# Patient Record
Sex: FEMALE | Race: BLACK OR AFRICAN AMERICAN | NOT HISPANIC OR LATINO | Employment: OTHER | URBAN - METROPOLITAN AREA
[De-identification: names, ages, dates, MRNs, and addresses within clinical notes are randomized per-mention and may not be internally consistent; named-entity substitution may affect disease eponyms.]

---

## 2017-01-05 ENCOUNTER — OFFICE VISIT (OUTPATIENT)
Dept: PODIATRY | Facility: CLINIC | Age: 82
End: 2017-01-05
Payer: MEDICARE

## 2017-01-05 VITALS
HEIGHT: 72 IN | SYSTOLIC BLOOD PRESSURE: 153 MMHG | HEART RATE: 73 BPM | WEIGHT: 137 LBS | DIASTOLIC BLOOD PRESSURE: 70 MMHG | BODY MASS INDEX: 18.56 KG/M2

## 2017-01-05 DIAGNOSIS — L97.509 FOOT ULCER DUE TO SECONDARY DM: Primary | Chronic | ICD-10-CM

## 2017-01-05 DIAGNOSIS — E13.621 FOOT ULCER DUE TO SECONDARY DM: Primary | Chronic | ICD-10-CM

## 2017-01-05 DIAGNOSIS — I73.9 PERIPHERAL VASCULAR DISEASE: ICD-10-CM

## 2017-01-05 PROCEDURE — 3078F DIAST BP <80 MM HG: CPT | Mod: S$GLB,,, | Performed by: PODIATRIST

## 2017-01-05 PROCEDURE — 1126F AMNT PAIN NOTED NONE PRSNT: CPT | Mod: S$GLB,,, | Performed by: PODIATRIST

## 2017-01-05 PROCEDURE — 1159F MED LIST DOCD IN RCRD: CPT | Mod: S$GLB,,, | Performed by: PODIATRIST

## 2017-01-05 PROCEDURE — 99213 OFFICE O/P EST LOW 20 MIN: CPT | Mod: 25,S$GLB,, | Performed by: PODIATRIST

## 2017-01-05 PROCEDURE — 11042 DBRDMT SUBQ TIS 1ST 20SQCM/<: CPT | Mod: S$GLB,,, | Performed by: PODIATRIST

## 2017-01-05 PROCEDURE — 1157F ADVNC CARE PLAN IN RCRD: CPT | Mod: S$GLB,,, | Performed by: PODIATRIST

## 2017-01-05 PROCEDURE — 1160F RVW MEDS BY RX/DR IN RCRD: CPT | Mod: S$GLB,,, | Performed by: PODIATRIST

## 2017-01-05 PROCEDURE — 3077F SYST BP >= 140 MM HG: CPT | Mod: S$GLB,,, | Performed by: PODIATRIST

## 2017-01-05 NOTE — MR AVS SNAPSHOT
[address]  [address]   [place] [address]  Phone: [phone]  Fax: [phone]                  Chey Trujillo   2017 9:30 AM   Office Visit    Description:  Female : 1935   Provider:  Vinay Zaragoza Jr., DPM   Department:  [facility]           Reason for Visit     Establish Care     Diabetic Foot Exam           Diagnoses this Visit        Comments    Foot ulcer due to secondary DM    -  Primary     Peripheral vascular disease                To Do List           Future Appointments        Provider Department Dept Phone    2017 3:40 PM John Quezada MD [facility] - Neurology [phone]    2017 11:05 AM Vinay Zaragoza Jr., DPM [facility] [phone]    3/2/2017 2:30 PM Shirley De La O APRN,ANP-C [facility] - Endocrinology [phone]    3/23/2017 11:20 AM TELEPHONE CHECK, PACEMAKER [facility] - Arrhythmia [phone]      Goals (5 Years of Data)     None      Follow-Up and Disposition     Return in about 2 weeks (around 2017) for woundcare.       These Medications        Disp Refills Start End    collagenase ointment 90 g 1 2017     Apply topically once daily. - Topical (Top)    Pharmacy: [facility]- [facility] - [place], [place] - [address] Ph #: [phone]         [facility]     [facility] On Call Nurse Care Line -  Assistance  Registered nurses in the [facility] Center provide clinical advisement, health education, appointment booking, and other advisory services.  Call for this free service at [phone].             Medications           Message regarding Medications     Verify the changes and/or additions to your medication regime listed below are the same as discussed with your clinician today.  If any of these changes or additions are incorrect, please notify your healthcare provider.        START taking these NEW medications        Refills     collagenase ointment 1    Sig: Apply topically once daily.    Class: Normal    Route: Topical (Top)           Verify that the below list of medications is an accurate representation of the medications you are currently taking.  If none reported, the list may be blank. If incorrect, please contact your healthcare provider. Carry this list with you in case of emergency.           Current Medications     amiodarone (PACERONE) 200 MG Tab Take 1 tablet (200 mg total) by mouth once daily.    amlodipine (NORVASC) 10 MG tablet Take 1 tablet (10 mg total) by mouth once daily.    aspirin 81 MG chewable tablet Take 1 tablet by mouth Every morning.    blood sugar diagnostic (ACCU-CHEK SMARTVIEW TEST STRIP) Strp 1 strip by Misc.(Non-Drug; Combo Route) route 4 (four) times daily.    blood-glucose meter Misc Dispense Accu-Chek Natalia meter    carvedilol (COREG) 25 MG tablet Take 1 tablet (25 mg total) by mouth 2 (two) times daily with meals.    cloNIDine (CATAPRES) 0.2 MG tablet Take 1 tablet (0.2 mg total) by mouth 2 (two) times daily.    cyanocobalamin, vitamin B-12, 1,000 mcg TbSR Take 1,000 mcg by mouth once daily.    dorzolamide (TRUSOPT) 2 % ophthalmic solution INSTILL ONE DROP INTO EACH EYE TWICE DAILY    doxazosin (CARDURA) 8 MG Tab TAKE ONE TABLET BY MOUTH ONCE DAILY IN THE EVENING    ferrous sulfate 324 mg (65 mg iron) TbEC TAKE ONE TABLET BY MOUTH TWICE DAILY ,  TAKE  WITH  A  SMALL  AMOUNT  OF  LEMON  WATER    hydrALAZINE (APRESOLINE) 100 MG tablet Take 1 tablet (100 mg total) by mouth every 8 (eight) hours.    insulin glargine (LANTUS) 100 unit/mL injection INJECT 10 units nightly.    insulin lispro (HUMALOG) 100 unit/mL injection Take 9 units with breakfast, 9 units with lunch, and 8 units with dinner. Plus sliding scale    insulin syringe-needle U-100 (EASY TOUCH INSULIN SYRINGE) 0.5 mL 31 gauge x 5/16 Syrg To use 4 times daily with insulin injections    isosorbide dinitrate (ISORDIL) 40 MG Tab Take 1 tablet (40 mg  "total) by mouth 3 (three) times daily.    lactulose (CEPHULAC) 20 gram Pack Take 1 packet (20 g total) by mouth once daily.    lancets Misc 1 lancet by Misc.(Non-Drug; Combo Route) route 4 (four) times daily.    latanoprost 0.005 % ophthalmic solution INSTILL ONE DROP INTO EACH EYE IN THE EVENING    lidocaine (LIDODERM) 5 % Place 1 patch onto the skin once daily. Remove & Discard patch within 12 hours or as directed by MD. Can apply to back area for pain    nitroGLYCERIN 0.4 MG/DOSE TL SPRY (NITROLINGUAL) 400 mcg/spray spray PLACE ONE SPRAY UNDER THE TONGUE EVERY 5 MINUTES AS NEEDED FOR CHEST PAIN.    NITROSTAT 0.3 mg SL tablet DISSOLVE ONE TABLET UNDER THE TONGUE EVERY 5 MINUTES AS NEEDED FOR CHEST PAIN.  DO NOT EXCEED A TOTAL OF 3 DOSES IN 15 MINUTES    paricalcitol (ZEMPLAR) 1 MCG capsule Take 1 capsule by mouth on Monday and Friday.    potassium chloride SA (K-DUR,KLOR-CON) 10 MEQ tablet Take 1 tablet (10 mEq total) by mouth 2 (two) times daily.    pravastatin (PRAVACHOL) 20 MG tablet TAKE 1 TABLET BY MOUTH once DAILY    sodium bicarbonate 650 MG tablet     torsemide (DEMADEX) 20 MG Tab Take 2 tablets (40 mg total) by mouth every morning.    warfarin (COUMADIN) 5 MG tablet     ZOSTAVAX, PF, 19,400 unit/0.65 mL injection     collagenase ointment Apply topically once daily.           Clinical Reference Information           Vital Signs - Last Recorded  Most recent update: 1/5/2017  9:29 AM by Terra Alvarado MA    BP Pulse Ht Wt BMI    (!) 153/70 (BP Location: Right arm, Patient Position: Sitting) 73 6' 2" (1.88 m) 62.1 kg (137 lb) 17.59 kg/m2      Blood Pressure          Most Recent Value    BP  (!)  153/70      Allergies as of 1/5/2017     Losartan    0.225 % Sodium Chloride    Amitriptyline    Azopt  [Brinzolamide]    Ciprofloxacin    Codeine    Cantil  [Mepenzolate Bromide]    Duragal-s    Erythromycin    Fentanyl    Hydrocodone-acetaminophen    Indomethacin    Meperidine    Minoxidil    Morphine    " Neuromuscular Blockers, Steroidal    Nifedipine    Oxycodone Hcl-oxycodone-asa    Penicillins    Propoxyphene    Sensipar [Cinacalcet]    Talwin Compound    Talwin  [Pentazocine Lactate]    Valsartan      Immunizations Administered on Date of Encounter - 1/5/2017     None      Orders Placed During Today's Visit     Future Labs/Procedures Expected by Expires    C-reactive protein  1/5/2017 3/6/2018    PREALBUMIN  1/5/2017 3/6/2018    Sedimentation rate, manual  1/5/2017 3/6/2018    X-Ray Calcaneus Bilateral  1/5/2017 1/5/2018    X-Ray Foot Complete Bilateral  1/5/2017 1/5/2018

## 2017-01-05 NOTE — PROGRESS NOTES
Subjective:    Patient ID: Chey Trujillo is a 81 y.o. female.    Chief Complaint: Establish Care (diabetic last appt 2 weeks ago, heel wounds) and Diabetic Foot Exam      HPI:   Chey is a 81 y.o. female who presents to the clinic for evaluation and treatment of high risk feet. 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 The patient's chief complaint is foot ulcer, R heel. This patient has documented high risk feet requiring routine maintenance secondary to diabetes mellitis and those secondary complications of diabetes, as mentioned..  Pt has established care with dr. Lola PRITCHETT at [facility].  Wants foot  closer to home. Has been moving all care to [place].   Gets home health from [facility].      PCP: Viviana Nguyen MD    Date Last Seen by PCP: in epic    Current shoe  gear:  Affected Foot: Flip-flops    History of Trauma: negative  Sign of Infection: none    Hemoglobin A1C   Date Value Ref Range Status   11/15/2016 7.8 (H) 4.5 - 6.2 % Final     Comment:     According to ADA guidelines, hemoglobin A1C <7.0% represents  optimal control in non-pregnant diabetic patients.  Different  metrics may apply to specific populations.   Standards of Medical Care in Diabetes - 2016.  For the purpose of screening for the presence of diabetes:  <5.7%     Consistent with the absence of diabetes  5.7-6.4%  Consistent with increasing risk for diabetes   (prediabetes)  >or=6.5%  Consistent with diabetes  Currently no consensus exists for use of hemoglobin A1C  for diagnosis of diabetes for children.     09/27/2016 7.5 (H) 4.5 - 6.2 % Final     Comment:     According to ADA guidelines, hemoglobin A1C <7.0% represents  optimal control in non-pregnant diabetic patients.  Different  metrics may apply to specific populations.   Standards of Medical Care in Diabetes - 2016.  For the purpose of screening for the presence of diabetes:  <5.7%     Consistent with the absence of diabetes  5.7-6.4%  Consistent with increasing risk for diabetes   (prediabetes)  >or=6.5%  Consistent with diabetes  Currently no consensus exists for use of hemoglobin A1C  for diagnosis of diabetes for children.     08/16/2016 7.2 (H) 4.5 - 6.2 % Final     Comment:     According to ADA guidelines, hemoglobin A1C <7.0% represents  optimal control in non-pregnant diabetic patients.  Different  metrics may apply to specific populations.   Standards of Medical Care in Diabetes - 2016.  For the purpose of screening for the presence of diabetes:  <5.7%     Consistent with the absence of diabetes  5.7-6.4%  Consistent with increasing risk for diabetes   (prediabetes)  >or=6.5%  Consistent with diabetes  Currently no consensus exists for use of hemoglobin A1C  for diagnosis of diabetes for children.       HPI    Last Podiatry Enc: Visit  date not found  Last Enc w/ Me: Visit date not found    Past Medical History   Diagnosis Date    Anticoagulation monitoring by pharmacist 6/29/2012    At risk for amiodarone toxicity with long term use 1/27/2014    Atrial fibrillation     Bilateral carotid artery disease 1/11/2016    Blood transfusion     CAD S/P percutaneous coronary angioplasty 9/27/2012    Chronic diastolic heart failure 9/27/2012     Echo 3-: 1 - Concentric hypertrophy.  2 - Normal left ventricular systolic function (EF 60-65%).  3 - Right ventricular enlargement with hypertrophy, with normal systolic function.  4 - Left ventricular diastolic dysfunction.  5 - Biatrial enlargement.  6 - Mild tricuspid regurgitation.  7 - Pulmonary hypertension. The estimated PA systolic pressure is 55 mmHg.  8 - Increased central venous pressure (IVC is greater than 3cm in diameter).     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  ROWAN placed just proximal to stent in  for UA.  Cath 2011: normal LMCA, 40% mid LAD, 50% distal LCx    Cardiac pacemaker placement  2012     Quinn Jerome DR, serial #[id number]    Eye surgery      Cataract extraction       Status post cataract extraction and insertion of intraocular lens of right eye     Social History     Social History    Marital status:      Spouse name: N/A    Number of children: N/A    Years of education: N/A     Occupational History    Not on file.     Social History Main Topics    Smoking status: Never Smoker    Smokeless tobacco: Never Used    Alcohol use No    Drug use: No    Sexual activity: No     Other Topics Concern    Not on file     Social History Narrative    Son lives with her. Spouse .          Current Outpatient Prescriptions:     amiodarone (PACERONE) 200 MG Tab, Take 1 tablet (200 mg total) by mouth once daily., Disp: 90 tablet, Rfl: 3    amlodipine (NORVASC) 10 MG tablet, Take 1 tablet (10 mg total) by mouth once daily., Disp: 90 tablet, Rfl: 3    aspirin 81 MG chewable tablet, Take 1 tablet by mouth Every morning., Disp: , Rfl:     blood sugar diagnostic (ACCU-CHEK SMARTVIEW TEST STRIP) Strp, 1 strip by Misc.(Non-Drug; Combo Route) route 4 (four) times daily., Disp: 150 strip, Rfl: 11    blood-glucose meter Misc, Dispense Accu-Chek Natalia meter, Disp: 1 each, Rfl: 0    carvedilol (COREG) 25 MG tablet, Take 1 tablet (25 mg total) by mouth 2 (two) times daily with meals., Disp: 270 tablet, Rfl: 4    cloNIDine (CATAPRES) 0.2 MG tablet, Take 1 tablet (0.2 mg total) by mouth 2 (two) times daily. (Patient taking differently: Take 0.3 mg by mouth 2 (two) times daily. ), Disp: 60 tablet, Rfl: 2    cyanocobalamin, vitamin B-12, 1,000 mcg TbSR, Take 1,000 mcg by mouth once daily., Disp: 30 each, Rfl: 0    dorzolamide (TRUSOPT) 2 % ophthalmic solution, INSTILL ONE DROP INTO  EACH EYE TWICE DAILY, Disp: 10 mL, Rfl: 11    doxazosin (CARDURA) 8 MG Tab, TAKE ONE TABLET BY MOUTH ONCE DAILY IN THE EVENING, Disp: 90 tablet, Rfl: 3    ferrous sulfate 324 mg (65 mg iron) TbEC, TAKE ONE TABLET BY MOUTH TWICE DAILY ,  TAKE  WITH  A  SMALL  AMOUNT  OF  LEMON  WATER, Disp: 60 tablet, Rfl: 0    hydrALAZINE (APRESOLINE) 100 MG tablet, Take 1 tablet (100 mg total) by mouth every 8 (eight) hours., Disp: 270 tablet, Rfl: 4    insulin glargine (LANTUS) 100 unit/mL injection, INJECT 10 units nightly., Disp: 30 mL, Rfl: 12    insulin lispro (HUMALOG) 100 unit/mL injection, Take 9 units with breakfast, 9 units with lunch, and 8 units with dinner. Plus sliding scale, Disp: 2 vial, Rfl: 8    insulin syringe-needle U-100 (EASY TOUCH INSULIN SYRINGE) 0.5 mL 31 gauge x 5/16 Syrg, To use 4 times daily with insulin injections, Disp: 150 each, Rfl: 2    isosorbide dinitrate (ISORDIL) 40 MG Tab, Take 1 tablet (40 mg total) by mouth 3 (three) times daily., Disp: 270 tablet, Rfl: 3    lactulose (CEPHULAC) 20 gram Pack, Take 1 packet (20 g total) by mouth once daily., Disp: 90 packet, Rfl: 6    lancets Misc, 1 lancet by Misc.(Non-Drug; Combo Route) route 4 (four) times daily., Disp: 150 each, Rfl: 11    latanoprost 0.005 % ophthalmic solution, INSTILL ONE DROP INTO EACH EYE IN THE EVENING, Disp: 3 Bottle, Rfl: 12    lidocaine (LIDODERM) 5 %, Place 1 patch onto the skin once daily. Remove & Discard patch within 12 hours or as directed by MD. Can apply to back area for pain, Disp: 30 patch, Rfl: 1    nitroGLYCERIN 0.4 MG/DOSE TL SPRY (NITROLINGUAL) 400 mcg/spray spray, PLACE ONE SPRAY UNDER THE TONGUE EVERY 5 MINUTES AS NEEDED FOR CHEST PAIN., Disp: 4.9 g, Rfl: 0    NITROSTAT 0.3 mg SL tablet, DISSOLVE ONE TABLET UNDER THE TONGUE EVERY 5 MINUTES AS NEEDED FOR CHEST PAIN.  DO NOT EXCEED A TOTAL OF 3 DOSES IN 15 MINUTES, Disp: 100 tablet, Rfl: 0    paricalcitol (ZEMPLAR) 1 MCG capsule, Take 1 capsule by mouth  on Monday and Friday., Disp: 8 capsule, Rfl: 3    potassium chloride SA (K-DUR,KLOR-CON) 10 MEQ tablet, Take 1 tablet (10 mEq total) by mouth 2 (two) times daily., Disp: 60 tablet, Rfl: 11    pravastatin (PRAVACHOL) 20 MG tablet, TAKE 1 TABLET BY MOUTH once DAILY, Disp: 90 tablet, Rfl: 0    sodium bicarbonate 650 MG tablet, , Disp: , Rfl:     torsemide (DEMADEX) 20 MG Tab, Take 2 tablets (40 mg total) by mouth every morning., Disp: 180 tablet, Rfl: 4    warfarin (COUMADIN) 5 MG tablet, , Disp: , Rfl:     ZOSTAVAX, PF, 19,400 unit/0.65 mL injection, , Disp: , Rfl:     collagenase ointment, Apply topically once daily., Disp: 90 g, Rfl: 1  Review of patient's allergies indicates:   Allergen Reactions    Losartan Other (See Comments)     Hyperkalemia    0.225 % sodium chloride      Other reaction(s): Eye irritation    Amitriptyline      Other reaction(s): Vomiting  Other reaction(s): Vomiting    Azopt  [brinzolamide]      Other reaction(s): Eye irritation    Ciprofloxacin Nausea And Vomiting     Other reaction(s): Stomach upset    Codeine      Other reaction(s): Unknown  Other reaction(s): Unknown    Cantil  [mepenzolate bromide]      Other reaction(s): Unknown  Other reaction(s): Unknown    Duragal-s      Other reaction(s): Vomiting    Erythromycin      Other reaction(s): Unknown  Other reaction(s): Unknown    Fentanyl      Other reaction(s): Vomiting    Hydrocodone-acetaminophen      Other reaction(s): Unknown  Other reaction(s): Unknown    Indomethacin      Other reaction(s): Unknown  Other reaction(s): Unknown    Meperidine      Other reaction(s): Unknown  Other reaction(s): Unknown    Minoxidil      Other reaction(s): druged  Other reaction(s): nausea and vomiting  Other reaction(s): nausea and vomiting  Other reaction(s): druged    Morphine      Other reaction(s): Unknown  Other reaction(s): Unknown    Neuromuscular blockers, steroidal      Other reaction(s): Unknown    Nifedipine       "Other reaction(s): Swelling  Other reaction(s): Swelling    Oxycodone hcl-oxycodone-asa      Other reaction(s): Unknown  Other reaction(s): Unknown    Penicillins Hives     Other reaction(s): Unknown    Propoxyphene      Other reaction(s): Unknown    Sensipar [cinacalcet] Nausea Only    Talwin compound      Other reaction(s): Unknown    Talwin  [pentazocine lactate]      Other reaction(s): Unknown    Valsartan Rash and Hives       Visit Vitals    BP (!) 153/70 (BP Location: Right arm, Patient Position: Sitting)    Pulse 73    Ht 6' 2" (1.88 m)    Wt 62.1 kg (137 lb)    BMI 17.59 kg/m2       ROS  ROS Constitutional:  General Appearance: malnourished  Vascular: positive for edema  Musculoskeletal: negative for joint paint and joint edema  Skin: negative for rashes and lesions  Neurological: negative for burning, tingling and numbness  Gastrointestinal: negative for stomach pain, nausea and vomiting        Objective:        Ortho/SPM Exam  Physical Exam  Physical Exam   Constitutional: She is oriented to person, place, and time. She appears well-developed and well-nourished.   Cardiovascular:   Dorsalis pedis and posterior tibial pulses are diminished Bilaterally. Toes are cool to touch. Feet are warm proximally.There is decreased digital hair. Skin is atrophic, slightly hyperpigmented, and mildly edematous       Musculoskeletal: Normal range of motion. She exhibits no edema or tenderness.   Adequate joint range of motion without pain, limitation, nor crepitation Bilateral feet and ankle joints. Muscle strength is 5/5 in all groups bilaterally.         Neurological: She is alert and oriented to person, place, and time.   Atwood-Mayela 5.07 monofilamant testing is diminished Haris feet. Sharp/dull sensation diminished Bilaterally. Light touch absent Bilaterally.       Skin: Skin is warm, dry. No bruising, no ecchymosis and no lesion noted. No erythema.   Nails x10 are elongated by  2-5mm's, thickened by 1-4 " "mm's, dystrophic, and are darkened in  coloration . Xerosis Bilaterally. No open lesions noted.    Hyperkeratotic tissue noted to posterior left heel    Psychiatric: She has a normal mood and affect. Her behavior is normal. Judgment and thought content normal.   Vitals reviewed.    Ulcer Location: R posterior heel  Measurements: .5x.5x.5  Periwound: Intact and hyperkeratotic  Drainage: none  Purulence: None.  Malodor: None.  Base:  100% fibrotic  Signs of infection: None.          Assessment:     Imaging: ordered on way out        1. Foot ulcer due to secondary DM    2. Peripheral vascular disease          Plan:       Orders Placed This Encounter    Debridement    X-Ray Foot Complete Bilateral    X-Ray Calcaneus Bilateral    Sedimentation rate, manual    C-reactive protein    PREALBUMIN    collagenase ointment     Wound Debridement  Date/Time: 1/5/2017 10:10 AM  Performed by: SHAWNEE SOLORZANO JR.  Authorized by: SHAWNEE SOLORZANO JR.     Time out: Immediately prior to procedure a "time out" was called to verify the correct patient, procedure, equipment, support staff and site/side marked as required.    Consent Done?:  Yes (Verbal)    Preparation: Patient was prepped and draped in usual sterile fashion    Local anesthesia used?: No      Wound Details:    Location:  Right foot    Location:  Right Heel    Type of Debridement:  Excisional       Length (cm):  0.5       Area (sq cm):  0.25       Width (cm):  0.5       Percent Debrided (%):  100       Depth (cm):  0.5       Total Area Debrided (sq cm):  0.25    Depth of debridement:  Subcutaneous tissue    Tissue debrided:  Epidermis, Hypergranulation and Subcutaneous    Devitalized tissue debrided:  Biofilm, Callus and Sough    Instruments:  Blade    Bleeding:  Minimal  Hemostasis Achieved: Yes    Method Used:  Pressure  Patient tolerance:  Patient tolerated the procedure well with no immediate complications        Chey was seen today for establish care and " diabetic foot exam.    Diagnoses and all orders for this visit:    Foot ulcer due to secondary DM  -     X-Ray Foot Complete Bilateral; Future  -     X-Ray Calcaneus Bilateral; Future  -     Sedimentation rate, manual; Future  -     C-reactive protein; Future  -     PREALBUMIN; Future  -     collagenase ointment; Apply topically once daily.  -     Debridement    Peripheral vascular disease  -     X-Ray Foot Complete Bilateral; Future  -     X-Ray Calcaneus Bilateral; Future        Chey ADRIANO Ronnie is a 81 y.o. female presenting w/   1. Foot ulcer due to secondary DM    2. Peripheral vascular disease        -pt seen, evaluated, and managed  -dx discussed in detail. All questions/concerns addressed  -all tx options discussed. All alternatives, risks, benefits of all txs discussed  -The patient was educated regarding the above diagnosis. We discussed conservative care options regarding shoe wear and/or padding.  -rxs dispensed: santyl. Pickup rx and bring to nxt visit  -dressing applied: iodosorb+mepilex+football. Keep on for 1 wk only and then remove at home. Keep covered with bandaid+triple abx ointment until nxt visit  -xr and labs ordered on way out--> will review at nxt visit  -full DM foot exam w/ routine care when ulceration is healed    Return in about 2 weeks (around 1/19/2017) for woundcare.

## 2017-01-10 ENCOUNTER — LAB VISIT (OUTPATIENT)
Dept: LAB | Facility: HOSPITAL | Age: 82
End: 2017-01-10
Attending: INTERNAL MEDICINE
Payer: MEDICARE

## 2017-01-10 ENCOUNTER — ANTI-COAG VISIT (OUTPATIENT)
Dept: CARDIOLOGY | Facility: CLINIC | Age: 82
End: 2017-01-10

## 2017-01-10 DIAGNOSIS — I50.32 CHRONIC DIASTOLIC HEART FAILURE: Primary | ICD-10-CM

## 2017-01-10 DIAGNOSIS — I48.0 PAROXYSMAL ATRIAL FIBRILLATION: ICD-10-CM

## 2017-01-10 DIAGNOSIS — Z79.01 ANTICOAGULATION MONITORING BY PHARMACIST: ICD-10-CM

## 2017-01-10 LAB
INR PPP: 1.9
PROTHROMBIN TIME: 18.8 SEC

## 2017-01-10 PROCEDURE — 36415 COLL VENOUS BLD VENIPUNCTURE: CPT

## 2017-01-10 PROCEDURE — 85610 PROTHROMBIN TIME: CPT

## 2017-01-13 ENCOUNTER — OFFICE VISIT (OUTPATIENT)
Dept: NEUROLOGY | Facility: CLINIC | Age: 82
End: 2017-01-13
Payer: MEDICARE

## 2017-01-13 ENCOUNTER — TELEPHONE (OUTPATIENT)
Dept: PODIATRY | Facility: CLINIC | Age: 82
End: 2017-01-13

## 2017-01-13 VITALS
WEIGHT: 163 LBS | DIASTOLIC BLOOD PRESSURE: 66 MMHG | HEIGHT: 72 IN | HEART RATE: 72 BPM | BODY MASS INDEX: 22.08 KG/M2 | SYSTOLIC BLOOD PRESSURE: 125 MMHG

## 2017-01-13 DIAGNOSIS — E11.22 DIABETES MELLITUS WITH STAGE 4 CHRONIC KIDNEY DISEASE GFR 15-29: Chronic | ICD-10-CM

## 2017-01-13 DIAGNOSIS — Z95.0 PACEMAKER: Chronic | ICD-10-CM

## 2017-01-13 DIAGNOSIS — G47.33 OBSTRUCTIVE SLEEP APNEA ON CPAP: Chronic | ICD-10-CM

## 2017-01-13 DIAGNOSIS — F01.518 VASCULAR DEMENTIA WITH BEHAVIOR DISTURBANCE: Primary | ICD-10-CM

## 2017-01-13 DIAGNOSIS — I48.0 PAROXYSMAL ATRIAL FIBRILLATION: Chronic | ICD-10-CM

## 2017-01-13 DIAGNOSIS — N18.4 DIABETES MELLITUS WITH STAGE 4 CHRONIC KIDNEY DISEASE GFR 15-29: Chronic | ICD-10-CM

## 2017-01-13 PROCEDURE — 99214 OFFICE O/P EST MOD 30 MIN: CPT | Mod: S$PBB,,, | Performed by: PSYCHIATRY & NEUROLOGY

## 2017-01-13 PROCEDURE — 99213 OFFICE O/P EST LOW 20 MIN: CPT | Mod: PBBFAC,PO | Performed by: PSYCHIATRY & NEUROLOGY

## 2017-01-13 PROCEDURE — 99999 PR PBB SHADOW E&M-EST. PATIENT-LVL III: CPT | Mod: PBBFAC,,, | Performed by: PSYCHIATRY & NEUROLOGY

## 2017-01-13 RX ORDER — MEMANTINE HYDROCHLORIDE 10 MG/1
10 TABLET ORAL DAILY
Qty: 30 TABLET | Refills: 5 | Status: SHIPPED | OUTPATIENT
Start: 2017-01-13 | End: 2017-04-21 | Stop reason: SDUPTHER

## 2017-01-13 NOTE — TELEPHONE ENCOUNTER
Spoke with patients son, Gilberto  and let him know that the silver nitrate will turn the skin black. When asked he stated that the wound does not have any drainage, a foul smell or redness. Patient does not have any fever, nausea or vomiting.

## 2017-01-13 NOTE — PROGRESS NOTES
Subjective:       Patient ID: Chey Trujillo is a 81 y.o. female.    Chief Complaint:  Memory Loss      History of Present Illness  HPI   This is an 81-year-old female who returns in follow-up of memory difficulties.  She was accompanied by her daughters who reported that she was fairly stable until September 2016 when she became quite distraught after the death of her niece suddenly.  It is reported that she used to look after her niece as if she were her daughter.  She became increasingly withdrawn and inattentive and at times is rambling incoherently.  She was admitted to the hospital when she became increasingly confused however it was noted that she was not taking her medications as prescribed and her diabetes was not well controlled and in addition it was suspected she may have had a reaction to the clonidine.  However a workup done during that hospitalization included a CT scan of the brain that was unremarkable and the patient is otherwise stable when discharged.      Since being at home she had continued to be somewhat withdrawn, inattentive and repeats herself but was otherwise quite appropriate when talked to.  The patient was able to give an adequate history during this interview.  She is limited to a walker or wheelchair because of chronic back problems.  She has had no recent falls.  Neuropsychological evaluation done recently was considered a combination of mild dementia most likely vascular in type and underlying depression also contributing factor.  Discussed results of the neuropsychological testing with family.  The patient has multiple cerebrovascular risk factors as documented and is presently on Coumadin and aspirin 81 mg daily.       Review of Systems  Review of Systems   Constitutional: Negative.    HENT: Negative.  Negative for hearing loss.    Eyes: Negative.  Negative for visual disturbance.   Respiratory: Negative.  Negative for shortness of breath.    Cardiovascular: Negative.   Negative for chest pain and palpitations.   Gastrointestinal: Negative.    Endocrine: Negative.    Genitourinary: Negative.    Musculoskeletal: Positive for arthralgias, back pain and gait problem. Negative for neck pain.   Skin: Negative.    Allergic/Immunologic: Negative.    Neurological: Negative for tremors, seizures, syncope, speech difficulty, weakness, numbness and headaches.   Hematological: Negative.    Psychiatric/Behavioral: Positive for decreased concentration and sleep disturbance. Negative for confusion. The patient is nervous/anxious.        Objective:      Neurologic Exam     Motor Exam     Strength   Strength 5/5 throughout.       Physical Exam   Constitutional: She appears well-developed and well-nourished.   HENT:   Head: Normocephalic.   Neck: Normal range of motion. Neck supple.   Neurological: She is alert. She has normal strength. She displays no atrophy, no tremor and normal reflexes (DTRs generally depressed to absent distally with plantars being flexor). A sensory deficit (Primary sensations intact though diminished distally) is present. No cranial nerve deficit (Cranial nerves are essentially intact and symmetrical.  Pupils equal and reactive with EOM full.  No facial asymmetry is noted.  Corneals and facial sensations intact.  Tongue and palate movements are normal.). She exhibits normal muscle tone. Gait (Walks stiffly with slight imbalance related to back and hip problems.) abnormal. Coordination normal.   Patient is awake and responsive and able to give an adequate recent history.  She does remember details of what happened to her niece and appears to be focused on that.  She is oriented to place person and time.  Immediate recall 2/3.  Spelling backwards 3/5.  She does take time to answer questions and there is a tendency to perseverate.  She is otherwise appropriate able to follow commands without any difficulty.  Speech was of normal flow and content though rambling at times.    Vitals reviewed.        Assessment:        1. Vascular dementia with behavior disturbance    2. 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 Obstructive sleep apnea on CPAP - setting = 5     4. Paroxysmal atrial fibrillation    5. Diabetes mellitus with stage 4 chronic kidney disease GFR 15-29            Plan:         Discussed with patient and family.  Results of the neuropsychological testing would discussed with the family.  Will initiate Namenda 10 mg in the morning daily.  They will contact me in about 2-4 weeks regarding response at which time may consider the addition of an SSRI.  Discussed family regarding regular use of CPAP as hypoxemia related to sleep apnea can also contribute to memory difficulties.  Control of vascular risk factors especially diabetes.  They will follow-up in 3 months.

## 2017-01-13 NOTE — MR AVS SNAPSHOT
[facility] Neurology  [address] [place] [address], [address]  [place] [address]  Phone: [phone]                  Chey Trujillo   2017 3:40 PM   Office Visit    Description:  Female : 1935   Provider:  John Quezada MD   Department[facility]  [facility] - Neurology           Reason for Visit     Memory Loss           Diagnoses this Visit        Comments    Vascular dementia with behavior disturbance                To Do List           Future Appointments        Provider Department Dept Phone    2017 11:05 AM Vinay Zaragoza Jr., DPM [facility] [phone]    3/2/2017 2:30 PM Shirleysury De La O, APRN,ANP-C [facility] - Endocrinology [phone]    3/23/2017 11:20 AM TELEPHONE CHECK, PACEMAKER [facility] - Arrhythmia [phone]    2017 3:20 PM John Quezada MD [facility] [phone]      Goals (5 Years of Data)     None       These Medications        Disp Refills Start End    memantine (NAMENDA) 10 MG Tab 30 tablet 5 2017    Take 1 tablet (10 mg total) by mouth once daily. - Oral    Pharmacy: "[facility] - [place], [place] - [address] Ph #: [phone]         [facility] On Call     [facility] On Call Nurse Care Line - 24/7 Assistance  Registered nurses in the [facility] On Call Center provide clinical advisement, health education, appointment booking, and other advisory services.  Call for this free service at [phone].             Medications           Message regarding Medications     Verify the changes and/or additions to your medication regime listed below are the same as discussed with your clinician today.  If any of these changes or additions are incorrect, please notify your healthcare provider.        START taking these NEW medications        Refills    memantine (NAMENDA) 10 MG Tab 5    Sig: Take 1 tablet (10 mg total) by mouth once daily.    Class: Normal    Route: Oral           Verify that the below list of  medications is an accurate representation of the medications you are currently taking.  If none reported, the list may be blank. If incorrect, please contact your healthcare provider. Carry this list with you in case of emergency.           Current Medications     amiodarone (PACERONE) 200 MG Tab Take 1 tablet (200 mg total) by mouth once daily.    amlodipine (NORVASC) 10 MG tablet Take 1 tablet (10 mg total) by mouth once daily.    aspirin 81 MG chewable tablet Take 1 tablet by mouth Every morning.    blood sugar diagnostic (ACCU-CHEK SMARTVIEW TEST STRIP) Strp 1 strip by Misc.(Non-Drug; Combo Route) route 4 (four) times daily.    blood-glucose meter Misc Dispense Accu-Chek Natalia meter    carvedilol (COREG) 25 MG tablet Take 1 tablet (25 mg total) by mouth 2 (two) times daily with meals.    cloNIDine (CATAPRES) 0.2 MG tablet Take 1 tablet (0.2 mg total) by mouth 2 (two) times daily.    collagenase ointment Apply topically once daily.    cyanocobalamin, vitamin B-12, 1,000 mcg TbSR Take 1,000 mcg by mouth once daily.    dorzolamide (TRUSOPT) 2 % ophthalmic solution INSTILL ONE DROP INTO EACH EYE TWICE DAILY    doxazosin (CARDURA) 8 MG Tab TAKE ONE TABLET BY MOUTH ONCE DAILY IN THE EVENING    ferrous sulfate 324 mg (65 mg iron) TbEC TAKE ONE TABLET BY MOUTH TWICE DAILY ,  TAKE  WITH  A  SMALL  AMOUNT  OF  LEMON  WATER    hydrALAZINE (APRESOLINE) 100 MG tablet Take 1 tablet (100 mg total) by mouth every 8 (eight) hours.    insulin glargine (LANTUS) 100 unit/mL injection INJECT 10 units nightly.    insulin lispro (HUMALOG) 100 unit/mL injection Take 9 units with breakfast, 9 units with lunch, and 8 units with dinner. Plus sliding scale    insulin syringe-needle U-100 (EASY TOUCH INSULIN SYRINGE) 0.5 mL 31 gauge x 5/16 Syrg To use 4 times daily with insulin injections    isosorbide dinitrate (ISORDIL) 40 MG Tab Take 1 tablet (40 mg total) by mouth 3 (three) times daily.    lactulose (CEPHULAC) 20 gram Pack Take 1 packet  "(20 g total) by mouth once daily.    lancets Misc 1 lancet by Misc.(Non-Drug; Combo Route) route 4 (four) times daily.    latanoprost 0.005 % ophthalmic solution INSTILL ONE DROP INTO EACH EYE IN THE EVENING    lidocaine (LIDODERM) 5 % Place 1 patch onto the skin once daily. Remove & Discard patch within 12 hours or as directed by MD. Can apply to back area for pain    memantine (NAMENDA) 10 MG Tab Take 1 tablet (10 mg total) by mouth once daily.    nitroGLYCERIN 0.4 MG/DOSE TL SPRY (NITROLINGUAL) 400 mcg/spray spray PLACE ONE SPRAY UNDER THE TONGUE EVERY 5 MINUTES AS NEEDED FOR CHEST PAIN.    NITROSTAT 0.3 mg SL tablet DISSOLVE ONE TABLET UNDER THE TONGUE EVERY 5 MINUTES AS NEEDED FOR CHEST PAIN.  DO NOT EXCEED A TOTAL OF 3 DOSES IN 15 MINUTES    paricalcitol (ZEMPLAR) 1 MCG capsule Take 1 capsule by mouth on Monday and Friday.    potassium chloride SA (K-DUR,KLOR-CON) 10 MEQ tablet Take 1 tablet (10 mEq total) by mouth 2 (two) times daily.    pravastatin (PRAVACHOL) 20 MG tablet TAKE 1 TABLET BY MOUTH once DAILY    sodium bicarbonate 650 MG tablet     torsemide (DEMADEX) 20 MG Tab Take 2 tablets (40 mg total) by mouth every morning.    warfarin (COUMADIN) 5 MG tablet     ZOSTAVAX, PF, 19,400 unit/0.65 mL injection            Clinical Reference Information           Vital Signs - Last Recorded  Most recent update: 1/13/2017  3:45 PM by Tali Bledsoe MA    BP Pulse Ht Wt BMI    125/66 (BP Location: Right arm, Patient Position: Sitting, BP Method: Automatic) 72 6' 2" (1.88 m) 73.9 kg (163 lb) 20.93 kg/m2      Blood Pressure          Most Recent Value    BP  125/66      Allergies as of 1/13/2017     Losartan    0.225 % Sodium Chloride    Amitriptyline    Azopt  [Brinzolamide]    Ciprofloxacin    Codeine    Cantil  [Mepenzolate Bromide]    Duragal-s    Erythromycin    Fentanyl    Hydrocodone-acetaminophen    Indomethacin    Meperidine    Minoxidil    Morphine    Neuromuscular Blockers, Steroidal    Nifedipine    " Oxycodone Hcl-oxycodone-asa    Penicillins    Propoxyphene    Sensipar [Cinacalcet]    Talwin Compound    Talwin  [Pentazocine Lactate]    Valsartan      Immunizations Administered on Date of Encounter - 1/13/2017     None

## 2017-01-13 NOTE — TELEPHONE ENCOUNTER
"----- Message from Jose F Wahl sent at 1/13/2017 12:35 PM CST -----  Contact: pk Macias   [phone]  or  [phone]  "My mother kept the bandage on for 1 week and now she has a blacken heel.  I do not like the way this look.  Please call me back soon."  "

## 2017-01-13 NOTE — PATIENT INSTRUCTIONS
Discussed with patient and family.  Results of the neuropsychological testing would discussed with the family.  Will initiate Namenda 10 mg in the morning daily.  They will contact me in about 2-4 weeks regarding response at which time may consider the addition of an SSRI.  Discussed family regarding regular use of CPAP as hypoxemia related to sleep apnea can also contribute to memory difficulties.  Control of vascular risk factors especially diabetes.

## 2017-01-17 ENCOUNTER — ANTI-COAG VISIT (OUTPATIENT)
Dept: CARDIOLOGY | Facility: CLINIC | Age: 82
End: 2017-01-17

## 2017-01-17 DIAGNOSIS — Z79.01 ANTICOAGULATION MONITORING BY PHARMACIST: ICD-10-CM

## 2017-01-17 DIAGNOSIS — I48.0 PAROXYSMAL ATRIAL FIBRILLATION: ICD-10-CM

## 2017-01-18 DIAGNOSIS — D63.8 ANEMIA OF CHRONIC DISEASE: ICD-10-CM

## 2017-01-19 RX ORDER — AMIODARONE HYDROCHLORIDE 200 MG/1
TABLET ORAL
Qty: 90 TABLET | OUTPATIENT
Start: 2017-01-19

## 2017-01-19 RX ORDER — FERROUS SULFATE 324(65)MG
TABLET, DELAYED RELEASE (ENTERIC COATED) ORAL
Qty: 60 TABLET | Refills: 0 | Status: SHIPPED | OUTPATIENT
Start: 2017-01-19 | End: 2017-01-20 | Stop reason: SDUPTHER

## 2017-01-20 DIAGNOSIS — D63.8 ANEMIA OF CHRONIC DISEASE: ICD-10-CM

## 2017-01-20 RX ORDER — FERROUS SULFATE 324(65)MG
325 TABLET, DELAYED RELEASE (ENTERIC COATED) ORAL 2 TIMES DAILY
Qty: 60 TABLET | Refills: 0 | Status: SHIPPED | OUTPATIENT
Start: 2017-01-20 | End: 2017-06-19 | Stop reason: SDUPTHER

## 2017-01-24 ENCOUNTER — OFFICE VISIT (OUTPATIENT)
Dept: PODIATRY | Facility: CLINIC | Age: 82
End: 2017-01-24
Payer: MEDICARE

## 2017-01-24 VITALS
RESPIRATION RATE: 20 BRPM | WEIGHT: 158.38 LBS | SYSTOLIC BLOOD PRESSURE: 115 MMHG | BODY MASS INDEX: 21.45 KG/M2 | HEART RATE: 69 BPM | DIASTOLIC BLOOD PRESSURE: 63 MMHG | HEIGHT: 72 IN

## 2017-01-24 DIAGNOSIS — L97.509 FOOT ULCER DUE TO SECONDARY DM: Primary | Chronic | ICD-10-CM

## 2017-01-24 DIAGNOSIS — N18.4 DIABETES MELLITUS WITH STAGE 4 CHRONIC KIDNEY DISEASE GFR 15-29: Chronic | ICD-10-CM

## 2017-01-24 DIAGNOSIS — I73.9 PERIPHERAL VASCULAR DISEASE: ICD-10-CM

## 2017-01-24 DIAGNOSIS — E13.621 FOOT ULCER DUE TO SECONDARY DM: Primary | Chronic | ICD-10-CM

## 2017-01-24 DIAGNOSIS — E11.22 DIABETES MELLITUS WITH STAGE 4 CHRONIC KIDNEY DISEASE GFR 15-29: Chronic | ICD-10-CM

## 2017-01-24 PROCEDURE — 3074F SYST BP LT 130 MM HG: CPT | Mod: S$GLB,,, | Performed by: PODIATRIST

## 2017-01-24 PROCEDURE — 99213 OFFICE O/P EST LOW 20 MIN: CPT | Mod: 25,S$GLB,, | Performed by: PODIATRIST

## 2017-01-24 PROCEDURE — 1159F MED LIST DOCD IN RCRD: CPT | Mod: S$GLB,,, | Performed by: PODIATRIST

## 2017-01-24 PROCEDURE — 11042 DBRDMT SUBQ TIS 1ST 20SQCM/<: CPT | Mod: S$GLB,,, | Performed by: PODIATRIST

## 2017-01-24 PROCEDURE — 1126F AMNT PAIN NOTED NONE PRSNT: CPT | Mod: S$GLB,,, | Performed by: PODIATRIST

## 2017-01-24 PROCEDURE — 1157F ADVNC CARE PLAN IN RCRD: CPT | Mod: S$GLB,,, | Performed by: PODIATRIST

## 2017-01-24 PROCEDURE — 3078F DIAST BP <80 MM HG: CPT | Mod: S$GLB,,, | Performed by: PODIATRIST

## 2017-01-24 PROCEDURE — 1160F RVW MEDS BY RX/DR IN RCRD: CPT | Mod: S$GLB,,, | Performed by: PODIATRIST

## 2017-01-24 NOTE — MR AVS SNAPSHOT
[address]  [address]   [place] [address]  Phone: [phone]  Fax: [phone]                  Chey Trujillo   2017 11:00 AM   Office Visit    Description:  Female : 1935   Provider:  Vinay Zaragoza Jr., DPM   Department:  [facility]           Reason for Visit     Follow-up                To Do List           Future Appointments        Provider Department Dept Phone    2017 8:15 AM Vinay Zaragoza Jr., DPM [facility] [phone]    3/2/2017 2:30 PM JAYLENE Way,ANP-C [facility] - Endocrinology [phone]    3/23/2017 11:20 AM TELEPHONE CHECK, PACEMAKER [facility] - Arrhythmia [phone]    2017 3:20 PM John Quezada MD [facility] Neurology [phone]      Goals (5 Years of Data)     None      Follow-Up and Disposition     Return in about 1 week (around 2017).      [facility] On Call     [facility] On Call Nurse [facility] Line -  Assistance  Registered nurses in the [facility] On Call Center provide clinical advisement, health education, appointment booking, and other advisory services.  Call for this free service at [phone].             Medications           Message regarding Medications     Verify the changes and/or additions to your medication regime listed below are the same as discussed with your clinician today.  If any of these changes or additions are incorrect, please notify your healthcare provider.             Verify that the below list of medications is an accurate representation of the medications you are currently taking.  If none reported, the list may be blank. If incorrect, please contact your healthcare provider. Carry this list with you in case of emergency.           Current Medications     amiodarone (PACERONE) 200 MG Tab Take 1 tablet (200 mg total) by mouth once daily.    amlodipine (NORVASC) 10 MG tablet Take 1 tablet (10 mg total) by mouth once daily.     aspirin 81 MG chewable tablet Take 1 tablet by mouth Every morning.    blood sugar diagnostic (ACCU-CHEK SMARTVIEW TEST STRIP) Strp 1 strip by Misc.(Non-Drug; Combo Route) route 4 (four) times daily.    blood-glucose meter Misc Dispense Accu-Chek Natalia meter    carvedilol (COREG) 25 MG tablet Take 1 tablet (25 mg total) by mouth 2 (two) times daily with meals.    cloNIDine (CATAPRES) 0.2 MG tablet Take 1 tablet (0.2 mg total) by mouth 2 (two) times daily.    collagenase ointment Apply topically once daily.    cyanocobalamin, vitamin B-12, 1,000 mcg TbSR Take 1,000 mcg by mouth once daily.    dorzolamide (TRUSOPT) 2 % ophthalmic solution INSTILL ONE DROP INTO EACH EYE TWICE DAILY    doxazosin (CARDURA) 8 MG Tab TAKE ONE TABLET BY MOUTH ONCE DAILY IN THE EVENING    ferrous sulfate 324 mg (65 mg iron) TbEC Take 1 tablet (325 mg total) by mouth 2 (two) times daily.    hydrALAZINE (APRESOLINE) 100 MG tablet Take 1 tablet (100 mg total) by mouth every 8 (eight) hours.    insulin glargine (LANTUS) 100 unit/mL injection INJECT 10 units nightly.    insulin lispro (HUMALOG) 100 unit/mL injection Take 9 units with breakfast, 9 units with lunch, and 8 units with dinner. Plus sliding scale    insulin syringe-needle U-100 (EASY TOUCH INSULIN SYRINGE) 0.5 mL 31 gauge x 5/16 Syrg To use 4 times daily with insulin injections    isosorbide dinitrate (ISORDIL) 40 MG Tab Take 1 tablet (40 mg total) by mouth 3 (three) times daily.    lactulose (CEPHULAC) 20 gram Pack Take 1 packet (20 g total) by mouth once daily.    lancets Misc 1 lancet by Misc.(Non-Drug; Combo Route) route 4 (four) times daily.    latanoprost 0.005 % ophthalmic solution INSTILL ONE DROP INTO EACH EYE IN THE EVENING    lidocaine (LIDODERM) 5 % Place 1 patch onto the skin once daily. Remove & Discard patch within 12 hours or as directed by MD. Can apply to back area for pain    memantine (NAMENDA) 10 MG Tab Take 1 tablet (10 mg total) by mouth once daily.     "nitroGLYCERIN 0.4 MG/DOSE TL SPRY (NITROLINGUAL) 400 mcg/spray spray PLACE ONE SPRAY UNDER THE TONGUE EVERY 5 MINUTES AS NEEDED FOR CHEST PAIN.    NITROSTAT 0.3 mg SL tablet DISSOLVE ONE TABLET UNDER THE TONGUE EVERY 5 MINUTES AS NEEDED FOR CHEST PAIN.  DO NOT EXCEED A TOTAL OF 3 DOSES IN 15 MINUTES    paricalcitol (ZEMPLAR) 1 MCG capsule Take 1 capsule by mouth on Monday and Friday.    potassium chloride SA (K-DUR,KLOR-CON) 10 MEQ tablet Take 1 tablet (10 mEq total) by mouth 2 (two) times daily.    pravastatin (PRAVACHOL) 20 MG tablet TAKE 1 TABLET BY MOUTH once DAILY    sodium bicarbonate 650 MG tablet     torsemide (DEMADEX) 20 MG Tab Take 2 tablets (40 mg total) by mouth every morning.    warfarin (COUMADIN) 5 MG tablet     ZOSTAVAX, PF, 19,400 unit/0.65 mL injection            Clinical Reference Information           Vital Signs - Last Recorded  Most recent update: 1/24/2017 11:06 AM by Lenka Peter LPN    BP Pulse Resp Ht Wt BMI    115/63 (BP Location: Left arm, Patient Position: Sitting, BP Method: Automatic) 69 20 6' 2" (1.88 m) 71.8 kg (158 lb 6.4 oz) 20.34 kg/m2      Blood Pressure          Most Recent Value    BP  115/63      Allergies as of 1/24/2017     Losartan    0.225 % Sodium Chloride    Amitriptyline    Azopt  [Brinzolamide]    Ciprofloxacin    Codeine    Cantil  [Mepenzolate Bromide]    Duragal-s    Erythromycin    Fentanyl    Hydrocodone-acetaminophen    Indomethacin    Meperidine    Minoxidil    Morphine    Neuromuscular Blockers, Steroidal    Nifedipine    Oxycodone Hcl-oxycodone-asa    Penicillins    Propoxyphene    Sensipar [Cinacalcet]    Talwin Compound    Talwin  [Pentazocine Lactate]    Valsartan      Immunizations Administered on Date of Encounter - 1/24/2017     None      Instructions      Prealbumin (Blood)  Does this test have other names?  PA, transthyretin test  What is this test?  The prealbumin screen is a blood test to see whether you are getting enough nutrition in your " diet. Specifically, the test finds out if you have been getting enough protein. It also finds out whether you are at risk for malnutrition or already suffering from it. Prealbumin is a protein that is made mainly by your liver. Your body uses prealbumin to make other proteins. Prealbumin also carries thyroid hormones in the blood.  Why do I need this test?  You might have this test if you appear to be malnourished or if healthcare providers want to follow your nutritional progress. Your provider may also order this test if you are an older adult and he or she suspects you suffer from poor nutrition. Healthcare providers may also screen children for prealbumin if they appear undernourished. In addition, if you are in the hospital, your provider might order this test soon after you arrive to see whether you need more nutritional support as part of your treatment. Your provider may also order this test if you have an eating disorder.  What other tests might I have along with this test?  To watch your nutritional needs, your healthcare provider might order a C-reactive protein screen. This looks for another protein in your blood. If you appear to be malnourished, your healthcare provider may order other tests. These may include hemoglobin, albumin, iron, transferrin, folate, and vitamin B-12, and other electrolytes, vitamins, and minerals.  What do my test results mean?  Many things may affect your lab test results. These include the method each lab uses to do the test. Even if your test results are different from the normal value, you may not have a problem. To learn what the results mean for you, talk with your healthcare provider.  Low prealbumin scores mean that you are likely at risk for malnutrition and need careful assessment. Low prealbumin scores may also be a sign of liver disease, inflammation, or tissue death (tissue necrosis). High prealbumin scores may be a sign of long-term (chronic) kidney disease,  steroid use, or alcoholism.  Normal results for a prealbumin blood test are:  · Adults: 15 to 36 milligrams per deciliter (mg/dL) or 150 to 360 milligrams per liter (mg/L)  · Children: 6 to 21 mg/dL for an infant under 5 days old, 14 to 30 mg/dL for children ages 1 to 5, 15 to 33 mg/dL for children ages 6 to 9, 22 to 36 mg/dL for those ages 10 to 13, 22 to 45 mg/dL for those ages 14 to 19  How is this test done?  The test requires a blood sample, which is drawn through a needle from a vein in your arm.  Does this test pose any risks?  Taking a blood sample with a needle carries a small risk of bleeding, infection, or bruising. You may also feel dizzy. When the needle pricks your arm, you may feel a slight sting. Afterward, the site might be sore.  What might affect my test results?  Infection, inflammation, or recent trauma may affect your test results. This could make them more difficult to figure out. Experts suggest that people in the hospital who are tested for prealbumin be tested twice. This should be done 3 to 5 days apart, for more accurate results.  How do I get ready for this test?  No preparation is necessary.      © 6888-5500 [employer]. [address] [place] [address], [place] [address]. All rights reserved. This information is not intended as a substitute for professional medical care. Always follow your healthcare professional's instructions.        Decubitus Ulcer    A decubitus ulcer starts as a red, tender area on skin (pressure sore). It is caused by lying on a bony area for long periods of time without turning. This reduces the amount of blood flow to that part of the skin. Decubitus ulcers usually occur on the lower back, buttocks, or heels. They affect people who spend most or all of their time in bed. These ulcers take a long time to heal, depending on how big they are and how deep they are.  If a decubitus ulcer becomes infected, it will cause redness in the skin around the ulcer. Pus  will drain from the wound. If not treated early, a decubitus infection can spread to the bloodstream or nearby bone.  Home care  Follow these guidelines to help you care for your wound at home:  · Look at your ulcer every day with good lighting to watch for signs of infection. At the same time, check other skin pressure points for early signs of a skin changes.  · Change positions every few hours. This will let blood flow to the pressure areas. This is essential for healing to occur. A foam, water, or air mattress or gel pad will help reduce the pressure on the skin.  · Your health care provider may give you a special skin covering that stays in place on the ulcer. If a simple bandage is used, change it daily. Clean the ulcer with sterile saline or another solution your doctor tells you to use. Pat dry. Apply any prescribed lotion or cream. Cover with a clean, dry gauze pad.  · Bed linen should be kept clean and dry.  · Avoid soiling the ulcer with feces or urine. If this isnt possible, keep the time of contact with the feces or urine to a minimum.  Follow-up care  Follow up with your doctor as advised by our staff.  When to seek medical advice  Call your health care provider right away if any of these occur:  · Redness around the wound that gets worse  · Pain or swelling near the wound that gets worse  · Pus drains from a wound thats not already treated  · Unexplained fever over 100.4°F (38.0°C) oral, or higher  © 2641-0493 [employer]. [address] [place] [address], [place] [address]. All rights reserved. This information is not intended as a substitute for professional medical care. Always follow your healthcare professional's instructions.

## 2017-01-24 NOTE — PATIENT INSTRUCTIONS
Prealbumin (Blood)  Does this test have other names?  PA, transthyretin test  What is this test?  The prealbumin screen is a blood test to see whether you are getting enough nutrition in your diet. Specifically, the test finds out if you have been getting enough protein. It also finds out whether you are at risk for malnutrition or already suffering from it. Prealbumin is a protein that is made mainly by your liver. Your body uses prealbumin to make other proteins. Prealbumin also carries thyroid hormones in the blood.  Why do I need this test?  You might have this test if you appear to be malnourished or if healthcare providers want to follow your nutritional progress. Your provider may also order this test if you are an older adult and he or she suspects you suffer from poor nutrition. Healthcare providers may also screen children for prealbumin if they appear undernourished. In addition, if you are in the hospital, your provider might order this test soon after you arrive to see whether you need more nutritional support as part of your treatment. Your provider may also order this test if you have an eating disorder.  What other tests might I have along with this test?  To watch your nutritional needs, your healthcare provider might order a C-reactive protein screen. This looks for another protein in your blood. If you appear to be malnourished, your healthcare provider may order other tests. These may include hemoglobin, albumin, iron, transferrin, folate, and vitamin B-12, and other electrolytes, vitamins, and minerals.  What do my test results mean?  Many things may affect your lab test results. These include the method each lab uses to do the test. Even if your test results are different from the normal value, you may not have a problem. To learn what the results mean for you, talk with your healthcare provider.  Low prealbumin scores mean that you are likely at risk for malnutrition and need careful  assessment. Low prealbumin scores may also be a sign of liver disease, inflammation, or tissue death (tissue necrosis). High prealbumin scores may be a sign of long-term (chronic) kidney disease, steroid use, or alcoholism.  Normal results for a prealbumin blood test are:  · Adults: 15 to 36 milligrams per deciliter (mg/dL) or 150 to 360 milligrams per liter (mg/L)  · Children: 6 to 21 mg/dL for an infant under 5 days old, 14 to 30 mg/dL for children ages 1 to 5, 15 to 33 mg/dL for children ages 6 to 9, 22 to 36 mg/dL for those ages 10 to 13, 22 to 45 mg/dL for those ages 14 to 19  How is this test done?  The test requires a blood sample, which is drawn through a needle from a vein in your arm.  Does this test pose any risks?  Taking a blood sample with a needle carries a small risk of bleeding, infection, or bruising. You may also feel dizzy. When the needle pricks your arm, you may feel a slight sting. Afterward, the site might be sore.  What might affect my test results?  Infection, inflammation, or recent trauma may affect your test results. This could make them more difficult to figure out. Experts suggest that people in the hospital who are tested for prealbumin be tested twice. This should be done 3 to 5 days apart, for more accurate results.  How do I get ready for this test?  No preparation is necessary.      © 0180-9319 [employer]. [address] [place] [address], [place] [address]. All rights reserved. This information is not intended as a substitute for professional medical care. Always follow your healthcare professional's instructions.        Decubitus Ulcer    A decubitus ulcer starts as a red, tender area on skin (pressure sore). It is caused by lying on a bony area for long periods of time without turning. This reduces the amount of blood flow to that part of the skin. Decubitus ulcers usually occur on the lower back, buttocks, or heels. They affect people who spend most or all of their time  in bed. These ulcers take a long time to heal, depending on how big they are and how deep they are.  If a decubitus ulcer becomes infected, it will cause redness in the skin around the ulcer. Pus will drain from the wound. If not treated early, a decubitus infection can spread to the bloodstream or nearby bone.  Home care  Follow these guidelines to help you care for your wound at home:  · Look at your ulcer every day with good lighting to watch for signs of infection. At the same time, check other skin pressure points for early signs of a skin changes.  · Change positions every few hours. This will let blood flow to the pressure areas. This is essential for healing to occur. A foam, water, or air mattress or gel pad will help reduce the pressure on the skin.  · Your health care provider may give you a special skin covering that stays in place on the ulcer. If a simple bandage is used, change it daily. Clean the ulcer with sterile saline or another solution your doctor tells you to use. Pat dry. Apply any prescribed lotion or cream. Cover with a clean, dry gauze pad.  · Bed linen should be kept clean and dry.  · Avoid soiling the ulcer with feces or urine. If this isnt possible, keep the time of contact with the feces or urine to a minimum.  Follow-up care  Follow up with your doctor as advised by our staff.  When to seek medical advice  Call your health care provider right away if any of these occur:  · Redness around the wound that gets worse  · Pain or swelling near the wound that gets worse  · Pus drains from a wound thats not already treated  · Unexplained fever over 100.4°F (38.0°C) oral, or higher  © 5118-6775 [employer]. [address] [place] [id number]. All rights reserved. This information is not intended as a substitute for professional medical care. Always follow your healthcare professional's instructions.

## 2017-01-24 NOTE — PROGRESS NOTES
Subjective:    Patient ID: Chey Trujillo is a 81 y.o. female.    Chief Complaint: Follow-up      HPI:   Chey is a 81 y.o. female who presents to the clinic for evaluation and treatment of high risk feet. 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 The patient's chief complaint is foot ulcer, R heel. This patient has documented high risk feet requiring routine maintenance secondary to diabetes mellitis and those secondary complications of diabetes, as mentioned..  Pt has established care with dr. Lola PRITCHETT at [facility].  Wants foot  closer to home. Has been moving all care to [place].   Gets home health from [facility].      PCP: Viviana Nguyen MD    Date Last Seen by PCP: in epic    Current shoe gear:  Affected Foot: Flip-flops    History of Trauma: negative  Sign of  Infection: none    Hemoglobin A1C   Date Value Ref Range Status   11/15/2016 7.8 (H) 4.5 - 6.2 % Final     Comment:     According to ADA guidelines, hemoglobin A1C <7.0% represents  optimal control in non-pregnant diabetic patients.  Different  metrics may apply to specific populations.   Standards of Medical Care in Diabetes - 2016.  For the purpose of screening for the presence of diabetes:  <5.7%     Consistent with the absence of diabetes  5.7-6.4%  Consistent with increasing risk for diabetes   (prediabetes)  >or=6.5%  Consistent with diabetes  Currently no consensus exists for use of hemoglobin A1C  for diagnosis of diabetes for children.     09/27/2016 7.5 (H) 4.5 - 6.2 % Final     Comment:     According to ADA guidelines, hemoglobin A1C <7.0% represents  optimal control in non-pregnant diabetic patients.  Different  metrics may apply to specific populations.   Standards of Medical Care in Diabetes - 2016.  For the purpose of screening for the presence of diabetes:  <5.7%     Consistent with the absence of diabetes  5.7-6.4%  Consistent with increasing risk for diabetes   (prediabetes)  >or=6.5%  Consistent with diabetes  Currently no consensus exists for use of hemoglobin A1C  for diagnosis of diabetes for children.     08/16/2016 7.2 (H) 4.5 - 6.2 % Final     Comment:     According to ADA guidelines, hemoglobin A1C <7.0% represents  optimal control in non-pregnant diabetic patients.  Different  metrics may apply to specific populations.   Standards of Medical Care in Diabetes - 2016.  For the purpose of screening for the presence of diabetes:  <5.7%     Consistent with the absence of diabetes  5.7-6.4%  Consistent with increasing risk for diabetes   (prediabetes)  >or=6.5%  Consistent with diabetes  Currently no consensus exists for use of hemoglobin A1C  for diagnosis of diabetes for children.       HPI    Last Podiatry Enc: Visit date not found  Last Enc w/ Me: Visit date not found    Past Medical History    Diagnosis Date    Anticoagulation monitoring by pharmacist 6/29/2012    At risk for amiodarone toxicity with long term use 1/27/2014    Atrial fibrillation     Bilateral carotid artery disease 1/11/2016    Blood transfusion     CAD S/P percutaneous coronary angioplasty 9/27/2012    Chronic diastolic heart failure 9/27/2012     Echo 3-: 1 - Concentric hypertrophy.  2 - Normal left ventricular systolic function (EF 60-65%).  3 - Right ventricular enlargement with hypertrophy, with normal systolic function.  4 - Left ventricular diastolic dysfunction.  5 - Biatrial enlargement.  6 - Mild tricuspid regurgitation.  7 - Pulmonary hypertension. The estimated PA systolic pressure is 55 mmHg.  8 - Increased central venous pressure (IVC is greater than 3cm in diameter).     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  ROWAN placed  just proximal to stent in  for UA.  Cath 2011: normal LMCA, 40% mid LAD, 50% distal LCx    Cardiac pacemaker placement  2012     Quinn Jerome DR, serial #[id number]    Eye surgery      Cataract extraction       Status post cataract extraction and insertion of intraocular lens of right eye     Social History     Social History    Marital status:      Spouse name: N/A    Number of children: N/A    Years of education: N/A     Occupational History    Not on file.     Social History Main Topics    Smoking status: Never Smoker    Smokeless tobacco: Never Used    Alcohol use No    Drug use: No    Sexual activity: No     Other Topics Concern    Not on file     Social History Narrative    Son lives with her. Spouse .          Current Outpatient Prescriptions:     amiodarone (PACERONE) 200 MG Tab, Take 1 tablet (200 mg total) by mouth once daily., Disp: 90 tablet, Rfl: 3    amlodipine (NORVASC) 10 MG tablet, Take 1 tablet (10 mg total) by mouth once daily., Disp: 90 tablet, Rfl: 3    aspirin 81 MG chewable tablet, Take 1 tablet by mouth Every morning., Disp: , Rfl:     blood sugar diagnostic (ACCU-CHEK SMARTVIEW TEST STRIP) Strp, 1 strip by Misc.(Non-Drug; Combo Route) route 4 (four) times daily., Disp: 150 strip, Rfl: 11    blood-glucose meter Misc, Dispense Accu-Chek Natalia meter, Disp: 1 each, Rfl: 0    carvedilol (COREG) 25 MG tablet, Take 1 tablet (25 mg total) by mouth 2 (two) times daily with meals., Disp: 270 tablet, Rfl: 4    cloNIDine (CATAPRES) 0.2 MG tablet, Take 1 tablet (0.2 mg total) by mouth 2 (two) times daily. (Patient taking differently: Take 0.3 mg by mouth 2 (two) times daily. ), Disp: 60 tablet, Rfl: 2    collagenase ointment, Apply topically once daily., Disp: 90 g, Rfl: 1    cyanocobalamin, vitamin B-12, 1,000 mcg TbSR, Take 1,000 mcg by mouth once daily., Disp: 30 each, Rfl: 0    dorzolamide (TRUSOPT) 2 % ophthalmic solution, INSTILL ONE DROP INTO EACH EYE  TWICE DAILY, Disp: 10 mL, Rfl: 11    doxazosin (CARDURA) 8 MG Tab, TAKE ONE TABLET BY MOUTH ONCE DAILY IN THE EVENING, Disp: 90 tablet, Rfl: 3    ferrous sulfate 324 mg (65 mg iron) TbEC, Take 1 tablet (325 mg total) by mouth 2 (two) times daily., Disp: 60 tablet, Rfl: 0    hydrALAZINE (APRESOLINE) 100 MG tablet, Take 1 tablet (100 mg total) by mouth every 8 (eight) hours., Disp: 270 tablet, Rfl: 4    insulin glargine (LANTUS) 100 unit/mL injection, INJECT 10 units nightly., Disp: 30 mL, Rfl: 12    insulin lispro (HUMALOG) 100 unit/mL injection, Take 9 units with breakfast, 9 units with lunch, and 8 units with dinner. Plus sliding scale, Disp: 2 vial, Rfl: 8    insulin syringe-needle U-100 (EASY TOUCH INSULIN SYRINGE) 0.5 mL 31 gauge x 5/16 Syrg, To use 4 times daily with insulin injections, Disp: 150 each, Rfl: 2    isosorbide dinitrate (ISORDIL) 40 MG Tab, Take 1 tablet (40 mg total) by mouth 3 (three) times daily., Disp: 270 tablet, Rfl: 3    lactulose (CEPHULAC) 20 gram Pack, Take 1 packet (20 g total) by mouth once daily., Disp: 90 packet, Rfl: 6    lancets Misc, 1 lancet by Misc.(Non-Drug; Combo Route) route 4 (four) times daily., Disp: 150 each, Rfl: 11    latanoprost 0.005 % ophthalmic solution, INSTILL ONE DROP INTO EACH EYE IN THE EVENING, Disp: 3 Bottle, Rfl: 12    lidocaine (LIDODERM) 5 %, Place 1 patch onto the skin once daily. Remove & Discard patch within 12 hours or as directed by MD. Can apply to back area for pain, Disp: 30 patch, Rfl: 1    memantine (NAMENDA) 10 MG Tab, Take 1 tablet (10 mg total) by mouth once daily., Disp: 30 tablet, Rfl: 5    nitroGLYCERIN 0.4 MG/DOSE TL SPRY (NITROLINGUAL) 400 mcg/spray spray, PLACE ONE SPRAY UNDER THE TONGUE EVERY 5 MINUTES AS NEEDED FOR CHEST PAIN., Disp: 4.9 g, Rfl: 0    NITROSTAT 0.3 mg SL tablet, DISSOLVE ONE TABLET UNDER THE TONGUE EVERY 5 MINUTES AS NEEDED FOR CHEST PAIN.  DO NOT EXCEED A TOTAL OF 3 DOSES IN 15 MINUTES, Disp: 100 tablet,  Rfl: 0    paricalcitol (ZEMPLAR) 1 MCG capsule, Take 1 capsule by mouth on Monday and Friday., Disp: 8 capsule, Rfl: 3    potassium chloride SA (K-DUR,KLOR-CON) 10 MEQ tablet, Take 1 tablet (10 mEq total) by mouth 2 (two) times daily., Disp: 60 tablet, Rfl: 11    pravastatin (PRAVACHOL) 20 MG tablet, TAKE 1 TABLET BY MOUTH once DAILY, Disp: 90 tablet, Rfl: 0    sodium bicarbonate 650 MG tablet, , Disp: , Rfl:     torsemide (DEMADEX) 20 MG Tab, Take 2 tablets (40 mg total) by mouth every morning., Disp: 180 tablet, Rfl: 4    warfarin (COUMADIN) 5 MG tablet, , Disp: , Rfl:     ZOSTAVAX, PF, 19,400 unit/0.65 mL injection, , Disp: , Rfl:   Review of patient's allergies indicates:   Allergen Reactions    Losartan Other (See Comments)     Hyperkalemia    0.225 % sodium chloride      Other reaction(s): Eye irritation    Amitriptyline      Other reaction(s): Vomiting  Other reaction(s): Vomiting    Azopt  [brinzolamide]      Other reaction(s): Eye irritation    Ciprofloxacin Nausea And Vomiting     Other reaction(s): Stomach upset    Codeine      Other reaction(s): Unknown  Other reaction(s): Unknown    Cantil  [mepenzolate bromide]      Other reaction(s): Unknown  Other reaction(s): Unknown    Duragal-s      Other reaction(s): Vomiting    Erythromycin      Other reaction(s): Unknown  Other reaction(s): Unknown    Fentanyl      Other reaction(s): Vomiting    Hydrocodone-acetaminophen      Other reaction(s): Unknown  Other reaction(s): Unknown    Indomethacin      Other reaction(s): Unknown  Other reaction(s): Unknown    Meperidine      Other reaction(s): Unknown  Other reaction(s): Unknown    Minoxidil      Other reaction(s): druged  Other reaction(s): nausea and vomiting  Other reaction(s): nausea and vomiting  Other reaction(s): druged    Morphine      Other reaction(s): Unknown  Other reaction(s): Unknown    Neuromuscular blockers, steroidal      Other reaction(s): Unknown    Nifedipine      Other  "reaction(s): Swelling  Other reaction(s): Swelling    Oxycodone hcl-oxycodone-asa      Other reaction(s): Unknown  Other reaction(s): Unknown    Penicillins Hives     Other reaction(s): Unknown    Propoxyphene      Other reaction(s): Unknown    Sensipar [cinacalcet] Nausea Only    Talwin compound      Other reaction(s): Unknown    Talwin  [pentazocine lactate]      Other reaction(s): Unknown    Valsartan Rash and Hives       Visit Vitals    /63 (BP Location: Left arm, Patient Position: Sitting, BP Method: Automatic)    Pulse 69    Resp 20    Ht 6' 2" (1.88 m)    Wt 71.8 kg (158 lb 6.4 oz)    BMI 20.34 kg/m2       ROS  ROS Constitutional:  General Appearance: malnourished  Vascular: positive for edema  Musculoskeletal: negative for joint paint and joint edema  Skin: negative for rashes and lesions  Neurological: negative for burning, tingling and numbness  Gastrointestinal: negative for stomach pain, nausea and vomiting        Objective:        Ortho/SPM Exam  Physical Exam  Physical Exam   Constitutional: She is oriented to person, place, and time. She appears well-developed and well-nourished.   Cardiovascular:   Dorsalis pedis and posterior tibial pulses are diminished Bilaterally. Toes are cool to touch. Feet are warm proximally.There is decreased digital hair. Skin is atrophic, slightly hyperpigmented, and mildly edematous       Musculoskeletal: Normal range of motion. She exhibits no edema or tenderness.   Adequate joint range of motion without pain, limitation, nor crepitation Bilateral feet and ankle joints. Muscle strength is 5/5 in all groups bilaterally.         Neurological: She is alert and oriented to person, place, and time.   [place] 5.07 monofilamant testing is diminished Haris feet. Sharp/dull sensation diminished Bilaterally. Light touch absent Bilaterally.       Skin: Skin is warm, dry. No bruising, no ecchymosis and no lesion noted. No erythema.   Nails x10 are elongated " by  2-5mm's, thickened by 1-4 mm's, dystrophic, and are darkened in  coloration . Xerosis Bilaterally. No open lesions noted.    Hyperkeratotic tissue noted to posterior left heel    Psychiatric: She has a normal mood and affect. Her behavior is normal. Judgment and thought content normal.   Vitals reviewed.    Ulcer Location: R posterior heel  Measurements: .5x.5x.3  Periwound: Intact and hyperkeratotic  Drainage: none  Purulence: None.  Malodor: None.  Base:  80% granular, 20% fibrotic  Signs of infection: None.          Assessment:     Imaging:   X-ray Foot Complete Bilateral    Result Date: 1/5/2017  AP, oblique, and weightbearing lateral radiographs of both feet were obtained. The bones are osteoporotic but intact. There is no evidence for acute fracture or bone destruction. There is mild hallux valgus deformity present bilaterally. There are mild degenerative changes within the small joints of both feet. There is no evidence for dislocation. No bony erosions are evident. Atherosclerotic calcification is identified within the arteries of both feet.    Osteoporosis. Extensive atherosclerosis. No evidence for acute fracture, bone destruction, or dislocation. Electronically signed by: LLOYD WALKER MD Date:     01/05/17 Time:    12:58     X-ray Calcaneus Bilateral    Result Date: 1/5/2017  2 views of the calcaneus were obtained bilaterally. There is no evidence for acute fracture or bone destruction. There is extensive atherosclerotic calcification identified within the arteries at the level of the ankle. There is a small amount of soft tissue calcification posterior to the left calcaneus. No radiopaque soft tissue foreign bodies are identified.    No evidence for acute fracture, bone destruction, or dislocation. Extensive atherosclerotic calcification within the arteries at the level of the ankles. Electronically signed by: LLOYD WALKER MD Date:     01/05/17 Time:    12:55       Recent Labs  Lab Result Units  "11/15/16  1013 01/05/17  1043   Sed Rate mm/Hr  --  40*   CRP mg/L  --  0.5   WBC K/uL 3.70*  3.70*  --    Prealbumin mg/dL  --  11*   ]          1. Foot ulcer due to secondary DM    2. Diabetes mellitus with stage 4 chronic kidney disease GFR 15-29    3. Peripheral vascular disease          Plan:       Orders Placed This Encounter    Foot Care    Debridement    Ambulatory consult to Nutrition Services     Wound Debridement  Date/Time: 1/24/2017 3:41 PM  Performed by: SHAWNEE SOLORZANO JR.  Authorized by: SHAWNEE SOLORZANO JR.     Time out: Immediately prior to procedure a "time out" was called to verify the correct patient, procedure, equipment, support staff and site/side marked as required.    Consent Done?:  Yes (Verbal)    Preparation: Patient was prepped and draped in usual sterile fashion    Local anesthesia used?: No      Wound Details:    Location:  Right foot    Location:  Right Heel    Type of Debridement:  Excisional       Length (cm):  0.5       Area (sq cm):  0.25       Width (cm):  0.5       Percent Debrided (%):  100       Depth (cm):  0.3       Total Area Debrided (sq cm):  0.25    Depth of debridement:  Subcutaneous tissue    Tissue debrided:  Subcutaneous, Hypergranulation and Epidermis    Devitalized tissue debrided:  Biofilm, Callus and Sough    Instruments:  Blade    Bleeding:  Minimal  Hemostasis Achieved: Yes    Method Used:  Pressure  Patient tolerance:  Patient tolerated the procedure well with no immediate complications        Chey was seen today for follow-up.    Diagnoses and all orders for this visit:    Foot ulcer due to secondary DM  -     Ambulatory consult to Nutrition Services  -     Debridement    Diabetes mellitus with stage 4 chronic kidney disease GFR 15-29  -     Ambulatory consult to Nutrition Services    Peripheral vascular disease  -     Ambulatory consult to Nutrition Services    Other orders  -     Foot Care        Chey Trujillo is a 81 y.o. female presenting w/ "   1. Foot ulcer due to secondary DM    2. Diabetes mellitus with stage 4 chronic kidney disease GFR 15-29    3. Peripheral vascular disease        -pt seen, evaluated, and managed  -dx discussed in detail. All questions/concerns addressed  -all tx options discussed. All alternatives, risks, benefits of all txs discussed  -The patient was educated regarding the above diagnosis. We discussed conservative care options regarding shoe wear and/or padding.  -rxs dispensed: none  -xr and labs reviewed  -pt would benefit from nutrition consult due to multiple medical problems, polypharmacy, allergies, low pre-albumin   Referral ordered in epic    -dressing applied: iodosorb+mepilex+football  -shoe: darco      -full DM foot exam w/ routine care when ulceration is healed    Return in about 1 week (around 1/31/2017).

## 2017-01-25 RX ORDER — POTASSIUM CHLORIDE 750 MG/1
TABLET, EXTENDED RELEASE ORAL
Qty: 60 TABLET | Refills: 0 | Status: SHIPPED | OUTPATIENT
Start: 2017-01-25 | End: 2017-01-26 | Stop reason: SDUPTHER

## 2017-01-25 RX ORDER — CLONIDINE HYDROCHLORIDE 0.2 MG/1
0.2 TABLET ORAL 2 TIMES DAILY
Qty: 60 TABLET | Refills: 2 | Status: SHIPPED | OUTPATIENT
Start: 2017-01-25 | End: 2017-05-12

## 2017-01-25 RX ORDER — AMIODARONE HYDROCHLORIDE 200 MG/1
TABLET ORAL
Qty: 90 TABLET | Refills: 3 | Status: SHIPPED | OUTPATIENT
Start: 2017-01-25 | End: 2017-01-26 | Stop reason: SDUPTHER

## 2017-01-26 RX ORDER — AMIODARONE HYDROCHLORIDE 200 MG/1
200 TABLET ORAL DAILY
Qty: 90 TABLET | Refills: 3 | Status: SHIPPED | OUTPATIENT
Start: 2017-01-26 | End: 2017-08-16 | Stop reason: SDUPTHER

## 2017-01-26 RX ORDER — POTASSIUM CHLORIDE 750 MG/1
10 TABLET, EXTENDED RELEASE ORAL 2 TIMES DAILY
Qty: 60 TABLET | Refills: 11 | Status: SHIPPED | OUTPATIENT
Start: 2017-01-26 | End: 2017-03-16 | Stop reason: SDUPTHER

## 2017-01-28 DIAGNOSIS — E11.9 TYPE 2 DIABETES MELLITUS NOT AT GOAL: Primary | ICD-10-CM

## 2017-01-30 RX ORDER — INSULIN LISPRO 100 [IU]/ML
INJECTION, SOLUTION INTRAVENOUS; SUBCUTANEOUS
Qty: 3 VIAL | Refills: 6 | Status: SHIPPED | OUTPATIENT
Start: 2017-01-30 | End: 2017-06-13 | Stop reason: SDUPTHER

## 2017-01-31 ENCOUNTER — OFFICE VISIT (OUTPATIENT)
Dept: PODIATRY | Facility: CLINIC | Age: 82
End: 2017-01-31
Payer: MEDICARE

## 2017-01-31 ENCOUNTER — ANTI-COAG VISIT (OUTPATIENT)
Dept: CARDIOLOGY | Facility: CLINIC | Age: 82
End: 2017-01-31

## 2017-01-31 VITALS
RESPIRATION RATE: 18 BRPM | HEIGHT: 72 IN | WEIGHT: 163 LBS | DIASTOLIC BLOOD PRESSURE: 68 MMHG | SYSTOLIC BLOOD PRESSURE: 148 MMHG | HEART RATE: 84 BPM | BODY MASS INDEX: 22.08 KG/M2

## 2017-01-31 DIAGNOSIS — L97.509 FOOT ULCER DUE TO SECONDARY DM: Primary | Chronic | ICD-10-CM

## 2017-01-31 DIAGNOSIS — Z79.01 ANTICOAGULATION MONITORING BY PHARMACIST: Primary | ICD-10-CM

## 2017-01-31 DIAGNOSIS — I48.0 PAROXYSMAL ATRIAL FIBRILLATION: ICD-10-CM

## 2017-01-31 DIAGNOSIS — E13.621 FOOT ULCER DUE TO SECONDARY DM: Primary | Chronic | ICD-10-CM

## 2017-01-31 LAB — INR PPP: 2

## 2017-01-31 PROCEDURE — 1157F ADVNC CARE PLAN IN RCRD: CPT | Mod: S$GLB,,, | Performed by: PODIATRIST

## 2017-01-31 PROCEDURE — 3078F DIAST BP <80 MM HG: CPT | Mod: S$GLB,,, | Performed by: PODIATRIST

## 2017-01-31 PROCEDURE — 1126F AMNT PAIN NOTED NONE PRSNT: CPT | Mod: S$GLB,,, | Performed by: PODIATRIST

## 2017-01-31 PROCEDURE — 3077F SYST BP >= 140 MM HG: CPT | Mod: S$GLB,,, | Performed by: PODIATRIST

## 2017-01-31 PROCEDURE — 1159F MED LIST DOCD IN RCRD: CPT | Mod: S$GLB,,, | Performed by: PODIATRIST

## 2017-01-31 PROCEDURE — 99213 OFFICE O/P EST LOW 20 MIN: CPT | Mod: 25,S$GLB,, | Performed by: PODIATRIST

## 2017-01-31 PROCEDURE — 1160F RVW MEDS BY RX/DR IN RCRD: CPT | Mod: S$GLB,,, | Performed by: PODIATRIST

## 2017-01-31 PROCEDURE — 11042 DBRDMT SUBQ TIS 1ST 20SQCM/<: CPT | Mod: S$GLB,,, | Performed by: PODIATRIST

## 2017-01-31 NOTE — PROGRESS NOTES
Message left by Melva/ Dr Jan Nina Dentist [phone] requesting call for dental treatment for patient.

## 2017-01-31 NOTE — PROGRESS NOTES
Subjective:    Patient ID: Chey Trujillo is a 81 y.o. female.    Chief Complaint: Wound Care      HPI:   Chey is a 81 y.o. female who presents to the clinic for evaluation and treatment of high risk feet. 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 The patient's chief complaint is foot ulcer, R heel. This patient has documented high risk feet requiring routine maintenance secondary to diabetes mellitis and those secondary complications of diabetes, as mentioned..  Pt has established care with dr. Lola PRITCHETT at [facility].  Wants foot  closer to home. Has been moving all care to [place].   Gets home health from [facility].      PCP: Viviana Nguyen MD    Date Last Seen by PCP: in epic    Current shoe gear:  Affected Foot: Flip-flops    History of Trauma: negative  Sign of  Infection: none    Hemoglobin A1C   Date Value Ref Range Status   11/15/2016 7.8 (H) 4.5 - 6.2 % Final     Comment:     According to ADA guidelines, hemoglobin A1C <7.0% represents  optimal control in non-pregnant diabetic patients.  Different  metrics may apply to specific populations.   Standards of Medical Care in Diabetes - 2016.  For the purpose of screening for the presence of diabetes:  <5.7%     Consistent with the absence of diabetes  5.7-6.4%  Consistent with increasing risk for diabetes   (prediabetes)  >or=6.5%  Consistent with diabetes  Currently no consensus exists for use of hemoglobin A1C  for diagnosis of diabetes for children.     09/27/2016 7.5 (H) 4.5 - 6.2 % Final     Comment:     According to ADA guidelines, hemoglobin A1C <7.0% represents  optimal control in non-pregnant diabetic patients.  Different  metrics may apply to specific populations.   Standards of Medical Care in Diabetes - 2016.  For the purpose of screening for the presence of diabetes:  <5.7%     Consistent with the absence of diabetes  5.7-6.4%  Consistent with increasing risk for diabetes   (prediabetes)  >or=6.5%  Consistent with diabetes  Currently no consensus exists for use of hemoglobin A1C  for diagnosis of diabetes for children.     08/16/2016 7.2 (H) 4.5 - 6.2 % Final     Comment:     According to ADA guidelines, hemoglobin A1C <7.0% represents  optimal control in non-pregnant diabetic patients.  Different  metrics may apply to specific populations.   Standards of Medical Care in Diabetes - 2016.  For the purpose of screening for the presence of diabetes:  <5.7%     Consistent with the absence of diabetes  5.7-6.4%  Consistent with increasing risk for diabetes   (prediabetes)  >or=6.5%  Consistent with diabetes  Currently no consensus exists for use of hemoglobin A1C  for diagnosis of diabetes for children.       HPI    Last Podiatry Enc: Visit date not found  Last Enc w/ Me: Visit date not found    Past Medical History    Diagnosis Date    Anticoagulation monitoring by pharmacist 6/29/2012    At risk for amiodarone toxicity with long term use 1/27/2014    Atrial fibrillation     Bilateral carotid artery disease 1/11/2016    Blood transfusion     CAD S/P percutaneous coronary angioplasty 9/27/2012    Chronic diastolic heart failure 9/27/2012     Echo 3-: 1 - Concentric hypertrophy.  2 - Normal left ventricular systolic function (EF 60-65%).  3 - Right ventricular enlargement with hypertrophy, with normal systolic function.  4 - Left ventricular diastolic dysfunction.  5 - Biatrial enlargement.  6 - Mild tricuspid regurgitation.  7 - Pulmonary hypertension. The estimated PA systolic pressure is 55 mmHg.  8 - Increased central venous pressure (IVC is greater than 3cm in diameter).     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  ROWAN placed  just proximal to stent in  for UA.  Cath 2011: normal LMCA, 40% mid LAD, 50% distal LCx    Cardiac pacemaker placement  2012     Quinn Waldropy , serial #[id number]    Eye surgery      Cataract extraction       Status post cataract extraction and insertion of intraocular lens of right eye     Social History     Social History    Marital status:      Spouse name: N/A    Number of children: N/A    Years of education: N/A     Occupational History    Not on file.     Social History Main Topics    Smoking status: Never Smoker    Smokeless tobacco: Never Used    Alcohol use No    Drug use: No    Sexual activity: No     Other Topics Concern    Not on file     Social History Narrative    Son lives with her. Spouse .          Current Outpatient Prescriptions:     amiodarone (PACERONE) 200 MG Tab, Take 1 tablet (200 mg total) by mouth once daily., Disp: 90 tablet, Rfl: 3    amlodipine (NORVASC) 10 MG tablet, Take 1 tablet (10 mg total) by mouth once daily., Disp: 90 tablet, Rfl: 3    aspirin 81 MG chewable tablet, Take 1 tablet by mouth Every morning., Disp: , Rfl:     blood sugar diagnostic (ACCU-CHEK SMARTVIEW TEST STRIP) Strp, 1 strip by Misc.(Non-Drug; Combo Route) route 4 (four) times daily., Disp: 150 strip, Rfl: 11    blood-glucose meter Misc, Dispense Accu-Chek Natalia meter, Disp: 1 each, Rfl: 0    carvedilol (COREG) 25 MG tablet, Take 1 tablet (25 mg total) by mouth 2 (two) times daily with meals., Disp: 270 tablet, Rfl: 4    cloNIDine (CATAPRES) 0.2 MG tablet, Take 1 tablet (0.2 mg total) by mouth 2 (two) times daily., Disp: 60 tablet, Rfl: 2    collagenase ointment, Apply topically once daily., Disp: 90 g, Rfl: 1    cyanocobalamin, vitamin B-12, 1,000 mcg TbSR, Take 1,000 mcg by mouth once daily., Disp: 30 each, Rfl: 0    dorzolamide (TRUSOPT) 2 % ophthalmic solution, INSTILL ONE DROP INTO EACH EYE TWICE DAILY, Disp: 10 mL, Rfl: 11    doxazosin (CARDURA) 8 MG Tab, TAKE  ONE TABLET BY MOUTH ONCE DAILY IN THE EVENING, Disp: 90 tablet, Rfl: 3    ferrous sulfate 324 mg (65 mg iron) TbEC, Take 1 tablet (325 mg total) by mouth 2 (two) times daily., Disp: 60 tablet, Rfl: 0    hydrALAZINE (APRESOLINE) 100 MG tablet, Take 1 tablet (100 mg total) by mouth every 8 (eight) hours., Disp: 270 tablet, Rfl: 4    insulin glargine (LANTUS) 100 unit/mL injection, INJECT 10 units nightly., Disp: 30 mL, Rfl: 12    insulin lispro (HUMALOG) 100 unit/mL injection, Take 9 units with breakfast, 9 units with lunch, and 8 units with dinner. Plus sliding scale, Disp: 2 vial, Rfl: 8    insulin lispro (HUMALOG) 100 unit/mL injection, 15 units with meals plus correction scale, max daily dose is 75 units., Disp: 3 vial, Rfl: 6    insulin syringe-needle U-100 (EASY TOUCH INSULIN SYRINGE) 0.5 mL 31 gauge x 5/16 Syrg, To use 4 times daily with insulin injections, Disp: 150 each, Rfl: 2    isosorbide dinitrate (ISORDIL) 40 MG Tab, Take 1 tablet (40 mg total) by mouth 3 (three) times daily., Disp: 270 tablet, Rfl: 3    lactulose (CEPHULAC) 20 gram Pack, Take 1 packet (20 g total) by mouth once daily., Disp: 90 packet, Rfl: 6    lancets Misc, 1 lancet by Misc.(Non-Drug; Combo Route) route 4 (four) times daily., Disp: 150 each, Rfl: 11    latanoprost 0.005 % ophthalmic solution, INSTILL ONE DROP INTO EACH EYE IN THE EVENING, Disp: 3 Bottle, Rfl: 12    lidocaine (LIDODERM) 5 %, Place 1 patch onto the skin once daily. Remove & Discard patch within 12 hours or as directed by MD. Can apply to back area for pain, Disp: 30 patch, Rfl: 1    memantine (NAMENDA) 10 MG Tab, Take 1 tablet (10 mg total) by mouth once daily., Disp: 30 tablet, Rfl: 5    nitroGLYCERIN 0.4 MG/DOSE TL SPRY (NITROLINGUAL) 400 mcg/spray spray, PLACE ONE SPRAY UNDER THE TONGUE EVERY 5 MINUTES AS NEEDED FOR CHEST PAIN., Disp: 4.9 g, Rfl: 0    NITROSTAT 0.3 mg SL tablet, DISSOLVE ONE TABLET UNDER THE TONGUE EVERY 5 MINUTES AS NEEDED FOR CHEST  PAIN.  DO NOT EXCEED A TOTAL OF 3 DOSES IN 15 MINUTES, Disp: 100 tablet, Rfl: 0    potassium chloride SA (KLOR-CON M10) 10 MEQ tablet, Take 1 tablet (10 mEq total) by mouth 2 (two) times daily., Disp: 60 tablet, Rfl: 11    pravastatin (PRAVACHOL) 20 MG tablet, TAKE 1 TABLET BY MOUTH once DAILY, Disp: 90 tablet, Rfl: 0    sodium bicarbonate 650 MG tablet, , Disp: , Rfl:     torsemide (DEMADEX) 20 MG Tab, Take 2 tablets (40 mg total) by mouth every morning., Disp: 180 tablet, Rfl: 4    warfarin (COUMADIN) 5 MG tablet, , Disp: , Rfl:     paricalcitol (ZEMPLAR) 1 MCG capsule, Take 1 capsule by mouth on Monday and Friday., Disp: 8 capsule, Rfl: 3    ZOSTAVAX, PF, 19,400 unit/0.65 mL injection, , Disp: , Rfl:   Review of patient's allergies indicates:   Allergen Reactions    Losartan Other (See Comments)     Hyperkalemia    0.225 % sodium chloride      Other reaction(s): Eye irritation    Amitriptyline      Other reaction(s): Vomiting  Other reaction(s): Vomiting    Azopt  [brinzolamide]      Other reaction(s): Eye irritation    Ciprofloxacin Nausea And Vomiting     Other reaction(s): Stomach upset    Codeine      Other reaction(s): Unknown  Other reaction(s): Unknown    Cantil  [mepenzolate bromide]      Other reaction(s): Unknown  Other reaction(s): Unknown    Duragal-s      Other reaction(s): Vomiting    Erythromycin      Other reaction(s): Unknown  Other reaction(s): Unknown    Fentanyl      Other reaction(s): Vomiting    Hydrocodone-acetaminophen      Other reaction(s): Unknown  Other reaction(s): Unknown    Indomethacin      Other reaction(s): Unknown  Other reaction(s): Unknown    Meperidine      Other reaction(s): Unknown  Other reaction(s): Unknown    Minoxidil      Other reaction(s): druged  Other reaction(s): nausea and vomiting  Other reaction(s): nausea and vomiting  Other reaction(s): druged    Morphine      Other reaction(s): Unknown  Other reaction(s): Unknown    Neuromuscular  "blockers, steroidal      Other reaction(s): Unknown    Nifedipine      Other reaction(s): Swelling  Other reaction(s): Swelling    Oxycodone hcl-oxycodone-asa      Other reaction(s): Unknown  Other reaction(s): Unknown    Penicillins Hives     Other reaction(s): Unknown    Propoxyphene      Other reaction(s): Unknown    Sensipar [cinacalcet] Nausea Only    Talwin compound      Other reaction(s): Unknown    Talwin  [pentazocine lactate]      Other reaction(s): Unknown    Valsartan Rash and Hives       Visit Vitals    BP (!) 148/68 (BP Location: Right arm, Patient Position: Sitting, BP Method: Automatic)    Pulse 84    Resp 18    Ht 6' 2" (1.88 m)    Wt 73.9 kg (163 lb)    BMI 20.93 kg/m2       ROS  ROS Constitutional:  General Appearance: malnourished  Vascular: positive for edema  Musculoskeletal: negative for joint paint and joint edema  Skin: negative for rashes and lesions  Neurological: negative for burning, tingling and numbness  Gastrointestinal: negative for stomach pain, nausea and vomiting        Objective:        Ortho/SPM Exam  Physical Exam  Physical Exam   Constitutional: She is oriented to person, place, and time. She appears well-developed and well-nourished.   Cardiovascular:   Dorsalis pedis and posterior tibial pulses are diminished Bilaterally. Toes are cool to touch. Feet are warm proximally.There is decreased digital hair. Skin is atrophic, slightly hyperpigmented, and mildly edematous       Musculoskeletal: Normal range of motion. She exhibits no edema or tenderness.   Adequate joint range of motion without pain, limitation, nor crepitation Bilateral feet and ankle joints. Muscle strength is 5/5 in all groups bilaterally.         Neurological: She is alert and oriented to person, place, and time.   Willow-Mayela 5.07 monofilamant testing is diminished Haris feet. Sharp/dull sensation diminished Bilaterally. Light touch absent Bilaterally.       Skin: Skin is warm, dry. No " bruising, no ecchymosis and no lesion noted. No erythema.   Nails x10 are elongated by  2-5mm's, thickened by 1-4 mm's, dystrophic, and are darkened in  coloration . Xerosis Bilaterally. No open lesions noted.    Hyperkeratotic tissue noted to posterior left heel    Psychiatric: She has a normal mood and affect. Her behavior is normal. Judgment and thought content normal.   Vitals reviewed.    Ulcer Location: R posterior heel  Measurements: .5x.5x.3  Periwound: Intact and hyperkeratotic  Drainage: none  Purulence: None.  Malodor: None.  Base:  80% granular, 20% fibrotic  Signs of infection: None.          Assessment:     Imaging:   X-ray Foot Complete Bilateral    Result Date: 1/5/2017  AP, oblique, and weightbearing lateral radiographs of both feet were obtained. The bones are osteoporotic but intact. There is no evidence for acute fracture or bone destruction. There is mild hallux valgus deformity present bilaterally. There are mild degenerative changes within the small joints of both feet. There is no evidence for dislocation. No bony erosions are evident. Atherosclerotic calcification is identified within the arteries of both feet.    Osteoporosis. Extensive atherosclerosis. No evidence for acute fracture, bone destruction, or dislocation. Electronically signed by: LLOYD WALKER MD Date:     01/05/17 Time:    12:58     X-ray Calcaneus Bilateral    Result Date: 1/5/2017  2 views of the calcaneus were obtained bilaterally. There is no evidence for acute fracture or bone destruction. There is extensive atherosclerotic calcification identified within the arteries at the level of the ankle. There is a small amount of soft tissue calcification posterior to the left calcaneus. No radiopaque soft tissue foreign bodies are identified.    No evidence for acute fracture, bone destruction, or dislocation. Extensive atherosclerotic calcification within the arteries at the level of the ankles. Electronically signed by: LLODY  "JENN WALKER MD Date:     01/05/17 Time:    12:55       Recent Labs  Lab Result Units 11/15/16  1013 01/05/17  1043   Sed Rate mm/Hr  --  40*   CRP mg/L  --  0.5   WBC K/uL 3.70*  3.70*  --    Prealbumin mg/dL  --  11*         1. Foot ulcer due to secondary DM          Plan:       Orders Placed This Encounter    Debridement     Wound Debridement  Date/Time: 1/31/2017 10:19 AM  Performed by: SHAWNEE SOLORZANO JR.  Authorized by: SHAWNEE SOLORZANO JR.     Time out: Immediately prior to procedure a "time out" was called to verify the correct patient, procedure, equipment, support staff and site/side marked as required.    Consent Done?:  Yes (Verbal)    Preparation: Patient was prepped and draped in usual sterile fashion    Local anesthesia used?: No      Wound Details:    Location:  Right foot    Location:  Right Heel    Type of Debridement:  Excisional       Length (cm):  0.5       Area (sq cm):  0.25       Width (cm):  0.5       Percent Debrided (%):  100       Depth (cm):  0.5       Total Area Debrided (sq cm):  0.25    Depth of debridement:  Subcutaneous tissue    Tissue debrided:  Subcutaneous and Epidermis    Devitalized tissue debrided:  Biofilm and Callus    Instruments:  Blade    Bleeding:  Minimal  Hemostasis Achieved: Yes    Method Used:  Pressure  Patient tolerance:  Patient tolerated the procedure well with no immediate complications        Chey was seen today for wound care.    Diagnoses and all orders for this visit:    Foot ulcer due to secondary DM  -     Debridement        Chey Trujillo is a 81 y.o. female presenting w/   1. Foot ulcer due to secondary DM        -pt seen, evaluated, and managed  -dx discussed in detail. All questions/concerns addressed  -all tx options discussed. All alternatives, risks, benefits of all txs discussed  -The patient was educated regarding the above diagnosis. We discussed conservative care options regarding shoe wear and/or padding.  -rxs dispensed: none  -xr and " labs reviewed  -pt would benefit from nutrition consult due to multiple medical problems, polypharmacy, allergies, low pre-albumin   Referral ordered in epic - still pending    -dressing applied: iodosorb+mepilex+football  -shoe: darco    -full DM foot exam w/ routine care when ulceration is healed    Return in about 1 week (around 2/7/2017).

## 2017-01-31 NOTE — PROGRESS NOTES
Dentist (Dr Nina) called today to report that they are wanting to schedule patient for dental work (described in previous progress note) and want clearance. When she had a similar procedure in October, her coumadin was held 3 days and resumed the evening of the procedure and she ended up in the ER with bleeding, so Dr Nina would be reluctant to restart coumadin right away from his standpoint. As stated in previous notes, patient has CHADS2 score of 4(CHF, HTN, Age, DM) but also bleeding risk and CKD. Per previous progress note the plan was to hold coumadin as minimal as possible, resuming as soon as safely possible.  I will contact patient's PCP (Dr Nguyen) to see if she thinks it would be ok for patient to hold coumadin 3 days prior, the day of, and the day after, resuming 2 days post procedure. If not, we may have to determine restart date on day by day basis depending on the presence of post procedure bleeding. Awaiting response. Dr Nguyen ok with this plan. I let desntist office know and per  she will run this by Dr Nina and get back to us once date is set

## 2017-01-31 NOTE — MR AVS SNAPSHOT
[address]  [address]   [place] [address]  Phone: [phone]  Fax: [phone]                  Chey Trujillo   2017 3:00 PM   Office Visit    Description:  Female : 1935   Provider:  Vinay Zaragoza Jr., DPM   Department:  [facility]           Reason for Visit     Wound Care           Diagnoses this Visit        Comments    Foot ulcer due to secondary DM    -  Primary            To Do List           Future Appointments        Provider Department Dept Phone    2017 1:45 PM Vinay Zaragoza Jr., DPM [facility] [phone]    2017 1:45 PM Vinay Zaragoza Jr., DPM [facility] [phone]    3/2/2017 2:30 PM JAYLENE Way,ANP-C [facility] - Endocrinology [phone]    3/23/2017 11:20 AM TELEPHONE CHECK, PACEMAKER [facility] - Arrhythmia [phone]    2017 3:20 PM John Quezada MD [facility] - Neurology [phone]      Goals (5 Years of Data)     None      Follow-Up and Disposition     Return in about 1 week (around 2017).      [facility] On Call     [facility] On Call Nurse Care Line -  Assistance  Registered nurses in the [facility] On Call Center provide clinical advisement, health education, appointment booking, and other advisory services.  Call for this free service at [phone].             Medications           Message regarding Medications     Verify the changes and/or additions to your medication regime listed below are the same as discussed with your clinician today.  If any of these changes or additions are incorrect, please notify your healthcare provider.             Verify that the below list of medications is an accurate representation of the medications you are currently taking.  If none reported, the list may be blank. If incorrect, please contact your healthcare provider. Carry this list with you in case of emergency.           Current Medications      amiodarone (PACERONE) 200 MG Tab Take 1 tablet (200 mg total) by mouth once daily.    amlodipine (NORVASC) 10 MG tablet Take 1 tablet (10 mg total) by mouth once daily.    aspirin 81 MG chewable tablet Take 1 tablet by mouth Every morning.    blood sugar diagnostic (ACCU-CHEK SMARTVIEW TEST STRIP) Strp 1 strip by Misc.(Non-Drug; Combo Route) route 4 (four) times daily.    blood-glucose meter Misc Dispense Accu-Chek Natalia meter    carvedilol (COREG) 25 MG tablet Take 1 tablet (25 mg total) by mouth 2 (two) times daily with meals.    cloNIDine (CATAPRES) 0.2 MG tablet Take 1 tablet (0.2 mg total) by mouth 2 (two) times daily.    collagenase ointment Apply topically once daily.    cyanocobalamin, vitamin B-12, 1,000 mcg TbSR Take 1,000 mcg by mouth once daily.    dorzolamide (TRUSOPT) 2 % ophthalmic solution INSTILL ONE DROP INTO EACH EYE TWICE DAILY    doxazosin (CARDURA) 8 MG Tab TAKE ONE TABLET BY MOUTH ONCE DAILY IN THE EVENING    ferrous sulfate 324 mg (65 mg iron) TbEC Take 1 tablet (325 mg total) by mouth 2 (two) times daily.    hydrALAZINE (APRESOLINE) 100 MG tablet Take 1 tablet (100 mg total) by mouth every 8 (eight) hours.    insulin glargine (LANTUS) 100 unit/mL injection INJECT 10 units nightly.    insulin lispro (HUMALOG) 100 unit/mL injection Take 9 units with breakfast, 9 units with lunch, and 8 units with dinner. Plus sliding scale    insulin lispro (HUMALOG) 100 unit/mL injection 15 units with meals plus correction scale, max daily dose is 75 units.    insulin syringe-needle U-100 (EASY TOUCH INSULIN SYRINGE) 0.5 mL 31 gauge x 5/16 Syrg To use 4 times daily with insulin injections    isosorbide dinitrate (ISORDIL) 40 MG Tab Take 1 tablet (40 mg total) by mouth 3 (three) times daily.    lactulose (CEPHULAC) 20 gram Pack Take 1 packet (20 g total) by mouth once daily.    lancets Misc 1 lancet by Misc.(Non-Drug; Combo Route) route 4 (four) times daily.    latanoprost 0.005 % ophthalmic solution INSTILL  "ONE DROP INTO EACH EYE IN THE EVENING    lidocaine (LIDODERM) 5 % Place 1 patch onto the skin once daily. Remove & Discard patch within 12 hours or as directed by MD. Can apply to back area for pain    memantine (NAMENDA) 10 MG Tab Take 1 tablet (10 mg total) by mouth once daily.    nitroGLYCERIN 0.4 MG/DOSE TL SPRY (NITROLINGUAL) 400 mcg/spray spray PLACE ONE SPRAY UNDER THE TONGUE EVERY 5 MINUTES AS NEEDED FOR CHEST PAIN.    NITROSTAT 0.3 mg SL tablet DISSOLVE ONE TABLET UNDER THE TONGUE EVERY 5 MINUTES AS NEEDED FOR CHEST PAIN.  DO NOT EXCEED A TOTAL OF 3 DOSES IN 15 MINUTES    paricalcitol (ZEMPLAR) 1 MCG capsule Take 1 capsule by mouth on Monday and Friday.    potassium chloride SA (KLOR-CON M10) 10 MEQ tablet Take 1 tablet (10 mEq total) by mouth 2 (two) times daily.    pravastatin (PRAVACHOL) 20 MG tablet TAKE 1 TABLET BY MOUTH once DAILY    sodium bicarbonate 650 MG tablet     torsemide (DEMADEX) 20 MG Tab Take 2 tablets (40 mg total) by mouth every morning.    warfarin (COUMADIN) 5 MG tablet     ZOSTAVAX, PF, 19,400 unit/0.65 mL injection            Clinical Reference Information           Vital Signs - Last Recorded  Most recent update: 1/31/2017  3:29 PM by Rajat Linton MA    BP Pulse Resp Ht Wt BMI    (!) 148/68 (BP Location: Right arm, Patient Position: Sitting, BP Method: Automatic) 84 18 6' 2" (1.88 m) 73.9 kg (163 lb) 20.93 kg/m2      Blood Pressure          Most Recent Value    BP  (!)  148/68      Allergies as of 1/31/2017     Losartan    0.225 % Sodium Chloride    Amitriptyline    Azopt  [Brinzolamide]    Ciprofloxacin    Codeine    Cantil  [Mepenzolate Bromide]    Duragal-s    Erythromycin    Fentanyl    Hydrocodone-acetaminophen    Indomethacin    Meperidine    Minoxidil    Morphine    Neuromuscular Blockers, Steroidal    Nifedipine    Oxycodone Hcl-oxycodone-asa    Penicillins    Propoxyphene    Sensipar [Cinacalcet]    Talwin Compound    Talwin  [Pentazocine Lactate]    Valsartan    "   Immunizations Administered on Date of Encounter - 1/31/2017     None      Instructions      Prealbumin (Blood)  Does this test have other names?  PA, transthyretin test  What is this test?  The prealbumin screen is a blood test to see whether you are getting enough nutrition in your diet. Specifically, the test finds out if you have been getting enough protein. It also finds out whether you are at risk for malnutrition or already suffering from it. Prealbumin is a protein that is made mainly by your liver. Your body uses prealbumin to make other proteins. Prealbumin also carries thyroid hormones in the blood.  Why do I need this test?  You might have this test if you appear to be malnourished or if healthcare providers want to follow your nutritional progress. Your provider may also order this test if you are an older adult and he or she suspects you suffer from poor nutrition. Healthcare providers may also screen children for prealbumin if they appear undernourished. In addition, if you are in the hospital, your provider might order this test soon after you arrive to see whether you need more nutritional support as part of your treatment. Your provider may also order this test if you have an eating disorder.  What other tests might I have along with this test?  To watch your nutritional needs, your healthcare provider might order a C-reactive protein screen. This looks for another protein in your blood. If you appear to be malnourished, your healthcare provider may order other tests. These may include hemoglobin, albumin, iron, transferrin, folate, and vitamin B-12, and other electrolytes, vitamins, and minerals.  What do my test results mean?  Many things may affect your lab test results. These include the method each lab uses to do the test. Even if your test results are different from the normal value, you may not have a problem. To learn what the results mean for you, talk with your healthcare provider.  Low  prealbumin scores mean that you are likely at risk for malnutrition and need careful assessment. Low prealbumin scores may also be a sign of liver disease, inflammation, or tissue death (tissue necrosis). High prealbumin scores may be a sign of long-term (chronic) kidney disease, steroid use, or alcoholism.  Normal results for a prealbumin blood test are:  · Adults: 15 to 36 milligrams per deciliter (mg/dL) or 150 to 360 milligrams per liter (mg/L)  · Children: 6 to 21 mg/dL for an infant under 5 days old, 14 to 30 mg/dL for children ages 1 to 5, 15 to 33 mg/dL for children ages 6 to 9, 22 to 36 mg/dL for those ages 10 to 13, 22 to 45 mg/dL for those ages 14 to 19  How is this test done?  The test requires a blood sample, which is drawn through a needle from a vein in your arm.  Does this test pose any risks?  Taking a blood sample with a needle carries a small risk of bleeding, infection, or bruising. You may also feel dizzy. When the needle pricks your arm, you may feel a slight sting. Afterward, the site might be sore.  What might affect my test results?  Infection, inflammation, or recent trauma may affect your test results. This could make them more difficult to figure out. Experts suggest that people in the hospital who are tested for prealbumin be tested twice. This should be done 3 to 5 days apart, for more accurate results.  How do I get ready for this test?  No preparation is necessary.      © 6847-3616 [employer]. [address] [place] [address], [place] [address]. All rights reserved. This information is not intended as a substitute for professional medical care. Always follow your healthcare professional's instructions.

## 2017-01-31 NOTE — PATIENT INSTRUCTIONS
Prealbumin (Blood)  Does this test have other names?  PA, transthyretin test  What is this test?  The prealbumin screen is a blood test to see whether you are getting enough nutrition in your diet. Specifically, the test finds out if you have been getting enough protein. It also finds out whether you are at risk for malnutrition or already suffering from it. Prealbumin is a protein that is made mainly by your liver. Your body uses prealbumin to make other proteins. Prealbumin also carries thyroid hormones in the blood.  Why do I need this test?  You might have this test if you appear to be malnourished or if healthcare providers want to follow your nutritional progress. Your provider may also order this test if you are an older adult and he or she suspects you suffer from poor nutrition. Healthcare providers may also screen children for prealbumin if they appear undernourished. In addition, if you are in the hospital, your provider might order this test soon after you arrive to see whether you need more nutritional support as part of your treatment. Your provider may also order this test if you have an eating disorder.  What other tests might I have along with this test?  To watch your nutritional needs, your healthcare provider might order a C-reactive protein screen. This looks for another protein in your blood. If you appear to be malnourished, your healthcare provider may order other tests. These may include hemoglobin, albumin, iron, transferrin, folate, and vitamin B-12, and other electrolytes, vitamins, and minerals.  What do my test results mean?  Many things may affect your lab test results. These include the method each lab uses to do the test. Even if your test results are different from the normal value, you may not have a problem. To learn what the results mean for you, talk with your healthcare provider.  Low prealbumin scores mean that you are likely at risk for malnutrition and need careful  assessment. Low prealbumin scores may also be a sign of liver disease, inflammation, or tissue death (tissue necrosis). High prealbumin scores may be a sign of long-term (chronic) kidney disease, steroid use, or alcoholism.  Normal results for a prealbumin blood test are:  · Adults: 15 to 36 milligrams per deciliter (mg/dL) or 150 to 360 milligrams per liter (mg/L)  · Children: 6 to 21 mg/dL for an infant under 5 days old, 14 to 30 mg/dL for children ages 1 to 5, 15 to 33 mg/dL for children ages 6 to 9, 22 to 36 mg/dL for those ages 10 to 13, 22 to 45 mg/dL for those ages 14 to 19  How is this test done?  The test requires a blood sample, which is drawn through a needle from a vein in your arm.  Does this test pose any risks?  Taking a blood sample with a needle carries a small risk of bleeding, infection, or bruising. You may also feel dizzy. When the needle pricks your arm, you may feel a slight sting. Afterward, the site might be sore.  What might affect my test results?  Infection, inflammation, or recent trauma may affect your test results. This could make them more difficult to figure out. Experts suggest that people in the hospital who are tested for prealbumin be tested twice. This should be done 3 to 5 days apart, for more accurate results.  How do I get ready for this test?  No preparation is necessary.      © 0133-7693 [employer]. [address] [place] [address], [place] [address]. All rights reserved. This information is not intended as a substitute for professional medical care. Always follow your healthcare professional's instructions.

## 2017-02-01 ENCOUNTER — TELEPHONE (OUTPATIENT)
Dept: INTERNAL MEDICINE | Facility: CLINIC | Age: 82
End: 2017-02-01

## 2017-02-01 NOTE — TELEPHONE ENCOUNTER
I agree, she is not a great candidate for other options    Holding Coumadin for 3 days is acceptable    The real question is, how urgent is this dental surgery.  Debora are usually lefty alone

## 2017-02-01 NOTE — TELEPHONE ENCOUNTER
----- Message from Fabian Ledezma PharmD sent at 2/1/2017  8:56 AM CST -----  Good morning,   We would like Dr Nguyen's input on this matter.  Coumadin clinic was contacted by patient's dentist, Dr Alvarez Nina with concerns. She is having upcoming dental surgery (to remove linn) and they are waiting to schedule once we can give them anticoagulation instructions. Of concern,  10/2016 she had dental work. We held 3 days before and resumed the evening of procedure (per protocol) and she ended up in the ER with bleeding.  Patient has CHADS2 score of 4(CHF, HTN, Age, DM) so is clot risk but also bleeding risk and CKD so poor lovenox candidate.  Our plan is to hold coumadin as minimal as possible, resuming as soon as safely possible.  Do you think it would be ok for us to hold 3 days prior but this time resume on post procedure day 2? Or do you have a suggestion for an alternate plan? Thanks in advance!

## 2017-02-06 ENCOUNTER — PATIENT MESSAGE (OUTPATIENT)
Dept: NEPHROLOGY | Facility: CLINIC | Age: 82
End: 2017-02-06

## 2017-02-06 ENCOUNTER — PATIENT MESSAGE (OUTPATIENT)
Dept: INTERNAL MEDICINE | Facility: CLINIC | Age: 82
End: 2017-02-06

## 2017-02-07 ENCOUNTER — PATIENT MESSAGE (OUTPATIENT)
Dept: NEUROLOGY | Facility: CLINIC | Age: 82
End: 2017-02-07

## 2017-02-07 ENCOUNTER — PATIENT MESSAGE (OUTPATIENT)
Dept: OPTOMETRY | Facility: CLINIC | Age: 82
End: 2017-02-07

## 2017-02-07 ENCOUNTER — OFFICE VISIT (OUTPATIENT)
Dept: PODIATRY | Facility: CLINIC | Age: 82
End: 2017-02-07
Payer: COMMERCIAL

## 2017-02-07 VITALS
DIASTOLIC BLOOD PRESSURE: 71 MMHG | SYSTOLIC BLOOD PRESSURE: 154 MMHG | BODY MASS INDEX: 22.08 KG/M2 | HEART RATE: 82 BPM | WEIGHT: 163 LBS | HEIGHT: 72 IN | RESPIRATION RATE: 18 BRPM

## 2017-02-07 DIAGNOSIS — L97.509 FOOT ULCER DUE TO SECONDARY DM: Primary | Chronic | ICD-10-CM

## 2017-02-07 DIAGNOSIS — E13.621 FOOT ULCER DUE TO SECONDARY DM: Primary | Chronic | ICD-10-CM

## 2017-02-07 DIAGNOSIS — N18.4 CHRONIC KIDNEY DISEASE (CKD), STAGE IV (SEVERE): Primary | ICD-10-CM

## 2017-02-07 PROCEDURE — 3077F SYST BP >= 140 MM HG: CPT | Mod: S$GLB,,, | Performed by: PODIATRIST

## 2017-02-07 PROCEDURE — 1125F AMNT PAIN NOTED PAIN PRSNT: CPT | Mod: S$GLB,,, | Performed by: PODIATRIST

## 2017-02-07 PROCEDURE — 1160F RVW MEDS BY RX/DR IN RCRD: CPT | Mod: S$GLB,,, | Performed by: PODIATRIST

## 2017-02-07 PROCEDURE — 99213 OFFICE O/P EST LOW 20 MIN: CPT | Mod: S$GLB,,, | Performed by: PODIATRIST

## 2017-02-07 PROCEDURE — 1157F ADVNC CARE PLAN IN RCRD: CPT | Mod: S$GLB,,, | Performed by: PODIATRIST

## 2017-02-07 PROCEDURE — 1159F MED LIST DOCD IN RCRD: CPT | Mod: S$GLB,,, | Performed by: PODIATRIST

## 2017-02-07 PROCEDURE — 3078F DIAST BP <80 MM HG: CPT | Mod: S$GLB,,, | Performed by: PODIATRIST

## 2017-02-07 RX ORDER — WARFARIN SODIUM 5 MG/1
TABLET ORAL
OUTPATIENT
Start: 2017-02-07

## 2017-02-07 NOTE — PROGRESS NOTES
Subjective:    Patient ID: Chey Trujillo is a 81 y.o. female.    Chief Complaint: Wound Care (foot ulcer)      HPI:   DENITA Guerrier is a 81 y.o. female who presents to the clinic for evaluation and treatment of high risk feet. 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 The patient's chief complaint is foot ulcer, R heel. This patient has documented high risk feet requiring routine maintenance secondary to diabetes mellitis and those secondary complications of diabetes, as mentioned..  Pt has established care with dr. Lola PRITCHETT at [facility].  [facility] closer to home. Has been moving all care to [place].   Gets home health from [facility].      PCP: Viviana Nguyen MD    Date Last Seen by PCP: in epic    Current shoe gear:  Affected Foot: Flip-flops    History of Trauma:  negative  Sign of Infection: none    Last Podiatry Enc: Visit date not found  Last Enc w/ Me: Visit date not found    Hemoglobin A1C   Date Value Ref Range Status   11/15/2016 7.8 (H) 4.5 - 6.2 % Final     Comment:     According to ADA guidelines, hemoglobin A1C <7.0% represents  optimal control in non-pregnant diabetic patients.  Different  metrics may apply to specific populations.   Standards of Medical Care in Diabetes - 2016.  For the purpose of screening for the presence of diabetes:  <5.7%     Consistent with the absence of diabetes  5.7-6.4%  Consistent with increasing risk for diabetes   (prediabetes)  >or=6.5%  Consistent with diabetes  Currently no consensus exists for use of hemoglobin A1C  for diagnosis of diabetes for children.     09/27/2016 7.5 (H) 4.5 - 6.2 % Final     Comment:     According to ADA guidelines, hemoglobin A1C <7.0% represents  optimal control in non-pregnant diabetic patients.  Different  metrics may apply to specific populations.   Standards of Medical Care in Diabetes - 2016.  For the purpose of screening for the presence of diabetes:  <5.7%     Consistent with the absence of diabetes  5.7-6.4%  Consistent with increasing risk for diabetes   (prediabetes)  >or=6.5%  Consistent with diabetes  Currently no consensus exists for use of hemoglobin A1C  for diagnosis of diabetes for children.     08/16/2016 7.2 (H) 4.5 - 6.2 % Final     Comment:     According to ADA guidelines, hemoglobin A1C <7.0% represents  optimal control in non-pregnant diabetic patients.  Different  metrics may apply to specific populations.   Standards of Medical Care in Diabetes - 2016.  For the purpose of screening for the presence of diabetes:  <5.7%     Consistent with the absence of diabetes  5.7-6.4%  Consistent with increasing risk for diabetes   (prediabetes)  >or=6.5%  Consistent with diabetes  Currently no consensus exists for use of hemoglobin A1C  for diagnosis of diabetes for children.         Past  Medical History   Diagnosis Date    Anticoagulation monitoring by pharmacist 6/29/2012    At risk for amiodarone toxicity with long term use 1/27/2014    Atrial fibrillation     Bilateral carotid artery disease 1/11/2016    Blood transfusion     CAD S/P percutaneous coronary angioplasty 9/27/2012    Chronic diastolic heart failure 9/27/2012     Echo 3-: 1 - Concentric hypertrophy.  2 - Normal left ventricular systolic function (EF 60-65%).  3 - Right ventricular enlargement with hypertrophy, with normal systolic function.  4 - Left ventricular diastolic dysfunction.  5 - Biatrial enlargement.  6 - Mild tricuspid regurgitation.  7 - Pulmonary hypertension. The estimated PA systolic pressure is 55 mmHg.  8 - Increased central venous pressure (IVC is greater than 3cm in diameter).     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  ROWAN placed just proximal to stent in  for UA.  Cath 2011: normal LMCA, 40% mid LAD, 50% distal LCx    Cardiac pacemaker placement  2012     Quinn Jerome DR, serial #[id number]    Eye surgery      Cataract extraction       Status post cataract extraction and insertion of intraocular lens of right eye     Social History     Social History    Marital status:      Spouse name: N/A    Number of children: N/A    Years of education: N/A     Occupational History    Not on file.     Social History Main Topics    Smoking status: Never Smoker    Smokeless tobacco: Never Used    Alcohol use No    Drug use: No    Sexual activity: No     Other Topics Concern    Not on file     Social History Narrative    Son lives with her. Spouse .          Current Outpatient Prescriptions:     amiodarone (PACERONE) 200 MG Tab, Take 1 tablet (200 mg total) by mouth once daily., Disp: 90 tablet, Rfl: 3    amlodipine (NORVASC) 10 MG tablet, Take 1 tablet (10 mg total) by mouth once daily., Disp: 90 tablet, Rfl: 3    aspirin 81 MG chewable tablet, Take 1 tablet by mouth Every morning., Disp: , Rfl:     blood-glucose meter Misc, Dispense Accu-Chek Natalia meter, Disp: 1 each, Rfl: 0    carvedilol (COREG) 25 MG tablet, Take 1 tablet (25 mg total) by mouth 2 (two) times daily with meals., Disp: 270 tablet, Rfl: 4    cloNIDine (CATAPRES) 0.2 MG tablet, Take 1 tablet (0.2 mg total) by mouth 2 (two) times daily., Disp: 60 tablet, Rfl: 2    collagenase ointment, Apply topically once daily., Disp: 90 g, Rfl: 1    cyanocobalamin, vitamin B-12, 1,000 mcg TbSR, Take 1,000 mcg by mouth once daily., Disp: 30 each, Rfl: 0    dorzolamide (TRUSOPT) 2 % ophthalmic solution, INSTILL ONE DROP INTO EACH EYE TWICE DAILY, Disp: 10 mL, Rfl: 11    doxazosin (CARDURA) 8 MG Tab, TAKE ONE TABLET BY MOUTH ONCE DAILY IN THE EVENING, Disp: 90 tablet, Rfl: 3    ferrous sulfate 324 mg (65 mg iron) TbEC, Take 1 tablet (325 mg  total) by mouth 2 (two) times daily., Disp: 60 tablet, Rfl: 0    hydrALAZINE (APRESOLINE) 100 MG tablet, Take 1 tablet (100 mg total) by mouth every 8 (eight) hours., Disp: 270 tablet, Rfl: 4    insulin glargine (LANTUS) 100 unit/mL injection, INJECT 10 units nightly., Disp: 30 mL, Rfl: 12    insulin lispro (HUMALOG) 100 unit/mL injection, Take 9 units with breakfast, 9 units with lunch, and 8 units with dinner. Plus sliding scale, Disp: 2 vial, Rfl: 8    insulin lispro (HUMALOG) 100 unit/mL injection, 15 units with meals plus correction scale, max daily dose is 75 units., Disp: 3 vial, Rfl: 6    insulin syringe-needle U-100 (EASY TOUCH INSULIN SYRINGE) 0.5 mL 31 gauge x 5/16 Syrg, To use 4 times daily with insulin injections, Disp: 150 each, Rfl: 2    isosorbide dinitrate (ISORDIL) 40 MG Tab, Take 1 tablet (40 mg total) by mouth 3 (three) times daily., Disp: 270 tablet, Rfl: 3    lactulose (CEPHULAC) 20 gram Pack, Take 1 packet (20 g total) by mouth once daily., Disp: 90 packet, Rfl: 6    lancets Misc, 1 lancet by Misc.(Non-Drug; Combo Route) route 4 (four) times daily., Disp: 150 each, Rfl: 11    latanoprost 0.005 % ophthalmic solution, INSTILL ONE DROP INTO EACH EYE IN THE EVENING, Disp: 3 Bottle, Rfl: 12    lidocaine (LIDODERM) 5 %, Place 1 patch onto the skin once daily. Remove & Discard patch within 12 hours or as directed by MD. Can apply to back area for pain, Disp: 30 patch, Rfl: 1    memantine (NAMENDA) 10 MG Tab, Take 1 tablet (10 mg total) by mouth once daily., Disp: 30 tablet, Rfl: 5    nitroGLYCERIN 0.4 MG/DOSE TL SPRY (NITROLINGUAL) 400 mcg/spray spray, PLACE ONE SPRAY UNDER THE TONGUE EVERY 5 MINUTES AS NEEDED FOR CHEST PAIN., Disp: 4.9 g, Rfl: 0    NITROSTAT 0.3 mg SL tablet, DISSOLVE ONE TABLET UNDER THE TONGUE EVERY 5 MINUTES AS NEEDED FOR CHEST PAIN.  DO NOT EXCEED A TOTAL OF 3 DOSES IN 15 MINUTES, Disp: 100 tablet, Rfl: 0    paricalcitol (ZEMPLAR) 1 MCG capsule, Take 1 capsule by  mouth on Monday and Friday., Disp: 8 capsule, Rfl: 3    potassium chloride SA (KLOR-CON M10) 10 MEQ tablet, Take 1 tablet (10 mEq total) by mouth 2 (two) times daily., Disp: 60 tablet, Rfl: 11    pravastatin (PRAVACHOL) 20 MG tablet, TAKE 1 TABLET BY MOUTH once DAILY, Disp: 90 tablet, Rfl: 0    sodium bicarbonate 650 MG tablet, , Disp: , Rfl:     torsemide (DEMADEX) 20 MG Tab, Take 2 tablets (40 mg total) by mouth every morning., Disp: 180 tablet, Rfl: 4    ZOSTAVAX, PF, 19,400 unit/0.65 mL injection, , Disp: , Rfl:     ACCU-CHEK SMARTVIEW TEST STRIP Strp, test FOUR TIMES A DAY, Disp: 150 each, Rfl: 11    warfarin (COUMADIN) 5 MG tablet, Take 0.5-1 tablets (2.5-5 mg total) by mouth Daily. As directed by [facility], Disp: 30 tablet, Rfl: 3  Review of patient's allergies indicates:   Allergen Reactions    Losartan Other (See Comments)     Hyperkalemia    0.225 % sodium chloride      Other reaction(s): Eye irritation    Amitriptyline      Other reaction(s): Vomiting  Other reaction(s): Vomiting    Azopt  [brinzolamide]      Other reaction(s): Eye irritation    Ciprofloxacin Nausea And Vomiting     Other reaction(s): Stomach upset    Codeine      Other reaction(s): Unknown  Other reaction(s): Unknown    Cantil  [mepenzolate bromide]      Other reaction(s): Unknown  Other reaction(s): Unknown    Duragal-s      Other reaction(s): Vomiting    Erythromycin      Other reaction(s): Unknown  Other reaction(s): Unknown    Fentanyl      Other reaction(s): Vomiting    Hydrocodone-acetaminophen      Other reaction(s): Unknown  Other reaction(s): Unknown    Indomethacin      Other reaction(s): Unknown  Other reaction(s): Unknown    Meperidine      Other reaction(s): Unknown  Other reaction(s): Unknown    Minoxidil      Other reaction(s): druged  Other reaction(s): nausea and vomiting  Other reaction(s): nausea and vomiting  Other reaction(s): druged    Morphine      Other reaction(s): Unknown  Other  "reaction(s): Unknown    Neuromuscular blockers, steroidal      Other reaction(s): Unknown    Nifedipine      Other reaction(s): Swelling  Other reaction(s): Swelling    Oxycodone hcl-oxycodone-asa      Other reaction(s): Unknown  Other reaction(s): Unknown    Penicillins Hives     Other reaction(s): Unknown    Propoxyphene      Other reaction(s): Unknown    Sensipar [cinacalcet] Nausea Only    Talwin compound      Other reaction(s): Unknown    Talwin  [pentazocine lactate]      Other reaction(s): Unknown    Valsartan Rash and Hives       Visit Vitals    BP (!) 154/71 (BP Location: Right arm, Patient Position: Sitting, BP Method: Automatic)    Pulse 82    Resp 18    Ht 6' 2" (1.88 m)    Wt 73.9 kg (163 lb)    BMI 20.93 kg/m2       ROS  ROS Constitutional:  General Appearance: malnourished  Vascular: positive for edema  Musculoskeletal: negative for joint paint and joint edema  Skin: negative for rashes and lesions  Neurological: negative for burning, tingling and numbness  Gastrointestinal: negative for stomach pain, nausea and vomiting        Objective:        Ortho/SPM Exam  Physical Exam  Physical Exam   Constitutional: She is oriented to person, place, and time. She appears well-developed and well-nourished.   Cardiovascular:   Dorsalis pedis and posterior tibial pulses are diminished Bilaterally. Toes are cool to touch. Feet are warm proximally.There is decreased digital hair. Skin is atrophic, slightly hyperpigmented, and mildly edematous       Musculoskeletal: Normal range of motion. She exhibits no edema or tenderness.   Adequate joint range of motion without pain, limitation, nor crepitation Bilateral feet and ankle joints. Muscle strength is 5/5 in all groups bilaterally.         Neurological: She is alert and oriented to person, place, and time.   Forest-Mayela 5.07 monofilamant testing is diminished Haris feet. Sharp/dull sensation diminished Bilaterally. Light touch absent " Bilaterally.       Skin: Skin is warm, dry. No bruising, no ecchymosis and no lesion noted. No erythema.   Nails x10 are elongated by  2-5mm's, thickened by 1-4 mm's, dystrophic, and are darkened in  coloration . Xerosis Bilaterally. No open lesions noted.    Hyperkeratotic tissue noted to posterior left heel    Psychiatric: She has a normal mood and affect. Her behavior is normal. Judgment and thought content normal.   Vitals reviewed.    Ulcer Location: R posterior heel  Measurements: ulcer epithelialized 2/7/17          Assessment:     Imaging:   X-ray Foot Complete Bilateral    Result Date: 1/5/2017  AP, oblique, and weightbearing lateral radiographs of both feet were obtained. The bones are osteoporotic but intact. There is no evidence for acute fracture or bone destruction. There is mild hallux valgus deformity present bilaterally. There are mild degenerative changes within the small joints of both feet. There is no evidence for dislocation. No bony erosions are evident. Atherosclerotic calcification is identified within the arteries of both feet.    Osteoporosis. Extensive atherosclerosis. No evidence for acute fracture, bone destruction, or dislocation. Electronically signed by: LLOYD WALKER MD Date:     01/05/17 Time:    12:58     X-ray Calcaneus Bilateral    Result Date: 1/5/2017  2 views of the calcaneus were obtained bilaterally. There is no evidence for acute fracture or bone destruction. There is extensive atherosclerotic calcification identified within the arteries at the level of the ankle. There is a small amount of soft tissue calcification posterior to the left calcaneus. No radiopaque soft tissue foreign bodies are identified.    No evidence for acute fracture, bone destruction, or dislocation. Extensive atherosclerotic calcification within the arteries at the level of the ankles. Electronically signed by: LLOYD WALKER MD Date:     01/05/17 Time:    12:55       Recent Labs  Lab Result Units  11/15/16  1013 01/05/17  1043   Sed Rate mm/Hr  --  40*   CRP mg/L  --  0.5   WBC K/uL 3.70*  3.70*  --    Prealbumin mg/dL  --  11*         1. Foot ulcer due to secondary DM          Plan:          Procedures: rudy Guerrier was seen today for wound care.    Diagnoses and all orders for this visit:    Foot ulcer due to secondary DM        Chey Trujillo is a 81 y.o. female presenting w/   1. Foot ulcer due to secondary DM        -pt seen, evaluated, and managed  -dx discussed in detail. All questions/concerns addressed  -all tx options discussed. All alternatives, risks, benefits of all txs discussed  -The patient was educated regarding the above diagnosis. We discussed conservative care options regarding shoe wear and/or padding.  -rxs dispensed: none  -xr and labs reviewed  -ulcer healed 2/7/17  -pt would benefit from nutrition consult due to multiple medical problems, polypharmacy, allergies, low pre-albumin   Referral ordered in epic - still pending     -dressing applied: offloading mepilex pad    -full DM foot exam w/ routine care when ulceration is healed    rtc 2 wks    Return in about 2 weeks (around 2/21/2017).

## 2017-02-07 NOTE — MR AVS SNAPSHOT
[address]  [address]   [place] [address]  Phone: [phone]  Fax: [phone]                  Chey Trujillo   2017 1:45 PM   Office Visit    Description:  Female : 1935   Provider:  Vinay Zaragoza Jr., DPM   Department:  [facility]           Reason for Visit     Wound Care           Diagnoses this Visit        Comments    Foot ulcer due to secondary DM    -  Primary            To Do List           Future Appointments        Provider Department Dept Phone    2017 10:45 AM Vinay Zaragoza Jr., DPM [facility] [phone]    3/2/2017 1:00 PM PERIMETRY, HUMP[facility] - Ophthalmology [phone]    3/2/2017 1:30 PM KIRTI Palacio - Optometry [phone]    3/2/2017 2:30 PM Shirley Perez APRN,ANP-C [facility] - Endocrinology [phone]    3/23/2017 11:20 AM TELEPHONE CHECK, PACEMAKER [facility] - Arrhythmia [phone]      Goals (5 Years of Data)     None      [facility] On Call     [facility] On Call Nurse Care Line -  Assistance  Registered nurses in the [facility] Center provide clinical advisement, health education, appointment booking, and other advisory services.  Call for this free service at [phone].             Medications           Message regarding Medications     Verify the changes and/or additions to your medication regime listed below are the same as discussed with your clinician today.  If any of these changes or additions are incorrect, please notify your healthcare provider.             Verify that the below list of medications is an accurate representation of the medications you are currently taking.  If none reported, the list may be blank. If incorrect, please contact your healthcare provider. Carry this list with you in case of emergency.           Current Medications     amiodarone (PACERONE) 200 MG Tab Take 1 tablet (200 mg total) by mouth once daily.     amlodipine (NORVASC) 10 MG tablet Take 1 tablet (10 mg total) by mouth once daily.    aspirin 81 MG chewable tablet Take 1 tablet by mouth Every morning.    blood sugar diagnostic (ACCU-CHEK SMARTVIEW TEST STRIP) Strp 1 strip by Misc.(Non-Drug; Combo Route) route 4 (four) times daily.    blood-glucose meter Misc Dispense Accu-Chek Natalia meter    carvedilol (COREG) 25 MG tablet Take 1 tablet (25 mg total) by mouth 2 (two) times daily with meals.    cloNIDine (CATAPRES) 0.2 MG tablet Take 1 tablet (0.2 mg total) by mouth 2 (two) times daily.    collagenase ointment Apply topically once daily.    cyanocobalamin, vitamin B-12, 1,000 mcg TbSR Take 1,000 mcg by mouth once daily.    dorzolamide (TRUSOPT) 2 % ophthalmic solution INSTILL ONE DROP INTO EACH EYE TWICE DAILY    doxazosin (CARDURA) 8 MG Tab TAKE ONE TABLET BY MOUTH ONCE DAILY IN THE EVENING    ferrous sulfate 324 mg (65 mg iron) TbEC Take 1 tablet (325 mg total) by mouth 2 (two) times daily.    hydrALAZINE (APRESOLINE) 100 MG tablet Take 1 tablet (100 mg total) by mouth every 8 (eight) hours.    insulin glargine (LANTUS) 100 unit/mL injection INJECT 10 units nightly.    insulin lispro (HUMALOG) 100 unit/mL injection Take 9 units with breakfast, 9 units with lunch, and 8 units with dinner. Plus sliding scale    insulin lispro (HUMALOG) 100 unit/mL injection 15 units with meals plus correction scale, max daily dose is 75 units.    insulin syringe-needle U-100 (EASY TOUCH INSULIN SYRINGE) 0.5 mL 31 gauge x 5/16 Syrg To use 4 times daily with insulin injections    isosorbide dinitrate (ISORDIL) 40 MG Tab Take 1 tablet (40 mg total) by mouth 3 (three) times daily.    lactulose (CEPHULAC) 20 gram Pack Take 1 packet (20 g total) by mouth once daily.    lancets Misc 1 lancet by Misc.(Non-Drug; Combo Route) route 4 (four) times daily.    latanoprost 0.005 % ophthalmic solution INSTILL ONE DROP INTO EACH EYE IN THE EVENING    lidocaine (LIDODERM) 5 % Place 1 patch onto the  "skin once daily. Remove & Discard patch within 12 hours or as directed by MD. Can apply to back area for pain    memantine (NAMENDA) 10 MG Tab Take 1 tablet (10 mg total) by mouth once daily.    nitroGLYCERIN 0.4 MG/DOSE TL SPRY (NITROLINGUAL) 400 mcg/spray spray PLACE ONE SPRAY UNDER THE TONGUE EVERY 5 MINUTES AS NEEDED FOR CHEST PAIN.    NITROSTAT 0.3 mg SL tablet DISSOLVE ONE TABLET UNDER THE TONGUE EVERY 5 MINUTES AS NEEDED FOR CHEST PAIN.  DO NOT EXCEED A TOTAL OF 3 DOSES IN 15 MINUTES    paricalcitol (ZEMPLAR) 1 MCG capsule Take 1 capsule by mouth on Monday and Friday.    potassium chloride SA (KLOR-CON M10) 10 MEQ tablet Take 1 tablet (10 mEq total) by mouth 2 (two) times daily.    pravastatin (PRAVACHOL) 20 MG tablet TAKE 1 TABLET BY MOUTH once DAILY    sodium bicarbonate 650 MG tablet     torsemide (DEMADEX) 20 MG Tab Take 2 tablets (40 mg total) by mouth every morning.    warfarin (COUMADIN) 5 MG tablet     ZOSTAVAX, PF, 19,400 unit/0.65 mL injection            Clinical Reference Information           Your Vitals Were     BP Pulse Resp Height Weight BMI    154/71 (BP Location: Right arm, Patient Position: Sitting, BP Method: Automatic) 82 18 6' 2" (1.88 m) 73.9 kg (163 lb) 20.93 kg/m2      Blood Pressure          Most Recent Value    BP  (!)  154/71      Allergies as of 2/7/2017     Losartan    0.225 % Sodium Chloride    Amitriptyline    Azopt  [Brinzolamide]    Ciprofloxacin    Codeine    Cantil  [Mepenzolate Bromide]    Duragal-s    Erythromycin    Fentanyl    Hydrocodone-acetaminophen    Indomethacin    Meperidine    Minoxidil    Morphine    Neuromuscular Blockers, Steroidal    Nifedipine    Oxycodone Hcl-oxycodone-asa    Penicillins    Propoxyphene    Sensipar [Cinacalcet]    Talwin Compound    Talwin  [Pentazocine Lactate]    Valsartan      Immunizations Administered on Date of Encounter - 2/7/2017     None      Language Assistance Services     ATTENTION: Language assistance services are available, " free of charge. Please call [phone].      ATENCIÓN: Si habla español, tiene a moulton disposición servicios gratuitos de asistencia lingüística. Llame al [phone].     CHÚ Ý: N?u b?n nói Ti?ng Vi?t, có các d?ch v? h? tr? ngôn ng? mi?n phí dành cho b?n. G?i s? [phone].         [facility] complies with applicable Federal civil rights laws and does not discriminate on the basis of race, color, national origin, age, disability, or sex.

## 2017-02-08 DIAGNOSIS — E11.22 TYPE 2 DIABETES MELLITUS WITH CHRONIC KIDNEY DISEASE: ICD-10-CM

## 2017-02-08 RX ORDER — WARFARIN SODIUM 5 MG/1
2.5-5 TABLET ORAL DAILY
Qty: 30 TABLET | Refills: 3 | Status: SHIPPED | OUTPATIENT
Start: 2017-02-08 | End: 2017-09-21 | Stop reason: SDUPTHER

## 2017-02-08 RX ORDER — BLOOD SUGAR DIAGNOSTIC
STRIP MISCELLANEOUS
Qty: 150 EACH | Refills: 11 | Status: SHIPPED | OUTPATIENT
Start: 2017-02-08 | End: 2018-03-07 | Stop reason: SDUPTHER

## 2017-02-10 ENCOUNTER — PATIENT MESSAGE (OUTPATIENT)
Dept: NEUROLOGY | Facility: CLINIC | Age: 82
End: 2017-02-10

## 2017-02-14 RX ORDER — POTASSIUM CHLORIDE 750 MG/1
TABLET, EXTENDED RELEASE ORAL
Qty: 60 TABLET | Refills: 0 | Status: SHIPPED | OUTPATIENT
Start: 2017-02-14 | End: 2017-02-20 | Stop reason: SDUPTHER

## 2017-02-15 NOTE — PROGRESS NOTES
As of 2/15/17 Nevin/Nurse/[facility] ORQUIDEA [place] called again today to inform us that Pt and son was not home. Pt was not home on 2/14/17 either, i spoke to son Gilberto he told me to tell nurse to call in am so they would not leave. Nevin/nurse called in the am, there was no answer. I spoke to son just 3 mins ago he is at a Dr. appt him self in [facility]. I ask him could he take his mother to ([facility]). He said he will try to get there for 4pm today.

## 2017-02-17 ENCOUNTER — ANTI-COAG VISIT (OUTPATIENT)
Dept: CARDIOLOGY | Facility: CLINIC | Age: 82
End: 2017-02-17

## 2017-02-17 DIAGNOSIS — Z79.01 ANTICOAGULATION MONITORING BY PHARMACIST: ICD-10-CM

## 2017-02-17 DIAGNOSIS — I48.0 PAROXYSMAL ATRIAL FIBRILLATION: ICD-10-CM

## 2017-02-19 ENCOUNTER — PATIENT MESSAGE (OUTPATIENT)
Dept: TRANSPLANT | Facility: CLINIC | Age: 82
End: 2017-02-19

## 2017-02-19 ENCOUNTER — PATIENT MESSAGE (OUTPATIENT)
Dept: NEUROLOGY | Facility: CLINIC | Age: 82
End: 2017-02-19

## 2017-02-20 RX ORDER — POTASSIUM CHLORIDE 750 MG/1
10 TABLET, EXTENDED RELEASE ORAL 2 TIMES DAILY
Qty: 60 TABLET | Refills: 2 | Status: SHIPPED | OUTPATIENT
Start: 2017-02-20 | End: 2017-05-12

## 2017-02-20 NOTE — TELEPHONE ENCOUNTER
gives this patient Namenda #30 tablets, and this is the only medication he prescribes to this patient. No one in the home seems to know what this message is about.

## 2017-02-21 ENCOUNTER — OFFICE VISIT (OUTPATIENT)
Dept: PODIATRY | Facility: CLINIC | Age: 82
End: 2017-02-21
Payer: COMMERCIAL

## 2017-02-21 VITALS
BODY MASS INDEX: 22.08 KG/M2 | HEIGHT: 72 IN | SYSTOLIC BLOOD PRESSURE: 126 MMHG | WEIGHT: 163 LBS | DIASTOLIC BLOOD PRESSURE: 56 MMHG | HEART RATE: 68 BPM | RESPIRATION RATE: 18 BRPM

## 2017-02-21 DIAGNOSIS — E11.49 TYPE II DIABETES MELLITUS WITH NEUROLOGICAL MANIFESTATIONS: ICD-10-CM

## 2017-02-21 DIAGNOSIS — L97.409: Primary | ICD-10-CM

## 2017-02-21 DIAGNOSIS — E11.621: Primary | ICD-10-CM

## 2017-02-21 DIAGNOSIS — I73.9 PERIPHERAL VASCULAR DISEASE: ICD-10-CM

## 2017-02-21 PROCEDURE — 11042 DBRDMT SUBQ TIS 1ST 20SQCM/<: CPT | Mod: S$GLB,,, | Performed by: PODIATRIST

## 2017-02-21 PROCEDURE — 3074F SYST BP LT 130 MM HG: CPT | Mod: S$GLB,,, | Performed by: PODIATRIST

## 2017-02-21 PROCEDURE — 1157F ADVNC CARE PLAN IN RCRD: CPT | Mod: S$GLB,,, | Performed by: PODIATRIST

## 2017-02-21 PROCEDURE — 3078F DIAST BP <80 MM HG: CPT | Mod: S$GLB,,, | Performed by: PODIATRIST

## 2017-02-21 PROCEDURE — 1126F AMNT PAIN NOTED NONE PRSNT: CPT | Mod: S$GLB,,, | Performed by: PODIATRIST

## 2017-02-21 PROCEDURE — 99213 OFFICE O/P EST LOW 20 MIN: CPT | Mod: 25,S$GLB,, | Performed by: PODIATRIST

## 2017-02-21 PROCEDURE — 1159F MED LIST DOCD IN RCRD: CPT | Mod: S$GLB,,, | Performed by: PODIATRIST

## 2017-02-21 NOTE — MR AVS SNAPSHOT
[address]  [address]   [place] [address]  Phone: [phone]  Fax: [phone]                  Chey Trujillo   2017 10:45 AM   Office Visit    Description:  Female : 1935   Provider:  Vinay Zaragoza Jr., DPM   Department:  [facility]           Reason for Visit     Wound Care           Diagnoses this Visit        Comments    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    -  Primary     Peripheral vascular disease         Type II diabetes mellitus with neurological manifestations                To Do List           Future Appointments        Provider Department Dept Phone    2017 10:45 AM Vinay Zaragoza Jr., DPM [facility] [phone]    3/2/2017 9:15 AM Vinay Zaragoza Jr., DPM [facility] [phone]    3/2/2017 1:00 PM PERIMETRY, HUMJACE Wesley - Ophthalmology [phone]    3/2/2017 1:30 PM KIRTI Palacio - Optometry [phone]    3/2/2017 2:30 PM JAYLENE Hanna,ANP-C Aj radha - Endocrinology [phone]      Goals (5 Years of Data)     None      Follow-Up and Disposition     Return in about 1 week (around 2017).    Follow-up and Disposition History      [facility]     [facility] On [facility] Care [facility] - / Assistance  Registered nurses in the [facility] On Call Center provide clinical advisement, health education, appointment booking, and other advisory services.  Call for this free service at [phone].             Medications           Message regarding Medications     Verify the changes and/or additions to your medication regime listed below are the same as discussed with your clinician today.  If any of these changes or additions are incorrect, please notify your healthcare provider.             Verify that the below list of medications is an accurate representation of the medications you are currently taking.  If none reported, the list may be blank. If incorrect,  please contact your healthcare provider. Carry this list with you in case of emergency.           Current Medications     ACCU-CHEK SMARTVIEW TEST STRIP Strp test FOUR TIMES A DAY    amiodarone (PACERONE) 200 MG Tab Take 1 tablet (200 mg total) by mouth once daily.    amlodipine (NORVASC) 10 MG tablet Take 1 tablet (10 mg total) by mouth once daily.    aspirin 81 MG chewable tablet Take 1 tablet by mouth Every morning.    blood-glucose meter Misc Dispense Accu-Chek Natalia meter    carvedilol (COREG) 25 MG tablet Take 1 tablet (25 mg total) by mouth 2 (two) times daily with meals.    cloNIDine (CATAPRES) 0.2 MG tablet Take 1 tablet (0.2 mg total) by mouth 2 (two) times daily.    collagenase ointment Apply topically once daily.    cyanocobalamin, vitamin B-12, 1,000 mcg TbSR Take 1,000 mcg by mouth once daily.    dorzolamide (TRUSOPT) 2 % ophthalmic solution INSTILL ONE DROP INTO EACH EYE TWICE DAILY    doxazosin (CARDURA) 8 MG Tab TAKE ONE TABLET BY MOUTH ONCE DAILY IN THE EVENING    ferrous sulfate 324 mg (65 mg iron) TbEC Take 1 tablet (325 mg total) by mouth 2 (two) times daily.    hydrALAZINE (APRESOLINE) 100 MG tablet Take 1 tablet (100 mg total) by mouth every 8 (eight) hours.    insulin glargine (LANTUS) 100 unit/mL injection INJECT 10 units nightly.    insulin lispro (HUMALOG) 100 unit/mL injection Take 9 units with breakfast, 9 units with lunch, and 8 units with dinner. Plus sliding scale    insulin lispro (HUMALOG) 100 unit/mL injection 15 units with meals plus correction scale, max daily dose is 75 units.    insulin syringe-needle U-100 (EASY TOUCH INSULIN SYRINGE) 0.5 mL 31 gauge x 5/16 Syrg To use 4 times daily with insulin injections    isosorbide dinitrate (ISOCHRON) 40 mg TbSR     isosorbide dinitrate (ISORDIL) 40 MG Tab Take 1 tablet (40 mg total) by mouth 3 (three) times daily.    lactulose (CEPHULAC) 20 gram Pack Take 1 packet (20 g total) by mouth once daily.    lancets Misc 1 lancet by  "Misc.(Non-Drug; Combo Route) route 4 (four) times daily.    latanoprost 0.005 % ophthalmic solution INSTILL ONE DROP INTO EACH EYE IN THE EVENING    lidocaine (LIDODERM) 5 % Place 1 patch onto the skin once daily. Remove & Discard patch within 12 hours or as directed by MD. Can apply to back area for pain    memantine (NAMENDA) 10 MG Tab Take 1 tablet (10 mg total) by mouth once daily.    nitroGLYCERIN 0.4 MG/DOSE TL SPRY (NITROLINGUAL) 400 mcg/spray spray PLACE ONE SPRAY UNDER THE TONGUE EVERY 5 MINUTES AS NEEDED FOR CHEST PAIN.    NITROSTAT 0.3 mg SL tablet DISSOLVE ONE TABLET UNDER THE TONGUE EVERY 5 MINUTES AS NEEDED FOR CHEST PAIN.  DO NOT EXCEED A TOTAL OF 3 DOSES IN 15 MINUTES    paricalcitol (ZEMPLAR) 1 MCG capsule Take 1 capsule by mouth on Monday and Friday.    potassium chloride SA (KLOR-CON M10) 10 MEQ tablet Take 1 tablet (10 mEq total) by mouth 2 (two) times daily.    potassium chloride SA (KLOR-CON M10) 10 MEQ tablet Take 1 tablet (10 mEq total) by mouth 2 (two) times daily.    pravastatin (PRAVACHOL) 20 MG tablet TAKE 1 TABLET BY MOUTH once DAILY    sodium bicarbonate 650 MG tablet     torsemide (DEMADEX) 20 MG Tab Take 2 tablets (40 mg total) by mouth every morning.    warfarin (COUMADIN) 5 MG tablet Take 0.5-1 tablets (2.5-5 mg total) by mouth Daily. As directed by [facility]    ZOSTAVAX, PF, 19,400 unit/0.65 mL injection            Clinical Reference Information           Your Vitals Were     BP Pulse Resp Height Weight BMI    126/56 (BP Location: Right arm, Patient Position: Sitting, BP Method: Automatic) 68 18 6' 2" (1.88 m) 73.9 kg (163 lb) 20.93 kg/m2      Blood Pressure          Most Recent Value    BP  (!)  126/56      Allergies as of 2/21/2017     Losartan    0.225 % Sodium Chloride    Amitriptyline    Azopt  [Brinzolamide]    Ciprofloxacin    Codeine    Cantil  [Mepenzolate Bromide]    Duragal-s    Erythromycin    Fentanyl    Hydrocodone-acetaminophen    Indomethacin    Meperidine    " Minoxidil    Morphine    Neuromuscular Blockers, Steroidal    Nifedipine    Oxycodone Hcl-oxycodone-asa    Penicillins    Propoxyphene    Sensipar [Cinacalcet]    Talwin Compound    Talwin  [Pentazocine Lactate]    Valsartan      Immunizations Administered on Date of Encounter - 2/21/2017     None      Orders Placed During Today's Visit      Normal Orders This Visit    Debridement       Instructions      Diabetes: Inspecting Your Feet    Diabetes increases your chances of developing foot problems. So inspect your feet every day. This helps you find small skin irritations before they become serious ulcers or infections. If you have trouble seeing the bottoms of your feet, use a mirror or ask a family member or friend to help.  How to check your feet  Below are tips to help you look for foot problems. Try to check your feet at the same time each day, such as when you get out of bed in the morning:  · Check the top of each foot. The tops of toes, back of the heel, and outer edge of the foot can get a lot of rubbing from poor-fitting shoes.  · Check the bottom of each foot. Daily wear and tear often leads to problems at pressure spots.  · Check the toes and nails. Fungal infections often occur between toes. Toenail problems can also be a sign of fungal infections or lead to breaks in the skin.  · Check your shoes, too. Loose objects inside a shoe can injure the foot. Use your hand to feel inside your shoes for things like nu, loose stitching, or rough areas that could irritate your skin.  Warning signs  Look for any color changes in the foot. Redness with streaks can signal a severe infection, which needs immediate medical attention. Tell your healthcare provider right away if you have any of these problems:  · Swelling, sometimes with color changes, may be a sign of poor blood flow or infection. Symptoms include tenderness and an increase in the size of your foot.  · Warm or hot areas on your feet may be signs of  infection. A foot that is cold may not be getting enough blood.  · Sensations such as burning, tingling, or pins and needles can be signs of a problem. Also check for areas that may be numb.  · Hot spots are caused by friction or pressure. Look for hot spots in areas that get a lot of rubbing. Hot spots can turn into blisters, calluses, or sores.  · Cracks and sores are caused by dry or irritated skin. They are a sign that the skin is breaking down, which can lead to infection.  · Toenail problems to watch for include nails growing into the skin (ingrown toenail) and causing redness or pain. Thick, yellow, or discolored nails can signal a fungal infection.  · Drainage and odor can develop from untreated sores and ulcers. Call your healthcare provider right away if you notice white or yellow drainage, bleeding, or unpleasant odor.   Date Last Reviewed: 6/1/2016  © 8045-9413 [employer]. [address] [place] [address]. All rights reserved. This information is not intended as a substitute for professional medical care. Always follow your healthcare professional's instructions.             Language Assistance Services     ATTENTION: Language assistance services are available, free of charge. Please call [phone].      ATENCIÓN: Si dianela maryann, tiene a moulton disposición servicios gratuitos de asistencia lingüística. Llame al [phone].     [facility] Ý: N?u b?n nói Ti?ng Vi?t, có các d?ch v? h? tr? ngôn ng? mi?n phí dành cho b?n. G?i s? [phone].         [facility] complies with applicable Federal civil rights laws and does not discriminate on the basis of race, color, national origin, age, disability, or sex.

## 2017-02-21 NOTE — PATIENT INSTRUCTIONS
Diabetes: Inspecting Your Feet    Diabetes increases your chances of developing foot problems. So inspect your feet every day. This helps you find small skin irritations before they become serious ulcers or infections. If you have trouble seeing the bottoms of your feet, use a mirror or ask a family member or friend to help.  How to check your feet  Below are tips to help you look for foot problems. Try to check your feet at the same time each day, such as when you get out of bed in the morning:  · Check the top of each foot. The tops of toes, back of the heel, and outer edge of the foot can get a lot of rubbing from poor-fitting shoes.  · Check the bottom of each foot. Daily wear and tear often leads to problems at pressure spots.  · Check the toes and nails. Fungal infections often occur between toes. Toenail problems can also be a sign of fungal infections or lead to breaks in the skin.  · Check your shoes, too. Loose objects inside a shoe can injure the foot. Use your hand to feel inside your shoes for things like nu, loose stitching, or rough areas that could irritate your skin.  Warning signs  Look for any color changes in the foot. Redness with streaks can signal a severe infection, which needs immediate medical attention. Tell your healthcare provider right away if you have any of these problems:  · Swelling, sometimes with color changes, may be a sign of poor blood flow or infection. Symptoms include tenderness and an increase in the size of your foot.  · Warm or hot areas on your feet may be signs of infection. A foot that is cold may not be getting enough blood.  · Sensations such as burning, tingling, or pins and needles can be signs of a problem. Also check for areas that may be numb.  · Hot spots are caused by friction or pressure. Look for hot spots in areas that get a lot of rubbing. Hot spots can turn into blisters, calluses, or sores.  · Cracks and sores are caused by dry or irritated  skin. They are a sign that the skin is breaking down, which can lead to infection.  · Toenail problems to watch for include nails growing into the skin (ingrown toenail) and causing redness or pain. Thick, yellow, or discolored nails can signal a fungal infection.  · Drainage and odor can develop from untreated sores and ulcers. Call your healthcare provider right away if you notice white or yellow drainage, bleeding, or unpleasant odor.   Date Last Reviewed: 6/1/2016 © 2000-2016 [employer]. [address] [place] [address]. All rights reserved. This information is not intended as a substitute for professional medical care. Always follow your healthcare professional's instructions.

## 2017-02-21 NOTE — PROGRESS NOTES
Subjective:    Patient ID: Chey Trujillo is a 81 y.o. female.    Chief Complaint: Wound Care      HPI:   DENITA Guerrier is a 81 y.o. female who presents to the clinic for evaluation and treatment of high risk feet. 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 The patient's chief complaint is foot ulcer, R heel. This patient has documented high risk feet requiring routine maintenance secondary to diabetes mellitis and those secondary complications of diabetes, as mentioned.    Wants foot dr closer to home. Has been moving all care to [place].   Gets home health from [facility].  Had a R heel ulcer that healed 2/7/17.      PCP: Viviana Nguyen MD    Date Last Seen by PCP: in epic    Current shoe gear:  Affected Foot: Flip-flops    History of Trauma: negative  Sign of  Infection: none    Last Podiatry Enc: Visit date not found  Last Enc w/ Me: Visit date not found    Hemoglobin A1C   Date Value Ref Range Status   11/15/2016 7.8 (H) 4.5 - 6.2 % Final     Comment:     According to ADA guidelines, hemoglobin A1C <7.0% represents  optimal control in non-pregnant diabetic patients.  Different  metrics may apply to specific populations.   Standards of Medical Care in Diabetes - 2016.  For the purpose of screening for the presence of diabetes:  <5.7%     Consistent with the absence of diabetes  5.7-6.4%  Consistent with increasing risk for diabetes   (prediabetes)  >or=6.5%  Consistent with diabetes  Currently no consensus exists for use of hemoglobin A1C  for diagnosis of diabetes for children.     09/27/2016 7.5 (H) 4.5 - 6.2 % Final     Comment:     According to ADA guidelines, hemoglobin A1C <7.0% represents  optimal control in non-pregnant diabetic patients.  Different  metrics may apply to specific populations.   Standards of Medical Care in Diabetes - 2016.  For the purpose of screening for the presence of diabetes:  <5.7%     Consistent with the absence of diabetes  5.7-6.4%  Consistent with increasing risk for diabetes   (prediabetes)  >or=6.5%  Consistent with diabetes  Currently no consensus exists for use of hemoglobin A1C  for diagnosis of diabetes for children.     08/16/2016 7.2 (H) 4.5 - 6.2 % Final     Comment:     According to ADA guidelines, hemoglobin A1C <7.0% represents  optimal control in non-pregnant diabetic patients.  Different  metrics may apply to specific populations.   Standards of Medical Care in Diabetes - 2016.  For the purpose of screening for the presence of diabetes:  <5.7%     Consistent with the absence of diabetes  5.7-6.4%  Consistent with increasing risk for diabetes   (prediabetes)  >or=6.5%  Consistent with diabetes  Currently no consensus exists for use of hemoglobin A1C  for diagnosis of diabetes for children.         Past Medical History    Diagnosis Date    Anticoagulation monitoring by pharmacist 6/29/2012    At risk for amiodarone toxicity with long term use 1/27/2014    Atrial fibrillation     Bilateral carotid artery disease 1/11/2016    Blood transfusion     CAD S/P percutaneous coronary angioplasty 9/27/2012    Chronic diastolic heart failure 9/27/2012     Echo 3-: 1 - Concentric hypertrophy.  2 - Normal left ventricular systolic function (EF 60-65%).  3 - Right ventricular enlargement with hypertrophy, with normal systolic function.  4 - Left ventricular diastolic dysfunction.  5 - Biatrial enlargement.  6 - Mild tricuspid regurgitation.  7 - Pulmonary hypertension. The estimated PA systolic pressure is 55 mmHg.  8 - Increased central venous pressure (IVC is greater than 3cm in diameter).     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  ROWAN placed  just proximal to stent in  for UA.  Cath 2011: normal LMCA, 40% mid LAD, 50% distal LCx    Cardiac pacemaker placement  2012     Quinn Reply , serial #[id number]    Eye surgery      Cataract extraction       Status post cataract extraction and insertion of intraocular lens of right eye     Social History     Social History    Marital status:      Spouse name: N/A    Number of children: N/A    Years of education: N/A     Occupational History    Not on file.     Social History Main Topics    Smoking status: Never Smoker    Smokeless tobacco: Never Used    Alcohol use No    Drug use: No    Sexual activity: No     Other Topics Concern    Not on file     Social History Narrative    Son lives with her. Spouse .          Current Outpatient Prescriptions:     ACCU-CHEK SMARTVIEW TEST STRIP Strp, test FOUR TIMES A DAY, Disp: 150 each, Rfl: 11    amiodarone (PACERONE) 200 MG Tab, Take 1 tablet (200 mg total) by mouth once daily., Disp: 90 tablet, Rfl: 3    amlodipine (NORVASC) 10 MG tablet, Take 1 tablet (10 mg total) by mouth once daily., Disp: 90 tablet, Rfl: 3    aspirin 81 MG chewable tablet, Take 1 tablet by mouth Every morning., Disp: , Rfl:     blood-glucose meter Misc, Dispense Accu-Chek Natalia meter, Disp: 1 each, Rfl: 0    carvedilol (COREG) 25 MG tablet, Take 1 tablet (25 mg total) by mouth 2 (two) times daily with meals., Disp: 270 tablet, Rfl: 4    cloNIDine (CATAPRES) 0.2 MG tablet, Take 1 tablet (0.2 mg total) by mouth 2 (two) times daily., Disp: 60 tablet, Rfl: 2    collagenase ointment, Apply topically once daily., Disp: 90 g, Rfl: 1    cyanocobalamin, vitamin B-12, 1,000 mcg TbSR, Take 1,000 mcg by mouth once daily., Disp: 30 each, Rfl: 0    dorzolamide (TRUSOPT) 2 % ophthalmic solution, INSTILL ONE DROP INTO EACH EYE TWICE DAILY, Disp: 10 mL, Rfl: 11    doxazosin (CARDURA) 8 MG Tab, TAKE ONE TABLET BY MOUTH ONCE DAILY IN THE EVENING, Disp: 90 tablet, Rfl: 3     ferrous sulfate 324 mg (65 mg iron) TbEC, Take 1 tablet (325 mg total) by mouth 2 (two) times daily., Disp: 60 tablet, Rfl: 0    hydrALAZINE (APRESOLINE) 100 MG tablet, Take 1 tablet (100 mg total) by mouth every 8 (eight) hours., Disp: 270 tablet, Rfl: 4    insulin glargine (LANTUS) 100 unit/mL injection, INJECT 10 units nightly., Disp: 30 mL, Rfl: 12    insulin lispro (HUMALOG) 100 unit/mL injection, Take 9 units with breakfast, 9 units with lunch, and 8 units with dinner. Plus sliding scale, Disp: 2 vial, Rfl: 8    insulin lispro (HUMALOG) 100 unit/mL injection, 15 units with meals plus correction scale, max daily dose is 75 units., Disp: 3 vial, Rfl: 6    insulin syringe-needle U-100 (EASY TOUCH INSULIN SYRINGE) 0.5 mL 31 gauge x 5/16 Syrg, To use 4 times daily with insulin injections, Disp: 150 each, Rfl: 2    isosorbide dinitrate (ISOCHRON) 40 mg TbSR, , Disp: , Rfl:     isosorbide dinitrate (ISORDIL) 40 MG Tab, Take 1 tablet (40 mg total) by mouth 3 (three) times daily., Disp: 270 tablet, Rfl: 3    lactulose (CEPHULAC) 20 gram Pack, Take 1 packet (20 g total) by mouth once daily., Disp: 90 packet, Rfl: 6    lancets Misc, 1 lancet by Misc.(Non-Drug; Combo Route) route 4 (four) times daily., Disp: 150 each, Rfl: 11    latanoprost 0.005 % ophthalmic solution, INSTILL ONE DROP INTO EACH EYE IN THE EVENING, Disp: 3 Bottle, Rfl: 12    lidocaine (LIDODERM) 5 %, Place 1 patch onto the skin once daily. Remove & Discard patch within 12 hours or as directed by MD. Can apply to back area for pain, Disp: 30 patch, Rfl: 1    memantine (NAMENDA) 10 MG Tab, Take 1 tablet (10 mg total) by mouth once daily., Disp: 30 tablet, Rfl: 5    nitroGLYCERIN 0.4 MG/DOSE TL SPRY (NITROLINGUAL) 400 mcg/spray spray, PLACE ONE SPRAY UNDER THE TONGUE EVERY 5 MINUTES AS NEEDED FOR CHEST PAIN., Disp: 4.9 g, Rfl: 0    NITROSTAT 0.3 mg SL tablet, DISSOLVE ONE TABLET UNDER THE TONGUE EVERY 5 MINUTES AS NEEDED FOR CHEST PAIN.  DO  NOT EXCEED A TOTAL OF 3 DOSES IN 15 MINUTES, Disp: 100 tablet, Rfl: 0    paricalcitol (ZEMPLAR) 1 MCG capsule, Take 1 capsule by mouth on Monday and Friday., Disp: 8 capsule, Rfl: 3    potassium chloride SA (KLOR-CON M10) 10 MEQ tablet, Take 1 tablet (10 mEq total) by mouth 2 (two) times daily., Disp: 60 tablet, Rfl: 11    potassium chloride SA (KLOR-CON M10) 10 MEQ tablet, Take 1 tablet (10 mEq total) by mouth 2 (two) times daily., Disp: 60 tablet, Rfl: 2    pravastatin (PRAVACHOL) 20 MG tablet, TAKE 1 TABLET BY MOUTH once DAILY, Disp: 90 tablet, Rfl: 0    sodium bicarbonate 650 MG tablet, , Disp: , Rfl:     torsemide (DEMADEX) 20 MG Tab, Take 2 tablets (40 mg total) by mouth every morning., Disp: 180 tablet, Rfl: 4    warfarin (COUMADIN) 5 MG tablet, Take 0.5-1 tablets (2.5-5 mg total) by mouth Daily. As directed by [facility], Disp: 30 tablet, Rfl: 3    ZOSTAVAX, PF, 19,400 unit/0.65 mL injection, , Disp: , Rfl:   Review of patient's allergies indicates:   Allergen Reactions    Losartan Other (See Comments)     Hyperkalemia    0.225 % sodium chloride      Other reaction(s): Eye irritation    Amitriptyline      Other reaction(s): Vomiting  Other reaction(s): Vomiting    Azopt  [brinzolamide]      Other reaction(s): Eye irritation    Ciprofloxacin Nausea And Vomiting     Other reaction(s): Stomach upset    Codeine      Other reaction(s): Unknown  Other reaction(s): Unknown    Cantil  [mepenzolate bromide]      Other reaction(s): Unknown  Other reaction(s): Unknown    Duragal-s      Other reaction(s): Vomiting    Erythromycin      Other reaction(s): Unknown  Other reaction(s): Unknown    Fentanyl      Other reaction(s): Vomiting    Hydrocodone-acetaminophen      Other reaction(s): Unknown  Other reaction(s): Unknown    Indomethacin      Other reaction(s): Unknown  Other reaction(s): Unknown    Meperidine      Other reaction(s): Unknown  Other reaction(s): Unknown    Minoxidil      Other  "reaction(s): druged  Other reaction(s): nausea and vomiting  Other reaction(s): nausea and vomiting  Other reaction(s): druged    Morphine      Other reaction(s): Unknown  Other reaction(s): Unknown    Neuromuscular blockers, steroidal      Other reaction(s): Unknown    Nifedipine      Other reaction(s): Swelling  Other reaction(s): Swelling    Oxycodone hcl-oxycodone-asa      Other reaction(s): Unknown  Other reaction(s): Unknown    Penicillins Hives     Other reaction(s): Unknown    Propoxyphene      Other reaction(s): Unknown    Sensipar [cinacalcet] Nausea Only    Talwin compound      Other reaction(s): Unknown    Talwin  [pentazocine lactate]      Other reaction(s): Unknown    Valsartan Rash and Hives       Visit Vitals    BP (!) 126/56 (BP Location: Right arm, Patient Position: Sitting, BP Method: Automatic)    Pulse 68    Resp 18    Ht 6' 2" (1.88 m)    Wt 73.9 kg (163 lb)    BMI 20.93 kg/m2       ROS  ROS Constitutional:  General Appearance: malnourished  Vascular: positive for edema  Musculoskeletal: negative for joint paint and joint edema  Skin: negative for rashes and lesions  Neurological: negative for burning, tingling and numbness  Gastrointestinal: negative for stomach pain, nausea and vomiting        Objective:        Ortho/SPM Exam  Physical Exam  Physical Exam   Constitutional: She is oriented to person, place, and time. She appears well-developed and well-nourished.   Cardiovascular:   Dorsalis pedis and posterior tibial pulses are diminished Bilaterally. Toes are cool to touch. Feet are warm proximally.There is decreased digital hair. Skin is atrophic, slightly hyperpigmented, and mildly edematous       Musculoskeletal: Normal range of motion. She exhibits no edema or tenderness.   Adequate joint range of motion without pain, limitation, nor crepitation Bilateral feet and ankle joints. Muscle strength is 5/5 in all groups bilaterally.         Neurological: She is alert and " oriented to person, place, and time.   [place] 5.07 monofilamant testing is diminished Haris feet. Sharp/dull sensation diminished Bilaterally. Light touch absent Bilaterally.       Skin: Skin is warm, dry. No bruising, no ecchymosis and no lesion noted. No erythema.   Nails x10 are elongated by  2-5mm's, thickened by 1-4 mm's, dystrophic, and are darkened in  coloration . Xerosis Bilaterally. No open lesions noted.    Hyperkeratotic tissue noted to posterior b/l  Psychiatric: She has a normal mood and affect. Her behavior is normal. Judgment and thought content normal.   Vitals reviewed.    Ulcer Location: R posterior heel  Measurements: .5x.5x.5  Periwound: Intact and hyperkeratotic  Drainage: none  Purulence: None.  Malodor: None.  Base:  80% granular, 20% fibrotic  Signs of infection: None.        Assessment:     Imaging:   X-ray Foot Complete Bilateral    Result Date: 1/5/2017  AP, oblique, and weightbearing lateral radiographs of both feet were obtained. The bones are osteoporotic but intact. There is no evidence for acute fracture or bone destruction. There is mild hallux valgus deformity present bilaterally. There are mild degenerative changes within the small joints of both feet. There is no evidence for dislocation. No bony erosions are evident. Atherosclerotic calcification is identified within the arteries of both feet.    Osteoporosis. Extensive atherosclerosis. No evidence for acute fracture, bone destruction, or dislocation. Electronically signed by: LLOYD WALKER MD Date:     01/05/17 Time:    12:58     X-ray Calcaneus Bilateral    Result Date: 1/5/2017  2 views of the calcaneus were obtained bilaterally. There is no evidence for acute fracture or bone destruction. There is extensive atherosclerotic calcification identified within the arteries at the level of the ankle. There is a small amount of soft tissue calcification posterior to the left calcaneus. No radiopaque soft tissue foreign  "bodies are identified.    No evidence for acute fracture, bone destruction, or dislocation. Extensive atherosclerotic calcification within the arteries at the level of the ankles. Electronically signed by: LLOYD WALKER MD Date:     01/05/17 Time:    12:55       Recent Labs  Lab Result Units 01/05/17  1043   Sed Rate mm/Hr 40*   CRP mg/L 0.5   Prealbumin mg/dL 11*         1. 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 - Right Foot    2. Peripheral vascular disease    3. Type II diabetes mellitus with neurological manifestations          Plan:       Orders Placed This Encounter    Debridement     Wound Debridement  Date/Time: 2/21/2017 10:25 AM  Performed by: SHAWNEE SOLORZANO JR.  Authorized by: SHAWNEE SOLORZANO JR.     Time out: Immediately prior to procedure a "time out" was called to verify the correct patient, procedure, equipment, support staff and site/side marked as required.    Consent Done?:  Yes (Verbal)    Preparation: Patient was prepped and draped in usual sterile fashion    Local anesthesia used?: No      Wound Details:    Location:  Right foot    Location:  Right Heel    Type of Debridement:  Excisional       Length (cm):  0.5       Area (sq cm):  0.25       Width (cm):  0.5       Percent Debrided (%):  100       Depth (cm):  0.5       Total Area Debrided (sq cm):  0.25    Depth of debridement:  Subcutaneous tissue    Tissue debrided:  Subcutaneous and Hypergranulation    Devitalized tissue debrided:  Biofilm, Callus and Fibrin    Instruments:  Blade    Bleeding:  Minimal  Hemostasis Achieved: Yes    Method Used:  Pressure and Silver Nitrate  Patient tolerance:  Patient tolerated the procedure well with no immediate complications    :     Chey was seen today for wound care.    Diagnoses and all orders for this visit:    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 - Right Foot  -     Debridement    Peripheral vascular disease    Type II diabetes mellitus with neurological manifestations        Chey Trujillo is a 81 y.o. female presenting " w/   1. 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 - Right Foot    2. Peripheral vascular disease    3. Type II diabetes mellitus with neurological manifestations        -pt seen, evaluated, and managed  -dx discussed in detail. All questions/concerns addressed  -all tx options discussed. All alternatives, risks, benefits of all txs discussed  -The patient was educated regarding the above diagnosis. We discussed conservative care options regarding shoe wear and/or padding.  -rxs dispensed: none  -xr and labs reviewed  -ulcer reopened today  -procedure as above  -dressings: chelle+football    -pt would benefit from nutrition consult due to multiple medical problems, polypharmacy, allergies, low pre-albumin   Referral ordered in epic - still pending  -educated on DM footcare    rtc 1 wks    Return in about 1 week (around 2/28/2017).

## 2017-03-02 ENCOUNTER — OFFICE VISIT (OUTPATIENT)
Dept: ENDOCRINOLOGY | Facility: CLINIC | Age: 82
End: 2017-03-02
Payer: COMMERCIAL

## 2017-03-02 ENCOUNTER — OFFICE VISIT (OUTPATIENT)
Dept: PODIATRY | Facility: CLINIC | Age: 82
End: 2017-03-02
Payer: COMMERCIAL

## 2017-03-02 ENCOUNTER — CLINICAL SUPPORT (OUTPATIENT)
Dept: OPHTHALMOLOGY | Facility: CLINIC | Age: 82
End: 2017-03-02
Payer: COMMERCIAL

## 2017-03-02 ENCOUNTER — OFFICE VISIT (OUTPATIENT)
Dept: OPTOMETRY | Facility: CLINIC | Age: 82
End: 2017-03-02
Payer: COMMERCIAL

## 2017-03-02 VITALS
BODY MASS INDEX: 23.7 KG/M2 | HEIGHT: 72 IN | WEIGHT: 175 LBS | SYSTOLIC BLOOD PRESSURE: 132 MMHG | HEART RATE: 72 BPM | DIASTOLIC BLOOD PRESSURE: 62 MMHG

## 2017-03-02 VITALS
HEART RATE: 84 BPM | SYSTOLIC BLOOD PRESSURE: 134 MMHG | HEIGHT: 72 IN | DIASTOLIC BLOOD PRESSURE: 72 MMHG | RESPIRATION RATE: 18 BRPM

## 2017-03-02 DIAGNOSIS — H40.1133 PRIMARY OPEN-ANGLE GLAUCOMA, BILATERAL, SEVERE STAGE: ICD-10-CM

## 2017-03-02 DIAGNOSIS — H52.4 MYOPIA WITH PRESBYOPIA OF BOTH EYES: ICD-10-CM

## 2017-03-02 DIAGNOSIS — G47.33 OBSTRUCTIVE SLEEP APNEA ON CPAP: Chronic | ICD-10-CM

## 2017-03-02 DIAGNOSIS — H25.12 NUCLEAR SCLEROSIS, LEFT: ICD-10-CM

## 2017-03-02 DIAGNOSIS — L97.409: Primary | ICD-10-CM

## 2017-03-02 DIAGNOSIS — E11.9 DIABETES MELLITUS TYPE 2 WITHOUT RETINOPATHY: Primary | ICD-10-CM

## 2017-03-02 DIAGNOSIS — Z98.61 CAD S/P PERCUTANEOUS CORONARY ANGIOPLASTY: Chronic | ICD-10-CM

## 2017-03-02 DIAGNOSIS — E55.9 VITAMIN D DEFICIENCY: ICD-10-CM

## 2017-03-02 DIAGNOSIS — E13.9 LADA (LATENT AUTOIMMUNE DIABETES IN ADULTS), MANAGED AS TYPE 1: Primary | ICD-10-CM

## 2017-03-02 DIAGNOSIS — N18.4 CHRONIC KIDNEY DISEASE, STAGE IV (SEVERE): Chronic | ICD-10-CM

## 2017-03-02 DIAGNOSIS — E78.2 MIXED HYPERLIPIDEMIA: ICD-10-CM

## 2017-03-02 DIAGNOSIS — E11.621: ICD-10-CM

## 2017-03-02 DIAGNOSIS — H52.13 MYOPIA WITH PRESBYOPIA OF BOTH EYES: ICD-10-CM

## 2017-03-02 DIAGNOSIS — F01.518 VASCULAR DEMENTIA WITH BEHAVIOR DISTURBANCE: ICD-10-CM

## 2017-03-02 DIAGNOSIS — E11.22 DIABETES MELLITUS WITH STAGE 4 CHRONIC KIDNEY DISEASE GFR 15-29: Chronic | ICD-10-CM

## 2017-03-02 DIAGNOSIS — E11.621: Primary | ICD-10-CM

## 2017-03-02 DIAGNOSIS — I50.32 CHRONIC DIASTOLIC HEART FAILURE: Chronic | ICD-10-CM

## 2017-03-02 DIAGNOSIS — I25.10 CAD S/P PERCUTANEOUS CORONARY ANGIOPLASTY: Chronic | ICD-10-CM

## 2017-03-02 DIAGNOSIS — L97.409: ICD-10-CM

## 2017-03-02 DIAGNOSIS — I48.0 PAROXYSMAL ATRIAL FIBRILLATION: Chronic | ICD-10-CM

## 2017-03-02 DIAGNOSIS — N18.4 DIABETES MELLITUS WITH STAGE 4 CHRONIC KIDNEY DISEASE GFR 15-29: Chronic | ICD-10-CM

## 2017-03-02 PROCEDURE — 99999 PR PBB SHADOW E&M-EST. PATIENT-LVL II: CPT | Mod: PBBFAC,,, | Performed by: OPTOMETRIST

## 2017-03-02 PROCEDURE — 99999 PR PBB SHADOW E&M-EST. PATIENT-LVL V: CPT | Mod: PBBFAC,,, | Performed by: NURSE PRACTITIONER

## 2017-03-02 PROCEDURE — 1160F RVW MEDS BY RX/DR IN RCRD: CPT | Mod: S$GLB,,, | Performed by: PODIATRIST

## 2017-03-02 PROCEDURE — 1160F RVW MEDS BY RX/DR IN RCRD: CPT | Mod: S$GLB,,, | Performed by: NURSE PRACTITIONER

## 2017-03-02 PROCEDURE — 99214 OFFICE O/P EST MOD 30 MIN: CPT | Mod: S$GLB,,, | Performed by: NURSE PRACTITIONER

## 2017-03-02 PROCEDURE — 92014 COMPRE OPH EXAM EST PT 1/>: CPT | Mod: S$GLB,,, | Performed by: OPTOMETRIST

## 2017-03-02 PROCEDURE — 1157F ADVNC CARE PLAN IN RCRD: CPT | Mod: S$GLB,,, | Performed by: PODIATRIST

## 2017-03-02 PROCEDURE — 3078F DIAST BP <80 MM HG: CPT | Mod: S$GLB,,, | Performed by: NURSE PRACTITIONER

## 2017-03-02 PROCEDURE — 3074F SYST BP LT 130 MM HG: CPT | Mod: S$GLB,,, | Performed by: OPTOMETRIST

## 2017-03-02 PROCEDURE — 1126F AMNT PAIN NOTED NONE PRSNT: CPT | Mod: S$GLB,,, | Performed by: NURSE PRACTITIONER

## 2017-03-02 PROCEDURE — 1126F AMNT PAIN NOTED NONE PRSNT: CPT | Mod: S$GLB,,, | Performed by: PODIATRIST

## 2017-03-02 PROCEDURE — 99213 OFFICE O/P EST LOW 20 MIN: CPT | Mod: 25,S$GLB,, | Performed by: PODIATRIST

## 2017-03-02 PROCEDURE — 3078F DIAST BP <80 MM HG: CPT | Mod: S$GLB,,, | Performed by: PODIATRIST

## 2017-03-02 PROCEDURE — 92083 EXTENDED VISUAL FIELD XM: CPT | Mod: S$GLB,,, | Performed by: OPTOMETRIST

## 2017-03-02 PROCEDURE — 1159F MED LIST DOCD IN RCRD: CPT | Mod: S$GLB,,, | Performed by: PODIATRIST

## 2017-03-02 PROCEDURE — 1157F ADVNC CARE PLAN IN RCRD: CPT | Mod: S$GLB,,, | Performed by: NURSE PRACTITIONER

## 2017-03-02 PROCEDURE — 1159F MED LIST DOCD IN RCRD: CPT | Mod: S$GLB,,, | Performed by: NURSE PRACTITIONER

## 2017-03-02 PROCEDURE — 3075F SYST BP GE 130 - 139MM HG: CPT | Mod: S$GLB,,, | Performed by: PODIATRIST

## 2017-03-02 PROCEDURE — 3075F SYST BP GE 130 - 139MM HG: CPT | Mod: S$GLB,,, | Performed by: NURSE PRACTITIONER

## 2017-03-02 PROCEDURE — 3078F DIAST BP <80 MM HG: CPT | Mod: S$GLB,,, | Performed by: OPTOMETRIST

## 2017-03-02 NOTE — PROGRESS NOTES
CC: Ms. Chey Trujillo arrives today for management of MAYELA treated as Type 1  DM and review of chronic medical conditions, as listed in the visit diagnosis section of this encounter.  She presents in a wheelchair and is accompanied by her son.     HPI: Ms. Chey Trujillo is  female who was diagnosed with Type 2 in 1960s via glucose tolerance test. She was diet controlled for a while, but then progressed to orals, then insulin. Denies ever being hospitalized for DM.   She is a former patient of Dr. Wang. C-peptide down to 0.6 in 10/2015. She was diagnosed with MAYELA and has been treated as type 1 diabetes. She is currently seeing podiatry regularly for R heel ulcer.    Last seen in endocrine by Dr. Marroquin 11/2016. She is new to me today. No current A1c in EPIC.    CURRENT DIABETIC MEDS: intensive insulin injection program Lantus 10 units PM, Humalog 8 units AC + correction scale  Vial or pen: vials; prefers vials; rotates injects in abd and hips    Missing Insulin/PO medication doses: Yes   Timing prandial insulin 5-15 minutes before meals: yes    BG readings are checked 3-4 x/day, no meter or log to clinic today but reports readings range from :  FBS 89, usually in 200s  Bedtime 200s    Hypoglycemia: none recently; previously during the night, BG in 70s  Hypoglycemic Symptoms: sweating, weakness, HA  Hypoglycemia Treatment: glucerna    Exercise: No    Dietary Habits: The patient eats a regular, healthy diet. snacks at night to prevent night hypoglycemia, drinks glucerna, water, coffee; eats out and at home    Last Eye Exam: has appt today Dr. Mederos  Last Podiatry Exam: 2/2017    REVIEW OF SYSTEMS  Constitutional: no c/o fatigue, weakness, + weight gain over past year  Eyes: denies visual disturbances.  Cardiac: no palpitations or chest pain.  Respiratory: no SOB, SCHWAB, or cough  GI: no N/V/D, abdominal pain   Skin: + R foot heel ulcer - seeing podiatry weekly  Neuro: no numbness, tingling, or  "parasthesias.  Endocrine: denies polyphagia, polydipsia, polyuria    Personally reviewed Past Medical, Surgical, Social History.    Vital Signs  /62  Pulse 72  Ht 6' 2" (1.88 m)  Wt 79.4 kg (175 lb)  BMI 22.47 kg/m2    Personally reviewed the below labs:    Hemoglobin A1C   Date Value Ref Range Status   11/15/2016 7.8 (H) 4.5 - 6.2 % Final     Comment:     According to ADA guidelines, hemoglobin A1C <7.0% represents  optimal control in non-pregnant diabetic patients.  Different  metrics may apply to specific populations.   Standards of Medical Care in Diabetes - 2016.  For the purpose of screening for the presence of diabetes:  <5.7%     Consistent with the absence of diabetes  5.7-6.4%  Consistent with increasing risk for diabetes   (prediabetes)  >or=6.5%  Consistent with diabetes  Currently no consensus exists for use of hemoglobin A1C  for diagnosis of diabetes for children.     09/27/2016 7.5 (H) 4.5 - 6.2 % Final     Comment:     According to ADA guidelines, hemoglobin A1C <7.0% represents  optimal control in non-pregnant diabetic patients.  Different  metrics may apply to specific populations.   Standards of Medical Care in Diabetes - 2016.  For the purpose of screening for the presence of diabetes:  <5.7%     Consistent with the absence of diabetes  5.7-6.4%  Consistent with increasing risk for diabetes   (prediabetes)  >or=6.5%  Consistent with diabetes  Currently no consensus exists for use of hemoglobin A1C  for diagnosis of diabetes for children.     08/16/2016 7.2 (H) 4.5 - 6.2 % Final     Comment:     According to ADA guidelines, hemoglobin A1C <7.0% represents  optimal control in non-pregnant diabetic patients.  Different  metrics may apply to specific populations.   Standards of Medical Care in Diabetes - 2016.  For the purpose of screening for the presence of diabetes:  <5.7%     Consistent with the absence of diabetes  5.7-6.4%  Consistent with increasing risk for diabetes "   (prediabetes)  >or=6.5%  Consistent with diabetes  Currently no consensus exists for use of hemoglobin A1C  for diagnosis of diabetes for children.         Chemistry        Component Value Date/Time     11/15/2016 1013    K 4.2 11/15/2016 1013     (H) 11/15/2016 1013    CO2 22 (L) 11/15/2016 1013    BUN 56 (H) 11/15/2016 1013    CREATININE 2.3 (H) 11/15/2016 1013     (H) 11/15/2016 1013        Component Value Date/Time    CALCIUM 8.9 11/15/2016 1013    ALKPHOS 96 11/15/2016 1013    AST 44 (H) 11/15/2016 1013    ALT 29 11/15/2016 1013    BILITOT 0.4 11/15/2016 1013        Lab Results   Component Value Date    CHOL 99 (L) 11/15/2016    CHOL 108 (L) 09/28/2016    CHOL 114 (L) 01/25/2016     Lab Results   Component Value Date    HDL 29 (L) 11/15/2016    HDL 24 (L) 09/28/2016    HDL 33 (L) 01/25/2016     Lab Results   Component Value Date    LDLCALC 47.0 (L) 11/15/2016    LDLCALC 57.8 (L) 09/28/2016    LDLCALC 63.4 01/25/2016     Lab Results   Component Value Date    TRIG 115 11/15/2016    TRIG 131 09/28/2016    TRIG 88 01/25/2016       Lab Results   Component Value Date    MICALBCREAT 12.9 09/05/2007     Lab Results   Component Value Date    TSH 1.563 11/15/2016     Vit D, 25-Hydroxy   Date Value Ref Range Status   06/16/2016 18 (L) 30 - 96 ng/mL Final     Comment:     Vitamin D deficiency.........<10 ng/mL                              Vitamin D insufficiency......10-29 ng/mL       Vitamin D sufficiency........> or equal to 30 ng/mL  Vitamin D toxicity............>100 ng/mL       PHYSICAL EXAMINATION  Constitutional: Appears well, no distress  Neck: Supple, trachea midline  Respiratory:  even and unlabored.  Cardiovascular:  no edema.    Abdomen: soft, non tender, non distended  Skin: warm and dry  Foot: full exam deferred, seeing podiatry weekly     Assessment/Plan  1. 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 Reviewed A1c and BG goals.   Change Lantus 10 units in AM administration - to lessen risk of hypoglycemia. AM administration r/t renal dysfunction. Instructed not to snack at night to prevent hypoglycemia.   Continue Humalog 8 units AC, + correction scale. Demonstrated proper use of correction scale. Does NOT count carbs at this time.   - provided SMBG flowsheet, demonostrated use, she will mail to this office for review in 2-3 weeks  Discussed insulin's onset, peak, duration, storage, dosing, administration, site rotation.   Reviewed hypoglycemia, s/s and appropriate tx options.   Monitor BG ac/hs  - takes ASA, statin  - allergy to losartan, valsartan   2. Chronic kidney disease, stage IV (severe)  Avoiding metformin and SGLT2i r/t GFR  - off ARB per Dr. Gupta 8/2016     3. Diabetes mellitus with stage 4 chronic kidney disease GFR 15-29  Avoiding metformin and SGLT2i  -avoid hypoglycemia  - caution with insulin stacking   4. CAD S/P percutaneous coronary angioplasty  avoid hypoglycemia; followed by cardiology   5. Chronic diastolic heart failure  avoid hypoglycemia   6. Mixed hyperlipidemia   LDL goal < 100  - controlled, LDL at goal, on moderate intensity statin, LFTs noted   7. Paroxysmal atrial fibrillation  If uncontrolled, may contribute to insulin resistance  - seen by Dr. Kay annually - due 9/2017   8. Vitamin D deficiency  Level low, managed per renal - started ergo 8/2016   9.  Memory loss Evaluated by neurology; started Namenda 1/2017.    10. PVD, with R heel foot ulcer Optimize DM control to promote wound healing; f/u with podiatry as recommended   11. VEE on CPAP If uncontrolled, may contribute to insulin resistance     Orders Placed This Encounter   Procedures    Hemoglobin A1c       FOLLOW UP  Return in about 3 months (around 6/2/2017).

## 2017-03-02 NOTE — PROGRESS NOTES
HPI     DLS: 06/25/2015 Dr. Mederos     LBS: 89 this morning  Hemoglobin A1C       Date                     Value               Ref Range             Status                11/15/2016               7.8 (H)             4.5 - 6.2 %           Final                 09/27/2016               7.5 (H)             4.5 - 6.2 %           Final                 08/16/2016               7.2 (H)             4.5 - 6.2 %           Final            ----------  Patient states vision her vision has been stable since her last visit. She   says she has been having problems seeing out of her right eye.      Latanoprost QHS, Trusopt BID (only taking at night time) (no drops today)   care giver reports good complinace       Last edited by Vinay Mederos, OD on 3/2/2017  2:30 PM.         Assessment /Plan     For exam results, see Encounter Report.    Diabetes mellitus type 2 without retinopathy  -No retinopathy noted today.  Continued control with primary care physician and annual comprehensive eye exam.    Primary open-angle glaucoma, bilateral, severe stage  -HVF very poor stats complete defect OD, not useful OS  -Reviewed compliance and instructed Trusopt BID and Latanoprost QHS  -6 mo follow up Dr. Cuellar.  Repeat fields OCT    Nuclear sclerosis, left  -Mature    Myopia with presbyopia of both eyes  -Hold sRx secondary to cataracts       RTC 6 mo Polo

## 2017-03-02 NOTE — MR AVS SNAPSHOT
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 [place] [phone]    3/23/2017 8:45 AM Vinay Zaragoza Jr. [facility] [phone]    3/23/2017 11:20 AM TELEPHONE CHECK, PACEMAKER [facility] Arr[facility]ia [phone]    2017 3:20 PM John Quezada MD [facility] Neurology [phone]      Goals (5 Years of Data)     None      Follow-Up and Disposition     Return in about 3 months (around 2017).      [facility] On [facility]     [facility] -  Assistance  Registered nurses in the [facility] On Call Center provide clinical advisement, health education, appointment booking, and other advisory services.  Call for this free service at [phone].             Medications           Message regarding Medications     Verify the changes and/or additions to your medication regime listed below are the same as discussed with your clinician today.  If any of these changes or additions are incorrect, please notify your healthcare  provider.             Verify that the below list of medications is an accurate representation of the medications you are currently taking.  If none reported, the list may be blank. If incorrect, please contact your healthcare provider. Carry this list with you in case of emergency.           Current Medications     ACCU-CHEK SMARTVIEW TEST STRIP Strp test FOUR TIMES A DAY    amiodarone (PACERONE) 200 MG Tab Take 1 tablet (200 mg total) by mouth once daily.    amlodipine (NORVASC) 10 MG tablet Take 1 tablet (10 mg total) by mouth once daily.    aspirin 81 MG chewable tablet Take 1 tablet by mouth Every morning.    blood-glucose meter Misc Dispense Accu-Chek Natalia meter    carvedilol (COREG) 25 MG tablet Take 1 tablet (25 mg total) by mouth 2 (two) times daily with meals.    cloNIDine (CATAPRES) 0.2 MG tablet Take 1 tablet (0.2 mg total) by mouth 2 (two) times daily.    collagenase ointment Apply topically once daily.    cyanocobalamin, vitamin B-12, 1,000 mcg TbSR Take 1,000 mcg by mouth once daily.    dorzolamide (TRUSOPT) 2 % ophthalmic solution INSTILL ONE DROP INTO EACH EYE TWICE DAILY    doxazosin (CARDURA) 8 MG Tab TAKE ONE TABLET BY MOUTH ONCE DAILY IN THE EVENING    ferrous sulfate 324 mg (65 mg iron) TbEC Take 1 tablet (325 mg total) by mouth 2 (two) times daily.    hydrALAZINE (APRESOLINE) 100 MG tablet Take 1 tablet (100 mg total) by mouth every 8 (eight) hours.    insulin glargine (LANTUS) 100 unit/mL injection INJECT 10 units nightly.    insulin lispro (HUMALOG) 100 unit/mL injection Take 9 units with breakfast, 9 units with lunch, and 8 units with dinner. Plus sliding scale    insulin lispro (HUMALOG) 100 unit/mL injection 15 units with meals plus correction scale, max daily dose is 75 units.    insulin syringe-needle U-100 (EASY TOUCH INSULIN SYRINGE) 0.5 mL 31 gauge x 5/16 Syrg To use 4 times daily with insulin injections    isosorbide dinitrate (ISOCHRON) 40 mg TbSR     isosorbide dinitrate (ISORDIL)  40 MG Tab Take 1 tablet (40 mg total) by mouth 3 (three) times daily.    lactulose (CEPHULAC) 20 gram Pack Take 1 packet (20 g total) by mouth once daily.    lancets Misc 1 lancet by Misc.(Non-Drug; Combo Route) route 4 (four) times daily.    latanoprost 0.005 % ophthalmic solution INSTILL ONE DROP INTO EACH EYE IN THE EVENING    lidocaine (LIDODERM) 5 % Place 1 patch onto the skin once daily. Remove & Discard patch within 12 hours or as directed by MD. Can apply to back area for pain    memantine (NAMENDA) 10 MG Tab Take 1 tablet (10 mg total) by mouth once daily.    nitroGLYCERIN 0.4 MG/DOSE TL SPRY (NITROLINGUAL) 400 mcg/spray spray PLACE ONE SPRAY UNDER THE TONGUE EVERY 5 MINUTES AS NEEDED FOR CHEST PAIN.    NITROSTAT 0.3 mg SL tablet DISSOLVE ONE TABLET UNDER THE TONGUE EVERY 5 MINUTES AS NEEDED FOR CHEST PAIN.  DO NOT EXCEED A TOTAL OF 3 DOSES IN 15 MINUTES    paricalcitol (ZEMPLAR) 1 MCG capsule Take 1 capsule by mouth on Monday and Friday.    potassium chloride SA (KLOR-CON M10) 10 MEQ tablet Take 1 tablet (10 mEq total) by mouth 2 (two) times daily.    potassium chloride SA (KLOR-CON M10) 10 MEQ tablet Take 1 tablet (10 mEq total) by mouth 2 (two) times daily.    pravastatin (PRAVACHOL) 20 MG tablet TAKE 1 TABLET BY MOUTH once DAILY    sodium bicarbonate 650 MG tablet     torsemide (DEMADEX) 20 MG Tab Take 2 tablets (40 mg total) by mouth every morning.    warfarin (COUMADIN) 5 MG tablet Take 0.5-1 tablets (2.5-5 mg total) by mouth Daily. As directed by [facility]    ZOSTAVAX, PF, 19,400 unit/0.65 mL injection            Clinical Reference Information           Your Vitals Were     BP                   132/62           Blood Pressure          Most Recent Value    BP  132/62      Allergies as of 3/2/2017     Losartan    0.225 % Sodium Chloride    Amitriptyline    Azopt  [Brinzolamide]    Ciprofloxacin    Codeine    Cantil  [Mepenzolate Bromide]    Duragal-s    Erythromycin    Fentanyl     Hydrocodone-acetaminophen    Indomethacin    Meperidine    Minoxidil    Morphine    Neuromuscular Blockers, Steroidal    Nifedipine    Oxycodone Hcl-oxycodone-asa    Penicillins    Propoxyphene    Sensipar [Cinacalcet]    Talwin Compound    Talwin  [Pentazocine Lactate]    Valsartan      Immunizations Administered on Date of Encounter - 3/2/2017     None      Orders Placed During Today's Visit     Future Labs/Procedures Expected by Expires    Hemoglobin A1c  3/2/2017 3/2/2018      Language Assistance Services     ATTENTION: Language assistance services are available, free of charge. Please call [phone].      ATENCIÓN: Si habla español, tiene a moulton disposición servicios gratuitos de asistencia lingüística. Llame al [phone].     CHÚ Ý: N?u b?n nói Ti?ng Vi?t, có các d?ch v? h? tr? ngôn ng? mi?n phí dành cho b?n. G?i s? [phone].         [facility] complies with applicable Federal civil rights laws and does not discriminate on the basis of race, color, national origin, age, disability, or sex.

## 2017-03-02 NOTE — MR AVS SNAPSHOT
[address]  [address]   [place] [address]  Phone: [phone]  Fax: [phone]                  Chey Trujillo   3/2/2017 9:15 AM   Office Visit    Description:  Female : 1935   Provider:  Vinay Zaragoza Jr., DPM   Department:  [facility]           Reason for Visit     Wound Check           Diagnoses this Visit        Comments    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    -  Primary            To Do List           Future Appointments        Provider Department Dept Phone    3/2/2017 1:00 PM PERIMETRY, REMI [facility] - Ophthalmology [phone]    3/2/2017 1:30 PM KIRTI Palacio radha - Optometry [phone]    3/2/2017 2:30 PM JAYLENE Hanna,ANP-C Aj [facility] - Endocrinology [phone]    3/9/2017 8:30 AM Vinay Zaragoza Jr., DPM [facility] [phone]    3/16/2017 8:15 AM Vinay Zaragoza Jr., DPM [place] [phone]      Goals (5 Years of Data)     None      Follow-Up and Disposition     Return in about 1 week (around 3/9/2017).      [facility]     [facility] On Call Nurse [facility] Line - 24/7 Assistance  Registered nurses in the [facility] On Call Center provide clinical advisement, health education, appointment booking, and other advisory services.  Call for this free service at [phone].             Medications           Message regarding Medications     Verify the changes and/or additions to your medication regime listed below are the same as discussed with your clinician today.  If any of these changes or additions are incorrect, please notify your healthcare provider.             Verify that the below list of medications is an accurate representation of the medications you are currently taking.  If none reported, the list may be blank. If incorrect, please contact your healthcare provider. Carry this list with you in case of emergency.           Current Medications     ACCU-CHEK SMARTVIEW TEST  STRIP Strp test FOUR TIMES A DAY    amiodarone (PACERONE) 200 MG Tab Take 1 tablet (200 mg total) by mouth once daily.    amlodipine (NORVASC) 10 MG tablet Take 1 tablet (10 mg total) by mouth once daily.    aspirin 81 MG chewable tablet Take 1 tablet by mouth Every morning.    blood-glucose meter Misc Dispense Accu-Chek Natalia meter    carvedilol (COREG) 25 MG tablet Take 1 tablet (25 mg total) by mouth 2 (two) times daily with meals.    cloNIDine (CATAPRES) 0.2 MG tablet Take 1 tablet (0.2 mg total) by mouth 2 (two) times daily.    collagenase ointment Apply topically once daily.    cyanocobalamin, vitamin B-12, 1,000 mcg TbSR Take 1,000 mcg by mouth once daily.    dorzolamide (TRUSOPT) 2 % ophthalmic solution INSTILL ONE DROP INTO EACH EYE TWICE DAILY    doxazosin (CARDURA) 8 MG Tab TAKE ONE TABLET BY MOUTH ONCE DAILY IN THE EVENING    ferrous sulfate 324 mg (65 mg iron) TbEC Take 1 tablet (325 mg total) by mouth 2 (two) times daily.    hydrALAZINE (APRESOLINE) 100 MG tablet Take 1 tablet (100 mg total) by mouth every 8 (eight) hours.    insulin glargine (LANTUS) 100 unit/mL injection INJECT 10 units nightly.    insulin lispro (HUMALOG) 100 unit/mL injection Take 9 units with breakfast, 9 units with lunch, and 8 units with dinner. Plus sliding scale    insulin lispro (HUMALOG) 100 unit/mL injection 15 units with meals plus correction scale, max daily dose is 75 units.    insulin syringe-needle U-100 (EASY TOUCH INSULIN SYRINGE) 0.5 mL 31 gauge x 5/16 Syrg To use 4 times daily with insulin injections    isosorbide dinitrate (ISOCHRON) 40 mg TbSR     isosorbide dinitrate (ISORDIL) 40 MG Tab Take 1 tablet (40 mg total) by mouth 3 (three) times daily.    lactulose (CEPHULAC) 20 gram Pack Take 1 packet (20 g total) by mouth once daily.    lancets Misc 1 lancet by Misc.(Non-Drug; Combo Route) route 4 (four) times daily.    latanoprost 0.005 % ophthalmic solution INSTILL ONE DROP INTO EACH EYE IN THE EVENING    lidocaine  (LIDODERM) 5 % Place 1 patch onto the skin once daily. Remove & Discard patch within 12 hours or as directed by MD. Can apply to back area for pain    memantine (NAMENDA) 10 MG Tab Take 1 tablet (10 mg total) by mouth once daily.    nitroGLYCERIN 0.4 MG/DOSE TL SPRY (NITROLINGUAL) 400 mcg/spray spray PLACE ONE SPRAY UNDER THE TONGUE EVERY 5 MINUTES AS NEEDED FOR CHEST PAIN.    NITROSTAT 0.3 mg SL tablet DISSOLVE ONE TABLET UNDER THE TONGUE EVERY 5 MINUTES AS NEEDED FOR CHEST PAIN.  DO NOT EXCEED A TOTAL OF 3 DOSES IN 15 MINUTES    paricalcitol (ZEMPLAR) 1 MCG capsule Take 1 capsule by mouth on Monday and Friday.    potassium chloride SA (KLOR-CON M10) 10 MEQ tablet Take 1 tablet (10 mEq total) by mouth 2 (two) times daily.    potassium chloride SA (KLOR-CON M10) 10 MEQ tablet Take 1 tablet (10 mEq total) by mouth 2 (two) times daily.    pravastatin (PRAVACHOL) 20 MG tablet TAKE 1 TABLET BY MOUTH once DAILY    sodium bicarbonate 650 MG tablet     torsemide (DEMADEX) 20 MG Tab Take 2 tablets (40 mg total) by mouth every morning.    warfarin (COUMADIN) 5 MG tablet Take 0.5-1 tablets (2.5-5 mg total) by mouth Daily. As directed by [facility]    ZOSTAVAX, PF, 19,400 unit/0.65 mL injection            Clinical Reference Information           Your Vitals Were     BP                   134/72 (BP Location: Right arm, Patient Position: Sitting, BP Method: Automatic)           Blood Pressure          Most Recent Value    BP  134/72      Allergies as of 3/2/2017     Losartan    0.225 % Sodium Chloride    Amitriptyline    Azopt  [Brinzolamide]    Ciprofloxacin    Codeine    Cantil  [Mepenzolate Bromide]    Duragal-s    Erythromycin    Fentanyl    Hydrocodone-acetaminophen    Indomethacin    Meperidine    Minoxidil    Morphine    Neuromuscular Blockers, Steroidal    Nifedipine    Oxycodone Hcl-oxycodone-asa    Penicillins    Propoxyphene    Sensipar [Cinacalcet]    Talwin Compound    Talwin  [Pentazocine Lactate]    Valsartan       Immunizations Administered on Date of Encounter - 3/2/2017     None      Instructions      Prealbumin (Blood)  Does this test have other names?  PA, transthyretin test  What is this test?  The prealbumin screen is a blood test to see whether you are getting enough nutrition in your diet. Specifically, the test finds out if you have been getting enough protein. It also finds out whether you are at risk for malnutrition or already suffering from it. Prealbumin is a protein that is made mainly by your liver. Your body uses prealbumin to make other proteins. Prealbumin also carries thyroid hormones in the blood.  Why do I need this test?  You might have this test if you appear to be malnourished or if healthcare providers want to follow your nutritional progress. Your provider may also order this test if you are an older adult and he or she suspects you suffer from poor nutrition. Healthcare providers may also screen children for prealbumin if they appear undernourished. In addition, if you are in the hospital, your provider might order this test soon after you arrive to see whether you need more nutritional support as part of your treatment. Your provider may also order this test if you have an eating disorder.  What other tests might I have along with this test?  To watch your nutritional needs, your healthcare provider might order a C-reactive protein screen. This looks for another protein in your blood. If you appear to be malnourished, your healthcare provider may order other tests. These may include hemoglobin, albumin, iron, transferrin, folate, and vitamin B-12, and other electrolytes, vitamins, and minerals.  What do my test results mean?  Many things may affect your lab test results. These include the method each lab uses to do the test. Even if your test results are different from the normal value, you may not have a problem. To learn what the results mean for you, talk with your healthcare  provider.  Low prealbumin scores mean that you are likely at risk for malnutrition and need careful assessment. Low prealbumin scores may also be a sign of liver disease, inflammation, or tissue death (tissue necrosis). High prealbumin scores may be a sign of long-term (chronic) kidney disease, steroid use, or alcoholism.  Normal results for a prealbumin blood test are:  · Adults: 15 to 36 milligrams per deciliter (mg/dL) or 150 to 360 milligrams per liter (mg/L)  · Children: 6 to 21 mg/dL for an infant under 5 days old, 14 to 30 mg/dL for children ages 1 to 5, 15 to 33 mg/dL for children ages 6 to 9, 22 to 36 mg/dL for those ages 10 to 13, 22 to 45 mg/dL for those ages 14 to 19  How is this test done?  The test requires a blood sample, which is drawn through a needle from a vein in your arm.  Does this test pose any risks?  Taking a blood sample with a needle carries a small risk of bleeding, infection, or bruising. You may also feel dizzy. When the needle pricks your arm, you may feel a slight sting. Afterward, the site might be sore.  What might affect my test results?  Infection, inflammation, or recent trauma may affect your test results. This could make them more difficult to figure out. Experts suggest that people in the hospital who are tested for prealbumin be tested twice. This should be done 3 to 5 days apart, for more accurate results.  How do I get ready for this test?  No preparation is necessary.      Date Last Reviewed: 8/31/2015 © 2000-2016 [employer]. [address] [place] [address], [place] [address]. All rights reserved. This information is not intended as a substitute for professional medical care. Always follow your healthcare professional's instructions.        Diabetes: Inspecting Your Feet    Diabetes increases your chances of developing foot problems. So inspect your feet every day. This helps you find small skin irritations before they become serious ulcers or infections. If you  have trouble seeing the bottoms of your feet, use a mirror or ask a family member or friend to help.  How to check your feet  Below are tips to help you look for foot problems. Try to check your feet at the same time each day, such as when you get out of bed in the morning:  · Check the top of each foot. The tops of toes, back of the heel, and outer edge of the foot can get a lot of rubbing from poor-fitting shoes.  · Check the bottom of each foot. Daily wear and tear often leads to problems at pressure spots.  · Check the toes and nails. Fungal infections often occur between toes. Toenail problems can also be a sign of fungal infections or lead to breaks in the skin.  · Check your shoes, too. Loose objects inside a shoe can injure the foot. Use your hand to feel inside your shoes for things like nu, loose stitching, or rough areas that could irritate your skin.  Warning signs  Look for any color changes in the foot. Redness with streaks can signal a severe infection, which needs immediate medical attention. Tell your healthcare provider right away if you have any of these problems:  · Swelling, sometimes with color changes, may be a sign of poor blood flow or infection. Symptoms include tenderness and an increase in the size of your foot.  · Warm or hot areas on your feet may be signs of infection. A foot that is cold may not be getting enough blood.  · Sensations such as burning, tingling, or pins and needles can be signs of a problem. Also check for areas that may be numb.  · Hot spots are caused by friction or pressure. Look for hot spots in areas that get a lot of rubbing. Hot spots can turn into blisters, calluses, or sores.  · Cracks and sores are caused by dry or irritated skin. They are a sign that the skin is breaking down, which can lead to infection.  · Toenail problems to watch for include nails growing into the skin (ingrown toenail) and causing redness or pain. Thick, yellow, or discolored  nails can signal a fungal infection.  · Drainage and odor can develop from untreated sores and ulcers. Call your healthcare provider right away if you notice white or yellow drainage, bleeding, or unpleasant odor.   Date Last Reviewed: 6/1/2016 © 2000-2016 [employer]. [address] [place] [address]. All rights reserved. This information is not intended as a substitute for professional medical care. Always follow your healthcare professional's instructions.             Language Assistance Services     ATTENTION: Language assistance services are available, free of charge. Please call [phone].      ATENCIÓN: Si habla español, tiene a moulton disposición servicios gratuitos de asistencia lingüística. Llame al [phone].     CHÚ Ý: N?u b?n nói Ti?ng Vi?t, có các d?ch v? h? tr? ngôn ng? mi?n phí dành cho b?n. G?i s? [phone].         [facility] complies with applicable Federal civil rights laws and does not discriminate on the basis of race, color, national origin, age, disability, or sex.

## 2017-03-02 NOTE — LETTER
March 2, 2017      Viviana Nguyen MD  [address]  [facility] [address]           [address]  [address] [address]  Phone: [phone]  Fax: [phone]          Patient: Chey Trujillo   MR Number: [id number]   YOB: 1935   Date of Visit: 3/2/2017       Dear Dr. Viviana Nguyen:    Thank you for referring Chey Trujillo to me for evaluation. Attached you will find relevant portions of my assessment and plan of care.    If you have questions, please do not hesitate to call me. I look forward to following Chey Trujillo along with you.    Sincerely,    Vinay Zaragoza Jr., DPM    Enclosure  CC:  No Recipients    If you would like to receive this communication electronically, please contact [email or web address] or [phone] to request more information on [employer] access.    For providers and/or their staff who would like to refer a patient to [facility], please contact us through our one-stop-shop provider referral line, [facility], at [phone].    If you feel you have received this communication in error or would no longer like to receive these types of communications, please e-mail [email or web address]

## 2017-03-02 NOTE — PROGRESS NOTES
Subjective:    Patient ID: Chey Trujillo is a 81 y.o. female.    Chief Complaint: Wound Check (right foot heel)      HPI:   Wound Check       Chey is a 81 y.o. female who presents to the clinic for evaluation and treatment of high risk feet. 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 The patient's chief complaint is foot ulcer, R heel. This patient has documented high risk feet requiring routine maintenance secondary to diabetes mellitis and those secondary complications of diabetes, as mentioned.    Wants foot dr closer to home. Has been moving all care to [place].   Gets home health from [facility].  Had a R heel ulcer that healed 2/7/17 but has subsequently recurred.      PCP: Viviana Nguyen MD    Date Last Seen by PCP: in epic    Current shoe gear:  Affected  Foot: Flip-flops    History of Trauma: negative  Sign of Infection: none    Last Podiatry Enc: Visit date not found  Last Enc w/ Me: Visit date not found    Hemoglobin A1C   Date Value Ref Range Status   11/15/2016 7.8 (H) 4.5 - 6.2 % Final     Comment:     According to ADA guidelines, hemoglobin A1C <7.0% represents  optimal control in non-pregnant diabetic patients.  Different  metrics may apply to specific populations.   Standards of Medical Care in Diabetes - 2016.  For the purpose of screening for the presence of diabetes:  <5.7%     Consistent with the absence of diabetes  5.7-6.4%  Consistent with increasing risk for diabetes   (prediabetes)  >or=6.5%  Consistent with diabetes  Currently no consensus exists for use of hemoglobin A1C  for diagnosis of diabetes for children.     09/27/2016 7.5 (H) 4.5 - 6.2 % Final     Comment:     According to ADA guidelines, hemoglobin A1C <7.0% represents  optimal control in non-pregnant diabetic patients.  Different  metrics may apply to specific populations.   Standards of Medical Care in Diabetes - 2016.  For the purpose of screening for the presence of diabetes:  <5.7%     Consistent with the absence of diabetes  5.7-6.4%  Consistent with increasing risk for diabetes   (prediabetes)  >or=6.5%  Consistent with diabetes  Currently no consensus exists for use of hemoglobin A1C  for diagnosis of diabetes for children.     08/16/2016 7.2 (H) 4.5 - 6.2 % Final     Comment:     According to ADA guidelines, hemoglobin A1C <7.0% represents  optimal control in non-pregnant diabetic patients.  Different  metrics may apply to specific populations.   Standards of Medical Care in Diabetes - 2016.  For the purpose of screening for the presence of diabetes:  <5.7%     Consistent with the absence of diabetes  5.7-6.4%  Consistent with increasing risk for diabetes   (prediabetes)  >or=6.5%  Consistent with diabetes  Currently no consensus exists for use of hemoglobin A1C  for diagnosis of  diabetes for children.         Past Medical History:   Diagnosis Date    Anticoagulation monitoring by pharmacist 6/29/2012    At risk for amiodarone toxicity with long term use 1/27/2014    Atrial fibrillation     Bilateral carotid artery disease 1/11/2016    Blood transfusion     CAD S/P percutaneous coronary angioplasty 9/27/2012    Chronic diastolic heart failure 9/27/2012    Echo 3-: 1 - Concentric hypertrophy.  2 - Normal left ventricular systolic function (EF 60-65%).  3 - Right ventricular enlargement with hypertrophy, with normal systolic function.  4 - Left ventricular diastolic dysfunction.  5 - Biatrial enlargement.  6 - Mild tricuspid regurgitation.  7 - Pulmonary hypertension. The estimated PA systolic pressure is 55 mmHg.  8 - Increased central venous pressure (IVC is greater than 3cm in diameter).     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  ROWAN placed just proximal to stent in  for UA.  Cath 2011: normal LMCA, 40% mid LAD, 50% distal LCx    EYE SURGERY      LAMINECTOMY      TOTAL HIP ARTHROPLASTY Right     x's r hip     Social History     Social History    Marital status:      Spouse name: N/A    Number of children: N/A    Years of education: N/A     Occupational History    Not on file.     Social History Main Topics    Smoking status: Never Smoker    Smokeless tobacco: Never Used    Alcohol use No    Drug use: No    Sexual activity: No     Other Topics Concern    Not on file     Social History Narrative    Son lives with her. Spouse .          Current Outpatient Prescriptions:     ACCU-CHEK SMARTVIEW TEST STRIP Strp, test FOUR TIMES A DAY, Disp: 150 each, Rfl: 11    amiodarone (PACERONE) 200 MG Tab, Take 1 tablet (200 mg total) by mouth once daily., Disp: 90 tablet, Rfl: 3    amlodipine (NORVASC) 10 MG tablet, Take 1 tablet (10 mg total) by mouth once daily., Disp: 90 tablet, Rfl: 3    aspirin 81 MG chewable tablet, Take 1 tablet by mouth Every morning., Disp: , Rfl:     blood-glucose meter Misc, Dispense Accu-Chek Natalia meter, Disp: 1 each, Rfl: 0    carvedilol (COREG) 25 MG tablet, Take 1 tablet (25 mg total) by mouth 2 (two) times daily with meals., Disp: 270 tablet, Rfl: 4    cloNIDine (CATAPRES) 0.2 MG tablet, Take 1 tablet (0.2 mg total) by mouth 2 (two) times daily., Disp: 60 tablet, Rfl: 2    collagenase ointment, Apply topically once daily., Disp: 90 g, Rfl: 1    cyanocobalamin, vitamin B-12, 1,000 mcg TbSR, Take 1,000 mcg by mouth once daily., Disp: 30 each, Rfl: 0    dorzolamide (TRUSOPT) 2 % ophthalmic solution, INSTILL ONE DROP INTO EACH EYE TWICE DAILY, Disp: 10 mL, Rfl: 11    doxazosin (CARDURA) 8 MG Tab, TAKE ONE TABLET BY  MOUTH ONCE DAILY IN THE EVENING, Disp: 90 tablet, Rfl: 3    ferrous sulfate 324 mg (65 mg iron) TbEC, Take 1 tablet (325 mg total) by mouth 2 (two) times daily., Disp: 60 tablet, Rfl: 0    hydrALAZINE (APRESOLINE) 100 MG tablet, Take 1 tablet (100 mg total) by mouth every 8 (eight) hours., Disp: 270 tablet, Rfl: 4    insulin glargine (LANTUS) 100 unit/mL injection, INJECT 10 units nightly., Disp: 30 mL, Rfl: 12    insulin lispro (HUMALOG) 100 unit/mL injection, Take 9 units with breakfast, 9 units with lunch, and 8 units with dinner. Plus sliding scale, Disp: 2 vial, Rfl: 8    insulin lispro (HUMALOG) 100 unit/mL injection, 15 units with meals plus correction scale, max daily dose is 75 units., Disp: 3 vial, Rfl: 6    insulin syringe-needle U-100 (EASY TOUCH INSULIN SYRINGE) 0.5 mL 31 gauge x 5/16 Syrg, To use 4 times daily with insulin injections, Disp: 150 each, Rfl: 2    isosorbide dinitrate (ISOCHRON) 40 mg TbSR, , Disp: , Rfl:     isosorbide dinitrate (ISORDIL) 40 MG Tab, Take 1 tablet (40 mg total) by mouth 3 (three) times daily., Disp: 270 tablet, Rfl: 3    lactulose (CEPHULAC) 20 gram Pack, Take 1 packet (20 g total) by mouth once daily., Disp: 90 packet, Rfl: 6    lancets Misc, 1 lancet by Misc.(Non-Drug; Combo Route) route 4 (four) times daily., Disp: 150 each, Rfl: 11    latanoprost 0.005 % ophthalmic solution, INSTILL ONE DROP INTO EACH EYE IN THE EVENING, Disp: 3 Bottle, Rfl: 12    lidocaine (LIDODERM) 5 %, Place 1 patch onto the skin once daily. Remove & Discard patch within 12 hours or as directed by MD. Can apply to back area for pain, Disp: 30 patch, Rfl: 1    memantine (NAMENDA) 10 MG Tab, Take 1 tablet (10 mg total) by mouth once daily., Disp: 30 tablet, Rfl: 5    nitroGLYCERIN 0.4 MG/DOSE TL SPRY (NITROLINGUAL) 400 mcg/spray spray, PLACE ONE SPRAY UNDER THE TONGUE EVERY 5 MINUTES AS NEEDED FOR CHEST PAIN., Disp: 4.9 g, Rfl: 0    NITROSTAT 0.3 mg SL tablet, DISSOLVE ONE TABLET UNDER  THE TONGUE EVERY 5 MINUTES AS NEEDED FOR CHEST PAIN.  DO NOT EXCEED A TOTAL OF 3 DOSES IN 15 MINUTES, Disp: 100 tablet, Rfl: 0    paricalcitol (ZEMPLAR) 1 MCG capsule, Take 1 capsule by mouth on Monday and Friday., Disp: 8 capsule, Rfl: 3    potassium chloride SA (KLOR-CON M10) 10 MEQ tablet, Take 1 tablet (10 mEq total) by mouth 2 (two) times daily., Disp: 60 tablet, Rfl: 11    potassium chloride SA (KLOR-CON M10) 10 MEQ tablet, Take 1 tablet (10 mEq total) by mouth 2 (two) times daily., Disp: 60 tablet, Rfl: 2    pravastatin (PRAVACHOL) 20 MG tablet, TAKE 1 TABLET BY MOUTH once DAILY, Disp: 90 tablet, Rfl: 0    sodium bicarbonate 650 MG tablet, , Disp: , Rfl:     torsemide (DEMADEX) 20 MG Tab, Take 2 tablets (40 mg total) by mouth every morning., Disp: 180 tablet, Rfl: 4    warfarin (COUMADIN) 5 MG tablet, Take 0.5-1 tablets (2.5-5 mg total) by mouth Daily. As directed by [facility], Disp: 30 tablet, Rfl: 3    ZOSTAVAX, PF, 19,400 unit/0.65 mL injection, , Disp: , Rfl:   Review of patient's allergies indicates:   Allergen Reactions    Losartan Other (See Comments)     Hyperkalemia    0.225 % sodium chloride      Other reaction(s): Eye irritation    Amitriptyline      Other reaction(s): Vomiting  Other reaction(s): Vomiting    Azopt  [brinzolamide]      Other reaction(s): Eye irritation    Ciprofloxacin Nausea And Vomiting     Other reaction(s): Stomach upset    Codeine      Other reaction(s): Unknown  Other reaction(s): Unknown    Cantil  [mepenzolate bromide]      Other reaction(s): Unknown  Other reaction(s): Unknown    Duragal-s      Other reaction(s): Vomiting    Erythromycin      Other reaction(s): Unknown  Other reaction(s): Unknown    Fentanyl      Other reaction(s): Vomiting    Hydrocodone-acetaminophen      Other reaction(s): Unknown  Other reaction(s): Unknown    Indomethacin      Other reaction(s): Unknown  Other reaction(s): Unknown    Meperidine      Other reaction(s):  "Unknown  Other reaction(s): Unknown    Minoxidil      Other reaction(s): druged  Other reaction(s): nausea and vomiting  Other reaction(s): nausea and vomiting  Other reaction(s): druged    Morphine      Other reaction(s): Unknown  Other reaction(s): Unknown    Neuromuscular blockers, steroidal      Other reaction(s): Unknown    Nifedipine      Other reaction(s): Swelling  Other reaction(s): Swelling    Oxycodone hcl-oxycodone-asa      Other reaction(s): Unknown  Other reaction(s): Unknown    Penicillins Hives     Other reaction(s): Unknown    Propoxyphene      Other reaction(s): Unknown    Sensipar [cinacalcet] Nausea Only    Talwin compound      Other reaction(s): Unknown    Talwin  [pentazocine lactate]      Other reaction(s): Unknown    Valsartan Rash and Hives       /72 (BP Location: Right arm, Patient Position: Sitting, BP Method: Automatic)  Pulse 84  Resp 18  Ht 6' 2" (1.88 m)    ROS  ROS Constitutional:  General Appearance: malnourished  Vascular: positive for edema  Musculoskeletal: negative for joint paint and joint edema  Skin: negative for rashes and lesions  Neurological: negative for burning, tingling and numbness  Gastrointestinal: negative for stomach pain, nausea and vomiting        Objective:        Ortho/SPM Exam  Physical Exam  Physical Exam   Constitutional: She is oriented to person, place, and time. She appears well-developed and well-nourished.   Cardiovascular:   Dorsalis pedis and posterior tibial pulses are diminished Bilaterally. Toes are cool to touch. Feet are warm proximally.There is decreased digital hair. Skin is atrophic, slightly hyperpigmented, and mildly edematous       Musculoskeletal: Normal range of motion. She exhibits no edema or tenderness.   Adequate joint range of motion without pain, limitation, nor crepitation Bilateral feet and ankle joints. Muscle strength is 5/5 in all groups bilaterally.         Neurological: She is alert and oriented to person, " place, and time.   [place] 5.07 monofilamant testing is diminished Haris feet. Sharp/dull sensation diminished Bilaterally. Light touch absent Bilaterally.       Skin: Skin is warm, dry. No bruising, no ecchymosis and no lesion noted. No erythema.   Nails x10 are elongated by  2-5mm's, thickened by 1-4 mm's, dystrophic, and are darkened in  coloration . Xerosis Bilaterally. No open lesions noted.    Hyperkeratotic tissue noted to posterior b/l  Psychiatric: She has a normal mood and affect. Her behavior is normal. Judgment and thought content normal.   Vitals reviewed.    Ulcer Location: R posterior heel  Measurements: .5x.5x.5  Periwound: Intact and hyperkeratotic  Drainage: none  Purulence: None.  Malodor: None.  Base:  80% granular, 20% fibrotic  Signs of infection: None.        Assessment:     Imaging:   X-ray Foot Complete Bilateral    Result Date: 1/5/2017  AP, oblique, and weightbearing lateral radiographs of both feet were obtained. The bones are osteoporotic but intact. There is no evidence for acute fracture or bone destruction. There is mild hallux valgus deformity present bilaterally. There are mild degenerative changes within the small joints of both feet. There is no evidence for dislocation. No bony erosions are evident. Atherosclerotic calcification is identified within the arteries of both feet.    Osteoporosis. Extensive atherosclerosis. No evidence for acute fracture, bone destruction, or dislocation. Electronically signed by: LLOYD WALKER MD Date:     01/05/17 Time:    12:58     X-ray Calcaneus Bilateral    Result Date: 1/5/2017  2 views of the calcaneus were obtained bilaterally. There is no evidence for acute fracture or bone destruction. There is extensive atherosclerotic calcification identified within the arteries at the level of the ankle. There is a small amount of soft tissue calcification posterior to the left calcaneus. No radiopaque soft tissue foreign bodies are  "identified.    No evidence for acute fracture, bone destruction, or dislocation. Extensive atherosclerotic calcification within the arteries at the level of the ankles. Electronically signed by: LLOYD WALKER MD Date:     01/05/17 Time:    12:55       Recent Labs  Lab Result Units 01/05/17  1043   Sed Rate mm/Hr 40*   CRP mg/L 0.5   Prealbumin mg/dL 11*         1. 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          Plan:          Wound Debridement  Date/Time: 3/6/2017 7:19 PM  Performed by: SHAWNEE SOLORZANO JR.  Authorized by: SHAWNEE SOLORZANO JR.     Time out: Immediately prior to procedure a "time out" was called to verify the correct patient, procedure, equipment, support staff and site/side marked as required.    Consent Done?:  Yes (Verbal)    Preparation: Patient was prepped and draped in usual sterile fashion    Local anesthesia used?: No      Wound Details:    Location:  Right foot    Location:  Right Heel       Length (cm):  0.5       Area (sq cm):  0.25       Width (cm):  0.5       Percent Debrided (%):  100       Depth (cm):  0.5       Total Area Debrided (sq cm):  0.25    Depth of debridement:  Subcutaneous tissue    Tissue debrided:  Epidermis and Hypergranulation    Devitalized tissue debrided:  Callus and Sough    Instruments:  Blade    Bleeding:  Minimal  Hemostasis Achieved: Yes    Method Used:  Pressure and Silver Nitrate  Patient tolerance:  Patient tolerated the procedure well with no immediate complications    :     Chey was seen today for wound check.    Diagnoses and all orders for this visit:    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      Chey Trujillo is a 81 y.o. female presenting w/   1. 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        -pt seen, evaluated, and managed  -dx discussed in detail. All questions/concerns addressed  -all tx options discussed. All alternatives, risks, benefits of all txs discussed  -The patient was educated regarding the above diagnosis. We discussed conservative care options regarding shoe wear and/or " padding.  -rxs dispensed: none  -xr and labs reviewed  -procedure as above  -dressings: chelle+football  -shea at nxt visit    -pt would benefit from nutrition consult due to multiple medical problems, polypharmacy, allergies, low pre-albumin   Referral ordered in epic - still pending    -educated on DM footcare    rtc 1 wks    Return in about 1 week (around 3/9/2017).

## 2017-03-02 NOTE — MR AVS SNAPSHOT
[facility]  [address]  [facility] [address]  Phone: [phone]  Fax: [phone]                  Chey Trujillo   3/2/2017 1:30 PM   Office Visit    Description:  Female : 1935   Provider:  Vinay Mederos OD   Department:  [facility]           Reason for Visit     Diabetic Eye Exam           Diagnoses this Visit        Comments    Diabetes mellitus type 2 without retinopathy    -  Primary     Primary open-angle glaucoma, bilateral, severe stage         Nuclear sclerosis, left         Myopia with presbyopia of both eyes                To Do List           Future Appointments        Provider Department Dept Phone    3/9/2017 8:30 AM Vinay Zaragoza Jr., [place] [phone]    3/16/2017 8:15 AM Vinay Zaragoza Jr., [place] [phone]    3/23/2017 8:45 AM Vinay Zaragoza Jr., [place] [phone]    3/23/2017 11:20 AM TELEPHONE CHECK, PACEM[facility]ER [facility] [phone]    2017 3:20 PM John Quezada MD [facility] Neurology [phone]      Goals (5 Years of Data)     None      Follow-Up and Disposition     Return in about 6 months (around 2017).      [facility] On [facility]     [facility] On Call Nurse Care Line - 24/7 Assistance  Registered nurses in the [facility] On Call Center provide clinical advisement, health education, appointment booking, and other advisory services.  Call for this free service at [phone].             Medications           Message regarding Medications     Verify the changes and/or additions to your medication regime listed below are the same as discussed with your clinician today.  If any of these changes or additions are incorrect, please notify your healthcare provider.             Verify that the below list of medications is an accurate representation of the medications you are currently taking.  If none reported, the list may be blank. If  incorrect, please contact your healthcare provider. Carry this list with you in case of emergency.           Current Medications     ACCU-CHEK SMARTVIEW TEST STRIP Strp test FOUR TIMES A DAY    amiodarone (PACERONE) 200 MG Tab Take 1 tablet (200 mg total) by mouth once daily.    amlodipine (NORVASC) 10 MG tablet Take 1 tablet (10 mg total) by mouth once daily.    aspirin 81 MG chewable tablet Take 1 tablet by mouth Every morning.    blood-glucose meter Misc Dispense Accu-Chek Natalia meter    carvedilol (COREG) 25 MG tablet Take 1 tablet (25 mg total) by mouth 2 (two) times daily with meals.    cloNIDine (CATAPRES) 0.2 MG tablet Take 1 tablet (0.2 mg total) by mouth 2 (two) times daily.    collagenase ointment Apply topically once daily.    cyanocobalamin, vitamin B-12, 1,000 mcg TbSR Take 1,000 mcg by mouth once daily.    dorzolamide (TRUSOPT) 2 % ophthalmic solution INSTILL ONE DROP INTO EACH EYE TWICE DAILY    doxazosin (CARDURA) 8 MG Tab TAKE ONE TABLET BY MOUTH ONCE DAILY IN THE EVENING    ferrous sulfate 324 mg (65 mg iron) TbEC Take 1 tablet (325 mg total) by mouth 2 (two) times daily.    hydrALAZINE (APRESOLINE) 100 MG tablet Take 1 tablet (100 mg total) by mouth every 8 (eight) hours.    insulin glargine (LANTUS) 100 unit/mL injection INJECT 10 units nightly.    insulin lispro (HUMALOG) 100 unit/mL injection Take 9 units with breakfast, 9 units with lunch, and 8 units with dinner. Plus sliding scale    insulin lispro (HUMALOG) 100 unit/mL injection 15 units with meals plus correction scale, max daily dose is 75 units.    insulin syringe-needle U-100 (EASY TOUCH INSULIN SYRINGE) 0.5 mL 31 gauge x 5/16 Syrg To use 4 times daily with insulin injections    isosorbide dinitrate (ISOCHRON) 40 mg TbSR     isosorbide dinitrate (ISORDIL) 40 MG Tab Take 1 tablet (40 mg total) by mouth 3 (three) times daily.    lactulose (CEPHULAC) 20 gram Pack Take 1 packet (20 g total) by mouth once daily.    lancets Misc 1 lancet by  Misc.(Non-Drug; Combo Route) route 4 (four) times daily.    latanoprost 0.005 % ophthalmic solution INSTILL ONE DROP INTO EACH EYE IN THE EVENING    lidocaine (LIDODERM) 5 % Place 1 patch onto the skin once daily. Remove & Discard patch within 12 hours or as directed by MD. Can apply to back area for pain    memantine (NAMENDA) 10 MG Tab Take 1 tablet (10 mg total) by mouth once daily.    nitroGLYCERIN 0.4 MG/DOSE TL SPRY (NITROLINGUAL) 400 mcg/spray spray PLACE ONE SPRAY UNDER THE TONGUE EVERY 5 MINUTES AS NEEDED FOR CHEST PAIN.    NITROSTAT 0.3 mg SL tablet DISSOLVE ONE TABLET UNDER THE TONGUE EVERY 5 MINUTES AS NEEDED FOR CHEST PAIN.  DO NOT EXCEED A TOTAL OF 3 DOSES IN 15 MINUTES    paricalcitol (ZEMPLAR) 1 MCG capsule Take 1 capsule by mouth on Monday and Friday.    potassium chloride SA (KLOR-CON M10) 10 MEQ tablet Take 1 tablet (10 mEq total) by mouth 2 (two) times daily.    potassium chloride SA (KLOR-CON M10) 10 MEQ tablet Take 1 tablet (10 mEq total) by mouth 2 (two) times daily.    pravastatin (PRAVACHOL) 20 MG tablet TAKE 1 TABLET BY MOUTH once DAILY    sodium bicarbonate 650 MG tablet     torsemide (DEMADEX) 20 MG Tab Take 2 tablets (40 mg total) by mouth every morning.    warfarin (COUMADIN) 5 MG tablet Take 0.5-1 tablets (2.5-5 mg total) by mouth Daily. As directed by [facility]    ZOSTAVAX, PF, 19,400 unit/0.65 mL injection            Clinical Reference Information           Allergies as of 3/2/2017     Losartan    0.225 % Sodium Chloride    Amitriptyline    Azopt  [Brinzolamide]    Ciprofloxacin    Codeine    Cantil  [Mepenzolate Bromide]    Duragal-s    Erythromycin    Fentanyl    Hydrocodone-acetaminophen    Indomethacin    Meperidine    Minoxidil    Morphine    Neuromuscular Blockers, Steroidal    Nifedipine    Oxycodone Hcl-oxycodone-asa    Penicillins    Propoxyphene    Sensipar [Cinacalcet]    Talwin Compound    Talwin  [Pentazocine Lactate]    Valsartan      Immunizations Administered on  Date of Encounter - 3/2/2017     None      Instructions    Trusopt 2x/day both eyes    Latanoprost at bedtime both eyes       Language Assistance Services     ATTENTION: Language assistance services are available, free of charge. Please call [phone].      ATENCIÓN: Si shreya wolf, tiene a moulton disposición servicios gratuitos de asistencia lingüística. Llame al [phone].     CHÚ Ý: N?u b?n nói Ti?ng Vi?t, có các d?ch v? h? tr? ngôn ng? mi?n phí dành cho b?n. G?i s? [phone].         [facility] complies with applicable Federal civil rights laws and does not discriminate on the basis of race, color, national origin, age, disability, or sex.

## 2017-03-02 NOTE — PATIENT INSTRUCTIONS
Prealbumin (Blood)  Does this test have other names?  PA, transthyretin test  What is this test?  The prealbumin screen is a blood test to see whether you are getting enough nutrition in your diet. Specifically, the test finds out if you have been getting enough protein. It also finds out whether you are at risk for malnutrition or already suffering from it. Prealbumin is a protein that is made mainly by your liver. Your body uses prealbumin to make other proteins. Prealbumin also carries thyroid hormones in the blood.  Why do I need this test?  You might have this test if you appear to be malnourished or if healthcare providers want to follow your nutritional progress. Your provider may also order this test if you are an older adult and he or she suspects you suffer from poor nutrition. Healthcare providers may also screen children for prealbumin if they appear undernourished. In addition, if you are in the hospital, your provider might order this test soon after you arrive to see whether you need more nutritional support as part of your treatment. Your provider may also order this test if you have an eating disorder.  What other tests might I have along with this test?  To watch your nutritional needs, your healthcare provider might order a C-reactive protein screen. This looks for another protein in your blood. If you appear to be malnourished, your healthcare provider may order other tests. These may include hemoglobin, albumin, iron, transferrin, folate, and vitamin B-12, and other electrolytes, vitamins, and minerals.  What do my test results mean?  Many things may affect your lab test results. These include the method each lab uses to do the test. Even if your test results are different from the normal value, you may not have a problem. To learn what the results mean for you, talk with your healthcare provider.  Low prealbumin scores mean that you are likely at risk for malnutrition and need careful  assessment. Low prealbumin scores may also be a sign of liver disease, inflammation, or tissue death (tissue necrosis). High prealbumin scores may be a sign of long-term (chronic) kidney disease, steroid use, or alcoholism.  Normal results for a prealbumin blood test are:  · Adults: 15 to 36 milligrams per deciliter (mg/dL) or 150 to 360 milligrams per liter (mg/L)  · Children: 6 to 21 mg/dL for an infant under 5 days old, 14 to 30 mg/dL for children ages 1 to 5, 15 to 33 mg/dL for children ages 6 to 9, 22 to 36 mg/dL for those ages 10 to 13, 22 to 45 mg/dL for those ages 14 to 19  How is this test done?  The test requires a blood sample, which is drawn through a needle from a vein in your arm.  Does this test pose any risks?  Taking a blood sample with a needle carries a small risk of bleeding, infection, or bruising. You may also feel dizzy. When the needle pricks your arm, you may feel a slight sting. Afterward, the site might be sore.  What might affect my test results?  Infection, inflammation, or recent trauma may affect your test results. This could make them more difficult to figure out. Experts suggest that people in the hospital who are tested for prealbumin be tested twice. This should be done 3 to 5 days apart, for more accurate results.  How do I get ready for this test?  No preparation is necessary.      Date Last Reviewed: 8/31/2015  © 2890-5455 [employer]. [address] [place] [address], [place] [address]. All rights reserved. This information is not intended as a substitute for professional medical care. Always follow your healthcare professional's instructions.        Diabetes: Inspecting Your Feet    Diabetes increases your chances of developing foot problems. So inspect your feet every day. This helps you find small skin irritations before they become serious ulcers or infections. If you have trouble seeing the bottoms of your feet, use a mirror or ask a family member or friend to  help.  How to check your feet  Below are tips to help you look for foot problems. Try to check your feet at the same time each day, such as when you get out of bed in the morning:  · Check the top of each foot. The tops of toes, back of the heel, and outer edge of the foot can get a lot of rubbing from poor-fitting shoes.  · Check the bottom of each foot. Daily wear and tear often leads to problems at pressure spots.  · Check the toes and nails. Fungal infections often occur between toes. Toenail problems can also be a sign of fungal infections or lead to breaks in the skin.  · Check your shoes, too. Loose objects inside a shoe can injure the foot. Use your hand to feel inside your shoes for things like nu, loose stitching, or rough areas that could irritate your skin.  Warning signs  Look for any color changes in the foot. Redness with streaks can signal a severe infection, which needs immediate medical attention. Tell your healthcare provider right away if you have any of these problems:  · Swelling, sometimes with color changes, may be a sign of poor blood flow or infection. Symptoms include tenderness and an increase in the size of your foot.  · Warm or hot areas on your feet may be signs of infection. A foot that is cold may not be getting enough blood.  · Sensations such as burning, tingling, or pins and needles can be signs of a problem. Also check for areas that may be numb.  · Hot spots are caused by friction or pressure. Look for hot spots in areas that get a lot of rubbing. Hot spots can turn into blisters, calluses, or sores.  · Cracks and sores are caused by dry or irritated skin. They are a sign that the skin is breaking down, which can lead to infection.  · Toenail problems to watch for include nails growing into the skin (ingrown toenail) and causing redness or pain. Thick, yellow, or discolored nails can signal a fungal infection.  · Drainage and odor can develop from untreated sores and  ulcers. Call your healthcare provider right away if you notice white or yellow drainage, bleeding, or unpleasant odor.   Date Last Reviewed: 6/1/2016  © 3766-0657 [employer]. [address] [place] [address], [place] [address]. All rights reserved. This information is not intended as a substitute for professional medical care. Always follow your healthcare professional's instructions.

## 2017-03-03 ENCOUNTER — NURSE TRIAGE (OUTPATIENT)
Dept: ADMINISTRATIVE | Facility: CLINIC | Age: 82
End: 2017-03-03

## 2017-03-03 NOTE — PROGRESS NOTES
hvf done    Assessment /Plan     For exam results, see Encounter Report.    There are no diagnoses linked to this encounter.

## 2017-03-03 NOTE — TELEPHONE ENCOUNTER
Reason for Disposition   Caller has medication question only, adult not sick, and triager answers question    Protocols used: ST MEDICATION QUESTION CALL-A-    Son calling with questions regarding Lantus and Humalog insulin.  Questions answered.

## 2017-03-06 PROCEDURE — 11042 DBRDMT SUBQ TIS 1ST 20SQCM/<: CPT | Mod: S$GLB,,, | Performed by: PODIATRIST

## 2017-03-08 ENCOUNTER — TELEPHONE (OUTPATIENT)
Dept: INTERNAL MEDICINE | Facility: CLINIC | Age: 82
End: 2017-03-08

## 2017-03-08 ENCOUNTER — ANTI-COAG VISIT (OUTPATIENT)
Dept: CARDIOLOGY | Facility: CLINIC | Age: 82
End: 2017-03-08

## 2017-03-08 DIAGNOSIS — I48.0 PAROXYSMAL ATRIAL FIBRILLATION: ICD-10-CM

## 2017-03-08 DIAGNOSIS — Z79.01 ANTICOAGULATION MONITORING BY PHARMACIST: ICD-10-CM

## 2017-03-08 DIAGNOSIS — I50.32 CHRONIC DIASTOLIC HEART FAILURE: Primary | Chronic | ICD-10-CM

## 2017-03-08 LAB — INR PPP: 2.1

## 2017-03-08 RX ORDER — POTASSIUM CHLORIDE 750 MG/1
10 TABLET, EXTENDED RELEASE ORAL 2 TIMES DAILY
Qty: 60 TABLET | Refills: 11 | Status: SHIPPED | OUTPATIENT
Start: 2017-03-08 | End: 2017-05-12 | Stop reason: SDUPTHER

## 2017-03-08 NOTE — TELEPHONE ENCOUNTER
She was supposed to see a general cardiologist last fall and this never happened.    She does have heart failure.  The best thing would be for her to come in to urgent care as soon as possible.  The other option would be to try to get her into general cardiology ASAP.  Referral is in.  Thank you

## 2017-03-08 NOTE — TELEPHONE ENCOUNTER
----- Message from Sophie Alvarez sent at 3/8/2017 12:48 PM CST -----  Contact: [facility] [phone]  The pt have congestive heart failure and the pt gained 4 pounds over night,please advise

## 2017-03-08 NOTE — PROGRESS NOTES
Morena goodson/OHYOLA Kramer called to R/s 3/09 lab draw to 3/08, states Son called to report wt gain so nurse will be seeing Pt today, lab draw date was R/s'ed, order was faxed to [facility]

## 2017-03-08 NOTE — TELEPHONE ENCOUNTER
Spoke to patients son he states that he is taking her to [facility]  We will call and schedule cardiology upon the patients ER discharge

## 2017-03-09 ENCOUNTER — OFFICE VISIT (OUTPATIENT)
Dept: PODIATRY | Facility: CLINIC | Age: 82
End: 2017-03-09
Payer: COMMERCIAL

## 2017-03-09 VITALS
HEART RATE: 77 BPM | DIASTOLIC BLOOD PRESSURE: 61 MMHG | BODY MASS INDEX: 24.38 KG/M2 | HEIGHT: 72 IN | RESPIRATION RATE: 18 BRPM | SYSTOLIC BLOOD PRESSURE: 115 MMHG | WEIGHT: 180 LBS

## 2017-03-09 DIAGNOSIS — L97.409: Primary | ICD-10-CM

## 2017-03-09 DIAGNOSIS — I73.9 PVD (PERIPHERAL VASCULAR DISEASE): ICD-10-CM

## 2017-03-09 DIAGNOSIS — I13.0: ICD-10-CM

## 2017-03-09 DIAGNOSIS — I50.32 CHRONIC DIASTOLIC HEART FAILURE: Chronic | ICD-10-CM

## 2017-03-09 DIAGNOSIS — E11.621: Primary | ICD-10-CM

## 2017-03-09 PROCEDURE — 99213 OFFICE O/P EST LOW 20 MIN: CPT | Mod: 25,S$GLB,, | Performed by: PODIATRIST

## 2017-03-09 PROCEDURE — 3078F DIAST BP <80 MM HG: CPT | Mod: S$GLB,,, | Performed by: PODIATRIST

## 2017-03-09 PROCEDURE — 11042 DBRDMT SUBQ TIS 1ST 20SQCM/<: CPT | Mod: S$GLB,,, | Performed by: PODIATRIST

## 2017-03-09 PROCEDURE — 1160F RVW MEDS BY RX/DR IN RCRD: CPT | Mod: S$GLB,,, | Performed by: PODIATRIST

## 2017-03-09 PROCEDURE — 1159F MED LIST DOCD IN RCRD: CPT | Mod: S$GLB,,, | Performed by: PODIATRIST

## 2017-03-09 PROCEDURE — 3074F SYST BP LT 130 MM HG: CPT | Mod: S$GLB,,, | Performed by: PODIATRIST

## 2017-03-09 PROCEDURE — 1157F ADVNC CARE PLAN IN RCRD: CPT | Mod: S$GLB,,, | Performed by: PODIATRIST

## 2017-03-09 PROCEDURE — 1126F AMNT PAIN NOTED NONE PRSNT: CPT | Mod: S$GLB,,, | Performed by: PODIATRIST

## 2017-03-09 RX ORDER — TORSEMIDE 20 MG/1
40 TABLET ORAL EVERY MORNING
Qty: 180 TABLET | Refills: 4 | Status: SHIPPED | OUTPATIENT
Start: 2017-03-09 | End: 2017-05-12 | Stop reason: SDUPTHER

## 2017-03-09 NOTE — MR AVS SNAPSHOT
[address]  [address]   [place] [address]  Phone: [phone]  Fax: [phone]                  Chey Trujillo   3/9/2017 8:30 AM   Office Visit    Description:  Female : 1935   Provider:  Vinay Zaragoza Jr., DPM   Department:  [facility]           Reason for Visit     Wound Care           Diagnoses this Visit        Comments    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    -  Primary     PVD (peripheral vascular disease)                To Do List           Future Appointments        Provider Department Dept Phone    3/16/2017 8:15 AM Vinay Zaragoza Jr., DPM [place] [phone]    3/23/2017 8:45 AM Vinay Zaragoza Jr., DPM [place] [phone]    3/23/2017 11:20 AM TELEPHONE CHECK, PACEMAKER [address] - Arrhythmia [phone]    3/30/2017 1:15 PM Vinay Zaragoza Jr., DPM [place] [phone]    2017 10:30 AM Vinay Zaragoza Jr., DPM [place] [phone]      Goals (5 Years of Data)     None      Follow-Up and Disposition     Return in about 1 week (around 3/16/2017).    Follow-up and Disposition History      [facility] On Call     [facility] On Call Nurse [facility] Line - 24/7 Assistance  Registered nurses in the [facility] On Call Center provide clinical advisement, health education, appointment booking, and other advisory services.  Call for this free service at [phone].             Medications           Message regarding Medications     Verify the changes and/or additions to your medication regime listed below are the same as discussed with your clinician today.  If any of these changes or additions are incorrect, please notify your healthcare provider.             Verify that the below list of medications is an accurate representation of the medications you are currently taking.  If none reported, the list may be blank. If incorrect, please contact your healthcare  provider. Carry this list with you in case of emergency.           Current Medications     ACCU-CHEK SMARTVIEW TEST STRIP Strp test FOUR TIMES A DAY    amiodarone (PACERONE) 200 MG Tab Take 1 tablet (200 mg total) by mouth once daily.    amlodipine (NORVASC) 10 MG tablet Take 1 tablet (10 mg total) by mouth once daily.    aspirin 81 MG chewable tablet Take 1 tablet by mouth Every morning.    blood-glucose meter Misc Dispense Accu-Chek Natalia meter    carvedilol (COREG) 25 MG tablet Take 1 tablet (25 mg total) by mouth 2 (two) times daily with meals.    cloNIDine (CATAPRES) 0.2 MG tablet Take 1 tablet (0.2 mg total) by mouth 2 (two) times daily.    collagenase ointment Apply topically once daily.    cyanocobalamin, vitamin B-12, 1,000 mcg TbSR Take 1,000 mcg by mouth once daily.    dorzolamide (TRUSOPT) 2 % ophthalmic solution INSTILL ONE DROP INTO EACH EYE TWICE DAILY    doxazosin (CARDURA) 8 MG Tab TAKE ONE TABLET BY MOUTH ONCE DAILY IN THE EVENING    ferrous sulfate 324 mg (65 mg iron) TbEC Take 1 tablet (325 mg total) by mouth 2 (two) times daily.    hydrALAZINE (APRESOLINE) 100 MG tablet Take 1 tablet (100 mg total) by mouth every 8 (eight) hours.    insulin glargine (LANTUS) 100 unit/mL injection INJECT 10 units nightly.    insulin lispro (HUMALOG) 100 unit/mL injection Take 9 units with breakfast, 9 units with lunch, and 8 units with dinner. Plus sliding scale    insulin lispro (HUMALOG) 100 unit/mL injection 15 units with meals plus correction scale, max daily dose is 75 units.    insulin syringe-needle U-100 (EASY TOUCH INSULIN SYRINGE) 0.5 mL 31 gauge x 5/16 Syrg To use 4 times daily with insulin injections    isosorbide dinitrate (ISOCHRON) 40 mg TbSR     isosorbide dinitrate (ISORDIL) 40 MG Tab Take 1 tablet (40 mg total) by mouth 3 (three) times daily.    lactulose (CEPHULAC) 20 gram Pack Take 1 packet (20 g total) by mouth once daily.    lancets Misc 1 lancet by Misc.(Non-Drug; Combo Route) route 4 (four)  "times daily.    latanoprost 0.005 % ophthalmic solution INSTILL ONE DROP INTO EACH EYE IN THE EVENING    lidocaine (LIDODERM) 5 % Place 1 patch onto the skin once daily. Remove & Discard patch within 12 hours or as directed by MD. Can apply to back area for pain    memantine (NAMENDA) 10 MG Tab Take 1 tablet (10 mg total) by mouth once daily.    nitroGLYCERIN 0.4 MG/DOSE TL SPRY (NITROLINGUAL) 400 mcg/spray spray PLACE ONE SPRAY UNDER THE TONGUE EVERY 5 MINUTES AS NEEDED FOR CHEST PAIN.    NITROSTAT 0.3 mg SL tablet DISSOLVE ONE TABLET UNDER THE TONGUE EVERY 5 MINUTES AS NEEDED FOR CHEST PAIN.  DO NOT EXCEED A TOTAL OF 3 DOSES IN 15 MINUTES    paricalcitol (ZEMPLAR) 1 MCG capsule Take 1 capsule by mouth on Monday and Friday.    potassium chloride SA (KLOR-CON M10) 10 MEQ tablet Take 1 tablet (10 mEq total) by mouth 2 (two) times daily.    potassium chloride SA (KLOR-CON M10) 10 MEQ tablet Take 1 tablet (10 mEq total) by mouth 2 (two) times daily.    pravastatin (PRAVACHOL) 20 MG tablet TAKE 1 TABLET BY MOUTH once DAILY    sodium bicarbonate 650 MG tablet     torsemide (DEMADEX) 20 MG Tab Take 2 tablets (40 mg total) by mouth every morning.    warfarin (COUMADIN) 5 MG tablet Take 0.5-1 tablets (2.5-5 mg total) by mouth Daily. As directed by [facility] Clinic    ZOSTAVAX, PF, 19,400 unit/0.65 mL injection            Clinical Reference Information           Your Vitals Were     BP Pulse Resp Height Weight BMI    115/61 (BP Location: Right arm, Patient Position: Sitting, BP Method: Automatic) 77 18 6' 2" (1.88 m) 81.6 kg (180 lb) 23.11 kg/m2      Blood Pressure          Most Recent Value    BP  115/61      Allergies as of 3/9/2017     Losartan    0.225 % Sodium Chloride    Amitriptyline    Azopt  [Brinzolamide]    Ciprofloxacin    Codeine    Cantil  [Mepenzolate Bromide]    Duragal-s    Erythromycin    Fentanyl    Hydrocodone-acetaminophen    Indomethacin    Meperidine    Minoxidil    Morphine    Neuromuscular Blockers, " Steroidal    Nifedipine    Oxycodone Hcl-oxycodone-asa    Penicillins    Propoxyphene    Sensipar [Cinacalcet]    Talwin Compound    Talwin  [Pentazocine Lactate]    Valsartan      Immunizations Administered on Date of Encounter - 3/9/2017     None      Orders Placed During Today's Visit      Normal Orders This Visit    Debridement     Future Labs/Procedures Expected by Expires    US Lower Extremity Arteries Bilateral  3/9/2017 3/9/2018      Language Assistance Services     ATTENTION: Language assistance services are available, free of charge. Please call [phone].      ATENCIÓN: Si habla español, tiene a moulton disposición servicios gratuitos de asistencia lingüística. Llame al [phone].     CHÚ Ý: N?u b?n nói Ti?ng Vi?t, có các d?ch v? h? tr? ngôn ng? mi?n phí dành cho b?n. G?i s? [phone].         [facility] complies with applicable Federal civil rights laws and does not discriminate on the basis of race, color, national origin, age, disability, or sex.

## 2017-03-09 NOTE — PROGRESS NOTES
Subjective:    Patient ID: Chey Trujillo is a 81 y.o. female.    Chief Complaint: Wound Care      HPI:   DENITA Guerrier is a 81 y.o. female who presents to the clinic for evaluation and treatment of high risk feet. 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 The patient's chief complaint is foot ulcer, R heel. This patient has documented high risk feet requiring routine maintenance secondary to diabetes mellitis and those secondary complications of diabetes, as mentioned.    Wants foot dr closer to home. Has been moving all care to [place].   Gets home health from [facility].  Had a R heel ulcer that healed 2/7/17 but has subsequently recurred.      PCP: Viviana Nguyen MD    Date Last Seen by PCP: in epic    Current shoe gear:  Affected Foot: Flip-flops    History of  Trauma: negative  Sign of Infection: none    Last Podiatry Enc: Visit date not found  Last Enc w/ Me: Visit date not found    Hemoglobin A1C   Date Value Ref Range Status   11/15/2016 7.8 (H) 4.5 - 6.2 % Final     Comment:     According to ADA guidelines, hemoglobin A1C <7.0% represents  optimal control in non-pregnant diabetic patients.  Different  metrics may apply to specific populations.   Standards of Medical Care in Diabetes - 2016.  For the purpose of screening for the presence of diabetes:  <5.7%     Consistent with the absence of diabetes  5.7-6.4%  Consistent with increasing risk for diabetes   (prediabetes)  >or=6.5%  Consistent with diabetes  Currently no consensus exists for use of hemoglobin A1C  for diagnosis of diabetes for children.     09/27/2016 7.5 (H) 4.5 - 6.2 % Final     Comment:     According to ADA guidelines, hemoglobin A1C <7.0% represents  optimal control in non-pregnant diabetic patients.  Different  metrics may apply to specific populations.   Standards of Medical Care in Diabetes - 2016.  For the purpose of screening for the presence of diabetes:  <5.7%     Consistent with the absence of diabetes  5.7-6.4%  Consistent with increasing risk for diabetes   (prediabetes)  >or=6.5%  Consistent with diabetes  Currently no consensus exists for use of hemoglobin A1C  for diagnosis of diabetes for children.     08/16/2016 7.2 (H) 4.5 - 6.2 % Final     Comment:     According to ADA guidelines, hemoglobin A1C <7.0% represents  optimal control in non-pregnant diabetic patients.  Different  metrics may apply to specific populations.   Standards of Medical Care in Diabetes - 2016.  For the purpose of screening for the presence of diabetes:  <5.7%     Consistent with the absence of diabetes  5.7-6.4%  Consistent with increasing risk for diabetes   (prediabetes)  >or=6.5%  Consistent with diabetes  Currently no consensus exists for use of hemoglobin A1C  for diagnosis of diabetes for children.          Past Medical History:   Diagnosis Date    Anticoagulation monitoring by pharmacist 6/29/2012    At risk for amiodarone toxicity with long term use 1/27/2014    Atrial fibrillation     Bilateral carotid artery disease 1/11/2016    Blood transfusion     CAD S/P percutaneous coronary angioplasty 9/27/2012    Chronic diastolic heart failure 9/27/2012    Echo 3-: 1 - Concentric hypertrophy.  2 - Normal left ventricular systolic function (EF 60-65%).  3 - Right ventricular enlargement with hypertrophy, with normal systolic function.  4 - Left ventricular diastolic dysfunction.  5 - Biatrial enlargement.  6 - Mild tricuspid regurgitation.  7 - Pulmonary hypertension. The estimated PA systolic pressure is 55 mmHg.  8 - Increased central venous pressure (IVC is greater than 3cm in diameter).     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  ROWAN placed just proximal to stent in  for UA.  Cath 2011: normal LMCA, 40% mid LAD, 50% distal LCx    EYE SURGERY      LAMINECTOMY      TOTAL HIP ARTHROPLASTY Right     x's r hip     Social History     Social History    Marital status:      Spouse name: N/A    Number of children: N/A    Years of education: N/A     Occupational History    Not on file.     Social History Main Topics    Smoking status: Never Smoker    Smokeless tobacco: Never Used    Alcohol use No    Drug use: No    Sexual activity: No     Other Topics Concern    Not on file     Social History Narrative    Son lives with her. Spouse .          Current Outpatient Prescriptions:     ACCU-CHEK SMARTVIEW TEST STRIP Strp, test FOUR TIMES A DAY, Disp: 150 each, Rfl: 11    amiodarone (PACERONE) 200 MG Tab, Take 1 tablet (200 mg total) by mouth once daily., Disp: 90 tablet, Rfl: 3    amlodipine (NORVASC) 10 MG tablet, Take 1 tablet (10 mg total) by mouth once daily., Disp: 90 tablet, Rfl: 3    aspirin 81 MG chewable tablet, Take 1 tablet by mouth Every morning., Disp: , Rfl:     blood-glucose meter Misc, Dispense Accu-Chek Natalia meter, Disp: 1 each, Rfl: 0    carvedilol (COREG) 25 MG tablet, Take 1 tablet (25 mg total) by mouth 2 (two) times daily with meals., Disp: 270 tablet, Rfl: 4    cloNIDine (CATAPRES) 0.2 MG tablet, Take 1 tablet (0.2 mg total) by mouth 2 (two) times daily., Disp: 60 tablet, Rfl: 2    collagenase ointment, Apply topically once daily., Disp: 90 g, Rfl: 1    cyanocobalamin, vitamin B-12, 1,000 mcg TbSR, Take 1,000 mcg by mouth once daily., Disp: 30 each, Rfl: 0    dorzolamide (TRUSOPT) 2 % ophthalmic solution, INSTILL ONE DROP INTO EACH EYE TWICE DAILY, Disp: 10 mL, Rfl: 11    doxazosin (CARDURA) 8 MG Tab, TAKE ONE TABLET BY MOUTH ONCE DAILY IN THE  EVENING, Disp: 90 tablet, Rfl: 3    ferrous sulfate 324 mg (65 mg iron) TbEC, Take 1 tablet (325 mg total) by mouth 2 (two) times daily., Disp: 60 tablet, Rfl: 0    hydrALAZINE (APRESOLINE) 100 MG tablet, Take 1 tablet (100 mg total) by mouth every 8 (eight) hours., Disp: 270 tablet, Rfl: 4    insulin glargine (LANTUS) 100 unit/mL injection, INJECT 10 units nightly., Disp: 30 mL, Rfl: 12    insulin lispro (HUMALOG) 100 unit/mL injection, Take 9 units with breakfast, 9 units with lunch, and 8 units with dinner. Plus sliding scale, Disp: 2 vial, Rfl: 8    insulin lispro (HUMALOG) 100 unit/mL injection, 15 units with meals plus correction scale, max daily dose is 75 units., Disp: 3 vial, Rfl: 6    insulin syringe-needle U-100 (EASY TOUCH INSULIN SYRINGE) 0.5 mL 31 gauge x 5/16 Syrg, To use 4 times daily with insulin injections, Disp: 150 each, Rfl: 2    isosorbide dinitrate (ISOCHRON) 40 mg TbSR, , Disp: , Rfl:     isosorbide dinitrate (ISORDIL) 40 MG Tab, Take 1 tablet (40 mg total) by mouth 3 (three) times daily., Disp: 270 tablet, Rfl: 3    lactulose (CEPHULAC) 20 gram Pack, Take 1 packet (20 g total) by mouth once daily., Disp: 90 packet, Rfl: 6    lancets Misc, 1 lancet by Misc.(Non-Drug; Combo Route) route 4 (four) times daily., Disp: 150 each, Rfl: 11    latanoprost 0.005 % ophthalmic solution, INSTILL ONE DROP INTO EACH EYE IN THE EVENING, Disp: 3 Bottle, Rfl: 12    lidocaine (LIDODERM) 5 %, Place 1 patch onto the skin once daily. Remove & Discard patch within 12 hours or as directed by MD. Can apply to back area for pain, Disp: 30 patch, Rfl: 1    memantine (NAMENDA) 10 MG Tab, Take 1 tablet (10 mg total) by mouth once daily., Disp: 30 tablet, Rfl: 5    nitroGLYCERIN 0.4 MG/DOSE TL SPRY (NITROLINGUAL) 400 mcg/spray spray, PLACE ONE SPRAY UNDER THE TONGUE EVERY 5 MINUTES AS NEEDED FOR CHEST PAIN., Disp: 4.9 g, Rfl: 0    NITROSTAT 0.3 mg SL tablet, DISSOLVE ONE TABLET UNDER THE TONGUE EVERY 5  MINUTES AS NEEDED FOR CHEST PAIN.  DO NOT EXCEED A TOTAL OF 3 DOSES IN 15 MINUTES, Disp: 100 tablet, Rfl: 0    paricalcitol (ZEMPLAR) 1 MCG capsule, Take 1 capsule by mouth on Monday and Friday., Disp: 8 capsule, Rfl: 3    potassium chloride SA (KLOR-CON M10) 10 MEQ tablet, Take 1 tablet (10 mEq total) by mouth 2 (two) times daily., Disp: 60 tablet, Rfl: 2    potassium chloride SA (KLOR-CON M10) 10 MEQ tablet, Take 1 tablet (10 mEq total) by mouth 2 (two) times daily., Disp: 60 tablet, Rfl: 11    pravastatin (PRAVACHOL) 20 MG tablet, TAKE 1 TABLET BY MOUTH once DAILY, Disp: 90 tablet, Rfl: 0    sodium bicarbonate 650 MG tablet, , Disp: , Rfl:     torsemide (DEMADEX) 20 MG Tab, Take 2 tablets (40 mg total) by mouth every morning., Disp: 180 tablet, Rfl: 4    warfarin (COUMADIN) 5 MG tablet, Take 0.5-1 tablets (2.5-5 mg total) by mouth Daily. As directed by [facility], Disp: 30 tablet, Rfl: 3    ZOSTAVAX, PF, 19,400 unit/0.65 mL injection, , Disp: , Rfl:   No current facility-administered medications for this visit.   Review of patient's allergies indicates:   Allergen Reactions    Losartan Other (See Comments)     Hyperkalemia    0.225 % sodium chloride      Other reaction(s): Eye irritation    Amitriptyline      Other reaction(s): Vomiting  Other reaction(s): Vomiting    Azopt  [brinzolamide]      Other reaction(s): Eye irritation    Ciprofloxacin Nausea And Vomiting     Other reaction(s): Stomach upset    Codeine      Other reaction(s): Unknown  Other reaction(s): Unknown    Cantil  [mepenzolate bromide]      Other reaction(s): Unknown  Other reaction(s): Unknown    Duragal-s      Other reaction(s): Vomiting    Erythromycin      Other reaction(s): Unknown  Other reaction(s): Unknown    Fentanyl      Other reaction(s): Vomiting    Hydrocodone-acetaminophen      Other reaction(s): Unknown  Other reaction(s): Unknown    Indomethacin      Other reaction(s): Unknown  Other reaction(s): Unknown  "   Meperidine      Other reaction(s): Unknown  Other reaction(s): Unknown    Minoxidil      Other reaction(s): druged  Other reaction(s): nausea and vomiting  Other reaction(s): nausea and vomiting  Other reaction(s): druged    Morphine      Other reaction(s): Unknown  Other reaction(s): Unknown    Neuromuscular blockers, steroidal      Other reaction(s): Unknown    Nifedipine      Other reaction(s): Swelling  Other reaction(s): Swelling    Oxycodone hcl-oxycodone-asa      Other reaction(s): Unknown  Other reaction(s): Unknown    Penicillins Hives     Other reaction(s): Unknown    Propoxyphene      Other reaction(s): Unknown    Sensipar [cinacalcet] Nausea Only    Talwin compound      Other reaction(s): Unknown    Talwin  [pentazocine lactate]      Other reaction(s): Unknown    Valsartan Rash and Hives       /61 (BP Location: Right arm, Patient Position: Sitting, BP Method: Automatic)  Pulse 77  Resp 18  Ht 6' 2" (1.88 m)  Wt 81.6 kg (180 lb)  BMI 23.11 kg/m2    ROS  ROS Constitutional:  General Appearance: malnourished  Vascular: positive for edema  Musculoskeletal: negative for joint paint and joint edema  Skin: negative for rashes and lesions  Neurological: negative for burning, tingling and numbness  Gastrointestinal: negative for stomach pain, nausea and vomiting        Objective:        Ortho/SPM Exam  Physical Exam  Physical Exam   Constitutional: She is oriented to person, place, and time. She appears well-developed and well-nourished.   Cardiovascular:   Dorsalis pedis and posterior tibial pulses are diminished Bilaterally. Toes are cool to touch. Feet are warm proximally.There is decreased digital hair. Skin is atrophic, slightly hyperpigmented, and mildly edematous       Musculoskeletal: Normal range of motion. She exhibits no edema or tenderness.   Adequate joint range of motion without pain, limitation, nor crepitation Bilateral feet and ankle joints. Muscle strength is 5/5 in all " groups bilaterally.         Neurological: She is alert and oriented to person, place, and time.   [place]-Mayela 5.07 monofilamant testing is diminished Haris feet. Sharp/dull sensation diminished Bilaterally. Light touch absent Bilaterally.       Skin: Skin is warm, dry. No bruising, no ecchymosis and no lesion noted. No erythema.   Nails x10 are elongated by  2-5mm's, thickened by 1-4 mm's, dystrophic, and are darkened in  coloration . Xerosis Bilaterally. No open lesions noted.    Hyperkeratotic tissue noted to posterior b/l  Psychiatric: She has a normal mood and affect. Her behavior is normal. Judgment and thought content normal.   Vitals reviewed.    Ulcer Location: R posterior heel  Measurements: .5x.5x.5  Periwound: Intact and hyperkeratotic  Drainage: none  Purulence: None.  Malodor: None.  Base:  80% granular, 20% fibrotic  Signs of infection: None.        Assessment:     Imaging:   X-ray Foot Complete Bilateral    Result Date: 1/5/2017  AP, oblique, and weightbearing lateral radiographs of both feet were obtained. The bones are osteoporotic but intact. There is no evidence for acute fracture or bone destruction. There is mild hallux valgus deformity present bilaterally. There are mild degenerative changes within the small joints of both feet. There is no evidence for dislocation. No bony erosions are evident. Atherosclerotic calcification is identified within the arteries of both feet.    Osteoporosis. Extensive atherosclerosis. No evidence for acute fracture, bone destruction, or dislocation. Electronically signed by: LLOYD WALKER MD Date:     01/05/17 Time:    12:58     X-ray Calcaneus Bilateral    Result Date: 1/5/2017  2 views of the calcaneus were obtained bilaterally. There is no evidence for acute fracture or bone destruction. There is extensive atherosclerotic calcification identified within the arteries at the level of the ankle. There is a small amount of soft tissue calcification posterior to  "the left calcaneus. No radiopaque soft tissue foreign bodies are identified.    No evidence for acute fracture, bone destruction, or dislocation. Extensive atherosclerotic calcification within the arteries at the level of the ankles. Electronically signed by: LLOYD WALKER MD Date:     01/05/17 Time:    12:55       Recent Labs  Lab Result Units 01/05/17  1043 03/08/17  1546   Sed Rate mm/Hr 40*  --    CRP mg/L 0.5  --    WBC K/uL  --  3.13*   Prealbumin mg/dL 11*  --          1. 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    2. PVD (peripheral vascular disease)          Plan:       Orders Placed This Encounter    Debridement    US Lower Extremity Arteries Bilateral     Wound Debridement  Date/Time: 3/9/2017 8:05 AM  Performed by: SHAWNEE SOLORZANO JR.  Authorized by: SHAWNEE SOLORZANO JR.     Time out: Immediately prior to procedure a "time out" was called to verify the correct patient, procedure, equipment, support staff and site/side marked as required.    Consent Done?:  Yes (Verbal)    Preparation: Patient was prepped and draped in usual sterile fashion    Local anesthesia used?: No      Wound Details:    Location:  Right foot    Location:  Right Heel    Type of Debridement:  Excisional       Length (cm):  0.5       Area (sq cm):  0.25       Width (cm):  0.5       Percent Debrided (%):  100       Depth (cm):  0.5       Total Area Debrided (sq cm):  0.25    Depth of debridement:  Subcutaneous tissue    Tissue debrided:  Subcutaneous, Hypergranulation and Epidermis    Devitalized tissue debrided:  Biofilm and Callus    Instruments:  Blade    Bleeding:  Minimal  Hemostasis Achieved: Yes    Method Used:  Pressure and Silver Nitrate  Patient tolerance:  Patient tolerated the procedure well with no immediate complications    :     Chey was seen today for wound care.    Diagnoses and all orders for this visit:    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  -     Debridement  -     US Lower Extremity Arteries Bilateral; Future    PVD (peripheral vascular " disease)  -     US Lower Extremity Arteries Bilateral; Future        Cheysa ADRIANO Trujillo is a 81 y.o. female presenting w/   1. 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    2. PVD (peripheral vascular disease)        -pt seen, evaluated, and managed  -dx discussed in detail. All questions/concerns addressed  -all tx options discussed. All alternatives, risks, benefits of all txs discussed  -The patient was educated regarding the above diagnosis. We discussed conservative care options regarding shoe wear and/or padding.  -rxs dispensed: none  -xr and labs reviewed  -procedure as above  -dressings: chelle+football  -arterial US ordered on way out--> will review at nxt viist    -pt would benefit from nutrition consult due to multiple medical problems, polypharmacy, allergies, low pre-albumin   Referral ordered in epic - pt not eligible due to insurance    -educated on DM footcare    rtc 1 wks    Return in about 1 week (around 3/16/2017).

## 2017-03-16 ENCOUNTER — OFFICE VISIT (OUTPATIENT)
Dept: PODIATRY | Facility: CLINIC | Age: 82
End: 2017-03-16
Payer: COMMERCIAL

## 2017-03-16 VITALS
RESPIRATION RATE: 18 BRPM | HEART RATE: 67 BPM | BODY MASS INDEX: 24.38 KG/M2 | SYSTOLIC BLOOD PRESSURE: 132 MMHG | HEIGHT: 72 IN | WEIGHT: 180 LBS | DIASTOLIC BLOOD PRESSURE: 68 MMHG

## 2017-03-16 DIAGNOSIS — E11.621: Primary | ICD-10-CM

## 2017-03-16 DIAGNOSIS — L97.409: Primary | ICD-10-CM

## 2017-03-16 PROCEDURE — 1126F AMNT PAIN NOTED NONE PRSNT: CPT | Mod: S$GLB,,, | Performed by: PODIATRIST

## 2017-03-16 PROCEDURE — 11042 DBRDMT SUBQ TIS 1ST 20SQCM/<: CPT | Mod: S$GLB,,, | Performed by: PODIATRIST

## 2017-03-16 PROCEDURE — 1160F RVW MEDS BY RX/DR IN RCRD: CPT | Mod: S$GLB,,, | Performed by: PODIATRIST

## 2017-03-16 PROCEDURE — 1157F ADVNC CARE PLAN IN RCRD: CPT | Mod: S$GLB,,, | Performed by: PODIATRIST

## 2017-03-16 PROCEDURE — 3078F DIAST BP <80 MM HG: CPT | Mod: S$GLB,,, | Performed by: PODIATRIST

## 2017-03-16 PROCEDURE — 1159F MED LIST DOCD IN RCRD: CPT | Mod: S$GLB,,, | Performed by: PODIATRIST

## 2017-03-16 PROCEDURE — 3075F SYST BP GE 130 - 139MM HG: CPT | Mod: S$GLB,,, | Performed by: PODIATRIST

## 2017-03-16 PROCEDURE — 99213 OFFICE O/P EST LOW 20 MIN: CPT | Mod: 25,S$GLB,, | Performed by: PODIATRIST

## 2017-03-16 NOTE — MR AVS SNAPSHOT
[address]  [address]   [place] [address]  Phone: [phone]  Fax: [phone]                  Chey Trujillo   3/16/2017 8:15 AM   Office Visit    Description:  Female : 1935   Provider:  Vinay Zaragoza Jr., DPM   Department:  [facility]           Reason for Visit     Wound Check           Diagnoses this Visit        Comments    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    -  Primary            To Do List           Future Appointments        Provider Department Dept Phone    3/21/2017 1:45 PM Vinay Zaragoza Jr., DPM [place] [phone]    3/23/2017 11:20 AM TELEPHONE CHECK, PACEM[facility] [address] - Arrhythmia [phone]    3/30/2017 1:15 PM Vinay Zaragoza Jr., DPM [place] [phone]    2017 10:30 AM Vinay Zaragoza Jr., DPM [place] [phone]    2017 3:20 PM John Quezada MD [facility] [phone]      Goals (5 Years of Data)     None      Follow-Up and Disposition     Return in about 1 week (around 3/23/2017).    Follow-up and Disposition History      [facility] On [facility]     [facility] On [facility] Care Line -  Assistance  Registered nurses in the [facility] On Call Center provide clinical advisement, health education, appointment booking, and other advisory services.  Call for this free service at [phone].             Medications           Message regarding Medications     Verify the changes and/or additions to your medication regime listed below are the same as discussed with your clinician today.  If any of these changes or additions are incorrect, please notify your healthcare provider.             Verify that the below list of medications is an accurate representation of the medications you are currently taking.  If none reported, the list may be blank. If incorrect, please contact your healthcare provider. Carry this list with you in case of emergency.            Current Medications     ACCU-CHEK SMARTVIEW TEST STRIP Strp test FOUR TIMES A DAY    amiodarone (PACERONE) 200 MG Tab Take 1 tablet (200 mg total) by mouth once daily.    amlodipine (NORVASC) 10 MG tablet Take 1 tablet (10 mg total) by mouth once daily.    aspirin 81 MG chewable tablet Take 1 tablet by mouth Every morning.    blood-glucose meter Misc Dispense Accu-Chek Natalia meter    carvedilol (COREG) 25 MG tablet Take 1 tablet (25 mg total) by mouth 2 (two) times daily with meals.    cloNIDine (CATAPRES) 0.2 MG tablet Take 1 tablet (0.2 mg total) by mouth 2 (two) times daily.    collagenase ointment Apply topically once daily.    cyanocobalamin, vitamin B-12, 1,000 mcg TbSR Take 1,000 mcg by mouth once daily.    dorzolamide (TRUSOPT) 2 % ophthalmic solution INSTILL ONE DROP INTO EACH EYE TWICE DAILY    doxazosin (CARDURA) 8 MG Tab TAKE ONE TABLET BY MOUTH ONCE DAILY IN THE EVENING    ferrous sulfate 324 mg (65 mg iron) TbEC Take 1 tablet (325 mg total) by mouth 2 (two) times daily.    hydrALAZINE (APRESOLINE) 100 MG tablet Take 1 tablet (100 mg total) by mouth every 8 (eight) hours.    insulin glargine (LANTUS) 100 unit/mL injection INJECT 10 units nightly.    insulin lispro (HUMALOG) 100 unit/mL injection Take 9 units with breakfast, 9 units with lunch, and 8 units with dinner. Plus sliding scale    insulin lispro (HUMALOG) 100 unit/mL injection 15 units with meals plus correction scale, max daily dose is 75 units.    insulin syringe-needle U-100 (EASY TOUCH INSULIN SYRINGE) 0.5 mL 31 gauge x 5/16 Syrg To use 4 times daily with insulin injections    isosorbide dinitrate (ISOCHRON) 40 mg TbSR     isosorbide dinitrate (ISORDIL) 40 MG Tab Take 1 tablet (40 mg total) by mouth 3 (three) times daily.    lactulose (CEPHULAC) 20 gram Pack Take 1 packet (20 g total) by mouth once daily.    lancets Misc 1 lancet by Misc.(Non-Drug; Combo Route) route 4 (four) times daily.    latanoprost 0.005 % ophthalmic solution  "INSTILL ONE DROP INTO EACH EYE IN THE EVENING    lidocaine (LIDODERM) 5 % Place 1 patch onto the skin once daily. Remove & Discard patch within 12 hours or as directed by MD. Can apply to back area for pain    memantine (NAMENDA) 10 MG Tab Take 1 tablet (10 mg total) by mouth once daily.    nitroGLYCERIN 0.4 MG/DOSE TL SPRY (NITROLINGUAL) 400 mcg/spray spray PLACE ONE SPRAY UNDER THE TONGUE EVERY 5 MINUTES AS NEEDED FOR CHEST PAIN.    NITROSTAT 0.3 mg SL tablet DISSOLVE ONE TABLET UNDER THE TONGUE EVERY 5 MINUTES AS NEEDED FOR CHEST PAIN.  DO NOT EXCEED A TOTAL OF 3 DOSES IN 15 MINUTES    paricalcitol (ZEMPLAR) 1 MCG capsule Take 1 capsule by mouth on Monday and Friday.    potassium chloride SA (KLOR-CON M10) 10 MEQ tablet Take 1 tablet (10 mEq total) by mouth 2 (two) times daily.    potassium chloride SA (KLOR-CON M10) 10 MEQ tablet Take 1 tablet (10 mEq total) by mouth 2 (two) times daily.    pravastatin (PRAVACHOL) 20 MG tablet TAKE 1 TABLET BY MOUTH once DAILY    sodium bicarbonate 650 MG tablet     torsemide (DEMADEX) 20 MG Tab Take 2 tablets (40 mg total) by mouth every morning.    warfarin (COUMADIN) 5 MG tablet Take 0.5-1 tablets (2.5-5 mg total) by mouth Daily. As directed by [facility]    ZOSTAVAX, PF, 19,400 unit/0.65 mL injection            Clinical Reference Information           Your Vitals Were     BP Pulse Resp Height Weight BMI    132/68 (BP Location: Left arm, Patient Position: Sitting, BP Method: Automatic) 67 18 6' 2" (1.88 m) 81.6 kg (180 lb) 23.11 kg/m2      Blood Pressure          Most Recent Value    BP  132/68      Allergies as of 3/16/2017     Losartan    0.225 % Sodium Chloride    Amitriptyline    Azopt  [Brinzolamide]    Ciprofloxacin    Codeine    Cantil  [Mepenzolate Bromide]    Duragal-s    Erythromycin    Fentanyl    Hydrocodone-acetaminophen    Indomethacin    Meperidine    Minoxidil    Morphine    Neuromuscular Blockers, Steroidal    Nifedipine    Oxycodone Hcl-oxycodone-asa    " Penicillins    Propoxyphene    Sensipar [Cinacalcet]    Talwin Compound    Talwin  [Pentazocine Lactate]    Valsartan      Immunizations Administered on Date of Encounter - 3/16/2017     None      Orders Placed During Today's Visit      Normal Orders This Visit    Debridement       Language Assistance Services     ATTENTION: Language assistance services are available, free of charge. Please call [phone].      ATENCIÓN: Si habla español, tiene a moulton disposición servicios gratuitos de asistencia lingüística. Llame al [phone].     GIOVANNY Ý: N?u b?n nói Ti?ng Vi?t, có các d?ch v? h? tr? ngôn ng? mi?n phí dành cho b?n. G?i s? [phone].         [facility] complies with applicable Federal civil rights laws and does not discriminate on the basis of race, color, national origin, age, disability, or sex.

## 2017-03-16 NOTE — LETTER
March 16, 2017      Viviana Nguyen MD  [address]  [facility] [address]           [address]  [address] [place] [address]  Phone: [phone]  Fax: [phone]          Patient: Chey Trujillo   MR Number: [id number]   YOB: 1935   Date of Visit: 3/16/2017       Dear Dr. Viviana Nguyen:    Thank you for referring Chey Trujillo to me for evaluation. Attached you will find relevant portions of my assessment and plan of care.    If you have questions, please do not hesitate to call me. I look forward to following Chey Trujillo along with you.    Sincerely,    Vinay Zaragoza Jr., DPM    Enclosure  CC:  No Recipients    If you would like to receive this communication electronically, please contact [email or web address] or [phone] to request more information on [employer] Link access.    For providers and/or their staff who would like to refer a patient to [facility], please contact us through our one-stop-shop provider referral line, [facility], at [phone].    If you feel you have received this communication in error or would no longer like to receive these types of communications, please e-mail [email or web address]

## 2017-03-16 NOTE — PROGRESS NOTES
Subjective:    Patient ID: Chey Trujillo is a 81 y.o. female.    Chief Complaint: Wound Check (heel ulcer)      HPI:   DENITA Guerrier is a 81 y.o. female who presents to the clinic for evaluation and treatment of high risk feet. 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 The patient's chief complaint is foot ulcer, R heel. This patient has documented high risk feet requiring routine maintenance secondary to diabetes mellitis and those secondary complications of diabetes, as mentioned.    Wants foot dr closer to home. Has been moving all care to [place].   Gets home health from [facility].  Had a R heel ulcer that healed 2/7/17 but has subsequently recurred.      PCP: Viviana Nguyen MD    Date Last Seen by PCP: in epic    Current shoe gear:  Affected Foot:  Flip-flops    History of Trauma: negative  Sign of Infection: none    Last Podiatry Enc: Visit date not found  Last Enc w/ Me: Visit date not found    Hemoglobin A1C   Date Value Ref Range Status   11/15/2016 7.8 (H) 4.5 - 6.2 % Final     Comment:     According to ADA guidelines, hemoglobin A1C <7.0% represents  optimal control in non-pregnant diabetic patients.  Different  metrics may apply to specific populations.   Standards of Medical Care in Diabetes - 2016.  For the purpose of screening for the presence of diabetes:  <5.7%     Consistent with the absence of diabetes  5.7-6.4%  Consistent with increasing risk for diabetes   (prediabetes)  >or=6.5%  Consistent with diabetes  Currently no consensus exists for use of hemoglobin A1C  for diagnosis of diabetes for children.     09/27/2016 7.5 (H) 4.5 - 6.2 % Final     Comment:     According to ADA guidelines, hemoglobin A1C <7.0% represents  optimal control in non-pregnant diabetic patients.  Different  metrics may apply to specific populations.   Standards of Medical Care in Diabetes - 2016.  For the purpose of screening for the presence of diabetes:  <5.7%     Consistent with the absence of diabetes  5.7-6.4%  Consistent with increasing risk for diabetes   (prediabetes)  >or=6.5%  Consistent with diabetes  Currently no consensus exists for use of hemoglobin A1C  for diagnosis of diabetes for children.     08/16/2016 7.2 (H) 4.5 - 6.2 % Final     Comment:     According to ADA guidelines, hemoglobin A1C <7.0% represents  optimal control in non-pregnant diabetic patients.  Different  metrics may apply to specific populations.   Standards of Medical Care in Diabetes - 2016.  For the purpose of screening for the presence of diabetes:  <5.7%     Consistent with the absence of diabetes  5.7-6.4%  Consistent with increasing risk for diabetes   (prediabetes)  >or=6.5%  Consistent with diabetes  Currently no consensus exists for use of hemoglobin A1C  for diagnosis of  diabetes for children.         Past Medical History:   Diagnosis Date    Anticoagulation monitoring by pharmacist 6/29/2012    At risk for amiodarone toxicity with long term use 1/27/2014    Atrial fibrillation     Bilateral carotid artery disease 1/11/2016    Blood transfusion     CAD S/P percutaneous coronary angioplasty 9/27/2012    Chronic diastolic heart failure 9/27/2012    Echo 3-: 1 - Concentric hypertrophy.  2 - Normal left ventricular systolic function (EF 60-65%).  3 - Right ventricular enlargement with hypertrophy, with normal systolic function.  4 - Left ventricular diastolic dysfunction.  5 - Biatrial enlargement.  6 - Mild tricuspid regurgitation.  7 - Pulmonary hypertension. The estimated PA systolic pressure is 55 mmHg.  8 - Increased central venous pressure (IVC is greater than 3cm in diameter).     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  ROWAN placed just proximal to stent in  for UA.  Cath 2011: normal LMCA, 40% mid LAD, 50% distal LCx    EYE SURGERY      LAMINECTOMY      TOTAL HIP ARTHROPLASTY Right     x's r hip     Social History     Social History    Marital status:      Spouse name: N/A    Number of children: N/A    Years of education: N/A     Occupational History    Not on file.     Social History Main Topics    Smoking status: Never Smoker    Smokeless tobacco: Never Used    Alcohol use No    Drug use: No    Sexual activity: No     Other Topics Concern    Not on file     Social History Narrative    Son lives with her. Spouse .          Current Outpatient Prescriptions:     ACCU-CHEK SMARTVIEW TEST STRIP Strp, test FOUR TIMES A DAY, Disp: 150 each, Rfl: 11    amiodarone (PACERONE) 200 MG Tab, Take 1 tablet (200 mg total) by mouth once daily., Disp: 90 tablet, Rfl: 3    amlodipine (NORVASC) 10 MG tablet, Take 1 tablet (10 mg total) by mouth once daily., Disp: 90 tablet, Rfl: 3    aspirin 81 MG chewable tablet, Take 1 tablet by mouth Every morning., Disp: , Rfl:     blood-glucose meter Misc, Dispense Accu-Chek Natalia meter, Disp: 1 each, Rfl: 0    carvedilol (COREG) 25 MG tablet, Take 1 tablet (25 mg total) by mouth 2 (two) times daily with meals., Disp: 270 tablet, Rfl: 4    cloNIDine (CATAPRES) 0.2 MG tablet, Take 1 tablet (0.2 mg total) by mouth 2 (two) times daily., Disp: 60 tablet, Rfl: 2    collagenase ointment, Apply topically once daily., Disp: 90 g, Rfl: 1    cyanocobalamin, vitamin B-12, 1,000 mcg TbSR, Take 1,000 mcg by mouth once daily., Disp: 30 each, Rfl: 0    dorzolamide (TRUSOPT) 2 % ophthalmic solution, INSTILL ONE DROP INTO EACH EYE TWICE DAILY, Disp: 10 mL, Rfl: 11    doxazosin (CARDURA) 8 MG Tab, TAKE ONE TABLET BY  MOUTH ONCE DAILY IN THE EVENING, Disp: 90 tablet, Rfl: 3    ferrous sulfate 324 mg (65 mg iron) TbEC, Take 1 tablet (325 mg total) by mouth 2 (two) times daily., Disp: 60 tablet, Rfl: 0    hydrALAZINE (APRESOLINE) 100 MG tablet, Take 1 tablet (100 mg total) by mouth every 8 (eight) hours., Disp: 270 tablet, Rfl: 4    insulin glargine (LANTUS) 100 unit/mL injection, INJECT 10 units nightly., Disp: 30 mL, Rfl: 12    insulin lispro (HUMALOG) 100 unit/mL injection, Take 9 units with breakfast, 9 units with lunch, and 8 units with dinner. Plus sliding scale, Disp: 2 vial, Rfl: 8    insulin lispro (HUMALOG) 100 unit/mL injection, 15 units with meals plus correction scale, max daily dose is 75 units., Disp: 3 vial, Rfl: 6    insulin syringe-needle U-100 (EASY TOUCH INSULIN SYRINGE) 0.5 mL 31 gauge x 5/16 Syrg, To use 4 times daily with insulin injections, Disp: 150 each, Rfl: 2    isosorbide dinitrate (ISOCHRON) 40 mg TbSR, , Disp: , Rfl:     isosorbide dinitrate (ISORDIL) 40 MG Tab, Take 1 tablet (40 mg total) by mouth 3 (three) times daily., Disp: 270 tablet, Rfl: 3    lactulose (CEPHULAC) 20 gram Pack, Take 1 packet (20 g total) by mouth once daily., Disp: 90 packet, Rfl: 6    lancets Misc, 1 lancet by Misc.(Non-Drug; Combo Route) route 4 (four) times daily., Disp: 150 each, Rfl: 11    latanoprost 0.005 % ophthalmic solution, INSTILL ONE DROP INTO EACH EYE IN THE EVENING, Disp: 3 Bottle, Rfl: 12    lidocaine (LIDODERM) 5 %, Place 1 patch onto the skin once daily. Remove & Discard patch within 12 hours or as directed by MD. Can apply to back area for pain, Disp: 30 patch, Rfl: 1    memantine (NAMENDA) 10 MG Tab, Take 1 tablet (10 mg total) by mouth once daily., Disp: 30 tablet, Rfl: 5    nitroGLYCERIN 0.4 MG/DOSE TL SPRY (NITROLINGUAL) 400 mcg/spray spray, PLACE ONE SPRAY UNDER THE TONGUE EVERY 5 MINUTES AS NEEDED FOR CHEST PAIN., Disp: 4.9 g, Rfl: 0    NITROSTAT 0.3 mg SL tablet, DISSOLVE ONE TABLET UNDER  THE TONGUE EVERY 5 MINUTES AS NEEDED FOR CHEST PAIN.  DO NOT EXCEED A TOTAL OF 3 DOSES IN 15 MINUTES, Disp: 100 tablet, Rfl: 0    paricalcitol (ZEMPLAR) 1 MCG capsule, Take 1 capsule by mouth on Monday and Friday., Disp: 8 capsule, Rfl: 3    potassium chloride SA (KLOR-CON M10) 10 MEQ tablet, Take 1 tablet (10 mEq total) by mouth 2 (two) times daily., Disp: 60 tablet, Rfl: 2    potassium chloride SA (KLOR-CON M10) 10 MEQ tablet, Take 1 tablet (10 mEq total) by mouth 2 (two) times daily., Disp: 60 tablet, Rfl: 11    pravastatin (PRAVACHOL) 20 MG tablet, TAKE 1 TABLET BY MOUTH once DAILY, Disp: 90 tablet, Rfl: 0    sodium bicarbonate 650 MG tablet, , Disp: , Rfl:     torsemide (DEMADEX) 20 MG Tab, Take 2 tablets (40 mg total) by mouth every morning., Disp: 180 tablet, Rfl: 4    warfarin (COUMADIN) 5 MG tablet, Take 0.5-1 tablets (2.5-5 mg total) by mouth Daily. As directed by [facility], Disp: 30 tablet, Rfl: 3    ZOSTAVAX, PF, 19,400 unit/0.65 mL injection, , Disp: , Rfl:   Review of patient's allergies indicates:   Allergen Reactions    Losartan Other (See Comments)     Hyperkalemia    0.225 % sodium chloride      Other reaction(s): Eye irritation    Amitriptyline      Other reaction(s): Vomiting  Other reaction(s): Vomiting    Azopt  [brinzolamide]      Other reaction(s): Eye irritation    Ciprofloxacin Nausea And Vomiting     Other reaction(s): Stomach upset    Codeine      Other reaction(s): Unknown  Other reaction(s): Unknown    Cantil  [mepenzolate bromide]      Other reaction(s): Unknown  Other reaction(s): Unknown    Duragal-s      Other reaction(s): Vomiting    Erythromycin      Other reaction(s): Unknown  Other reaction(s): Unknown    Fentanyl      Other reaction(s): Vomiting    Hydrocodone-acetaminophen      Other reaction(s): Unknown  Other reaction(s): Unknown    Indomethacin      Other reaction(s): Unknown  Other reaction(s): Unknown    Meperidine      Other reaction(s):  "Unknown  Other reaction(s): Unknown    Minoxidil      Other reaction(s): druged  Other reaction(s): nausea and vomiting  Other reaction(s): nausea and vomiting  Other reaction(s): druged    Morphine      Other reaction(s): Unknown  Other reaction(s): Unknown    Neuromuscular blockers, steroidal      Other reaction(s): Unknown    Nifedipine      Other reaction(s): Swelling  Other reaction(s): Swelling    Oxycodone hcl-oxycodone-asa      Other reaction(s): Unknown  Other reaction(s): Unknown    Penicillins Hives     Other reaction(s): Unknown    Propoxyphene      Other reaction(s): Unknown    Sensipar [cinacalcet] Nausea Only    Talwin compound      Other reaction(s): Unknown    Talwin  [pentazocine lactate]      Other reaction(s): Unknown    Valsartan Rash and Hives       /68 (BP Location: Left arm, Patient Position: Sitting, BP Method: Automatic)  Pulse 67  Resp 18  Ht 6' 2" (1.88 m)  Wt 81.6 kg (180 lb)  BMI 23.11 kg/m2    ROS  ROS Constitutional:  General Appearance: malnourished  Vascular: positive for edema  Musculoskeletal: negative for joint paint and joint edema  Skin: negative for rashes and lesions  Neurological: negative for burning, tingling and numbness  Gastrointestinal: negative for stomach pain, nausea and vomiting        Objective:        Ortho/SPM Exam  Physical Exam  Physical Exam   Constitutional: She is oriented to person, place, and time. She appears well-developed and well-nourished.   Cardiovascular:   Dorsalis pedis and posterior tibial pulses are diminished Bilaterally. Toes are cool to touch. Feet are warm proximally.There is decreased digital hair. Skin is atrophic, slightly hyperpigmented, and mildly edematous       Musculoskeletal: Normal range of motion. She exhibits no edema or tenderness.   Adequate joint range of motion without pain, limitation, nor crepitation Bilateral feet and ankle joints. Muscle strength is 5/5 in all groups bilaterally.       "   Neurological: She is alert and oriented to person, place, and time.   Walnut-Mayela 5.07 monofilamant testing is diminished Haris feet. Sharp/dull sensation diminished Bilaterally. Light touch absent Bilaterally.       Skin: Skin is warm, dry. No bruising, no ecchymosis and no lesion noted. No erythema.   Nails x10 are elongated by  2-5mm's, thickened by 1-4 mm's, dystrophic, and are darkened in  coloration . Xerosis Bilaterally. No open lesions noted.    Hyperkeratotic tissue noted to posterior b/l  Psychiatric: She has a normal mood and affect. Her behavior is normal. Judgment and thought content normal.   Vitals reviewed.    Ulcer Location: R posterior heel  Measurements: .5x.5x.5  Periwound: Intact and hyperkeratotic  Drainage: none  Purulence: None.  Malodor: None.  Base:  80% granular, 20% fibrotic  Signs of infection: None.        Assessment:     Imaging:   X-ray Foot Complete Bilateral    Result Date: 1/5/2017  AP, oblique, and weightbearing lateral radiographs of both feet were obtained. The bones are osteoporotic but intact. There is no evidence for acute fracture or bone destruction. There is mild hallux valgus deformity present bilaterally. There are mild degenerative changes within the small joints of both feet. There is no evidence for dislocation. No bony erosions are evident. Atherosclerotic calcification is identified within the arteries of both feet.    Osteoporosis. Extensive atherosclerosis. No evidence for acute fracture, bone destruction, or dislocation. Electronically signed by: LLOYD WALKER MD Date:     01/05/17 Time:    12:58     X-ray Calcaneus Bilateral    Result Date: 1/5/2017  2 views of the calcaneus were obtained bilaterally. There is no evidence for acute fracture or bone destruction. There is extensive atherosclerotic calcification identified within the arteries at the level of the ankle. There is a small amount of soft tissue calcification posterior to the left calcaneus. No  "radiopaque soft tissue foreign bodies are identified.    No evidence for acute fracture, bone destruction, or dislocation. Extensive atherosclerotic calcification within the arteries at the level of the ankles. Electronically signed by: LLOYD WALKER MD Date:     01/05/17 Time:    12:55     Narrative   Ultrasound lower extremity arteries bilateral.    Findings: Duplex and color flow Doppler evaluation demonstrates patent common femoral, superficial femoral, popliteal, posterior tibial, anterior tibial, and dorsal pedis arteries bilaterally. There is no doubling of velocity suggest any hemodynamically significant stenosis. There is moderate scattered plaque.   Impression    No evidence of any hemodynamically significant stenosis.      Electronically signed by: LLOYD JONES MD  Date: 03/13/17  Time: 14:57          Recent Labs  Lab Result Units 01/05/17  1043 03/08/17  1546   Sed Rate mm/Hr 40*  --    CRP mg/L 0.5  --    WBC K/uL  --  3.13*   Prealbumin mg/dL 11*  --          1. 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          Plan:       Orders Placed This Encounter    Debridement     Wound Debridement  Date/Time: 3/16/2017 8:07 AM  Performed by: SHAWNEE SOLORZANO JR.  Authorized by: SHAWNEE SOLORZANO JR.     Time out: Immediately prior to procedure a "time out" was called to verify the correct patient, procedure, equipment, support staff and site/side marked as required.    Consent Done?:  Yes (Verbal)    Preparation: Patient was prepped and draped in usual sterile fashion    Local anesthesia used?: No      Wound Details:    Location:  Right foot    Location:  Right Heel    Type of Debridement:  Excisional       Length (cm):  0.5       Area (sq cm):  0.25       Width (cm):  0.5       Percent Debrided (%):  100       Depth (cm):  0.5       Total Area Debrided (sq cm):  0.25    Depth of debridement:  Subcutaneous tissue    Tissue debrided:  Epidermis and Hypergranulation    Devitalized tissue debrided:  Biofilm, Callus and Sough    " Instruments:  Blade    Bleeding:  Minimal  Hemostasis Achieved: Yes    Method Used:  Pressure  Patient tolerance:  Patient tolerated the procedure well with no immediate complications      :     Chey was seen today for wound check.    Diagnoses and all orders for this visit:    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  -     Debridement        Chey Trujillo is a 81 y.o. female presenting w/ 1. 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        -pt seen, evaluated, and managed  -dx discussed in detail. All questions/concerns addressed  -all tx options discussed. All alternatives, risks, benefits of all txs discussed  -The patient was educated regarding the above diagnosis. We discussed conservative care options regarding shoe wear and/or padding.  -pt would benefit from nutrition consult due to multiple medical problems, polypharmacy, allergies, low pre-albumin   Referral ordered in epic - pt not eligible due to insurance  -rxs dispensed: none  -xr and labs reviewed  -procedure as above  -dressings: chelle+football    -educated on DM footcare    rtc 1 wks    Return in about 1 week (around 3/23/2017).

## 2017-03-21 ENCOUNTER — OFFICE VISIT (OUTPATIENT)
Dept: PODIATRY | Facility: CLINIC | Age: 82
End: 2017-03-21
Payer: COMMERCIAL

## 2017-03-21 VITALS
RESPIRATION RATE: 16 BRPM | HEART RATE: 76 BPM | BODY MASS INDEX: 24.38 KG/M2 | DIASTOLIC BLOOD PRESSURE: 60 MMHG | HEIGHT: 72 IN | SYSTOLIC BLOOD PRESSURE: 104 MMHG | WEIGHT: 180 LBS

## 2017-03-21 DIAGNOSIS — L97.409: Primary | ICD-10-CM

## 2017-03-21 DIAGNOSIS — E11.621: Primary | ICD-10-CM

## 2017-03-21 PROCEDURE — 1160F RVW MEDS BY RX/DR IN RCRD: CPT | Mod: S$GLB,,, | Performed by: PODIATRIST

## 2017-03-21 PROCEDURE — 1157F ADVNC CARE PLAN IN RCRD: CPT | Mod: S$GLB,,, | Performed by: PODIATRIST

## 2017-03-21 PROCEDURE — 3074F SYST BP LT 130 MM HG: CPT | Mod: S$GLB,,, | Performed by: PODIATRIST

## 2017-03-21 PROCEDURE — 1159F MED LIST DOCD IN RCRD: CPT | Mod: S$GLB,,, | Performed by: PODIATRIST

## 2017-03-21 PROCEDURE — 1126F AMNT PAIN NOTED NONE PRSNT: CPT | Mod: S$GLB,,, | Performed by: PODIATRIST

## 2017-03-21 PROCEDURE — 3078F DIAST BP <80 MM HG: CPT | Mod: S$GLB,,, | Performed by: PODIATRIST

## 2017-03-21 PROCEDURE — 99213 OFFICE O/P EST LOW 20 MIN: CPT | Mod: S$GLB,,, | Performed by: PODIATRIST

## 2017-03-21 NOTE — MR AVS SNAPSHOT
[address]  [address]   [place] [address]  Phone: [phone]  Fax: [phone]                  Chey Trujillo   3/21/2017 1:45 PM   Office Visit    Description:  Female : 1935   Provider:  Vinay Zaragoza Jr., DPM   Department:  [facility]           Reason for Visit     Wound Care           Diagnoses this Visit        Comments    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    -  Primary            To Do List           Future Appointments        Provider Department Dept Phone    3/23/2017 11:20 AM TELEPHONE CHECK, PACEMAKER Aj radha - Arrhythmia [phone]    3/30/2017 1:15 PM Vinay Zaragoza Jr., DPM [facility] [phone]    2017 10:30 AM Vinay Zaragoza Jr., DPM [place] [phone]    2017 3:20 PM John Quezada MD [facility] Neurology [phone]    2017 2:00 PM JAYLENE Newby, FNP [facility] - Endocrinology [phone]      Goals (5 Years of Data)     None      Follow-Up and Disposition     Return in about 1 week (around 3/28/2017).    Follow-up and Disposition History      [facility] On Call     [facility] -  Assistance  Registered nurses in the [facility] On Call Center provide clinical advisement, health education, appointment booking, and other advisory services.  Call for this free service at [phone].             Medications           Message regarding Medications     Verify the changes and/or additions to your medication regime listed below are the same as discussed with your clinician today.  If any of these changes or additions are incorrect, please notify your healthcare provider.             Verify that the below list of medications is an accurate representation of the medications you are currently taking.  If none reported, the list may be blank. If incorrect, please contact your healthcare provider. Carry this list with you in case of emergency.            Current Medications     ACCU-CHEK SMARTVIEW TEST STRIP Strp test FOUR TIMES A DAY    amiodarone (PACERONE) 200 MG Tab Take 1 tablet (200 mg total) by mouth once daily.    amlodipine (NORVASC) 10 MG tablet Take 1 tablet (10 mg total) by mouth once daily.    aspirin 81 MG chewable tablet Take 1 tablet by mouth Every morning.    blood-glucose meter Misc Dispense Accu-Chek Natalia meter    carvedilol (COREG) 25 MG tablet Take 1 tablet (25 mg total) by mouth 2 (two) times daily with meals.    cloNIDine (CATAPRES) 0.2 MG tablet Take 1 tablet (0.2 mg total) by mouth 2 (two) times daily.    collagenase ointment Apply topically once daily.    cyanocobalamin, vitamin B-12, 1,000 mcg TbSR Take 1,000 mcg by mouth once daily.    dorzolamide (TRUSOPT) 2 % ophthalmic solution INSTILL ONE DROP INTO EACH EYE TWICE DAILY    doxazosin (CARDURA) 8 MG Tab TAKE ONE TABLET BY MOUTH ONCE DAILY IN THE EVENING    ferrous sulfate 324 mg (65 mg iron) TbEC Take 1 tablet (325 mg total) by mouth 2 (two) times daily.    hydrALAZINE (APRESOLINE) 100 MG tablet Take 1 tablet (100 mg total) by mouth every 8 (eight) hours.    insulin glargine (LANTUS) 100 unit/mL injection INJECT 10 units nightly.    insulin lispro (HUMALOG) 100 unit/mL injection Take 9 units with breakfast, 9 units with lunch, and 8 units with dinner. Plus sliding scale    insulin lispro (HUMALOG) 100 unit/mL injection 15 units with meals plus correction scale, max daily dose is 75 units.    insulin syringe-needle U-100 (EASY TOUCH INSULIN SYRINGE) 0.5 mL 31 gauge x 5/16 Syrg To use 4 times daily with insulin injections    isosorbide dinitrate (ISOCHRON) 40 mg TbSR     isosorbide dinitrate (ISORDIL) 40 MG Tab Take 1 tablet (40 mg total) by mouth 3 (three) times daily.    lactulose (CEPHULAC) 20 gram Pack Take 1 packet (20 g total) by mouth once daily.    lancets Misc 1 lancet by Misc.(Non-Drug; Combo Route) route 4 (four) times daily.    latanoprost 0.005 % ophthalmic solution  "INSTILL ONE DROP INTO EACH EYE IN THE EVENING    lidocaine (LIDODERM) 5 % Place 1 patch onto the skin once daily. Remove & Discard patch within 12 hours or as directed by MD. Can apply to back area for pain    memantine (NAMENDA) 10 MG Tab Take 1 tablet (10 mg total) by mouth once daily.    nitroGLYCERIN 0.4 MG/DOSE TL SPRY (NITROLINGUAL) 400 mcg/spray spray PLACE ONE SPRAY UNDER THE TONGUE EVERY 5 MINUTES AS NEEDED FOR CHEST PAIN.    NITROSTAT 0.3 mg SL tablet DISSOLVE ONE TABLET UNDER THE TONGUE EVERY 5 MINUTES AS NEEDED FOR CHEST PAIN.  DO NOT EXCEED A TOTAL OF 3 DOSES IN 15 MINUTES    paricalcitol (ZEMPLAR) 1 MCG capsule Take 1 capsule by mouth on Monday and Friday.    potassium chloride SA (KLOR-CON M10) 10 MEQ tablet Take 1 tablet (10 mEq total) by mouth 2 (two) times daily.    potassium chloride SA (KLOR-CON M10) 10 MEQ tablet Take 1 tablet (10 mEq total) by mouth 2 (two) times daily.    pravastatin (PRAVACHOL) 20 MG tablet TAKE 1 TABLET BY MOUTH once DAILY    sodium bicarbonate 650 MG tablet     torsemide (DEMADEX) 20 MG Tab Take 2 tablets (40 mg total) by mouth every morning.    warfarin (COUMADIN) 5 MG tablet Take 0.5-1 tablets (2.5-5 mg total) by mouth Daily. As directed by [facility]    ZOSTAVAX, PF, 19,400 unit/0.65 mL injection            Clinical Reference Information           Your Vitals Were     BP Pulse Resp Height Weight BMI    104/60 (BP Location: Left arm, Patient Position: Sitting, BP Method: Automatic) 76 16 6' 2" (1.88 m) 81.6 kg (180 lb) 23.11 kg/m2      Blood Pressure          Most Recent Value    BP  104/60      Allergies as of 3/21/2017     Losartan    0.225 % Sodium Chloride    Amitriptyline    Azopt  [Brinzolamide]    Ciprofloxacin    Codeine    Cantil  [Mepenzolate Bromide]    Duragal-s    Erythromycin    Fentanyl    Hydrocodone-acetaminophen    Indomethacin    Meperidine    Minoxidil    Morphine    Neuromuscular Blockers, Steroidal    Nifedipine    Oxycodone Hcl-oxycodone-asa    " Penicillins    Propoxyphene    Sensipar [Cinacalcet]    Talwin Compound    Talwin  [Pentazocine Lactate]    Valsartan      Immunizations Administered on Date of Encounter - 3/21/2017     None      Orders Placed During Today's Visit      Normal Orders This Visit    Ambulatory consult to Physical Therapy     Ambulatory Referral to Physical/Occupational Therapy       Language Assistance Services     ATTENTION: Language assistance services are available, free of charge. Please call [phone].      ATENCIÓN: Si habla español, tiene a moulton disposición servicios gratuitos de asistencia lingüística. Llame al [phone].     [facility] Ý: N?u b?n nói Ti?ng Vi?t, có các d?ch v? h? tr? ngôn ng? mi?n phí dành cho b?n. G?i s? [phone].         [facility] complies with applicable Federal civil rights laws and does not discriminate on the basis of race, color, national origin, age, disability, or sex.

## 2017-03-21 NOTE — LETTER
March 21, 2017      Viviana Nguyen MD  [address]  [facility] [address]           [address]  [address] [place] [address]  Phone: [phone]  Fax: [phone]          Patient: Chey Trujillo   MR Number: [id number]   YOB: 1935   Date of Visit: 3/21/2017       Dear Dr. Viviana Nguyen:    Thank you for referring Chey Trujillo to me for evaluation. Attached you will find relevant portions of my assessment and plan of care.    If you have questions, please do not hesitate to call me. I look forward to following Chey Trujillo along with you.    Sincerely,    Vinay Zaragoza Jr., DPM    Enclosure  CC:  No Recipients    If you would like to receive this communication electronically, please contact [email or web address] or [phone] to request more information on [facility] access.    For providers and/or their staff who would like to refer a patient to [facility], please contact us through our one-stop-shop provider referral line, [facility], at [phone].    If you feel you have received this communication in error or would no longer like to receive these types of communications, please e-mail [email or web address]

## 2017-03-21 NOTE — PROGRESS NOTES
Subjective:    Patient ID: Chey Trujillo is a 82 y.o. female.    Chief Complaint: Wound Care      HPI:   DENITA Guerrier is a 81 y.o. female who presents to the clinic for evaluation and treatment of high risk feet. 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     The patient's chief complaint is foot ulcer, R heel. This patient has documented high risk feet requiring routine maintenance secondary to diabetes mellitis and those secondary complications of diabetes, as mentioned.  Wants foot dr closer to home. Has been moving all care to [place].   Gets home health from [facility], but not for heel.  Had a R heel ulcer that [facility] healed on 2/7/17 but has subsequently recurred by 2/21/17. Offloading has been the main issue.      PCP: Viviana Nguyen MD    Date Last Seen by PCP:  in epic    Current shoe gear:  Affected Foot: Flip-flops    History of Trauma: negative  Sign of Infection: none    Last Podiatry Enc: Visit date not found  Last Enc w/ Me: Visit date not found    Hemoglobin A1C   Date Value Ref Range Status   11/15/2016 7.8 (H) 4.5 - 6.2 % Final     Comment:     According to ADA guidelines, hemoglobin A1C <7.0% represents  optimal control in non-pregnant diabetic patients.  Different  metrics may apply to specific populations.   Standards of Medical Care in Diabetes - 2016.  For the purpose of screening for the presence of diabetes:  <5.7%     Consistent with the absence of diabetes  5.7-6.4%  Consistent with increasing risk for diabetes   (prediabetes)  >or=6.5%  Consistent with diabetes  Currently no consensus exists for use of hemoglobin A1C  for diagnosis of diabetes for children.     09/27/2016 7.5 (H) 4.5 - 6.2 % Final     Comment:     According to ADA guidelines, hemoglobin A1C <7.0% represents  optimal control in non-pregnant diabetic patients.  Different  metrics may apply to specific populations.   Standards of Medical Care in Diabetes - 2016.  For the purpose of screening for the presence of diabetes:  <5.7%     Consistent with the absence of diabetes  5.7-6.4%  Consistent with increasing risk for diabetes   (prediabetes)  >or=6.5%  Consistent with diabetes  Currently no consensus exists for use of hemoglobin A1C  for diagnosis of diabetes for children.     08/16/2016 7.2 (H) 4.5 - 6.2 % Final     Comment:     According to ADA guidelines, hemoglobin A1C <7.0% represents  optimal control in non-pregnant diabetic patients.  Different  metrics may apply to specific populations.   Standards of Medical Care in Diabetes - 2016.  For the purpose of screening for the presence of diabetes:  <5.7%     Consistent with the absence of diabetes  5.7-6.4%  Consistent with increasing risk for diabetes   (prediabetes)  >or=6.5%  Consistent with diabetes  Currently no consensus exists for  use of hemoglobin A1C  for diagnosis of diabetes for children.         Past Medical History:   Diagnosis Date    Anticoagulation monitoring by pharmacist 6/29/2012    At risk for amiodarone toxicity with long term use 1/27/2014    Atrial fibrillation     Bilateral carotid artery disease 1/11/2016    Blood transfusion     CAD S/P percutaneous coronary angioplasty 9/27/2012    Chronic diastolic heart failure 9/27/2012    Echo 3-: 1 - Concentric hypertrophy.  2 - Normal left ventricular systolic function (EF 60-65%).  3 - Right ventricular enlargement with hypertrophy, with normal systolic function.  4 - Left ventricular diastolic dysfunction.  5 - Biatrial enlargement.  6 - Mild tricuspid regurgitation.  7 - Pulmonary hypertension. The estimated PA systolic pressure is 55 mmHg.  8 - Increased central venous pressure (IVC is greater than 3cm in diameter).     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  ROWAN placed just proximal to stent in  for UA.  Cath 2011: normal LMCA, 40% mid LAD, 50% distal LCx    EYE SURGERY      LAMINECTOMY      TOTAL HIP ARTHROPLASTY Right     x's r hip     Social History     Social History    Marital status:      Spouse name: N/A    Number of children: N/A    Years of education: N/A     Occupational History    Not on file.     Social History Main Topics    Smoking status: Never Smoker    Smokeless tobacco: Never Used    Alcohol use No    Drug use: No    Sexual activity: No     Other Topics Concern    Not on file     Social History Narrative    Son lives with her. Spouse .          Current Outpatient Prescriptions:     ACCU-CHEK SMARTVIEW TEST STRIP Strp, test FOUR TIMES A DAY, Disp: 150 each, Rfl: 11    amiodarone (PACERONE) 200 MG Tab, Take 1 tablet (200 mg total) by mouth once daily., Disp: 90 tablet, Rfl: 3    amlodipine (NORVASC) 10 MG tablet, Take 1 tablet (10 mg total) by mouth once daily., Disp: 90 tablet, Rfl: 3    aspirin 81 MG chewable tablet, Take 1 tablet by mouth Every morning., Disp: , Rfl:     blood-glucose meter Misc, Dispense Accu-Chek Natalia meter, Disp: 1 each, Rfl: 0    carvedilol (COREG) 25 MG tablet, Take 1 tablet (25 mg total) by mouth 2 (two) times daily with meals., Disp: 270 tablet, Rfl: 4    cloNIDine (CATAPRES) 0.2 MG tablet, Take 1 tablet (0.2 mg total) by mouth 2 (two) times daily., Disp: 60 tablet, Rfl: 2    collagenase ointment, Apply topically once daily., Disp: 90 g, Rfl: 1    cyanocobalamin, vitamin B-12, 1,000 mcg TbSR, Take 1,000 mcg by mouth once daily., Disp: 30 each, Rfl: 0    dorzolamide (TRUSOPT) 2 % ophthalmic solution, INSTILL ONE DROP INTO EACH EYE TWICE DAILY, Disp: 10 mL, Rfl: 11    doxazosin  (CARDURA) 8 MG Tab, TAKE ONE TABLET BY MOUTH ONCE DAILY IN THE EVENING, Disp: 90 tablet, Rfl: 3    ferrous sulfate 324 mg (65 mg iron) TbEC, Take 1 tablet (325 mg total) by mouth 2 (two) times daily., Disp: 60 tablet, Rfl: 0    hydrALAZINE (APRESOLINE) 100 MG tablet, Take 1 tablet (100 mg total) by mouth every 8 (eight) hours., Disp: 270 tablet, Rfl: 4    insulin glargine (LANTUS) 100 unit/mL injection, INJECT 10 units nightly., Disp: 30 mL, Rfl: 12    insulin lispro (HUMALOG) 100 unit/mL injection, Take 9 units with breakfast, 9 units with lunch, and 8 units with dinner. Plus sliding scale, Disp: 2 vial, Rfl: 8    insulin lispro (HUMALOG) 100 unit/mL injection, 15 units with meals plus correction scale, max daily dose is 75 units., Disp: 3 vial, Rfl: 6    insulin syringe-needle U-100 (EASY TOUCH INSULIN SYRINGE) 0.5 mL 31 gauge x 5/16 Syrg, To use 4 times daily with insulin injections, Disp: 150 each, Rfl: 2    isosorbide dinitrate (ISOCHRON) 40 mg TbSR, , Disp: , Rfl:     isosorbide dinitrate (ISORDIL) 40 MG Tab, Take 1 tablet (40 mg total) by mouth 3 (three) times daily., Disp: 270 tablet, Rfl: 3    lactulose (CEPHULAC) 20 gram Pack, Take 1 packet (20 g total) by mouth once daily., Disp: 90 packet, Rfl: 6    lancets Misc, 1 lancet by Misc.(Non-Drug; Combo Route) route 4 (four) times daily., Disp: 150 each, Rfl: 11    latanoprost 0.005 % ophthalmic solution, INSTILL ONE DROP INTO EACH EYE IN THE EVENING, Disp: 3 Bottle, Rfl: 12    lidocaine (LIDODERM) 5 %, Place 1 patch onto the skin once daily. Remove & Discard patch within 12 hours or as directed by MD. Can apply to back area for pain, Disp: 30 patch, Rfl: 1    memantine (NAMENDA) 10 MG Tab, Take 1 tablet (10 mg total) by mouth once daily., Disp: 30 tablet, Rfl: 5    nitroGLYCERIN 0.4 MG/DOSE TL SPRY (NITROLINGUAL) 400 mcg/spray spray, PLACE ONE SPRAY UNDER THE TONGUE EVERY 5 MINUTES AS NEEDED FOR CHEST PAIN., Disp: 4.9 g, Rfl: 0    NITROSTAT 0.3  mg SL tablet, DISSOLVE ONE TABLET UNDER THE TONGUE EVERY 5 MINUTES AS NEEDED FOR CHEST PAIN.  DO NOT EXCEED A TOTAL OF 3 DOSES IN 15 MINUTES, Disp: 100 tablet, Rfl: 0    paricalcitol (ZEMPLAR) 1 MCG capsule, Take 1 capsule by mouth on Monday and Friday., Disp: 8 capsule, Rfl: 3    potassium chloride SA (KLOR-CON M10) 10 MEQ tablet, Take 1 tablet (10 mEq total) by mouth 2 (two) times daily., Disp: 60 tablet, Rfl: 2    potassium chloride SA (KLOR-CON M10) 10 MEQ tablet, Take 1 tablet (10 mEq total) by mouth 2 (two) times daily., Disp: 60 tablet, Rfl: 11    pravastatin (PRAVACHOL) 20 MG tablet, TAKE 1 TABLET BY MOUTH once DAILY, Disp: 90 tablet, Rfl: 0    sodium bicarbonate 650 MG tablet, , Disp: , Rfl:     torsemide (DEMADEX) 20 MG Tab, Take 2 tablets (40 mg total) by mouth every morning., Disp: 180 tablet, Rfl: 4    warfarin (COUMADIN) 5 MG tablet, Take 0.5-1 tablets (2.5-5 mg total) by mouth Daily. As directed by [facility], Disp: 30 tablet, Rfl: 3    ZOSTAVAX, PF, 19,400 unit/0.65 mL injection, , Disp: , Rfl:   Review of patient's allergies indicates:   Allergen Reactions    Losartan Other (See Comments)     Hyperkalemia    0.225 % sodium chloride      Other reaction(s): Eye irritation    Amitriptyline      Other reaction(s): Vomiting  Other reaction(s): Vomiting    Azopt  [brinzolamide]      Other reaction(s): Eye irritation    Ciprofloxacin Nausea And Vomiting     Other reaction(s): Stomach upset    Codeine      Other reaction(s): Unknown  Other reaction(s): Unknown    Cantil  [mepenzolate bromide]      Other reaction(s): Unknown  Other reaction(s): Unknown    Duragal-s      Other reaction(s): Vomiting    Erythromycin      Other reaction(s): Unknown  Other reaction(s): Unknown    Fentanyl      Other reaction(s): Vomiting    Hydrocodone-acetaminophen      Other reaction(s): Unknown  Other reaction(s): Unknown    Indomethacin      Other reaction(s): Unknown  Other reaction(s): Unknown     "Meperidine      Other reaction(s): Unknown  Other reaction(s): Unknown    Minoxidil      Other reaction(s): druged  Other reaction(s): nausea and vomiting  Other reaction(s): nausea and vomiting  Other reaction(s): druged    Morphine      Other reaction(s): Unknown  Other reaction(s): Unknown    Neuromuscular blockers, steroidal      Other reaction(s): Unknown    Nifedipine      Other reaction(s): Swelling  Other reaction(s): Swelling    Oxycodone hcl-oxycodone-asa      Other reaction(s): Unknown  Other reaction(s): Unknown    Penicillins Hives     Other reaction(s): Unknown    Propoxyphene      Other reaction(s): Unknown    Sensipar [cinacalcet] Nausea Only    Talwin compound      Other reaction(s): Unknown    Talwin  [pentazocine lactate]      Other reaction(s): Unknown    Valsartan Rash and Hives       /60 (BP Location: Left arm, Patient Position: Sitting, BP Method: Automatic)  Pulse 76  Resp 16  Ht 6' 2" (1.88 m)  Wt 81.6 kg (180 lb)  BMI 23.11 kg/m2    ROS  ROS Constitutional:  General Appearance: malnourished  Vascular: positive for edema  Musculoskeletal: negative for joint paint and joint edema  Skin: negative for rashes and lesions  Neurological: negative for burning, tingling and numbness  Gastrointestinal: negative for stomach pain, nausea and vomiting        Objective:        Ortho/SPM Exam  Physical Exam  Physical Exam   Constitutional: She is oriented to person, place, and time. She appears well-developed and well-nourished.   Cardiovascular:   Dorsalis pedis and posterior tibial pulses are diminished Bilaterally. Toes are cool to touch. Feet are warm proximally.There is decreased digital hair. Skin is atrophic, slightly hyperpigmented, and mildly edematous       Musculoskeletal: Normal range of motion. She exhibits no edema or tenderness.   Adequate joint range of motion without pain, limitation, nor crepitation Bilateral feet and ankle joints. Muscle strength is 5/5 in all " groups bilaterally.         Neurological: She is alert and oriented to person, place, and time.   Mellen-Mayela 5.07 monofilamant testing is diminished Haris feet. Sharp/dull sensation diminished Bilaterally. Light touch absent Bilaterally.       Skin: Skin is warm, dry. No bruising, no ecchymosis and no lesion noted. No erythema.   Nails x10 are elongated by  2-5mm's, thickened by 1-4 mm's, dystrophic, and are darkened in  coloration . Xerosis Bilaterally. No open lesions noted.    Hyperkeratotic tissue noted to posterior b/l  Psychiatric: She has a normal mood and affect. Her behavior is normal. Judgment and thought content normal.   Vitals reviewed.    Ulcer Location: R posterior heel  Measurements: .5x.5x.5  Periwound: Intact and hyperkeratotic  Drainage: none  Purulence: None.  Malodor: None.  Base:  80% granular, 20% fibrotic  Signs of infection: None.        Assessment:     Imaging:   X-ray Foot Complete Bilateral    Result Date: 1/5/2017  AP, oblique, and weightbearing lateral radiographs of both feet were obtained. The bones are osteoporotic but intact. There is no evidence for acute fracture or bone destruction. There is mild hallux valgus deformity present bilaterally. There are mild degenerative changes within the small joints of both feet. There is no evidence for dislocation. No bony erosions are evident. Atherosclerotic calcification is identified within the arteries of both feet.    Osteoporosis. Extensive atherosclerosis. No evidence for acute fracture, bone destruction, or dislocation. Electronically signed by: LLOYD WALKER MD Date:     01/05/17 Time:    12:58     X-ray Calcaneus Bilateral    Result Date: 1/5/2017  2 views of the calcaneus were obtained bilaterally. There is no evidence for acute fracture or bone destruction. There is extensive atherosclerotic calcification identified within the arteries at the level of the ankle. There is a small amount of soft tissue calcification posterior to  the left calcaneus. No radiopaque soft tissue foreign bodies are identified.    No evidence for acute fracture, bone destruction, or dislocation. Extensive atherosclerotic calcification within the arteries at the level of the ankles. Electronically signed by: LLOYD WALKER MD Date:     01/05/17 Time:    12:55     Narrative   Ultrasound lower extremity arteries bilateral.    Findings: Duplex and color flow Doppler evaluation demonstrates patent common femoral, superficial femoral, popliteal, posterior tibial, anterior tibial, and dorsal pedis arteries bilaterally. There is no doubling of velocity suggest any hemodynamically significant stenosis. There is moderate scattered plaque.   Impression    No evidence of any hemodynamically significant stenosis.      Electronically signed by: LLYOD JONES MD  Date: 03/13/17  Time: 14:57        Recent Labs  Lab Result Units 01/05/17  1043 03/08/17  1546   Sed Rate mm/Hr 40*  --    CRP mg/L 0.5  --    WBC K/uL  --  3.13*   Prealbumin mg/dL 11*  --            .    1. 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          Plan:       Orders Placed This Encounter    Ambulatory consult to Physical Therapy    Ambulatory Referral to Physical/Occupational Therapy     Procedures:     Chey was seen today for wound care.    Diagnoses and all orders for this visit:    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  -     Ambulatory consult to Physical Therapy  -     Ambulatory Referral to Physical/Occupational Therapy        Chey Trujillo is a 82 y.o. female presenting w/ 1. 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        -pt seen, evaluated, and managed  -dx discussed in detail. All questions/concerns addressed  -all tx options discussed. All alternatives, risks, benefits of all txs discussed  -The patient was educated regarding the above diagnosis. We discussed conservative care options regarding shoe wear and/or padding.  -pt would benefit from nutrition consult due to multiple medical problems, polypharmacy, allergies, low pre-albumin   Referral  ordered in epic - pt not eligible due to insurance  -rxs dispensed: none  -xr and labs reviewed  -procedure as above  -wound is stable w/o signs of infection  -pt would benefit from PT woundcare to manage wound - referral/order placed    dressings: chelle+football    -educated on DM footcare    rtc 1 wk    Return in about 1 week (around 3/28/2017).

## 2017-03-23 ENCOUNTER — CLINICAL SUPPORT (OUTPATIENT)
Dept: ELECTROPHYSIOLOGY | Facility: CLINIC | Age: 82
End: 2017-03-23
Payer: COMMERCIAL

## 2017-03-23 DIAGNOSIS — Z95.0 CARDIAC PACEMAKER IN SITU: ICD-10-CM

## 2017-03-23 DIAGNOSIS — I48.0 PAROXYSMAL ATRIAL FIBRILLATION: ICD-10-CM

## 2017-03-23 PROCEDURE — 93293 PM PHONE R-STRIP DEVICE EVAL: CPT | Mod: S$GLB,,, | Performed by: INTERNAL MEDICINE

## 2017-04-17 DIAGNOSIS — I13.0: ICD-10-CM

## 2017-04-17 DIAGNOSIS — I50.32 CHRONIC DIASTOLIC HEART FAILURE: Chronic | ICD-10-CM

## 2017-04-17 PROBLEM — L97.419 DIABETIC ULCER OF RIGHT HEEL: Status: ACTIVE | Noted: 2017-04-17

## 2017-04-17 PROBLEM — E11.621 DIABETIC ULCER OF RIGHT HEEL: Status: ACTIVE | Noted: 2017-04-17

## 2017-04-17 RX ORDER — CARVEDILOL 25 MG/1
25 TABLET ORAL 2 TIMES DAILY WITH MEALS
Qty: 270 TABLET | Refills: 4 | OUTPATIENT
Start: 2017-04-17

## 2017-04-19 DIAGNOSIS — B18.2 CHRONIC HEPATITIS C WITHOUT HEPATIC COMA: ICD-10-CM

## 2017-04-21 ENCOUNTER — OFFICE VISIT (OUTPATIENT)
Dept: NEUROLOGY | Facility: CLINIC | Age: 82
End: 2017-04-21
Payer: COMMERCIAL

## 2017-04-21 VITALS
BODY MASS INDEX: 23.35 KG/M2 | SYSTOLIC BLOOD PRESSURE: 136 MMHG | DIASTOLIC BLOOD PRESSURE: 64 MMHG | HEIGHT: 72 IN | HEART RATE: 68 BPM | WEIGHT: 172.38 LBS

## 2017-04-21 DIAGNOSIS — R41.3 MEMORY LOSS: Primary | ICD-10-CM

## 2017-04-21 DIAGNOSIS — F01.518 VASCULAR DEMENTIA WITH BEHAVIOR DISTURBANCE: ICD-10-CM

## 2017-04-21 PROCEDURE — 99213 OFFICE O/P EST LOW 20 MIN: CPT | Mod: PBBFAC,PO | Performed by: PSYCHIATRY & NEUROLOGY

## 2017-04-21 PROCEDURE — 99999 PR PBB SHADOW E&M-EST. PATIENT-LVL III: CPT | Mod: PBBFAC,,, | Performed by: PSYCHIATRY & NEUROLOGY

## 2017-04-21 PROCEDURE — 99214 OFFICE O/P EST MOD 30 MIN: CPT | Mod: S$GLB,,, | Performed by: PSYCHIATRY & NEUROLOGY

## 2017-04-21 RX ORDER — ESCITALOPRAM OXALATE 5 MG/1
5 TABLET ORAL DAILY
Qty: 30 TABLET | Refills: 5 | Status: SHIPPED | OUTPATIENT
Start: 2017-04-21 | End: 2017-08-16 | Stop reason: SDUPTHER

## 2017-04-21 RX ORDER — MEMANTINE HYDROCHLORIDE 10 MG/1
10 TABLET ORAL 2 TIMES DAILY
Qty: 60 TABLET | Refills: 5 | Status: SHIPPED | OUTPATIENT
Start: 2017-04-21 | End: 2017-08-16 | Stop reason: SDUPTHER

## 2017-04-21 NOTE — PROGRESS NOTES
Subjective:       Patient ID: Chey Trujillo is a 82 y.o. female.    Chief Complaint:  Memory Loss      History of Present Illness  HPI  This is an 82-year-old female who returns in follow-up of memory difficulties.  She was accompanied by her daughter who reported that she was fairly stable until September 2016 when she became quite distraught after the death of her niece suddenly.  It is reported that she used to look after her niece as if she were her daughter.  She became increasingly withdrawn and inattentive and at times is rambling incoherently.  She was admitted to the hospital when she became increasingly confused however it was noted that she was not taking her medications as prescribed and her diabetes was not well controlled and in addition it was suspected she may have had a reaction to the clonidine.  However a workup done during that hospitalization included a CT scan of the brain that was unremarkable and the patient is otherwise stable when discharged.      Since being at home she had continued to be somewhat withdrawn, inattentive and repeats herself but was otherwise quite appropriate when talked to.  The patient was able to give an adequate history during this interview.  She is limited to a walker or wheelchair because of chronic back problems.  She has had no recent falls.  Neuropsychological evaluation done recently was considered a combination of mild dementia most likely vascular in type and underlying depression also contributing factor.  The patient was started on Namenda and is presently on 10 mg twice a day with some improvement in her memory however the irritability is persistent.  She does ambulate in the house using a walker and occasionally outside the house with family members.  Sleep and appetite is good.       Review of Systems  Review of Systems   Constitutional: Negative.    HENT: Negative.  Negative for hearing loss.    Eyes: Negative.  Negative for visual disturbance.    Respiratory: Negative.  Negative for shortness of breath.    Cardiovascular: Negative.  Negative for chest pain and palpitations.   Gastrointestinal: Negative.    Endocrine: Negative.    Genitourinary: Negative.    Musculoskeletal: Positive for arthralgias, back pain and gait problem. Negative for neck pain.   Skin: Negative.    Allergic/Immunologic: Negative.    Neurological: Negative for tremors, seizures, syncope, speech difficulty, weakness, numbness and headaches.   Hematological: Negative.    Psychiatric/Behavioral: Positive for decreased concentration and sleep disturbance. Negative for confusion. The patient is nervous/anxious.        Objective:      Neurologic Exam     Motor Exam     Strength   Strength 5/5 throughout.       Physical Exam   Constitutional: She appears well-developed and well-nourished.   HENT:   Head: Normocephalic.   Neck: Normal range of motion. Neck supple.   Neurological: She is alert. She has normal strength. She displays no atrophy, no tremor and normal reflexes (DTRs generally depressed to absent distally with plantars being flexor). A sensory deficit (Primary sensations intact though diminished distally) is present. No cranial nerve deficit (Cranial nerves are essentially intact and symmetrical.  Pupils equal and reactive with EOM full.  No facial asymmetry is noted.  Corneals and facial sensations intact.  Tongue and palate movements are normal.). She exhibits normal muscle tone. Gait (Walks stiffly with slight imbalance related to back and hip problems.) abnormal. Coordination normal.   Patient is awake and responsive and able to give an adequate recent history.  She does remember details of what happened to her niece and appears to be focused on that.  She is oriented to place person and time.  Immediate recall 2/3.  Spelling backwards 3/5.  She does take time to answer questions and there is a tendency to perseverate.  She is otherwise appropriate able to follow commands  without any difficulty.  Speech was of normal flow and content though rambling at times.   Vitals reviewed.        Assessment:        1. Memory loss    2. Vascular dementia with behavior disturbance            Plan:         Discussed with patient and family.  Continue Namenda 10 mg twice a day and I will add Lexapro 5 mg at bedtime daily.  They will contact me in about 2-4 weeks regarding response at which time may consider increasing the dose of the Lexapro.  DControl of vascular risk factors especially diabetes.  They will follow-up in 3 months.

## 2017-04-21 NOTE — PATIENT INSTRUCTIONS
Discussed with patient and family.  Continue Namenda 10 mg twice a day and I will add Lexapro 5 mg at bedtime daily.  They will contact me in about 2-4 weeks regarding response at which time may consider increasing the dose of the Lexapro.  DControl of vascular risk factors especially diabetes.

## 2017-04-21 NOTE — MR AVS SNAPSHOT
[facility] Neurology  [address], [address]  [place] [address]  Phone: [phone]                  Chey Trujillo   2017 3:20 PM   Office Visit    Description:  Female : 1935   Provider:  John Quezada MD   Department:  [facility] - Neurology           Reason for Visit     Memory Loss           Diagnoses this Visit        Comments    Memory loss    -  Primary     Vascular dementia with behavior disturbance                To Do List           Future Appointments        Provider Department Dept Phone    2017 1:45 PM Fabian Ledezma, PharmD [facility] - Coumadin [phone]    2017 2:00 PM Lindsay Mcintyre APRN, FNP [facility] - Endocrinology [phone]    2017 11:40 AM TELEPHONE CHECK, PACEMAKER [facility] - Arrhythmia [phone]    2017 3:00 PM John Quezada MD [facility] Neurology [phone]      Goals (5 Years of Data)     None       These Medications        Disp Refills Start End    memantine (NAMENDA) 10 MG Tab 60 tablet 5 2017 10/18/2017    Take 1 tablet (10 mg total) by mouth 2 (two) times daily. - Oral    Pharmacy: [facility] - [place], [place] - [address] Ph #: [phone]       escitalopram oxalate (LEXAPRO) 5 MG Tab 30 tablet 5 2017 10/18/2017    Take 1 tablet (5 mg total) by mouth once daily. - Oral    Pharmacy: [facility] - [place], [place] - [address] Ph #: [phone]         [facility] On [facility]     [facility] -  Assistance  Unless otherwise directed by your provider, please contact [facility], our nurse care line that is available for  assistance.     Registered nurses in the [facility] provide: appointment scheduling, clinical advisement, health education, and other advisory services.  Call: [phone] (toll free)               Medications           Message regarding Medications     Verify the changes and/or  additions to your medication regime listed below are the same as discussed with your clinician today.  If any of these changes or additions are incorrect, please notify your healthcare provider.        START taking these NEW medications        Refills    escitalopram oxalate (LEXAPRO) 5 MG Tab 5    Sig: Take 1 tablet (5 mg total) by mouth once daily.    Class: Normal    Route: Oral      CHANGE how you are taking these medications     Start Taking Instead of    memantine (NAMENDA) 10 MG Tab memantine (NAMENDA) 10 MG Tab    Dosage:  Take 1 tablet (10 mg total) by mouth 2 (two) times daily. Dosage:  Take 1 tablet (10 mg total) by mouth once daily.    Reason for Change:  Reorder            Verify that the below list of medications is an accurate representation of the medications you are currently taking.  If none reported, the list may be blank. If incorrect, please contact your healthcare provider. Carry this list with you in case of emergency.           Current Medications     ACCU-CHEK SMARTVIEW TEST STRIP Strp test FOUR TIMES A DAY    amiodarone (PACERONE) 200 MG Tab Take 1 tablet (200 mg total) by mouth once daily.    amlodipine (NORVASC) 10 MG tablet Take 1 tablet (10 mg total) by mouth once daily.    aspirin 81 MG chewable tablet Take 1 tablet by mouth Every morning.    blood-glucose meter Misc Dispense Accu-Chek Natalia meter    carvedilol (COREG) 25 MG tablet Take 1 tablet (25 mg total) by mouth 2 (two) times daily with meals.    cloNIDine (CATAPRES) 0.2 MG tablet Take 1 tablet (0.2 mg total) by mouth 2 (two) times daily.    collagenase ointment Apply topically once daily.    cyanocobalamin, vitamin B-12, 1,000 mcg TbSR Take 1,000 mcg by mouth once daily.    dorzolamide (TRUSOPT) 2 % ophthalmic solution INSTILL ONE DROP INTO EACH EYE TWICE DAILY    doxazosin (CARDURA) 8 MG Tab TAKE ONE TABLET BY MOUTH ONCE DAILY IN THE EVENING    escitalopram oxalate (LEXAPRO) 5 MG Tab Take 1 tablet (5 mg total) by mouth once  daily.    ferrous sulfate 324 mg (65 mg iron) TbEC Take 1 tablet (325 mg total) by mouth 2 (two) times daily.    hydrALAZINE (APRESOLINE) 100 MG tablet Take 1 tablet (100 mg total) by mouth every 8 (eight) hours.    insulin glargine (LANTUS) 100 unit/mL injection INJECT 10 units nightly.    insulin lispro (HUMALOG) 100 unit/mL injection Take 9 units with breakfast, 9 units with lunch, and 8 units with dinner. Plus sliding scale    insulin lispro (HUMALOG) 100 unit/mL injection 15 units with meals plus correction scale, max daily dose is 75 units.    insulin syringe-needle U-100 (EASY TOUCH INSULIN SYRINGE) 0.5 mL 31 gauge x 5/16 Syrg To use 4 times daily with insulin injections    isosorbide dinitrate (ISOCHRON) 40 mg TbSR     isosorbide dinitrate (ISORDIL) 40 MG Tab Take 1 tablet (40 mg total) by mouth 3 (three) times daily.    lactulose (CEPHULAC) 20 gram Pack Take 1 packet (20 g total) by mouth once daily.    lancets Misc 1 lancet by Misc.(Non-Drug; Combo Route) route 4 (four) times daily.    latanoprost 0.005 % ophthalmic solution INSTILL ONE DROP INTO EACH EYE IN THE EVENING    lidocaine (LIDODERM) 5 % Place 1 patch onto the skin once daily. Remove & Discard patch within 12 hours or as directed by MD. Can apply to back area for pain    memantine (NAMENDA) 10 MG Tab Take 1 tablet (10 mg total) by mouth 2 (two) times daily.    nitroGLYCERIN 0.4 MG/DOSE TL SPRY (NITROLINGUAL) 400 mcg/spray spray PLACE ONE SPRAY UNDER THE TONGUE EVERY 5 MINUTES AS NEEDED FOR CHEST PAIN.    NITROSTAT 0.3 mg SL tablet DISSOLVE ONE TABLET UNDER THE TONGUE EVERY 5 MINUTES AS NEEDED FOR CHEST PAIN.  DO NOT EXCEED A TOTAL OF 3 DOSES IN 15 MINUTES    paricalcitol (ZEMPLAR) 1 MCG capsule Take 1 capsule by mouth on Monday and Friday.    potassium chloride SA (KLOR-CON M10) 10 MEQ tablet Take 1 tablet (10 mEq total) by mouth 2 (two) times daily.    potassium chloride SA (KLOR-CON M10) 10 MEQ tablet Take 1 tablet (10 mEq total) by mouth 2  "(two) times daily.    pravastatin (PRAVACHOL) 20 MG tablet TAKE 1 TABLET BY MOUTH once DAILY    sodium bicarbonate 650 MG tablet     torsemide (DEMADEX) 20 MG Tab Take 2 tablets (40 mg total) by mouth every morning.    warfarin (COUMADIN) 5 MG tablet Take 0.5-1 tablets (2.5-5 mg total) by mouth Daily. As directed by Coumadin Clinic    ZOSTAVAX, PF, 19,400 unit/0.65 mL injection            Clinical Reference Information           Your Vitals Were     BP Pulse Height Weight BMI    136/64 (BP Location: Left arm, Patient Position: Sitting, BP Method: Automatic) 68 6' 2" (1.88 m) 78.2 kg (172 lb 6.4 oz) 22.13 kg/m2      Blood Pressure          Most Recent Value    BP  136/64      Allergies as of 4/21/2017     Losartan    0.225 % Sodium Chloride    Amitriptyline    Azopt  [Brinzolamide]    Ciprofloxacin    Codeine    Cantil  [Mepenzolate Bromide]    Duragal-s    Erythromycin    Fentanyl    Hydrocodone-acetaminophen    Indomethacin    Meperidine    Minoxidil    Morphine    Neuromuscular Blockers, Steroidal    Nifedipine    Oxycodone Hcl-oxycodone-asa    Penicillins    Propoxyphene    Sensipar [Cinacalcet]    Talwin Compound    Talwin  [Pentazocine Lactate]    Valsartan      Immunizations Administered on Date of Encounter - 4/21/2017     None      Language Assistance Services     ATTENTION: Language assistance services are available, free of charge. Please call [phone].      ATENCIÓN: Si habla bridgetañol, tiene a moulton disposición servicios gratuitos de asistencia lingüística. Llame al [phone].     [facility] Ý: N?u b?n nói Ti?ng Vi?t, có các d?ch v? h? tr? ngôn ng? mi?n phí dành cho b?n. G?i s? [phone].         [facility] complies with applicable Federal civil rights laws and does not discriminate on the basis of race, color, national origin, age, disability, or sex.        "

## 2017-04-23 ENCOUNTER — PATIENT MESSAGE (OUTPATIENT)
Dept: INTERNAL MEDICINE | Facility: CLINIC | Age: 82
End: 2017-04-23

## 2017-04-24 ENCOUNTER — ANTI-COAG VISIT (OUTPATIENT)
Dept: CARDIOLOGY | Facility: CLINIC | Age: 82
End: 2017-04-24
Payer: MEDICARE

## 2017-04-24 DIAGNOSIS — I48.0 PAROXYSMAL ATRIAL FIBRILLATION: ICD-10-CM

## 2017-04-24 DIAGNOSIS — Z79.01 ANTICOAGULATION MONITORING BY PHARMACIST: Primary | ICD-10-CM

## 2017-04-24 LAB — INR PPP: 2.5 (ref 2–3)

## 2017-04-24 PROCEDURE — 99211 OFF/OP EST MAY X REQ PHY/QHP: CPT | Mod: PBBFAC | Performed by: PHARMACIST

## 2017-04-24 PROCEDURE — 99999 PR PBB SHADOW E&M-EST. PATIENT-LVL I: CPT | Mod: PBBFAC,,, | Performed by: PHARMACIST

## 2017-04-24 PROCEDURE — 85610 PROTHROMBIN TIME: CPT | Mod: PBBFAC | Performed by: PHARMACIST

## 2017-04-24 NOTE — PROGRESS NOTES
Patient started a new medication on 4/21/17, Lexapro. We will watch more closely for any interaction with warfarin. Patient missed her last appointment with us since 3/29. I re educated her on the importance of compliance.  No date set for dental work

## 2017-04-26 RX ORDER — POTASSIUM CHLORIDE 750 MG/1
TABLET, EXTENDED RELEASE ORAL
Qty: 60 TABLET | Refills: 0 | Status: SHIPPED | OUTPATIENT
Start: 2017-04-26 | End: 2017-05-29 | Stop reason: SDUPTHER

## 2017-04-27 DIAGNOSIS — I50.32 CHRONIC DIASTOLIC HEART FAILURE: Chronic | ICD-10-CM

## 2017-04-27 DIAGNOSIS — I13.0: ICD-10-CM

## 2017-04-27 RX ORDER — CARVEDILOL 25 MG/1
25 TABLET ORAL 2 TIMES DAILY WITH MEALS
Qty: 270 TABLET | Refills: 4 | Status: SHIPPED | OUTPATIENT
Start: 2017-04-27 | End: 2017-08-16 | Stop reason: SDUPTHER

## 2017-05-04 ENCOUNTER — ANTI-COAG VISIT (OUTPATIENT)
Dept: CARDIOLOGY | Facility: CLINIC | Age: 82
End: 2017-05-04
Payer: MEDICARE

## 2017-05-04 DIAGNOSIS — I48.0 PAROXYSMAL ATRIAL FIBRILLATION: ICD-10-CM

## 2017-05-04 DIAGNOSIS — Z79.01 ANTICOAGULATION MONITORING BY PHARMACIST: Primary | ICD-10-CM

## 2017-05-04 LAB — INR PPP: 2.6 (ref 2–3)

## 2017-05-04 PROCEDURE — 99211 OFF/OP EST MAY X REQ PHY/QHP: CPT | Mod: PBBFAC

## 2017-05-04 PROCEDURE — 99999 PR PBB SHADOW E&M-EST. PATIENT-LVL I: CPT | Mod: PBBFAC,,,

## 2017-05-04 PROCEDURE — 85610 PROTHROMBIN TIME: CPT | Mod: PBBFAC

## 2017-05-04 NOTE — PROGRESS NOTES
Today was a quick follow up to assess potential DDI with initiation of lexapro.  Patient's son denies other changes to affect INR.  INR remains therapeutic.  Continue warfarin dose and repeat INR 3-4 weeks.  Patient's son requests follow up next month.  Patient and son advised to contact clinic with any changes, questions, or concerns.

## 2017-05-12 ENCOUNTER — OUTPATIENT CASE MANAGEMENT (OUTPATIENT)
Dept: ADMINISTRATIVE | Facility: OTHER | Age: 82
End: 2017-05-12

## 2017-05-12 ENCOUNTER — OFFICE VISIT (OUTPATIENT)
Dept: INTERNAL MEDICINE | Facility: CLINIC | Age: 82
End: 2017-05-12
Payer: COMMERCIAL

## 2017-05-12 ENCOUNTER — TELEPHONE (OUTPATIENT)
Dept: INTERNAL MEDICINE | Facility: CLINIC | Age: 82
End: 2017-05-12

## 2017-05-12 VITALS
HEIGHT: 72 IN | SYSTOLIC BLOOD PRESSURE: 126 MMHG | HEART RATE: 78 BPM | OXYGEN SATURATION: 99 % | DIASTOLIC BLOOD PRESSURE: 76 MMHG

## 2017-05-12 DIAGNOSIS — Z98.61 CAD S/P PERCUTANEOUS CORONARY ANGIOPLASTY: Chronic | ICD-10-CM

## 2017-05-12 DIAGNOSIS — F01.518 VASCULAR DEMENTIA WITH BEHAVIOR DISTURBANCE: ICD-10-CM

## 2017-05-12 DIAGNOSIS — E78.2 MIXED HYPERLIPIDEMIA: ICD-10-CM

## 2017-05-12 DIAGNOSIS — F33.41 RECURRENT MAJOR DEPRESSIVE DISORDER, IN PARTIAL REMISSION: ICD-10-CM

## 2017-05-12 DIAGNOSIS — I48.0 PAROXYSMAL ATRIAL FIBRILLATION: Chronic | ICD-10-CM

## 2017-05-12 DIAGNOSIS — G47.33 OBSTRUCTIVE SLEEP APNEA ON CPAP: Chronic | ICD-10-CM

## 2017-05-12 DIAGNOSIS — Z79.899 AT RISK FOR AMIODARONE TOXICITY WITH LONG TERM USE: ICD-10-CM

## 2017-05-12 DIAGNOSIS — N18.4 CHRONIC KIDNEY DISEASE, STAGE IV (SEVERE): Chronic | ICD-10-CM

## 2017-05-12 DIAGNOSIS — I77.9 BILATERAL CAROTID ARTERY DISEASE: ICD-10-CM

## 2017-05-12 DIAGNOSIS — I35.0 NONRHEUMATIC AORTIC VALVE STENOSIS: ICD-10-CM

## 2017-05-12 DIAGNOSIS — D63.8 ANEMIA OF CHRONIC DISEASE: Chronic | ICD-10-CM

## 2017-05-12 DIAGNOSIS — E55.9 VITAMIN D DEFICIENCY: ICD-10-CM

## 2017-05-12 DIAGNOSIS — I25.10 CAD S/P PERCUTANEOUS CORONARY ANGIOPLASTY: Chronic | ICD-10-CM

## 2017-05-12 DIAGNOSIS — Z91.89 AT RISK FOR AMIODARONE TOXICITY WITH LONG TERM USE: ICD-10-CM

## 2017-05-12 DIAGNOSIS — I50.32 CHRONIC DIASTOLIC HEART FAILURE: Chronic | ICD-10-CM

## 2017-05-12 DIAGNOSIS — E11.22 DIABETES MELLITUS WITH STAGE 4 CHRONIC KIDNEY DISEASE GFR 15-29: Chronic | ICD-10-CM

## 2017-05-12 DIAGNOSIS — I13.0: ICD-10-CM

## 2017-05-12 DIAGNOSIS — E04.1 THYROID NODULE: Primary | ICD-10-CM

## 2017-05-12 DIAGNOSIS — Z91.148 NON COMPLIANCE W MEDICATION REGIMEN: ICD-10-CM

## 2017-05-12 DIAGNOSIS — E13.9 LADA (LATENT AUTOIMMUNE DIABETES IN ADULTS), MANAGED AS TYPE 1: ICD-10-CM

## 2017-05-12 DIAGNOSIS — E53.8 B12 DEFICIENCY: ICD-10-CM

## 2017-05-12 DIAGNOSIS — N18.4 DIABETES MELLITUS WITH STAGE 4 CHRONIC KIDNEY DISEASE GFR 15-29: Chronic | ICD-10-CM

## 2017-05-12 DIAGNOSIS — I10 ESSENTIAL HYPERTENSION: ICD-10-CM

## 2017-05-12 PROCEDURE — 1160F RVW MEDS BY RX/DR IN RCRD: CPT | Mod: ,,, | Performed by: INTERNAL MEDICINE

## 2017-05-12 PROCEDURE — 99215 OFFICE O/P EST HI 40 MIN: CPT | Mod: S$PBB,,, | Performed by: INTERNAL MEDICINE

## 2017-05-12 PROCEDURE — 99999 PR PBB SHADOW E&M-EST. PATIENT-LVL V: CPT | Mod: PBBFAC,,, | Performed by: INTERNAL MEDICINE

## 2017-05-12 PROCEDURE — 1126F AMNT PAIN NOTED NONE PRSNT: CPT | Mod: ,,, | Performed by: INTERNAL MEDICINE

## 2017-05-12 PROCEDURE — 3078F DIAST BP <80 MM HG: CPT | Mod: ,,, | Performed by: INTERNAL MEDICINE

## 2017-05-12 PROCEDURE — 1159F MED LIST DOCD IN RCRD: CPT | Mod: ,,, | Performed by: INTERNAL MEDICINE

## 2017-05-12 PROCEDURE — 99215 OFFICE O/P EST HI 40 MIN: CPT | Mod: PBBFAC | Performed by: INTERNAL MEDICINE

## 2017-05-12 PROCEDURE — 3074F SYST BP LT 130 MM HG: CPT | Mod: ,,, | Performed by: INTERNAL MEDICINE

## 2017-05-12 RX ORDER — ISOSORBIDE DINITRATE 40 MG/1
40 TABLET ORAL 2 TIMES DAILY
Qty: 270 TABLET | Refills: 3 | Status: SHIPPED | OUTPATIENT
Start: 2017-05-12 | End: 2017-06-20 | Stop reason: SDUPTHER

## 2017-05-12 RX ORDER — TORSEMIDE 20 MG/1
20 TABLET ORAL EVERY MORNING
Qty: 180 TABLET | Refills: 4 | Status: SHIPPED | OUTPATIENT
Start: 2017-05-12 | End: 2017-06-16 | Stop reason: SDUPTHER

## 2017-05-12 RX ORDER — HYDRALAZINE HYDROCHLORIDE 100 MG/1
100 TABLET, FILM COATED ORAL 2 TIMES DAILY
Qty: 270 TABLET | Refills: 4 | Status: ON HOLD | OUTPATIENT
Start: 2017-05-12 | End: 2017-06-29

## 2017-05-12 RX ORDER — DOXAZOSIN 4 MG/1
4 TABLET ORAL DAILY
Qty: 30 TABLET | Refills: 12 | Status: ON HOLD | OUTPATIENT
Start: 2017-05-12 | End: 2017-07-05 | Stop reason: HOSPADM

## 2017-05-12 NOTE — PROGRESS NOTES
For your Information:    Please note the following patient has been assigned to REEMA Linares with Outpatient [facility] Mgmt for screening.    Reason: Low/Mod Risk    Referral Diagnosis: Chronic diastolic heart failure    Please contact [facility] at ext [phone] with questions.    Thank you,  Marisa Wilson, SSC

## 2017-05-12 NOTE — MR AVS SNAPSHOT
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         Chronic kidney disease, stage IV (severe)         CAD S/P percutaneous coronary angioplasty         Bilateral carotid artery disease         At risk for amiodarone toxicity with long term use         Vitamin D deficiency         Vascular dementia with behavior disturbance         Paroxysmal atrial fibrillation         Nonrheumatic aortic valve stenosis         Mixed hyperlipidemia         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), managed as type 1         Diabetes mellitus with stage 4 chronic kidney disease GFR 15-29         Essential hypertension         B12 deficiency                To Do List           Future Appointments        Provider Department Dept Phone    2017 2:45 PM Precious Chandra, PharmD [facility] - Coumadin [phone]    2017 2:00 PM Lindsay Mcintyre, APRN, FNP [facility] - Endocrinology [phone]    2017 11:40 AM TELEPHONE CHECK, PACEMAKER [facility] - Arrhythmia [phone]    2017 3:00 PM John Quezada MD [facility] - Neurology [phone]      Goals (5 Years of Data)     None      Follow-Up and Disposition     Return in about 3 months (around 2017) for EP.       These Medications        Disp Refills Start End    doxazosin (CARDURA) 4 MG tablet 30 tablet 12 2017     Take 1 tablet (4 mg total) by mouth once daily. - Oral    Pharmacy: [facility] - [place]  [address] [facility] [address] Ph #: [phone]       hydrALAZINE (APRESOLINE) 100 MG tablet 270 tablet 4 5/12/2017     Take 1 tablet (100 mg total) by mouth 2 (two) times daily. - Oral    Pharmacy: [facility] - [place], [place] - [address] Ph #: [phone]       isosorbide dinitrate (ISORDIL) 40 MG Tab 270 tablet 3 5/12/2017 5/12/2018    Take 1 tablet (40 mg total) by mouth 2 (two) times daily. - Oral    Pharmacy: [facility] - [place], [place] - [address] Ph #: [phone]       torsemide (DEMADEX) 20 MG Tab 180 tablet 4 5/12/2017     Take 1 tablet (20 mg total) by mouth every morning. - Oral    Pharmacy: [facility] - [place], [place] - [address] Ph #: [phone]         [facility]     [facility] Nurse Care Line - 24/7 Assistance  Unless otherwise directed by your provider, please contact [facility], our nurse care line that is available for 24/7 assistance.     Registered nurses in the [facility] provide: appointment scheduling, clinical advisement, health education, and other advisory services.  Call: [phone] (toll free)               Medications           Message regarding Medications     Verify the changes and/or additions to your medication regime listed below are the same as discussed with your clinician today.  If any of these changes or additions are incorrect, please notify your healthcare provider.        CHANGE how you are taking these medications     Start Taking Instead of    doxazosin (CARDURA) 4 MG tablet doxazosin (CARDURA) 8 MG Tab    Dosage:  Take 1 tablet (4 mg total) by mouth once daily. Dosage:  TAKE ONE TABLET BY MOUTH ONCE DAILY IN THE EVENING    Reason for Change:  Reorder     hydrALAZINE (APRESOLINE) 100 MG tablet hydrALAZINE (APRESOLINE) 100 MG tablet    Dosage:  Take 1 tablet (100 mg total) by mouth 2 (two) times daily. Dosage:  Take 1 tablet (100 mg total) by mouth every 8 (eight) hours.     Reason for Change:  Reorder     isosorbide dinitrate (ISORDIL) 40 MG Tab isosorbide dinitrate (ISORDIL) 40 MG Tab    Dosage:  Take 1 tablet (40 mg total) by mouth 2 (two) times daily. Dosage:  Take 1 tablet (40 mg total) by mouth 3 (three) times daily.    Reason for Change:  Reorder     torsemide (DEMADEX) 20 MG Tab torsemide (DEMADEX) 20 MG Tab    Dosage:  Take 1 tablet (20 mg total) by mouth every morning. Dosage:  Take 2 tablets (40 mg total) by mouth every morning.    Reason for Change:  Reorder       STOP taking these medications     isosorbide dinitrate (ISOCHRON) 40 mg TbSR     ZOSTAVAX, PF, 19,400 unit/0.65 mL injection     cloNIDine (CATAPRES) 0.2 MG tablet Take 1 tablet (0.2 mg total) by mouth 2 (two) times daily.    lidocaine (LIDODERM) 5 % Place 1 patch onto the skin once daily. Remove & Discard patch within 12 hours or as directed by MD. Can apply to back area for pain    paricalcitol (ZEMPLAR) 1 MCG capsule Take 1 capsule by mouth on Monday and Friday.    sodium bicarbonate 650 MG tablet            Verify that the below list of medications is an accurate representation of the medications you are currently taking.  If none reported, the list may be blank. If incorrect, please contact your healthcare provider. Carry this list with you in case of emergency.           Current Medications     ACCU-CHEK SMARTVIEW TEST STRIP Strp test FOUR TIMES A DAY    amiodarone (PACERONE) 200 MG Tab Take 1 tablet (200 mg total) by mouth once daily.    amlodipine (NORVASC) 10 MG tablet Take 1 tablet (10 mg total) by mouth once daily.    aspirin 81 MG chewable tablet Take 1 tablet by mouth Every morning.    blood-glucose meter Misc Dispense Accu-Chek Natalia meter    carvedilol (COREG) 25 MG tablet Take 1 tablet (25 mg total) by mouth 2 (two) times daily with meals.    collagenase ointment Apply topically once daily.    cyanocobalamin, vitamin B-12, 1,000 mcg TbSR Take 1,000 mcg by mouth once daily.    dorzolamide (TRUSOPT) 2  % ophthalmic solution INSTILL ONE DROP INTO EACH EYE TWICE DAILY    doxazosin (CARDURA) 4 MG tablet Take 1 tablet (4 mg total) by mouth once daily.    escitalopram oxalate (LEXAPRO) 5 MG Tab Take 1 tablet (5 mg total) by mouth once daily.    ferrous sulfate 324 mg (65 mg iron) TbEC Take 1 tablet (325 mg total) by mouth 2 (two) times daily.    hydrALAZINE (APRESOLINE) 100 MG tablet Take 1 tablet (100 mg total) by mouth 2 (two) times daily.    insulin glargine (LANTUS) 100 unit/mL injection INJECT 10 units nightly.    insulin lispro (HUMALOG) 100 unit/mL injection 15 units with meals plus correction scale, max daily dose is 75 units.    insulin syringe-needle U-100 (EASY TOUCH INSULIN SYRINGE) 0.5 mL 31 gauge x 5/16 Syrg To use 4 times daily with insulin injections    isosorbide dinitrate (ISORDIL) 40 MG Tab Take 1 tablet (40 mg total) by mouth 2 (two) times daily.    KLOR-CON M10 10 mEq tablet TAKE ONE TABLET BY MOUTH TWICE DAILY    lactulose (CEPHULAC) 20 gram Pack Take 1 packet (20 g total) by mouth once daily.    lancets Misc 1 lancet by Misc.(Non-Drug; Combo Route) route 4 (four) times daily.    latanoprost 0.005 % ophthalmic solution INSTILL ONE DROP INTO EACH EYE IN THE EVENING    memantine (NAMENDA) 10 MG Tab Take 1 tablet (10 mg total) by mouth 2 (two) times daily.    nitroGLYCERIN 0.4 MG/DOSE TL SPRY (NITROLINGUAL) 400 mcg/spray spray PLACE ONE SPRAY UNDER THE TONGUE EVERY 5 MINUTES AS NEEDED FOR CHEST PAIN.    NITROSTAT 0.3 mg SL tablet DISSOLVE ONE TABLET UNDER THE TONGUE EVERY 5 MINUTES AS NEEDED FOR CHEST PAIN.  DO NOT EXCEED A TOTAL OF 3 DOSES IN 15 MINUTES    pravastatin (PRAVACHOL) 20 MG tablet TAKE 1 TABLET BY MOUTH once DAILY    torsemide (DEMADEX) 20 MG Tab Take 1 tablet (20 mg total) by mouth every morning.    warfarin (COUMADIN) 5 MG tablet Take 0.5-1 tablets (2.5-5 mg total) by mouth Daily. As directed by Coumadin Clinic           Clinical Reference Information           Your Vitals Were     BP  "Pulse Height SpO2          126/76 78 6' 2" (1.88 m) 99%        Blood Pressure          Most Recent Value    BP  126/76      Allergies as of 5/12/2017     Losartan    0.225 % Sodium Chloride    Amitriptyline    Azopt  [Brinzolamide]    Ciprofloxacin    Codeine    Cantil  [Mepenzolate Bromide]    Duragal-s    Erythromycin    Fentanyl    Hydrocodone-acetaminophen    Indomethacin    Meperidine    Minoxidil    Morphine    Neuromuscular Blockers, Steroidal    Nifedipine    Oxycodone Hcl-oxycodone-asa    Penicillins    Propoxyphene    Sensipar [Cinacalcet]    Talwin Compound    Talwin  [Pentazocine Lactate]    Valsartan      Immunizations Administered on Date of Encounter - 5/12/2017     None      Orders Placed During Today's Visit      Normal Orders This Visit    Ambulatory consult to Cardiology     Ambulatory consult to Nephrology     Ambulatory referral to Outpatient Case Management     Future Labs/Procedures Expected by Expires    CBC auto differential  5/12/2017 5/12/2018    Comprehensive metabolic panel  5/12/2017 5/12/2018    Ferritin  5/12/2017 8/10/2017    Hemoglobin A1c  5/12/2017 5/12/2018    Iron and TIBC  5/12/2017 8/10/2017    Lipid panel  5/12/2017 5/12/2018    TSH  5/12/2017 5/12/2018    Vitamin B12  5/12/2017 5/12/2018    Vitamin D  5/12/2017 (Approximate) 5/12/2018    2D Echo w/ Color Flow Doppler  As directed 5/12/2018    CAR Ultrasound doppler carotid bliateral  As directed 5/12/2018      Language Assistance Services     ATTENTION: Language assistance services are available, free of charge. Please call [phone].      ATENCIÓN: Si habla español, tiene a moulton disposición servicios gratuitos de asistencia lingüística. Llame al [phone].     CHÚ Ý: N?u b?n nói Ti?ng Vi?t, có các d?ch v? h? tr? ngôn ng? mi?n phí dành cho b?n. G?i s? [phone].         [facility] complies with applicable Federal civil rights laws and does not discriminate on the basis of race, color, national " origin, age, disability, or sex.

## 2017-05-12 NOTE — PROGRESS NOTES
Subjective:       Patient ID: Chey Trujillo is a 82 y.o. female.    Chief Complaint: Medication Problem    HPI Comments: Multiple issues    Seen with daughters Deyanira and Stephanie.  Recall she is  and lives with her son Gilberto. Stephanie comes by every night; Deyanira tries to come as often as work permits.    Mental status and memory problems- see Neurology assessment April 2017.   Neuropsych assessment 2016 revealed mild to moderate dementia and depression.  Meds are being adjusted.  She has some trouble with her VEE tx as well asnd this was reviewed.    She is stable at home in terms of eating and drinking; staying hydrated.  However, somewhat tired/somnolent and much less interested in activity than before.  Antidepressant started by Neurology but daughters not sure if helping- however has only been a couple of weeks.    No falls.  They would like her to get more exercise.  She is getting some PT.  SHe is not homebound; son can take her to appointments.    BP improved, in fact low    They would like to see about reducing meds.    No PND or orthopnea.    Overdue for Nephrology follow up- would like t see a new nephrologist.    Overdue for general Cardiology follow up; in the ED with CHF in March- not admitted.  Since then no PND or orthopnea or weight gain.    DM doing well.    Neuro 4/17  Endo 3/17  Opto 3/17  Podiatry 3/17  Arrhythmia 9/16  Nephrology 6/16    Patient Active Problem List:     Paroxysmal atrial fibrillation     Anticoagulation monitoring by pharmacist     Obstructive sleep apnea on CPAP - setting = 5      CAD S/P percutaneous coronary angioplasty     Chronic diastolic heart failure     Secondary pulmonary hypertension     Peripheral vascular disease     Mixed hyperlipidemia     Chronic kidney disease, stage IV (severe)     Pacemaker 2/09/12     At risk for amiodarone toxicity with long term use     Chronic hepatitis C without hepatic coma     Thrombocytopenia     Bilateral carotid artery disease:  40-49% 2016 Bilateral     Anemia of chronic disease     Non compliance w medication regimen     Diabetes mellitus with stage 4 chronic kidney disease GFR 15-29     Osteoporosis: see DEXA 1/16     Thyroid nodule     Nonrheumatic aortic valve stenosis     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 Negative for activity change, appetite change, chills and fever.   HENT: Negative for sinus pressure and sore throat.    Eyes: Negative for visual disturbance.   Respiratory: Positive for apnea. Negative for cough, chest tightness, shortness of breath and wheezing.    Cardiovascular: Negative for chest pain, palpitations and leg swelling.   Gastrointestinal: Negative for abdominal distention, abdominal pain, constipation, diarrhea, nausea and vomiting.   Genitourinary: Negative for dysuria, frequency, hematuria and vaginal bleeding.   Musculoskeletal: Positive for arthralgias. Negative for gait problem, joint swelling and myalgias.   Skin: Negative for rash.   Neurological: Negative for dizziness, tremors, weakness, light-headedness and headaches.   Hematological: Negative for adenopathy. Does not bruise/bleed easily.   Psychiatric/Behavioral: Negative for confusion, dysphoric mood, hallucinations, sleep disturbance and suicidal ideas.        No hallucinations or confusion  Some fatigue  Less interested in activites       Objective:      Physical Exam   Constitutional: She is oriented to person, place, and time. She appears well-developed and well-nourished.   HENT:   Head: Normocephalic and atraumatic.   Right Ear: External ear normal.   Left Ear: External ear normal.   Nose: Nose normal.   Mouth/Throat: Oropharynx is clear and moist. No oropharyngeal exudate.   Eyes:  Conjunctivae and EOM are normal. No scleral icterus.   Neck: Normal range of motion. Neck supple. No JVD present. Thyromegaly present.   Faint bruits   Cardiovascular: Normal rate, regular rhythm and intact distal pulses.  Exam reveals no gallop.    Murmur heard.  Pulmonary/Chest: Effort normal and breath sounds normal. No respiratory distress. She has no wheezes. She exhibits no tenderness.   Abdominal: Soft. Bowel sounds are normal. She exhibits no distension and no mass. There is no tenderness. There is no rebound and no guarding.   Musculoskeletal: Normal range of motion. She exhibits edema. She exhibits no tenderness.   Trace non pitting edema   Lymphadenopathy:     She has no cervical adenopathy.   Neurological: She is oriented to person, place, and time. She displays normal reflexes. No cranial nerve deficit. Coordination normal.   Appears tired but arousable  Appropriate response when speaking   Skin: Skin is warm. No rash noted. No erythema.   Psychiatric: Her behavior is normal. Judgment and thought content normal.   Nursing note and vitals reviewed.      Assessment:       1. Thyroid nodule    2. Chronic diastolic heart failure    3. 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 Chronic kidney disease, stage IV (severe)    5. CAD S/P percutaneous coronary angioplasty    6. Bilateral carotid artery disease: 40-49% 2016 Bilateral    7. At risk for amiodarone toxicity with long term use    8. Vitamin D deficiency    9. Vascular dementia with behavior disturbance    10. Paroxysmal atrial fibrillation    11. Nonrheumatic aortic valve stenosis    12. Mixed hyperlipidemia    13. 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 Diabetes mellitus with stage 4 chronic kidney disease GFR 15-29    15. Essential hypertension    16. B12 deficiency    17. Anemia of chronic disease    18. Non compliance w medication regimen    19. Recurrent major depressive  disorder, in partial remission    20. Obstructive sleep apnea on CPAP - setting = 5         Plan:         Fasting labs  Carotid ultrasound  ECHO  General Cardiology  Nephrology  EP with me 3 months    Reviewed each mediction line by line and clarified how she is taking  Will reduce cardura from 8 to 4 to see if this improved energy and BP    Thyroid nodule  -     TSH; Future; Expected date: 5/12/17    Chronic diastolic heart failure  -     hydrALAZINE (APRESOLINE) 100 MG tablet; Take 1 tablet (100 mg total) by mouth 2 (two) times daily.  Dispense: 270 tablet; Refill: 4  -     isosorbide dinitrate (ISORDIL) 40 MG Tab; Take 1 tablet (40 mg total) by mouth 2 (two) times daily.  Dispense: 270 tablet; Refill: 3  -     torsemide (DEMADEX) 20 MG Tab; Take 1 tablet (20 mg total) by mouth every morning.  Dispense: 180 tablet; Refill: 4  -     Ambulatory consult to Cardiology  -     2D Echo w/ Color Flow Doppler; Future  -     Ambulatory referral to Outpatient Case Management- to assist with community resources.  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  Dispense: 270 tablet; Refill: 4  -     torsemide (DEMADEX) 20 MG Tab; Take 1 tablet (20 mg total) by mouth every morning.  Dispense: 180 tablet; Refill: 4  -     CBC auto differential; Future; Expected date: 5/12/17  -     Comprehensive metabolic panel; Future; Expected date: 5/12/17    Chronic kidney disease, stage IV (severe)  -     Ferritin; Future; Expected date: 5/12/17  -     Lipid panel; Future; Expected date: 5/12/17  -     Iron and TIBC; Future; Expected date: 5/12/17  -     Ambulatory consult to Nephrology    CAD S/P percutaneous coronary angioplasty: no alaarm sx; should have a general cardiologist assessment    Bilateral carotid artery disease: 40-49% 2016  Bilateral  -     CAR Ultrasound doppler carotid bliateral; Future    At risk for amiodarone toxicity with long term use: continue to follow up per arrhythmia- due 9/17    Vitamin D deficiency  -     Vitamin D; Future; Expected date: 5/12/17    Vascular dementia with behavior disturbance: continue with Neurology assessment    Paroxysmal atrial fibrillation: in NSR now- keep arrhythmia follow up    Nonrheumatic aortic valve stenosis: no alarm sx    Mixed hyperlipidemia: labs and review    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  Call if BP > 135/85 on a regular basis.    B12 deficiency  -     Vitamin B12; Future; Expected date: 5/12/17    Other orders  -     doxazosin (CARDURA) 4 MG tablet; Take 1 tablet (4 mg total) by mouth once daily.  Dispense: 30 tablet; Refill: 12    Depression and VEE issues discussed at length.  Compliance with tx reviewed.  Consider sleep clinic follow up    I will review all studies and determine further tx depending on findings    Patient evaluated for over 45 minutes with this appoinment, including diagnostic testing and treatment.  All questions answered,  chart reviewed and care coordinated.

## 2017-05-12 NOTE — TELEPHONE ENCOUNTER
----- Message from Marisa Wilson sent at 5/12/2017  4:01 PM CDT -----  For your Information:    Please note the following patient has been assigned to REEMA Linares with Outpatient Complex Care Mgmt for screening.    Reason: Low/Mod Risk    Referral Diagnosis: Chronic diastolic heart failure    Please contact [facility] at ext [phone] with questions.    Thank you,  Marisa Wilson, SSC

## 2017-05-13 PROBLEM — F33.41 RECURRENT MAJOR DEPRESSIVE DISORDER, IN PARTIAL REMISSION: Status: ACTIVE | Noted: 2017-05-13

## 2017-05-15 ENCOUNTER — OUTPATIENT CASE MANAGEMENT (OUTPATIENT)
Dept: ADMINISTRATIVE | Facility: OTHER | Age: 82
End: 2017-05-15

## 2017-05-15 ENCOUNTER — TELEPHONE (OUTPATIENT)
Dept: INTERNAL MEDICINE | Facility: CLINIC | Age: 82
End: 2017-05-15

## 2017-05-15 NOTE — TELEPHONE ENCOUNTER
----- Message from REEMA Chacon sent at 5/15/2017 11:59 AM CDT -----  This CSW received a referral on the above patient.This CSW completed assessment with patient son Gilberto. CSW offered Personal Care Services through the [facility]. Caregiver/Gilberto advised this CSW that the people who provide the services with the [facility] are not reliable. Caregiver states that he provides all his mother care. CSW provided son with VET Attend, however patient does not meet income guidelines. This CSW provided son with support groups that assist caregivers who are caring for patients with Dementia. CSW provided Caregiver with her contact information for any additional needs.      Thank you for the referral,    REEMA Linares

## 2017-05-15 NOTE — PROGRESS NOTES
This CSW received a referral on the above patient.   Reason for referral:Connected to community resources  Name of the community resource that was provided: Senior Resource Guide(support groups )  Resource given to: Patient son via US mail     This CSW completed assessment with patient son Gilberto. CSW offered Personal Care Services through the [facility]. Caregiver/Gilberto advised this CSW that the people who provide the services with the [facility] are not reliable. Caregiver states that he provides all his mother care. CSW provided son with VET Attend, however patient does not meet income guidelines. This CSW provided son with support groups that assist caregivers who are caring for patients with Dementia. CSW provided Caregiver with her contact information for any additional needs.

## 2017-05-17 ENCOUNTER — PATIENT MESSAGE (OUTPATIENT)
Dept: INTERNAL MEDICINE | Facility: CLINIC | Age: 82
End: 2017-05-17

## 2017-05-17 ENCOUNTER — HOSPITAL ENCOUNTER (OUTPATIENT)
Dept: CARDIOLOGY | Facility: CLINIC | Age: 82
Discharge: HOME | End: 2017-05-17
Payer: COMMERCIAL

## 2017-05-17 ENCOUNTER — CLINICAL SUPPORT (OUTPATIENT)
Dept: CARDIOLOGY | Facility: CLINIC | Age: 82
End: 2017-05-17
Payer: COMMERCIAL

## 2017-05-17 DIAGNOSIS — I77.9 BILATERAL CAROTID ARTERY DISEASE: ICD-10-CM

## 2017-05-17 DIAGNOSIS — I50.32 CHRONIC DIASTOLIC HEART FAILURE: Chronic | ICD-10-CM

## 2017-05-17 LAB
DIASTOLIC DYSFUNCTION: YES
ESTIMATED PA SYSTOLIC PRESSURE: 63.72
GLOBAL PERICARDIAL EFFUSION: ABNORMAL
INTERNAL CAROTID STENOSIS: NORMAL
MITRAL VALVE MOBILITY: NORMAL
MITRAL VALVE REGURGITATION: ABNORMAL
RETIRED EF AND QEF - SEE NOTES: 45 (ref 55–65)
TRICUSPID VALVE REGURGITATION: ABNORMAL

## 2017-05-17 PROCEDURE — 93306 TTE W/DOPPLER COMPLETE: CPT | Mod: PBBFAC | Performed by: INTERNAL MEDICINE

## 2017-05-17 PROCEDURE — 93880 EXTRACRANIAL BILAT STUDY: CPT | Mod: PBBFAC | Performed by: INTERNAL MEDICINE

## 2017-05-18 ENCOUNTER — PATIENT MESSAGE (OUTPATIENT)
Dept: INTERNAL MEDICINE | Facility: CLINIC | Age: 82
End: 2017-05-18

## 2017-05-22 PROBLEM — E10.621 DIABETIC ULCER OF RIGHT HEEL ASSOCIATED WITH TYPE 1 DIABETES MELLITUS: Status: ACTIVE | Noted: 2017-05-22

## 2017-05-22 PROBLEM — L97.419 DIABETIC ULCER OF RIGHT HEEL ASSOCIATED WITH TYPE 1 DIABETES MELLITUS: Status: ACTIVE | Noted: 2017-05-22

## 2017-05-25 ENCOUNTER — PATIENT MESSAGE (OUTPATIENT)
Dept: INTERNAL MEDICINE | Facility: CLINIC | Age: 82
End: 2017-05-25

## 2017-05-26 DIAGNOSIS — K64.9 HEMORRHOIDS, UNSPECIFIED HEMORRHOID TYPE: Primary | ICD-10-CM

## 2017-05-26 NOTE — TELEPHONE ENCOUNTER
A referral was placed to colon rectal  For further evaluation of her hemorrhoid   Assist in [place]

## 2017-05-29 ENCOUNTER — TELEPHONE (OUTPATIENT)
Dept: ADMINISTRATIVE | Facility: HOSPITAL | Age: 82
End: 2017-05-29

## 2017-05-29 RX ORDER — POTASSIUM CHLORIDE 750 MG/1
TABLET, EXTENDED RELEASE ORAL
Qty: 60 TABLET | Refills: 0 | Status: SHIPPED | OUTPATIENT
Start: 2017-05-29 | End: 2017-08-16 | Stop reason: SDUPTHER

## 2017-05-29 NOTE — TELEPHONE ENCOUNTER
Spoke to pt's son Gilberto. He advised his sister will be calling in to schedule pt's colon rectal screening. Number to schedule given.

## 2017-06-01 ENCOUNTER — ANTI-COAG VISIT (OUTPATIENT)
Dept: CARDIOLOGY | Facility: CLINIC | Age: 82
End: 2017-06-01
Payer: COMMERCIAL

## 2017-06-01 DIAGNOSIS — Z79.01 ANTICOAGULATION MONITORING BY PHARMACIST: Primary | ICD-10-CM

## 2017-06-01 DIAGNOSIS — I48.0 PAROXYSMAL ATRIAL FIBRILLATION: ICD-10-CM

## 2017-06-01 LAB — INR PPP: 2.7 (ref 2–3)

## 2017-06-01 PROCEDURE — 85610 PROTHROMBIN TIME: CPT | Mod: PBBFAC

## 2017-06-01 NOTE — PROGRESS NOTES
INR remains therapeutic.  Patient denies changes to diet, medications, or health that would affect INR.  Patient will continue weekly warfarin regimen at this time and repeat INR next month.  Patient and son advised to contact clinic with any changes, questions, or concerns.

## 2017-06-05 DIAGNOSIS — E78.5 HYPERLIPIDEMIA: ICD-10-CM

## 2017-06-05 RX ORDER — PRAVASTATIN SODIUM 20 MG/1
TABLET ORAL
Qty: 90 TABLET | Refills: 1 | Status: SHIPPED | OUTPATIENT
Start: 2017-06-05 | End: 2017-08-16 | Stop reason: SDUPTHER

## 2017-06-12 ENCOUNTER — HOSPITAL ENCOUNTER (OUTPATIENT)
Dept: WOUND CARE | Facility: HOSPITAL | Age: 82
Discharge: HOME | End: 2017-06-12
Attending: PODIATRIST
Payer: COMMERCIAL

## 2017-06-12 VITALS
SYSTOLIC BLOOD PRESSURE: 181 MMHG | WEIGHT: 172 LBS | BODY MASS INDEX: 23.3 KG/M2 | HEART RATE: 88 BPM | TEMPERATURE: 99 F | DIASTOLIC BLOOD PRESSURE: 80 MMHG | HEIGHT: 72 IN

## 2017-06-12 DIAGNOSIS — N18.4 DIABETES MELLITUS WITH STAGE 4 CHRONIC KIDNEY DISEASE GFR 15-29: Chronic | ICD-10-CM

## 2017-06-12 DIAGNOSIS — E10.621 DIABETIC ULCER OF RIGHT HEEL ASSOCIATED WITH TYPE 1 DIABETES MELLITUS, LIMITED TO BREAKDOWN OF SKIN: Primary | ICD-10-CM

## 2017-06-12 DIAGNOSIS — L97.412 DIABETIC ULCER OF RIGHT HEEL ASSOCIATED WITH TYPE 1 DIABETES MELLITUS, WITH FAT LAYER EXPOSED: ICD-10-CM

## 2017-06-12 DIAGNOSIS — E10.621 DIABETIC ULCER OF RIGHT HEEL ASSOCIATED WITH TYPE 1 DIABETES MELLITUS, WITH FAT LAYER EXPOSED: ICD-10-CM

## 2017-06-12 DIAGNOSIS — L97.411 DIABETIC ULCER OF RIGHT HEEL ASSOCIATED WITH TYPE 1 DIABETES MELLITUS, LIMITED TO BREAKDOWN OF SKIN: Primary | ICD-10-CM

## 2017-06-12 DIAGNOSIS — E11.22 DIABETES MELLITUS WITH STAGE 4 CHRONIC KIDNEY DISEASE GFR 15-29: Chronic | ICD-10-CM

## 2017-06-12 PROCEDURE — 97597 DBRDMT OPN WND 1ST 20 CM/<: CPT | Mod: ,,, | Performed by: PODIATRIST

## 2017-06-12 PROCEDURE — 99214 OFFICE O/P EST MOD 30 MIN: CPT | Mod: 25,,, | Performed by: PODIATRIST

## 2017-06-12 PROCEDURE — 97597 DBRDMT OPN WND 1ST 20 CM/<: CPT

## 2017-06-12 PROCEDURE — 97597 PR DEBRIDEMENT OPEN WOUND 20 SQ CM<: ICD-10-PCS | Mod: ,,, | Performed by: PODIATRIST

## 2017-06-12 PROCEDURE — 99214 OFFICE O/P EST MOD 30 MIN: CPT | Mod: 25

## 2017-06-12 PROCEDURE — 99214 PR OFFICE/OUTPT VISIT, EST, LEVL IV, 30-39 MIN: ICD-10-PCS | Mod: 25,,, | Performed by: PODIATRIST

## 2017-06-12 PROCEDURE — 99204 OFFICE O/P NEW MOD 45 MIN: CPT | Mod: 25

## 2017-06-12 NOTE — PROGRESS NOTES
Much of the documentation for this visit was completed in the [facility] system. Please see the attached documentation for further details about this patient's care.     Lula Alberts RN

## 2017-06-13 ENCOUNTER — OFFICE VISIT (OUTPATIENT)
Dept: ENDOCRINOLOGY | Facility: CLINIC | Age: 82
End: 2017-06-13
Payer: COMMERCIAL

## 2017-06-13 VITALS
DIASTOLIC BLOOD PRESSURE: 82 MMHG | BODY MASS INDEX: 26.55 KG/M2 | WEIGHT: 196 LBS | HEIGHT: 72 IN | HEART RATE: 77 BPM | SYSTOLIC BLOOD PRESSURE: 162 MMHG

## 2017-06-13 DIAGNOSIS — E11.9 TYPE 2 DIABETES MELLITUS NOT AT GOAL: ICD-10-CM

## 2017-06-13 DIAGNOSIS — L97.411 DIABETIC ULCER OF RIGHT HEEL ASSOCIATED WITH DIABETES MELLITUS DUE TO UNDERLYING CONDITION, LIMITED TO BREAKDOWN OF SKIN: ICD-10-CM

## 2017-06-13 DIAGNOSIS — E13.9 LADA (LATENT AUTOIMMUNE DIABETES IN ADULTS), MANAGED AS TYPE 1: ICD-10-CM

## 2017-06-13 DIAGNOSIS — G47.33 OBSTRUCTIVE SLEEP APNEA ON CPAP: Chronic | ICD-10-CM

## 2017-06-13 DIAGNOSIS — E08.621 DIABETIC ULCER OF RIGHT HEEL ASSOCIATED WITH DIABETES MELLITUS DUE TO UNDERLYING CONDITION, LIMITED TO BREAKDOWN OF SKIN: ICD-10-CM

## 2017-06-13 DIAGNOSIS — F01.518 VASCULAR DEMENTIA WITH BEHAVIOR DISTURBANCE: ICD-10-CM

## 2017-06-13 DIAGNOSIS — I50.32 CHRONIC DIASTOLIC HEART FAILURE: Chronic | ICD-10-CM

## 2017-06-13 DIAGNOSIS — E55.9 VITAMIN D DEFICIENCY: ICD-10-CM

## 2017-06-13 DIAGNOSIS — N18.4 CHRONIC KIDNEY DISEASE, STAGE IV (SEVERE): Primary | Chronic | ICD-10-CM

## 2017-06-13 DIAGNOSIS — I10 ESSENTIAL HYPERTENSION: ICD-10-CM

## 2017-06-13 DIAGNOSIS — I48.0 PAROXYSMAL ATRIAL FIBRILLATION: Chronic | ICD-10-CM

## 2017-06-13 PROCEDURE — 99215 OFFICE O/P EST HI 40 MIN: CPT | Mod: PBBFAC | Performed by: NURSE PRACTITIONER

## 2017-06-13 PROCEDURE — 1126F AMNT PAIN NOTED NONE PRSNT: CPT | Mod: ,,, | Performed by: NURSE PRACTITIONER

## 2017-06-13 PROCEDURE — 1159F MED LIST DOCD IN RCRD: CPT | Mod: ,,, | Performed by: NURSE PRACTITIONER

## 2017-06-13 PROCEDURE — 99999 PR PBB SHADOW E&M-EST. PATIENT-LVL V: CPT | Mod: PBBFAC,,, | Performed by: NURSE PRACTITIONER

## 2017-06-13 PROCEDURE — 99214 OFFICE O/P EST MOD 30 MIN: CPT | Mod: S$PBB,,, | Performed by: NURSE PRACTITIONER

## 2017-06-13 RX ORDER — INSULIN LISPRO 100 [IU]/ML
INJECTION, SOLUTION INTRAVENOUS; SUBCUTANEOUS
Qty: 3 VIAL | Refills: 6 | Status: ON HOLD
Start: 2017-06-13 | End: 2017-06-29

## 2017-06-13 NOTE — PATIENT INSTRUCTIONS
Snacks can be an important part of a balanced, healthy meal plan. They allow you to eat more frequently, feeling full and satisfied throughout the day. Also, they allow you to spread carbohydrates evenly, which may stabilize blood sugars.  Plus, snacks are enjoyable!     The amount of carbohydrate needed at snacks varies. Generally, about 15-30 grams of carbohydrate per snack is recommended.  Below you will find some tasty treats.       0-5 gm carb   Crystal Light   Vitamin Water Zero   Herbal tea, unsweetened   2 tsp peanut butter on celery   1./2 cup sugar-free jell-o   1 sugar-free popsicle   ¼ cup blueberries   8oz Blue Jessica unsweetened almond milk   5 baby carrots & celery sticks, cucumbers, bell peppers dipped in ¼ cup salsa, 2Tbsp light ranch dressing or 2Tbsp plain Greek yogurt   10 Goldfish crackers   ½ oz low-fat cheese or string cheese   1 closed handful of nuts, unsalted   1 Tbsp of sunflower seeds, unsalted   1 cup Smart Pop popcorn   1 whole grain brown rice cake        15 gm carb   1 small piece of fruit or ½ banana or 1/2 cup lite canned fruit   3 fabienne cracker squares   3 cups Smart Pop popcorn, top spray butter, Almodovar lite salt or cinnamon and Truvia   5 Vanilla Wafers   ½ cup low fat, no added sugar ice cream or frozen yogurt (Blue bell, Blue Bunny, Weight Watchers, Skinny Cow)   ½ turkey, ham, or chicken sandwich   ½ c fruit with ½ c Cottage cheese   4-6 unsalted wheat crackers with 1 oz low fat cheese or 1 tbsp peanut butter    30-45 goldfish crackers (depending on flavor)    7-8 Yarsanism mini brown rice cakes (caramel, apple cinnamon, chocolate)    12 Yarsanism mini brown rice cakes (cheddar, bbq, ranch)    1/3 cup hummus dip with raw veg   1/2 whole wheat gary, 1Tbsp hummus   Mini Pizza (1/2 whole wheat English muffin, low-fat  cheese, tomato sauce)   100 calorie snack pack (Oreo, Chips Ahoy, Ritz Mix, Baked Cheetos)   4-6 oz. light or Greek Style yogurt  (Loida Hinds, [place], [facility])   ½ cup sugar-free pudding     6 in. wheat tortilla or gary oven toasted chips (topped with spray butter flavoring, cinnamon, Truvia OR spray butter, garlic powder, chili powder)    18 BBQ Popchips (available at [employer], Whole Foods, Fresh Market)                   Diabetes Support Group Meetings    Date Topics   February 9 Health Promotion/Nutrition   March 9 Taking Care of Your Kidneys   April 13 Taking Care of Your Feet   May 11 Ease Your Mind with Diabetes   June 8 Hurricane and Emergency Preparedness   July 13 Family & Caregiver Support for Diabetes   *August 17  Taking Care of Your Eyes   September 14 Devices & Technology   October 12 Recipes & Treats   November 9 Getting Pumped Up for Diabetes   December 14 Year-End Close Out            Meetings are held in the [facility] (A) of the [facility] located at [address] [place] [address]. Please call [phone] for additional information.    Free service, offered every 2nd Thursday of every month, except in August! Family members and/or friends are welcome as well!  Support group is for patients with type 1 or type 2 diabetes.    From 3:30p to 4:30p

## 2017-06-13 NOTE — PROGRESS NOTES
CC: Ms. Chey Trujillo arrives today for management of MAYELA treated as Type 1  DM and review of chronic medical conditions, as listed in the visit diagnosis section of this encounter.  She presents in a wheelchair and is accompanied by her son.     HPI: Ms. Chey Trujillo is  female who was diagnosed with Type 2 in 1960s via glucose tolerance test. She was diet controlled for a while, but then progressed to orals, then insulin. Denies ever being hospitalized for DM.   She is a former patient of Dr. Wang. C-peptide down to 0.6 in 10/2015. She was diagnosed with MAYELA and has been treated as type 1 diabetes. She is currently seeing podiatry regularly for R heel ulcer (over the past year).    Last seen in endocrine by Dr. Marroquin 11/2016, by CHAD ware NP & ELENA Mendes DNP. She is new to me today. No current A1c in EPIC.    CURRENT DIABETIC MEDS: intensive insulin injection program Lantus 10 units daily, Humalog 8 units AC + correction scale  Vial or pen: vials; prefers vials; rotates injects in abd and hips    Missing Insulin/PO medication doses: Yes   Timing prandial insulin 5-15 minutes before meals: yes    BG readings are checked 3-4 x/day, no meter or log to clinic today but reports readings range from :  FBS 120s-150s, 202       Hypoglycemia: none recently; previously during the night, BG in 70s  Hypoglycemic Symptoms: sweating, weakness, HA  Hypoglycemia Treatment: glucerna    Exercise: No    Dietary Habits: The patient eats a regular, healthy diet. snacks at night to prevent night hypoglycemia, drinks glucerna, water, coffee; eats out and at home    Last Eye Exam: 2017  Last Podiatry Exam: 2/2017    REVIEW OF SYSTEMS  Constitutional: no c/o fatigue, weakness, + weight gain over past year  Eyes: denies visual disturbances.  Cardiac: no palpitations or chest pain.  Respiratory: no SOB, SCHWAB, or cough  GI: no N/V/D, abdominal pain   Skin: + R foot heel ulcer - seeing podiatry weekly  Neuro: no  "numbness, tingling, or parasthesias.  Endocrine: denies polyphagia, polydipsia, polyuria    Personally reviewed Past Medical, Surgical, Social History.    Vital Signs  BP (!) 162/82   Pulse 77   Ht 6' 2" (1.88 m)   Wt 88.9 kg (196 lb)   BMI 25.16 kg/m²     Personally reviewed the below labs:    Hemoglobin A1C   Date Value Ref Range Status   11/15/2016 7.8 (H) 4.5 - 6.2 % Final     Comment:     According to ADA guidelines, hemoglobin A1C <7.0% represents  optimal control in non-pregnant diabetic patients.  Different  metrics may apply to specific populations.   Standards of Medical Care in Diabetes - 2016.  For the purpose of screening for the presence of diabetes:  <5.7%     Consistent with the absence of diabetes  5.7-6.4%  Consistent with increasing risk for diabetes   (prediabetes)  >or=6.5%  Consistent with diabetes  Currently no consensus exists for use of hemoglobin A1C  for diagnosis of diabetes for children.     09/27/2016 7.5 (H) 4.5 - 6.2 % Final     Comment:     According to ADA guidelines, hemoglobin A1C <7.0% represents  optimal control in non-pregnant diabetic patients.  Different  metrics may apply to specific populations.   Standards of Medical Care in Diabetes - 2016.  For the purpose of screening for the presence of diabetes:  <5.7%     Consistent with the absence of diabetes  5.7-6.4%  Consistent with increasing risk for diabetes   (prediabetes)  >or=6.5%  Consistent with diabetes  Currently no consensus exists for use of hemoglobin A1C  for diagnosis of diabetes for children.     08/16/2016 7.2 (H) 4.5 - 6.2 % Final     Comment:     According to ADA guidelines, hemoglobin A1C <7.0% represents  optimal control in non-pregnant diabetic patients.  Different  metrics may apply to specific populations.   Standards of Medical Care in Diabetes - 2016.  For the purpose of screening for the presence of diabetes:  <5.7%     Consistent with the absence of diabetes  5.7-6.4%  Consistent with increasing " risk for diabetes   (prediabetes)  >or=6.5%  Consistent with diabetes  Currently no consensus exists for use of hemoglobin A1C  for diagnosis of diabetes for children.         Chemistry        Component Value Date/Time     03/08/2017 1546    K 4.3 03/08/2017 1546     03/08/2017 1546    CO2 25 03/08/2017 1546    BUN 60 (H) 03/08/2017 1546    CREATININE 1.97 (H) 03/08/2017 1546     (H) 03/08/2017 1546        Component Value Date/Time    CALCIUM 9.6 03/08/2017 1546    ALKPHOS 84 03/08/2017 1546    AST 44 03/08/2017 1546    ALT 28 03/08/2017 1546    BILITOT 0.8 03/08/2017 1546        Lab Results   Component Value Date    CHOL 99 (L) 11/15/2016    CHOL 108 (L) 09/28/2016    CHOL 114 (L) 01/25/2016     Lab Results   Component Value Date    HDL 29 (L) 11/15/2016    HDL 24 (L) 09/28/2016    HDL 33 (L) 01/25/2016     Lab Results   Component Value Date    LDLCALC 47.0 (L) 11/15/2016    LDLCALC 57.8 (L) 09/28/2016    LDLCALC 63.4 01/25/2016     Lab Results   Component Value Date    TRIG 115 11/15/2016    TRIG 131 09/28/2016    TRIG 88 01/25/2016       Lab Results   Component Value Date    MICALBCREAT 12.9 09/05/2007     Lab Results   Component Value Date    TSH 1.563 11/15/2016     Vit D, 25-Hydroxy   Date Value Ref Range Status   06/16/2016 18 (L) 30 - 96 ng/mL Final     Comment:     Vitamin D deficiency.........<10 ng/mL                              Vitamin D insufficiency......10-29 ng/mL       Vitamin D sufficiency........> or equal to 30 ng/mL  Vitamin D toxicity............>100 ng/mL       PHYSICAL EXAMINATION  Constitutional: Appears well, no distress  Neck: Supple, trachea midline  Respiratory:  even and unlabored.  Cardiovascular:  no edema.    Abdomen: soft, non tender, non distended  Skin: warm and dry  Foot: full exam deferred, seeing podiatry weekly     Assessment/Plan  1. 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 Reviewed A1c and BG goals.   Continue Lantus 10 units in AM administration - to lessen risk of hypoglycemia. AM administration r/t renal dysfunction. Instructed not to snack at night to prevent hypoglycemia.   Continue Humalog 8 units AC, + correction scale. Demonstrated proper use of correction scale. Does NOT count carbs at this time.   - provided SMBG flowsheet, demonostrated use, she will mail to this office for review in 2-3 weeks  - a1c next time, a1c on 6/14/17    Discussed insulin's onset, peak, duration, storage, dosing, administration, site rotation.   Reviewed hypoglycemia, s/s and appropriate tx options.   Monitor BG ac/hs  - takes ASA, statin  - allergy to losartan, valsartan   2. Chronic kidney disease, stage IV (severe)  Avoiding metformin and SGLT2i r/t GFR  - off ARB per Dr. Gupta 8/2016     3. Diabetes mellitus with stage 4 chronic kidney disease GFR 15-29  Avoiding metformin and SGLT2i  -avoid hypoglycemia  - caution with insulin stacking   4. CAD S/P percutaneous coronary angioplasty  avoid hypoglycemia; followed by cardiology   5. Chronic diastolic heart failure  avoid hypoglycemia   6. Mixed hyperlipidemia   LDL goal < 100  - controlled, LDL at goal, on moderate intensity statin, LFTs noted   7. Paroxysmal atrial fibrillation  If uncontrolled, may contribute to insulin resistance  - seen by Dr. Kay annually - due 9/2017   8. Vitamin D deficiency  Level low, managed per renal - started ergo 8/2016   9. PVD, with R heel foot ulcer Optimize DM control to promote wound healing; f/u with podiatry as recommended   10. VEE on CPAP If uncontrolled, may contribute to insulin resistance     Orders Placed This Encounter   Procedures    Hemoglobin A1c       FOLLOW UP  Return in about 3 months (around 9/13/2017).

## 2017-06-16 ENCOUNTER — OFFICE VISIT (OUTPATIENT)
Dept: NEPHROLOGY | Facility: CLINIC | Age: 82
End: 2017-06-16
Payer: MEDICARE

## 2017-06-16 VITALS
HEART RATE: 72 BPM | OXYGEN SATURATION: 97 % | HEIGHT: 72 IN | SYSTOLIC BLOOD PRESSURE: 130 MMHG | DIASTOLIC BLOOD PRESSURE: 80 MMHG

## 2017-06-16 DIAGNOSIS — N18.4 DIABETES MELLITUS WITH STAGE 4 CHRONIC KIDNEY DISEASE GFR 15-29: Primary | Chronic | ICD-10-CM

## 2017-06-16 DIAGNOSIS — E11.22 DIABETES MELLITUS WITH STAGE 4 CHRONIC KIDNEY DISEASE GFR 15-29: Primary | Chronic | ICD-10-CM

## 2017-06-16 DIAGNOSIS — I50.32 CHRONIC DIASTOLIC HEART FAILURE: Chronic | ICD-10-CM

## 2017-06-16 PROCEDURE — 1159F MED LIST DOCD IN RCRD: CPT | Mod: ,,, | Performed by: INTERNAL MEDICINE

## 2017-06-16 PROCEDURE — 99213 OFFICE O/P EST LOW 20 MIN: CPT | Mod: PBBFAC | Performed by: INTERNAL MEDICINE

## 2017-06-16 PROCEDURE — 99999 PR PBB SHADOW E&M-EST. PATIENT-LVL III: CPT | Mod: PBBFAC,,, | Performed by: INTERNAL MEDICINE

## 2017-06-16 PROCEDURE — 99214 OFFICE O/P EST MOD 30 MIN: CPT | Mod: S$PBB,,, | Performed by: INTERNAL MEDICINE

## 2017-06-16 PROCEDURE — 1126F AMNT PAIN NOTED NONE PRSNT: CPT | Mod: ,,, | Performed by: INTERNAL MEDICINE

## 2017-06-16 RX ORDER — TORSEMIDE 20 MG/1
TABLET ORAL
Qty: 180 TABLET | Refills: 3 | Status: ON HOLD | OUTPATIENT
Start: 2017-06-16 | End: 2017-06-29

## 2017-06-16 NOTE — LETTER
June 16, 2017      Viviana Nguyen MD  [address] [facility]  [facility] [address]           [facility] - Nephrology  [address] [facility] [facility]  [facility] [address]  Phone: [phone]  Fax: [phone]          Patient: Chey Trujillo   MR Number: [id number]   YOB: 1935   Date of Visit: 6/16/2017       Dear Dr. Viviana Nguyen:    Thank you for referring Chey Trujillo to me for evaluation. Attached you will find relevant portions of my assessment and plan of care.    If you have questions, please do not hesitate to call me. I look forward to following Chey Trujillo along with you.    Sincerely,    Long Grace MD    Enclosure  CC:  No Recipients    If you would like to receive this communication electronically, please contact [email or web address] or [phone] to request more information on [employer] access.    For providers and/or their staff who would like to refer a patient to [facility], please contact us through our one-stop-shop provider referral line, [facility], at [phone].    If you feel you have received this communication in error or would no longer like to receive these types of communications, please e-mail [email or web address]

## 2017-06-18 ENCOUNTER — HOSPITAL ENCOUNTER (OUTPATIENT)
Facility: HOSPITAL | Age: 82
Discharge: HOME | End: 2017-06-19
Attending: EMERGENCY MEDICINE | Admitting: HOSPITALIST
Payer: COMMERCIAL

## 2017-06-18 DIAGNOSIS — R06.01 ORTHOPNEA: ICD-10-CM

## 2017-06-18 DIAGNOSIS — E11.22 DIABETES MELLITUS WITH STAGE 4 CHRONIC KIDNEY DISEASE GFR 15-29: Chronic | ICD-10-CM

## 2017-06-18 DIAGNOSIS — I50.33 ACUTE ON CHRONIC DIASTOLIC CONGESTIVE HEART FAILURE: ICD-10-CM

## 2017-06-18 DIAGNOSIS — F01.518 VASCULAR DEMENTIA WITH BEHAVIOR DISTURBANCE: ICD-10-CM

## 2017-06-18 DIAGNOSIS — E46 PROTEIN CALORIE MALNUTRITION: ICD-10-CM

## 2017-06-18 DIAGNOSIS — N18.4 CHRONIC KIDNEY DISEASE, STAGE IV (SEVERE): Chronic | ICD-10-CM

## 2017-06-18 DIAGNOSIS — Z98.61 CAD S/P PERCUTANEOUS CORONARY ANGIOPLASTY: Chronic | ICD-10-CM

## 2017-06-18 DIAGNOSIS — L97.419 DIABETIC ULCER OF RIGHT HEEL ASSOCIATED WITH TYPE 1 DIABETES MELLITUS, UNSPECIFIED ULCER STAGE: ICD-10-CM

## 2017-06-18 DIAGNOSIS — I48.0 PAROXYSMAL ATRIAL FIBRILLATION: Chronic | ICD-10-CM

## 2017-06-18 DIAGNOSIS — E08.621 DIABETIC ULCER OF RIGHT HEEL ASSOCIATED WITH DIABETES MELLITUS DUE TO UNDERLYING CONDITION, WITH FAT LAYER EXPOSED: ICD-10-CM

## 2017-06-18 DIAGNOSIS — I10 ESSENTIAL HYPERTENSION: ICD-10-CM

## 2017-06-18 DIAGNOSIS — I50.43 HEART FAILURE, ACUTE ON CHRONIC, SYSTOLIC AND DIASTOLIC: Primary | ICD-10-CM

## 2017-06-18 DIAGNOSIS — D61.818 PANCYTOPENIA: ICD-10-CM

## 2017-06-18 DIAGNOSIS — L97.412 DIABETIC ULCER OF RIGHT HEEL ASSOCIATED WITH DIABETES MELLITUS DUE TO UNDERLYING CONDITION, WITH FAT LAYER EXPOSED: ICD-10-CM

## 2017-06-18 DIAGNOSIS — N18.4 DIABETES MELLITUS WITH STAGE 4 CHRONIC KIDNEY DISEASE GFR 15-29: Chronic | ICD-10-CM

## 2017-06-18 DIAGNOSIS — I25.10 CAD S/P PERCUTANEOUS CORONARY ANGIOPLASTY: Chronic | ICD-10-CM

## 2017-06-18 DIAGNOSIS — R06.02 SOB (SHORTNESS OF BREATH): ICD-10-CM

## 2017-06-18 DIAGNOSIS — E10.621 DIABETIC ULCER OF RIGHT HEEL ASSOCIATED WITH TYPE 1 DIABETES MELLITUS, UNSPECIFIED ULCER STAGE: ICD-10-CM

## 2017-06-18 LAB
ALBUMIN SERPL BCP-MCNC: 2.7 G/DL
ALP SERPL-CCNC: 74 U/L
ALT SERPL W/O P-5'-P-CCNC: 20 U/L
ANION GAP SERPL CALC-SCNC: 8 MMOL/L
AST SERPL-CCNC: 39 U/L
BASOPHILS # BLD AUTO: 0.01 K/UL
BASOPHILS NFR BLD: 0.4 %
BILIRUB SERPL-MCNC: 0.5 MG/DL
BNP SERPL-MCNC: 421 PG/ML
BUN SERPL-MCNC: 60 MG/DL
CALCIUM SERPL-MCNC: 9.3 MG/DL
CHLORIDE SERPL-SCNC: 105 MMOL/L
CO2 SERPL-SCNC: 28 MMOL/L
CREAT SERPL-MCNC: 2.5 MG/DL
DIFFERENTIAL METHOD: ABNORMAL
EOSINOPHIL # BLD AUTO: 0 K/UL
EOSINOPHIL NFR BLD: 1.5 %
ERYTHROCYTE [DISTWIDTH] IN BLOOD BY AUTOMATED COUNT: 16.2 %
EST. GFR  (AFRICAN AMERICAN): 20 ML/MIN/1.73 M^2
EST. GFR  (NON AFRICAN AMERICAN): 17.4 ML/MIN/1.73 M^2
GLUCOSE SERPL-MCNC: 57 MG/DL
HCT VFR BLD AUTO: 31.6 %
HGB BLD-MCNC: 10.1 G/DL
INR PPP: 2.7
LYMPHOCYTES # BLD AUTO: 0.8 K/UL
LYMPHOCYTES NFR BLD: 28.7 %
MCH RBC QN AUTO: 29.4 PG
MCHC RBC AUTO-ENTMCNC: 32 %
MCV RBC AUTO: 92 FL
MONOCYTES # BLD AUTO: 0.2 K/UL
MONOCYTES NFR BLD: 8.5 %
NEUTROPHILS # BLD AUTO: 1.7 K/UL
NEUTROPHILS NFR BLD: 60.5 %
PLATELET # BLD AUTO: 90 K/UL
PMV BLD AUTO: 10.8 FL
POCT GLUCOSE: 137 MG/DL (ref 70–110)
POCT GLUCOSE: 167 MG/DL (ref 70–110)
POCT GLUCOSE: 188 MG/DL (ref 70–110)
POCT GLUCOSE: 70 MG/DL (ref 70–110)
POCT GLUCOSE: 79 MG/DL (ref 70–110)
POTASSIUM SERPL-SCNC: 3.6 MMOL/L
PREALB SERPL-MCNC: 13 MG/DL
PROT SERPL-MCNC: 6.4 G/DL
PROTHROMBIN TIME: 26.9 SEC
RBC # BLD AUTO: 3.44 M/UL
SODIUM SERPL-SCNC: 141 MMOL/L
TROPONIN I SERPL DL<=0.01 NG/ML-MCNC: 0.03 NG/ML
TSH SERPL DL<=0.005 MIU/L-ACNC: 2.89 UIU/ML
WBC # BLD AUTO: 2.72 K/UL

## 2017-06-18 PROCEDURE — 99219 PR INITIAL OBSERVATION CARE,LEVL II: CPT | Mod: ,,, | Performed by: PODIATRIST

## 2017-06-18 PROCEDURE — 84484 ASSAY OF TROPONIN QUANT: CPT | Mod: 91

## 2017-06-18 PROCEDURE — 63600175 PHARM REV CODE 636 W HCPCS: Performed by: NURSE PRACTITIONER

## 2017-06-18 PROCEDURE — 80053 COMPREHEN METABOLIC PANEL: CPT

## 2017-06-18 PROCEDURE — 99900035 HC TECH TIME PER 15 MIN (STAT)

## 2017-06-18 PROCEDURE — 93010 ELECTROCARDIOGRAM REPORT: CPT | Mod: S$GLB,,, | Performed by: INTERNAL MEDICINE

## 2017-06-18 PROCEDURE — 25000003 PHARM REV CODE 250: Performed by: GENERAL PRACTICE

## 2017-06-18 PROCEDURE — 63600175 PHARM REV CODE 636 W HCPCS: Performed by: GENERAL PRACTICE

## 2017-06-18 PROCEDURE — 83880 ASSAY OF NATRIURETIC PEPTIDE: CPT

## 2017-06-18 PROCEDURE — 96375 TX/PRO/DX INJ NEW DRUG ADDON: CPT

## 2017-06-18 PROCEDURE — 25000003 PHARM REV CODE 250: Performed by: NURSE PRACTITIONER

## 2017-06-18 PROCEDURE — 85025 COMPLETE CBC W/AUTO DIFF WBC: CPT

## 2017-06-18 PROCEDURE — 36415 COLL VENOUS BLD VENIPUNCTURE: CPT

## 2017-06-18 PROCEDURE — 82962 GLUCOSE BLOOD TEST: CPT

## 2017-06-18 PROCEDURE — G0378 HOSPITAL OBSERVATION PER HR: HCPCS

## 2017-06-18 PROCEDURE — 99285 EMERGENCY DEPT VISIT HI MDM: CPT | Mod: 25

## 2017-06-18 PROCEDURE — 84134 ASSAY OF PREALBUMIN: CPT

## 2017-06-18 PROCEDURE — 84484 ASSAY OF TROPONIN QUANT: CPT

## 2017-06-18 PROCEDURE — 27000190 HC CPAP FULL FACE MASK W/VALVE

## 2017-06-18 PROCEDURE — 96374 THER/PROPH/DIAG INJ IV PUSH: CPT

## 2017-06-18 PROCEDURE — 97802 MEDICAL NUTRITION INDIV IN: CPT

## 2017-06-18 PROCEDURE — 84443 ASSAY THYROID STIM HORMONE: CPT

## 2017-06-18 PROCEDURE — 94660 CPAP INITIATION&MGMT: CPT

## 2017-06-18 PROCEDURE — 85610 PROTHROMBIN TIME: CPT

## 2017-06-18 PROCEDURE — 99220 PR INITIAL OBSERVATION CARE,LEVL III: CPT | Mod: ,,, | Performed by: NURSE PRACTITIONER

## 2017-06-18 PROCEDURE — 99285 EMERGENCY DEPT VISIT HI MDM: CPT | Mod: ,,, | Performed by: EMERGENCY MEDICINE

## 2017-06-18 RX ORDER — HYDRALAZINE HYDROCHLORIDE 25 MG/1
100 TABLET, FILM COATED ORAL 2 TIMES DAILY
Status: DISCONTINUED | OUTPATIENT
Start: 2017-06-18 | End: 2017-06-19 | Stop reason: HOSPADM

## 2017-06-18 RX ORDER — HEPARIN SODIUM 5000 [USP'U]/ML
5000 INJECTION, SOLUTION INTRAVENOUS; SUBCUTANEOUS EVERY 8 HOURS
Status: DISCONTINUED | OUTPATIENT
Start: 2017-06-18 | End: 2017-06-18

## 2017-06-18 RX ORDER — CYANOCOBALAMIN (VITAMIN B-12) 250 MCG
1000 TABLET ORAL DAILY
Status: DISCONTINUED | OUTPATIENT
Start: 2017-06-18 | End: 2017-06-19 | Stop reason: HOSPADM

## 2017-06-18 RX ORDER — MEMANTINE HYDROCHLORIDE 10 MG/1
10 TABLET ORAL 2 TIMES DAILY
Status: DISCONTINUED | OUTPATIENT
Start: 2017-06-18 | End: 2017-06-19 | Stop reason: HOSPADM

## 2017-06-18 RX ORDER — IPRATROPIUM BROMIDE AND ALBUTEROL SULFATE 2.5; .5 MG/3ML; MG/3ML
3 SOLUTION RESPIRATORY (INHALATION) EVERY 4 HOURS PRN
Status: DISCONTINUED | OUTPATIENT
Start: 2017-06-18 | End: 2017-06-19 | Stop reason: HOSPADM

## 2017-06-18 RX ORDER — WARFARIN 2.5 MG/1
2.5 TABLET ORAL DAILY
Status: DISCONTINUED | OUTPATIENT
Start: 2017-06-18 | End: 2017-06-19 | Stop reason: HOSPADM

## 2017-06-18 RX ORDER — ISOSORBIDE DINITRATE 20 MG/1
40 TABLET ORAL 2 TIMES DAILY
Status: DISCONTINUED | OUTPATIENT
Start: 2017-06-18 | End: 2017-06-19 | Stop reason: HOSPADM

## 2017-06-18 RX ORDER — FUROSEMIDE 10 MG/ML
40 INJECTION INTRAMUSCULAR; INTRAVENOUS
Status: COMPLETED | OUTPATIENT
Start: 2017-06-18 | End: 2017-06-18

## 2017-06-18 RX ORDER — FERROUS SULFATE 325(65) MG
325 TABLET, DELAYED RELEASE (ENTERIC COATED) ORAL 2 TIMES DAILY
Status: DISCONTINUED | OUTPATIENT
Start: 2017-06-18 | End: 2017-06-19 | Stop reason: HOSPADM

## 2017-06-18 RX ORDER — IBUPROFEN 200 MG
24 TABLET ORAL
Status: DISCONTINUED | OUTPATIENT
Start: 2017-06-18 | End: 2017-06-19 | Stop reason: HOSPADM

## 2017-06-18 RX ORDER — AMIODARONE HYDROCHLORIDE 200 MG/1
200 TABLET ORAL DAILY
Status: DISCONTINUED | OUTPATIENT
Start: 2017-06-18 | End: 2017-06-19 | Stop reason: HOSPADM

## 2017-06-18 RX ORDER — DOXAZOSIN 2 MG/1
4 TABLET ORAL DAILY
Status: DISCONTINUED | OUTPATIENT
Start: 2017-06-18 | End: 2017-06-19 | Stop reason: HOSPADM

## 2017-06-18 RX ORDER — PRAVASTATIN SODIUM 20 MG/1
20 TABLET ORAL DAILY
Status: DISCONTINUED | OUTPATIENT
Start: 2017-06-18 | End: 2017-06-19 | Stop reason: HOSPADM

## 2017-06-18 RX ORDER — IBUPROFEN 200 MG
16 TABLET ORAL
Status: DISCONTINUED | OUTPATIENT
Start: 2017-06-18 | End: 2017-06-19 | Stop reason: HOSPADM

## 2017-06-18 RX ORDER — ONDANSETRON 8 MG/1
8 TABLET, ORALLY DISINTEGRATING ORAL EVERY 8 HOURS PRN
Status: DISCONTINUED | OUTPATIENT
Start: 2017-06-18 | End: 2017-06-19 | Stop reason: HOSPADM

## 2017-06-18 RX ORDER — AMOXICILLIN 250 MG
1 CAPSULE ORAL DAILY
Status: DISCONTINUED | OUTPATIENT
Start: 2017-06-18 | End: 2017-06-19 | Stop reason: HOSPADM

## 2017-06-18 RX ORDER — GLUCAGON 1 MG
1 KIT INJECTION
Status: DISCONTINUED | OUTPATIENT
Start: 2017-06-18 | End: 2017-06-19 | Stop reason: HOSPADM

## 2017-06-18 RX ORDER — FUROSEMIDE 10 MG/ML
40 INJECTION INTRAMUSCULAR; INTRAVENOUS 2 TIMES DAILY
Status: DISCONTINUED | OUTPATIENT
Start: 2017-06-18 | End: 2017-06-19

## 2017-06-18 RX ORDER — ESCITALOPRAM OXALATE 5 MG/1
5 TABLET ORAL DAILY
Status: DISCONTINUED | OUTPATIENT
Start: 2017-06-18 | End: 2017-06-19 | Stop reason: HOSPADM

## 2017-06-18 RX ORDER — ONDANSETRON 2 MG/ML
4 INJECTION INTRAMUSCULAR; INTRAVENOUS EVERY 12 HOURS PRN
Status: DISCONTINUED | OUTPATIENT
Start: 2017-06-18 | End: 2017-06-19 | Stop reason: HOSPADM

## 2017-06-18 RX ORDER — CARVEDILOL 25 MG/1
25 TABLET ORAL 2 TIMES DAILY WITH MEALS
Status: DISCONTINUED | OUTPATIENT
Start: 2017-06-18 | End: 2017-06-19 | Stop reason: HOSPADM

## 2017-06-18 RX ORDER — LATANOPROST 50 UG/ML
1 SOLUTION/ DROPS OPHTHALMIC NIGHTLY
Status: DISCONTINUED | OUTPATIENT
Start: 2017-06-18 | End: 2017-06-19 | Stop reason: HOSPADM

## 2017-06-18 RX ORDER — DEXTROSE 50 % IN WATER (D50W) INTRAVENOUS SYRINGE
25
Status: COMPLETED | OUTPATIENT
Start: 2017-06-18 | End: 2017-06-18

## 2017-06-18 RX ORDER — RAMELTEON 8 MG/1
8 TABLET ORAL NIGHTLY PRN
Status: DISCONTINUED | OUTPATIENT
Start: 2017-06-18 | End: 2017-06-19 | Stop reason: HOSPADM

## 2017-06-18 RX ORDER — INSULIN ASPART 100 [IU]/ML
0-5 INJECTION, SOLUTION INTRAVENOUS; SUBCUTANEOUS
Status: DISCONTINUED | OUTPATIENT
Start: 2017-06-18 | End: 2017-06-19 | Stop reason: HOSPADM

## 2017-06-18 RX ADMIN — DOXAZOSIN MESYLATE 4 MG: 2 TABLET ORAL at 08:06

## 2017-06-18 RX ADMIN — FERROUS SULFATE TAB EC 325 MG (65 MG FE EQUIVALENT) 325 MG: 325 (65 FE) TABLET DELAYED RESPONSE at 09:06

## 2017-06-18 RX ADMIN — ISOSORBIDE DINITRATE 40 MG: 20 TABLET ORAL at 08:06

## 2017-06-18 RX ADMIN — LATANOPROST 1 DROP: 50 SOLUTION OPHTHALMIC at 09:06

## 2017-06-18 RX ADMIN — PRAVASTATIN SODIUM 20 MG: 20 TABLET ORAL at 08:06

## 2017-06-18 RX ADMIN — STANDARDIZED SENNA CONCENTRATE AND DOCUSATE SODIUM 1 TABLET: 8.6; 5 TABLET, FILM COATED ORAL at 08:06

## 2017-06-18 RX ADMIN — MEMANTINE HYDROCHLORIDE 10 MG: 10 TABLET ORAL at 09:06

## 2017-06-18 RX ADMIN — ISOSORBIDE DINITRATE 40 MG: 20 TABLET ORAL at 09:06

## 2017-06-18 RX ADMIN — AMIODARONE HYDROCHLORIDE 200 MG: 200 TABLET ORAL at 08:06

## 2017-06-18 RX ADMIN — CYANOCOBALAMIN TAB 250 MCG 1000 MCG: 250 TAB at 08:06

## 2017-06-18 RX ADMIN — HYDRALAZINE HYDROCHLORIDE 100 MG: 25 TABLET, FILM COATED ORAL at 08:06

## 2017-06-18 RX ADMIN — WARFARIN SODIUM 2.5 MG: 2.5 TABLET ORAL at 05:06

## 2017-06-18 RX ADMIN — FERROUS SULFATE TAB EC 325 MG (65 MG FE EQUIVALENT) 325 MG: 325 (65 FE) TABLET DELAYED RESPONSE at 08:06

## 2017-06-18 RX ADMIN — FUROSEMIDE 40 MG: 10 INJECTION, SOLUTION INTRAVENOUS at 03:06

## 2017-06-18 RX ADMIN — CARVEDILOL 25 MG: 25 TABLET, FILM COATED ORAL at 08:06

## 2017-06-18 RX ADMIN — HYDRALAZINE HYDROCHLORIDE 100 MG: 25 TABLET, FILM COATED ORAL at 09:06

## 2017-06-18 RX ADMIN — FUROSEMIDE 40 MG: 10 INJECTION, SOLUTION INTRAVENOUS at 08:06

## 2017-06-18 RX ADMIN — ESCITALOPRAM 5 MG: 5 TABLET, FILM COATED ORAL at 08:06

## 2017-06-18 RX ADMIN — DEXTROSE MONOHYDRATE 25 G: 25 INJECTION, SOLUTION INTRAVENOUS at 03:06

## 2017-06-18 RX ADMIN — CARVEDILOL 25 MG: 25 TABLET, FILM COATED ORAL at 05:06

## 2017-06-18 RX ADMIN — MEMANTINE HYDROCHLORIDE 10 MG: 10 TABLET ORAL at 08:06

## 2017-06-18 RX ADMIN — FUROSEMIDE 40 MG: 10 INJECTION, SOLUTION INTRAVENOUS at 05:06

## 2017-06-18 NOTE — ASSESSMENT & PLAN NOTE
- hypoglycemic on arrival requiring D50  - monitor glucose closely  - hold long acting insulin for now  -ISS AC/HS

## 2017-06-18 NOTE — SUBJECTIVE & OBJECTIVE
Past Medical History:   Diagnosis Date    Anticoagulation monitoring by pharmacist 6/29/2012    At risk for amiodarone toxicity with long term use 1/27/2014    Atrial fibrillation     Bilateral carotid artery disease 1/11/2016    Blood transfusion     CAD S/P percutaneous coronary angioplasty 9/27/2012    Chronic diastolic heart failure 9/27/2012    Echo 3-: 1 - Concentric hypertrophy.  2 - Normal left ventricular systolic function (EF 60-65%).  3 - Right ventricular enlargement with hypertrophy, with normal systolic function.  4 - Left ventricular diastolic dysfunction.  5 - Biatrial enlargement.  6 - Mild tricuspid regurgitation.  7 - Pulmonary hypertension. The estimated PA systolic pressure is 55 mmHg.  8 - Increased central venous pressure (IVC is greater than 3cm in diameter).     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  ROWAN placed just proximal to stent in 2005 for UA.  Cath 12/2011: normal LMCA, 40% mid LAD, 50% distal LCx    EYE SURGERY      LAMINECTOMY      TOTAL HIP ARTHROPLASTY Right     x's r hip       Review of patient's allergies indicates:   Allergen Reactions    Losartan Other (See Comments)     Hyperkalemia    0.225 % sodium chloride      Other reaction(s): Eye irritation    Amitriptyline      Other reaction(s): Vomiting  Other reaction(s): Vomiting    Azopt  [brinzolamide]      Other reaction(s): Eye irritation    Cantil  [mepenzolate bromide]      Other reaction(s): Unknown  Other reaction(s): Unknown    Ciprofloxacin Nausea And Vomiting     Other reaction(s): Stomach upset    Codeine      Other reaction(s): Unknown  Other reaction(s): Unknown    Duragal-s      Other reaction(s): Vomiting    Erythromycin      Other reaction(s): Unknown  Other reaction(s): Unknown    Fentanyl      Other reaction(s): Vomiting    Hydrocodone-acetaminophen      Other reaction(s): Unknown  Other reaction(s): Unknown    Indomethacin      Other reaction(s): Unknown  Other reaction(s): Unknown    Meperidine      Other reaction(s): Unknown  Other reaction(s): Unknown    Minoxidil      Other reaction(s): druged  Other reaction(s): nausea and vomiting  Other reaction(s): nausea and vomiting  Other reaction(s): druged    Morphine      Other reaction(s): Unknown  Other reaction(s): Unknown    Neuromuscular blockers, steroidal      Other reaction(s): Unknown    Nifedipine      Other reaction(s): Swelling  Other reaction(s): Swelling    Oxycodone hcl-oxycodone-asa      Other reaction(s): Unknown  Other reaction(s): Unknown    Penicillins Hives     Other reaction(s): Unknown    Propoxyphene      Other reaction(s): Unknown    Sensipar [cinacalcet] Nausea Only     Talwin  [pentazocine lactate]      Other reaction(s): Unknown    Talwin compound      Other reaction(s): Unknown    Valsartan Rash and Hives       No current facility-administered medications on file prior to encounter.      Current Outpatient Prescriptions on File Prior to Encounter   Medication Sig    ACCU-CHEK SMARTVIEW TEST STRIP Strp test FOUR TIMES A DAY    amiodarone (PACERONE) 200 MG Tab Take 1 tablet (200 mg total) by mouth once daily.    amlodipine (NORVASC) 10 MG tablet Take 1 tablet (10 mg total) by mouth once daily.    aspirin 81 MG chewable tablet Take 1 tablet by mouth Every morning.    blood-glucose meter Misc Dispense Accu-Chek Natalia meter    carvedilol (COREG) 25 MG tablet Take 1 tablet (25 mg total) by mouth 2 (two) times daily with meals.    collagenase ointment Apply topically once daily.    cyanocobalamin, vitamin B-12, 1,000 mcg TbSR Take 1,000 mcg by mouth once daily.    dorzolamide (TRUSOPT) 2 % ophthalmic solution INSTILL ONE DROP INTO EACH EYE TWICE DAILY    doxazosin (CARDURA) 4 MG tablet Take 1 tablet (4 mg total) by mouth once daily.    escitalopram oxalate (LEXAPRO) 5 MG Tab Take 1 tablet (5 mg total) by mouth once daily.    ferrous sulfate 324 mg (65 mg iron) TbEC Take 1 tablet (325 mg total) by mouth 2 (two) times daily.    hydrALAZINE (APRESOLINE) 100 MG tablet Take 1 tablet (100 mg total) by mouth 2 (two) times daily.    insulin glargine (LANTUS) 100 unit/mL injection INJECT 10 units nightly.    insulin lispro (HUMALOG) 100 unit/mL injection Inject 8 units w/ meals.    insulin syringe-needle U-100 (EASY TOUCH INSULIN SYRINGE) 0.5 mL 31 gauge x 5/16 Syrg To use 4 times daily with insulin injections    isosorbide dinitrate (ISORDIL) 40 MG Tab Take 1 tablet (40 mg total) by mouth 2 (two) times daily.    KLOR-CON M10 10 mEq tablet TAKE ONE TABLET BY MOUTH TWICE DAILY    lactulose (CEPHULAC) 20 gram Pack Take 1 packet (20 g total) by mouth once daily.    lancets  Misc 1 lancet by Misc.(Non-Drug; Combo Route) route 4 (four) times daily.    latanoprost 0.005 % ophthalmic solution INSTILL ONE DROP INTO EACH EYE IN THE EVENING    memantine (NAMENDA) 10 MG Tab Take 1 tablet (10 mg total) by mouth 2 (two) times daily.    nitroGLYCERIN 0.4 MG/DOSE TL SPRY (NITROLINGUAL) 400 mcg/spray spray PLACE ONE SPRAY UNDER THE TONGUE EVERY 5 MINUTES AS NEEDED FOR CHEST PAIN.    NITROSTAT 0.3 mg SL tablet DISSOLVE ONE TABLET UNDER THE TONGUE EVERY 5 MINUTES AS NEEDED FOR CHEST PAIN.  DO NOT EXCEED A TOTAL OF 3 DOSES IN 15 MINUTES    pravastatin (PRAVACHOL) 20 MG tablet TAKE 1 TABLET BY MOUTH once DAILY    torsemide (DEMADEX) 20 MG Tab 1 tablet (20 mg) in the morning, half a tablet (10 mg) in the afternoon.    warfarin (COUMADIN) 5 MG tablet Take 0.5-1 tablets (2.5-5 mg total) by mouth Daily. As directed by [facility]     Family History     Problem Relation (Age of Onset)    Cancer Mother, Brother, Daughter, Maternal Aunt    Diabetes Mother, Sister, Sister, Son        Social History Main Topics    Smoking status: Never Smoker    Smokeless tobacco: Never Used    Alcohol use No    Drug use: No    Sexual activity: No     Review of Systems   Constitutional: Positive for activity change and fatigue. Negative for appetite change, chills and fever.   HENT: Negative for congestion.    Eyes: Negative for visual disturbance.   Respiratory: Positive for shortness of breath. Negative for cough and chest tightness.    Cardiovascular: Positive for leg swelling. Negative for chest pain and palpitations.   Gastrointestinal: Negative for abdominal distention, abdominal pain, constipation, nausea and vomiting.   Genitourinary: Negative for dysuria and frequency.        Incontinent     Musculoskeletal: Negative for back pain and joint swelling.   Skin: Negative for rash.   Neurological: Negative for seizures and syncope.   Hematological: Bruises/bleeds easily.     Objective:     Vital Signs (Most  Recent):  Temp: 98.4 °F (36.9 °C) (06/18/17 0205)  Pulse: 71 (06/18/17 0446)  Resp: 13 (06/18/17 0446)  BP: (!) 158/57 (06/18/17 0446)  SpO2: 96 % (06/18/17 0446) Vital Signs (24h Range):  Temp:  [98.4 °F (36.9 °C)] 98.4 °F (36.9 °C)  Pulse:  [64-84] 71  Resp:  [12-19] 13  SpO2:  [96 %-100 %] 96 %  BP: (146-183)/(57-83) 158/57     Weight: 88.9 kg (196 lb)  Body mass index is 25.16 kg/m².    Physical Exam   Constitutional: She appears well-developed. No distress.   HENT:   Head: Normocephalic and atraumatic.   Eyes: EOM are normal. Pupils are equal, round, and reactive to light.   Neck: Normal range of motion. Neck supple. JVD present.   Cardiovascular: Normal rate, regular rhythm, normal heart sounds and intact distal pulses.    No murmur heard.  Pulmonary/Chest: Effort normal. No respiratory distress. She has no wheezes. She has no rales.   Abdominal: Soft. Bowel sounds are normal. She exhibits no distension. There is no tenderness.   Musculoskeletal: Normal range of motion. She exhibits edema.   Neurological: She is alert. GCS eye subscore is 4. GCS verbal subscore is 4. GCS motor subscore is 6.   Skin: Skin is warm and dry. She is not diaphoretic.   Psychiatric: She has a normal mood and affect. She is withdrawn.   Nursing note and vitals reviewed.       Significant Labs:   CBC:   Recent Labs  Lab 06/18/17 0246   WBC 2.72*   HGB 10.1*   HCT 31.6*   PLT 90*     CMP:   Recent Labs  Lab 06/18/17 0246      K 3.6      CO2 28   GLU 57*   BUN 60*   CREATININE 2.5*   CALCIUM 9.3   PROT 6.4   ALBUMIN 2.7*   BILITOT 0.5   ALKPHOS 74   AST 39   ALT 20   ANIONGAP 8   EGFRNONAA 17.4*     Cardiac Markers:   Recent Labs  Lab 06/18/17 0246   *     Coagulation:   Recent Labs  Lab 06/18/17  0246   INR 2.7*     Troponin:   Recent Labs  Lab 06/18/17  0246   TROPONINI 0.034*     All pertinent labs within the past 24 hours have been reviewed.    Significant Imaging: I have reviewed all pertinent imaging  results/findings within the past 24 hours.

## 2017-06-18 NOTE — ASSESSMENT & PLAN NOTE
- Daughter reports good oral intake at home with supplements PRN  - Albumin 2.7, prealbumin 13  - Consulted nutrition and appreciate recs

## 2017-06-18 NOTE — ED TRIAGE NOTES
Pt presents to ED with c/o wheezing, rapid weight gain, fluid build up since the past week. Pt was seen by PCP Friday and had med dosage upped. Pt states they called the oncall nurse and was told to come to ED.

## 2017-06-18 NOTE — SUBJECTIVE & OBJECTIVE
Interval History: Pt had no acute events overnight. This AM, pt lying in bed resting comfortably with daughter at bedside. Pt and daughter endorse symptomatic improvement.     Review of Systems   Constitutional: Positive for activity change and fatigue. Negative for appetite change, chills and fever.   Eyes: Negative for photophobia and visual disturbance.   Respiratory: Positive for shortness of breath. Negative for cough, chest tightness and wheezing.    Cardiovascular: Positive for leg swelling. Negative for chest pain and palpitations.   Gastrointestinal: Negative for abdominal distention, abdominal pain, constipation, nausea and vomiting.   Genitourinary: Negative for dysuria, flank pain, frequency and hematuria.        Incontinent     Musculoskeletal: Negative for back pain, joint swelling and neck pain.   Skin: Positive for wound (R heel ulcer). Negative for rash.   Hematological: Negative for adenopathy. Bruises/bleeds easily.     Objective:     Vital Signs (Most Recent):  Temp: 97.6 °F (36.4 °C) (06/18/17 0845)  Pulse: 67 (06/18/17 1100)  Resp: 18 (06/18/17 0845)  BP: (!) 212/78 (06/18/17 0845)  SpO2: 97 % (06/18/17 0845) Vital Signs (24h Range):  Temp:  [97.6 °F (36.4 °C)-98.4 °F (36.9 °C)] 97.6 °F (36.4 °C)  Pulse:  [64-84] 67  Resp:  [12-19] 18  SpO2:  [96 %-100 %] 97 %  BP: (146-212)/(57-86) 212/78     Weight: 88.9 kg (196 lb)  Body mass index is 25.16 kg/m².    Intake/Output Summary (Last 24 hours) at 06/18/17 1403  Last data filed at 06/18/17 1000   Gross per 24 hour   Intake              340 ml   Output                0 ml   Net              340 ml      Physical Exam   Constitutional: She appears well-developed. No distress.   Neck: JVD present.   Cardiovascular: Normal rate, regular rhythm, normal heart sounds and intact distal pulses.    No murmur heard.  Pulmonary/Chest: Effort normal. No respiratory distress. She has no wheezes. She has no rales.   Abdominal: Soft. Bowel sounds are normal. She  exhibits no distension. There is no tenderness.   Musculoskeletal: Normal range of motion. She exhibits edema (nonpitting, trace).   Neurological: She is alert. GCS eye subscore is 4. GCS verbal subscore is 4. GCS motor subscore is 6.   Skin: Skin is warm and dry. She is not diaphoretic.   Psychiatric: She has a normal mood and affect. She is withdrawn.   Nursing note and vitals reviewed.      Significant Labs: All pertinent labs within the past 24 hours have been reviewed.    Significant Imaging: I have reviewed all pertinent imaging results/findings within the past 24 hours.

## 2017-06-18 NOTE — ASSESSMENT & PLAN NOTE
Secondary pulmonary hypertension, SOB (shortness of breath)  - Most recent ECHO 5/17/17 EF estimated at 45% ( baseline 60-65%), ongoing diastolic dysfunction, PA systolic pressure 63.72 (baseline 38-91-54.69)  - Received 40 mg of furosemide while in ED with reported good response  - Continue IV furosemide Q12 for now  6/18: Spoke with cardiology who rec BP control, continued diuresis, and optimal nutrition. Will continue IV lasix q12 as patient tolerates.

## 2017-06-18 NOTE — NURSING TRANSFER
Nursing Transfer Note      6/18/2017     Report received from skylar jung rn. Care assumed. Pt arrived to obs 6 via stretcher. Safety maintained. Pt lying in bed. Eyes open. Respirations unlabored. Pt alert awake orientated x4. Pt orientated to room and call bell. No distress noted.

## 2017-06-18 NOTE — ASSESSMENT & PLAN NOTE
- SBP ranging between 146-188  - Will likely improve s/p home meds and diuresis  - Continue current medication regimen. Consider adding PRN agent if indicated  6/18: BP controlled with home regimen. Will continue to monitor

## 2017-06-18 NOTE — ASSESSMENT & PLAN NOTE
- Creatinine 2.5 on arrival (baseline 1.97-2.45)  - Monitor closely during diuresis and trend chemistry daily  - Strict I/O's  6/18: Cr 2.5 on admit. Will monitor closely in setting of diuresis

## 2017-06-18 NOTE — ASSESSMENT & PLAN NOTE
On warfarin  - Continue home warfarin dosage  - PT/INR daily (2.7 on arrival)  - Hx of SSS s/p pacemaker placement  - Continue amiodarone as directed

## 2017-06-18 NOTE — ED PROVIDER NOTES
Encounter Date: 6/18/2017       History     Chief Complaint   Patient presents with    Shortness of Breath     Pt has SOB when walking. Hx of dementia      Review of patient's allergies indicates:   Allergen Reactions    Losartan Other (See Comments)     Hyperkalemia    0.225 % sodium chloride      Other reaction(s): Eye irritation    Amitriptyline      Other reaction(s): Vomiting  Other reaction(s): Vomiting    Azopt  [brinzolamide]      Other reaction(s): Eye irritation    Cantil  [mepenzolate bromide]      Other reaction(s): Unknown  Other reaction(s): Unknown    Ciprofloxacin Nausea And Vomiting     Other reaction(s): Stomach upset    Codeine      Other reaction(s): Unknown  Other reaction(s): Unknown    Duragal-s      Other reaction(s): Vomiting    Erythromycin      Other reaction(s): Unknown  Other reaction(s): Unknown    Fentanyl      Other reaction(s): Vomiting    Hydrocodone-acetaminophen      Other reaction(s): Unknown  Other reaction(s): Unknown    Indomethacin      Other reaction(s): Unknown  Other reaction(s): Unknown    Meperidine      Other reaction(s): Unknown  Other reaction(s): Unknown    Minoxidil      Other reaction(s): druged  Other reaction(s): nausea and vomiting  Other reaction(s): nausea and vomiting  Other reaction(s): druged    Morphine      Other reaction(s): Unknown  Other reaction(s): Unknown    Neuromuscular blockers, steroidal      Other reaction(s): Unknown    Nifedipine      Other reaction(s): Swelling  Other reaction(s): Swelling    Oxycodone hcl-oxycodone-asa      Other reaction(s): Unknown  Other reaction(s): Unknown    Penicillins Hives     Other reaction(s): Unknown    Propoxyphene      Other reaction(s): Unknown    Sensipar [cinacalcet] Nausea Only    Talwin  [pentazocine lactate]      Other reaction(s): Unknown    Talwin compound      Other reaction(s): Unknown    Valsartan Rash and Hives     Ms Trujillo presents with orthopnea and SCHWAB. Per her family  member, over the last two weeks, she has gone from lying flat to need 3 pillows at night to sleep. He states she gets short of breath when she walks short distances now, whereas she did not before. He states she has worsened swelling on her legs compared to previously. He endorses compliance with all medications, including torsemide. He and she denies chest pain, vomiting, fevers, syncope, and URI symptoms.           Past Medical History:   Diagnosis Date    Anticoagulation monitoring by pharmacist 6/29/2012    At risk for amiodarone toxicity with long term use 1/27/2014    Atrial fibrillation     Bilateral carotid artery disease 1/11/2016    Blood transfusion     CAD S/P percutaneous coronary angioplasty 9/27/2012    Chronic diastolic heart failure 9/27/2012    Echo 3-: 1 - Concentric hypertrophy.  2 - Normal left ventricular systolic function (EF 60-65%).  3 - Right ventricular enlargement with hypertrophy, with normal systolic function.  4 - Left ventricular diastolic dysfunction.  5 - Biatrial enlargement.  6 - Mild tricuspid regurgitation.  7 - Pulmonary hypertension. The estimated PA systolic pressure is 55 mmHg.  8 - Increased central venous pressure (IVC is greater than 3cm in diameter).     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  ROWAN placed just proximal to stent in 2005 for UA.  Cath 12/2011: normal LMCA, 40% mid LAD, 50% distal LCx    EYE SURGERY      LAMINECTOMY      TOTAL HIP ARTHROPLASTY Right     x's r hip     Family History   Problem Relation Age of Onset    Diabetes Mother     Cancer Mother     Diabetes Sister     Cancer Brother     Cancer Daughter     Diabetes Sister     Diabetes Son     Cancer Maternal Aunt     Kidney disease Neg Hx      Social History   Substance Use Topics    Smoking status: Never Smoker    Smokeless tobacco: Never Used    Alcohol use No     Review of Systems   Constitutional: Positive for fatigue. Negative for fever.   HENT: Negative for congestion and rhinorrhea.    Eyes: Negative for visual disturbance.   Respiratory: Positive for shortness of breath.    Cardiovascular: Positive for leg swelling. Negative for chest pain.   Gastrointestinal: Negative for diarrhea and vomiting.   Genitourinary: Negative for hematuria.   Musculoskeletal: Negative for neck pain and neck stiffness.   Skin: Negative for rash.   Neurological: Negative for seizures and syncope.       Physical Exam     Initial Vitals [06/18/17 0205]   BP Pulse Resp Temp SpO2   (!) 146/78 84 18 98.4 °F (36.9 °C) 100 %     Physical Exam    Nursing note and vitals reviewed.  Constitutional: She appears well-developed and well-nourished.   HENT:   Head: Normocephalic  and atraumatic.   Eyes: EOM are normal. Pupils are equal, round, and reactive to light.   Neck: Normal range of motion. No tracheal deviation present.   Cardiovascular: Normal rate, regular rhythm and normal heart sounds.   Pulmonary/Chest: No respiratory distress. She has rales (mild at bases).   Decreased at right base   Abdominal: Soft. She exhibits no distension.   Musculoskeletal: She exhibits edema (2+ to mid shin).   Neurological: She is alert.   Skin: Capillary refill takes less than 2 seconds.         ED Course   Procedures  Labs Reviewed   CBC W/ AUTO DIFFERENTIAL - Abnormal; Notable for the following:        Result Value    WBC 2.72 (*)     RBC 3.44 (*)     Hemoglobin 10.1 (*)     Hematocrit 31.6 (*)     RDW 16.2 (*)     Platelets 90 (*)     Gran # 1.7 (*)     Lymph # 0.8 (*)     Mono # 0.2 (*)     All other components within normal limits   COMPREHENSIVE METABOLIC PANEL - Abnormal; Notable for the following:     Glucose 57 (*)     BUN, Bld 60 (*)     Creatinine 2.5 (*)     Albumin 2.7 (*)     eGFR if  20.0 (*)     eGFR if non  17.4 (*)     All other components within normal limits   TROPONIN I - Abnormal; Notable for the following:     Troponin I 0.034 (*)     All other components within normal limits   B-TYPE NATRIURETIC PEPTIDE - Abnormal; Notable for the following:      (*)     All other components within normal limits   PROTIME-INR - Abnormal; Notable for the following:     Prothrombin Time 26.9 (*)     INR 2.7 (*)     All other components within normal limits   POCT GLUCOSE   POCT GLUCOSE MONITORING CONTINUOUS     EKG Readings: (Independently Interpreted)   paced       X-Rays:   Independently Interpreted Readings:   Other Readings:  Cxr: cm, congestive changes, R side effusion          APC / Resident Notes:   82F with CHF presents with 3 pillow orthopnea and SCHWAB. On exam, VSS, mildly decreased right base lung sounds, otherwise with rales at base and mild edema  of the legs. Differential includes CHF exacerbation, fluid overload, medication non compliance. Have ordered CHF work up     Kelle Rocha MD  PGY-2, Emergency Medicine  3:30 AM 6/18/2017    BNP mildly elevated, CXR shows mildly enlarged pleural effusion on right. I spoke with medicine who will admit her for CHF exacerbation.     Kelle Rocha MD  PGY-2, Emergency Medicine  3:50 AM 6/18/2017           Attending Attestation:   Physician Attestation Statement for Resident:  As the supervising MD   Physician Attestation Statement: I have personally seen and examined this patient.   I agree with the above history. -:   As the supervising MD I agree with the above PE.    As the supervising MD I agree with the above treatment, course, plan, and disposition.  I have reviewed and agree with the residents interpretation of the following: lab data, x-rays and EKG.  I have reviewed the following: old records at this facility.                    ED Course     Clinical Impression:   The primary encounter diagnosis was Acute on chronic diastolic congestive heart failure. 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          Bernard Cortes MD  06/18/17 0692

## 2017-06-18 NOTE — PROGRESS NOTES
[facility] Hospital Medicine  Progress Note    Patient Name: Chey Trujillo  MRN: [id number]  Patient Class: OP- Observation   Admission Date: 6/18/2017  Length of Stay: 0 days  Attending Physician: Nichole Damian MD  Primary Care Provider: Viviana Nguyen MD    [facility] Medicine Team: [facility] MED F Adela Lora PA-C    Subjective:     Principal Problem:Heart failure, acute on chronic, systolic and diastolic    HPI:  83 y/o female, who presents to the ED with worsening SOB, BLE edema, orthopnea, and dyspnea with exertion.  Her daughter states that symptoms started roughly about a week ago and have progressed.  They state that she has difficulty even walking short distances without shortness of breath, and that she has to sleep on multiple pillows at night (3). She usually on uses 1.  She last underwent echocardiogram 5/17/17 which suggested diastolic dysfunction, elevated PA pressure (63.72), and decreased EF (45%).  She has a PMH of dementia, Afib on chronic warfarin therapy, SSS s/p pacemaker implantation, CAD, CHF, Hep C, DM, CKD, HLD, HTN. Labs obtained on arrival find BNP elevated at 421, troponin 0.034, % ventricular paced,CXR suggestive of right sided pleural effusion which appears slightly worse from previous study.  She denies CP, N/V/D, recent illness, fever, chills, or  symptoms.  She does endorse urinary incontinence.  Family at bed side state that she is complaint with her medications.  She reports somewhat poor oral intake.  She states she usually eats 1-2 meals a day, albumin 2.7.   Glucose on arrival was noted to be 57 and she received D50 IV while in the ED. At this time she is in NAD and is tolerating RA with O2 saturations ranging between % with HOB at about 40 degrees, mild JVD is present. She was recently seen (6/12/17) outpatient by Dr. Donnelly (Podiatry) related to right foot wound.      Hospital Course:  Pt admitted to observation for acute on chronic CHF  exacerbation. Podiatry consulted for foot ulcer.   6/18: Spoke with cardiology who rec BP control, continued diuresis, and optimal nutrition. Will continue IV lasix q12 as patient tolerates. Consulted nutrition and PT/OT. Per podiatry, ulceration R heel stable with no active signs of infection; cleansed and dressing applied; heel protector boots ordered apply to B/L LE while in bed; f/u with podiatry within 2-3 days of discharge.        Interval History: Pt had no acute events overnight. This AM, pt lying in bed resting comfortably with daughter at bedside. Pt and daughter endorse symptomatic improvement.     Review of Systems   Constitutional: Positive for activity change and fatigue. Negative for appetite change, chills and fever.   Eyes: Negative for photophobia and visual disturbance.   Respiratory: Positive for shortness of breath. Negative for cough, chest tightness and wheezing.    Cardiovascular: Positive for leg swelling. Negative for chest pain and palpitations.   Gastrointestinal: Negative for abdominal distention, abdominal pain, constipation, nausea and vomiting.   Genitourinary: Negative for dysuria, flank pain, frequency and hematuria.        Incontinent     Musculoskeletal: Negative for back pain, joint swelling and neck pain.   Skin: Positive for wound (R heel ulcer). Negative for rash.   Hematological: Negative for adenopathy. Bruises/bleeds easily.     Objective:     Vital Signs (Most Recent):  Temp: 97.6 °F (36.4 °C) (06/18/17 0845)  Pulse: 67 (06/18/17 1100)  Resp: 18 (06/18/17 0845)  BP: (!) 212/78 (06/18/17 0845)  SpO2: 97 % (06/18/17 0845) Vital Signs (24h Range):  Temp:  [97.6 °F (36.4 °C)-98.4 °F (36.9 °C)] 97.6 °F (36.4 °C)  Pulse:  [64-84] 67  Resp:  [12-19] 18  SpO2:  [96 %-100 %] 97 %  BP: (146-212)/(57-86) 212/78     Weight: 88.9 kg (196 lb)  Body mass index is 25.16 kg/m².    Intake/Output Summary (Last 24 hours) at 06/18/17 1403  Last data filed at 06/18/17 1000   Gross per 24 hour    Intake              340 ml   Output                0 ml   Net              340 ml      Physical Exam   Constitutional: She appears well-developed. No distress.   Neck: JVD present.   Cardiovascular: Normal rate, regular rhythm, normal heart sounds and intact distal pulses.    No murmur heard.  Pulmonary/Chest: Effort normal. No respiratory distress. She has no wheezes. She has no rales.   Abdominal: Soft. Bowel sounds are normal. She exhibits no distension. There is no tenderness.   Musculoskeletal: Normal range of motion. She exhibits edema (nonpitting, trace).   Neurological: She is alert. GCS eye subscore is 4. GCS verbal subscore is 4. GCS motor subscore is 6.   Skin: Skin is warm and dry. She is not diaphoretic.   Psychiatric: She has a normal mood and affect. She is withdrawn.   Nursing note and vitals reviewed.      Significant Labs: All pertinent labs within the past 24 hours have been reviewed.    Significant Imaging: I have reviewed all pertinent imaging results/findings within the past 24 hours.    Assessment/Plan:      * Heart failure, acute on chronic, systolic and diastolic    Secondary pulmonary hypertension, SOB (shortness of breath)  - Most recent ECHO 5/17/17 EF estimated at 45% ( baseline 60-65%), ongoing diastolic dysfunction, PA systolic pressure 63.72 (baseline 38-91-54.69)  - Received 40 mg of furosemide while in ED with reported good response  - Continue IV furosemide Q12 for now  6/18: Spoke with cardiology who rec BP control, continued diuresis, and optimal nutrition. Will continue IV lasix q12 as patient tolerates.         SOB (shortness of breath)    - 2/2 acute on chronic CHF exacerbation  - See above          Essential hypertension    - SBP ranging between 146-188  - Will likely improve s/p home meds and diuresis  - Continue current medication regimen. Consider adding PRN agent if indicated  6/18: BP controlled with home regimen. Will continue to monitor        Secondary pulmonary  hypertension    - See above          CAD S/P percutaneous coronary angioplasty    - Mildly elevated troponin 0.034 on arrival (previous result of 0.04); likely elevated 2/2 demand ischemia in setting of hypervolemia and uncontrolled BP  - Will trend  - Maintain telemetry throughout hospitalization   - Continue BB, nitrate, and statin as directed        Paroxysmal atrial fibrillation    On warfarin  - Continue home warfarin dosage  - PT/INR daily (2.7 on arrival)  - Hx of SSS s/p pacemaker placement  - Continue amiodarone as directed        Diabetic ulcer of right heel    - Followed outpatient by Dr. Donnelly - last seen 6/12/17  - Podiatry consult pending  6/18: Per podiatry, ulceration R heel stable with no active signs of infection; cleansed and dressing applied; heel protector boots ordered apply to B/L LE while in bed; f/u with podiatry within 2-3 days of discharge.         Diabetes mellitus with stage 4 chronic kidney disease GFR 15-29    - Hypoglycemic on arrival requiring D50  - Monitor glucose closely  - Hold long acting insulin for now  - ISS AC/HS        Chronic kidney disease, stage IV (severe)    - Creatinine 2.5 on arrival (baseline 1.97-2.45)  - Monitor closely during diuresis and trend chemistry daily  - Strict I/O's  6/18: Cr 2.5 on admit. Will monitor closely in setting of diuresis        Pancytopenia    - Appears relatively stable  - Trend CBC daily        Protein calorie malnutrition    - Daughter reports good oral intake at home with supplements PRN  - Albumin 2.7, prealbumin 13  - Consulted nutrition and appreciate recs        Vascular dementia with behavior disturbance    - Continue supportive care  - High risk of fall / injury maintain all safety measures and fall precaution  - Consider sitter if family not at bedside          VTE Risk Mitigation         Ordered     warfarin (COUMADIN) tablet 2.5 mg  Daily     Route:  Oral        06/18/17 0519     Medium Risk of VTE  Once      06/18/17 0519      Place JUSTINE hose  Until discontinued      06/18/17 0519     Place sequential compression device  Until discontinued      06/18/17 0519     Reason for No Pharmacological VTE Prophylaxis  Once      06/18/17 0519          Adela Lora PA-C  Department of Hospital Medicine   [facility]

## 2017-06-18 NOTE — HOSPITAL COURSE
Pt admitted to observation for acute on chronic CHF exacerbation. Podiatry consulted for foot ulcer.   6/18: Spoke with cardiology who rec BP control, continued diuresis, and optimal nutrition. Will continue IV lasix q12 as patient tolerates. Consulted nutrition and PT/OT. Per podiatry, ulceration R heel stable with no active signs of infection; cleansed and dressing applied; heel protector boots ordered apply to B/L LE while in bed; f/u with podiatry within 2-3 days of discharge.   6/19: Pt with better BP control and adequate diuresis. Pt discharged home with [facility]. Pt to follow up with podiatry today at 3. Family educated on optimal nutrition and supplementation.

## 2017-06-18 NOTE — ASSESSMENT & PLAN NOTE
- Continue supportive care  - High risk of fall / injury maintain all safety measures and fall precaution  - Consider sitter if family not at bedside

## 2017-06-18 NOTE — ASSESSMENT & PLAN NOTE
- troponin 0.034 on arrival (previous result of 0.04  - trend troponin Q 6 X 3 - next draw due at 09:00  - maintain telemetry throughout hospitalization   - continue BB, nitrate, and statin as directed

## 2017-06-18 NOTE — ASSESSMENT & PLAN NOTE
- Mildly elevated troponin 0.034 on arrival (previous result of 0.04); likely elevated 2/2 demand ischemia in setting of hypervolemia and uncontrolled BP  - Will trend  - Maintain telemetry throughout hospitalization   - Continue BB, nitrate, and statin as directed

## 2017-06-18 NOTE — ASSESSMENT & PLAN NOTE
- Followed outpatient by Dr. Donnelly - last seen 6/12/17  - Podiatry consult pending  6/18: Per podiatry, ulceration R heel stable with no active signs of infection; cleansed and dressing applied; heel protector boots ordered apply to B/L LE while in bed; f/u with podiatry within 2-3 days of discharge.

## 2017-06-18 NOTE — PLAN OF CARE
Problem: Patient Care Overview  Goal: Plan of Care Review  Pt with no s/s hypo or hyperglycemia. Pt with no falls or injuries this shift. Pt afebrile, no s/s infection. Pt with no new skin breakdown.

## 2017-06-18 NOTE — ASSESSMENT & PLAN NOTE
- with chronic warfarin usage   - continue home warfarin dosage  - PT/INR daily (2.7 on arrival)  - hx of SSS s/p pacemaker placement  - continue amiodarone as directed

## 2017-06-18 NOTE — CONSULTS
Consult Note  Podiatry    Consult Requested By: Nichole Damian MD  Reason for Consult: Right foot ulceration followed by podiatry outpatient     SUBJECTIVE     History of Present Illness:  Chey Trujillo is a 82 y.o. female  has a past medical history of Anticoagulation monitoring by pharmacist; At risk for amiodarone toxicity with long term use; Atrial fibrillation; Bilateral carotid artery disease; Blood transfusion; CAD S/P percutaneous coronary angioplasty; Chronic diastolic heart failure; Chronic hepatitis C without hepatic coma; Degenerative joint disease (DJD) of lumbar spine; Depression; Diabetes mellitus type I; Gallstones; Goiter; Hyperlipidemia; Malignant essential hypertension with congestive heart failure with renal disease; Nonrheumatic aortic valve stenosis; Obstructive sleep apnea on CPAP - setting = 5 ; Primary hyperparathyroidism; Primary open-angle glaucoma, severe stage; Renal artery stenosis: see CTA 2012- no intervention.  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  Admitted for CHF exacerbation. Consulted for right foot ulceration. Followed by podiatry last seen by Dr. Donnelly on 6/12/17 at [facility] wound care center. Pt. Lethargic today upon exam. . Outer dressings removed from Right foot, offloaded on foam.  Family present at bedside who state other family remember removed outer dressings. Family reports ulceration to right foot  started one year ago during a hospitalization where she was bedbound.     Scheduled Meds:   amiodarone  200 mg Oral Daily    carvedilol  25 mg Oral BID WM    cyanocobalamin  1,000 mcg Oral Daily    doxazosin  4 mg Oral Daily    escitalopram oxalate  5 mg Oral Daily    ferrous sulfate  325 mg Oral BID    furosemide  40 mg  Intravenous BID    hydrALAZINE  100 mg Oral BID    isosorbide dinitrate  40 mg Oral BID    latanoprost  1 drop Both Eyes QHS    memantine  10 mg Oral BID    pravastatin  20 mg Oral Daily    senna-docusate 8.6-50 mg  1 tablet Oral Daily    warfarin  2.5 mg Oral Daily     Continuous Infusions:   PRN Meds:albuterol-ipratropium 2.5mg-0.5mg/3mL, dextrose 50%, dextrose 50%, glucagon (human recombinant), glucose, glucose, insulin aspart, ondansetron, ondansetron, ramelteon    Review of patient's allergies indicates:   Allergen Reactions    Losartan Other (See Comments)     Hyperkalemia    0.225 % sodium chloride      Other reaction(s): Eye irritation    Amitriptyline      Other reaction(s): Vomiting  Other reaction(s): Vomiting    Azopt  [brinzolamide]      Other reaction(s): Eye irritation    Cantil  [mepenzolate bromide]      Other reaction(s): Unknown  Other reaction(s): Unknown    Ciprofloxacin Nausea And Vomiting     Other reaction(s): Stomach upset    Codeine      Other reaction(s): Unknown  Other reaction(s): Unknown    Duragal-s      Other reaction(s): Vomiting    Erythromycin      Other reaction(s): Unknown  Other reaction(s): Unknown    Fentanyl      Other reaction(s): Vomiting    Hydrocodone-acetaminophen      Other reaction(s): Unknown  Other reaction(s): Unknown    Indomethacin      Other reaction(s): Unknown  Other reaction(s): Unknown    Meperidine      Other reaction(s): Unknown  Other reaction(s): Unknown    Minoxidil      Other reaction(s): druged  Other reaction(s): nausea and vomiting  Other reaction(s): nausea and vomiting  Other reaction(s): druged    Morphine      Other reaction(s): Unknown  Other reaction(s): Unknown    Neuromuscular blockers, steroidal      Other reaction(s): Unknown    Nifedipine      Other reaction(s): Swelling  Other reaction(s): Swelling    Oxycodone hcl-oxycodone-asa      Other reaction(s): Unknown  Other reaction(s): Unknown    Penicillins Hives      Other reaction(s): Unknown    Propoxyphene      Other reaction(s): Unknown    Sensipar [cinacalcet] Nausea Only    Talwin  [pentazocine lactate]      Other reaction(s): Unknown    Talwin compound      Other reaction(s): Unknown    Valsartan Rash and Hives        Past Medical History:   Diagnosis Date    Anticoagulation monitoring by pharmacist 6/29/2012    At risk for amiodarone toxicity with long term use 1/27/2014    Atrial fibrillation     Bilateral carotid artery disease 1/11/2016    Blood transfusion     CAD S/P percutaneous coronary angioplasty 9/27/2012    Chronic diastolic heart failure 9/27/2012    Echo 3-: 1 - Concentric hypertrophy.  2 - Normal left ventricular systolic function (EF 60-65%).  3 - Right ventricular enlargement with hypertrophy, with normal systolic function.  4 - Left ventricular diastolic dysfunction.  5 - Biatrial enlargement.  6 - Mild tricuspid regurgitation.  7 - Pulmonary hypertension. The estimated PA systolic pressure is 55 mmHg.  8 - Increased central venous pressure (IVC is greater than 3cm in diameter).     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  ANABELA u/s 4/14 wnl 9/12/2012    Secondary pulmonary hypertension 8/13/2013    Spinal stenosis     Thyroid nodule: per ENDO repeat in 2017 and see ENDO then (note 1/29/16) 1/2/2013    Tricuspid regurgitation 1/11/2016    Tubular adenoma x 3 6/13 5/19/2014    Type 2 DM  with CKD stage 4 and hypertension 1/16/2015    Urinary, incontinence, stress female      Past Surgical History:   Procedure Laterality Date    CARDIAC CATHETERIZATION      CARDIAC PACEMAKER PLACEMENT  2/9/2012    Quinn Reply , serial #[id number]    CATARACT EXTRACTION      Status post cataract extraction and insertion of intraocular lens of right eye    CORONARY ANGIOPLASTY      ROWAN to prox RCA 2002 for UA/+stress test.  ROWAN placed just proximal to stent in 2005 for UA.  Cath 12/2011: normal LMCA, 40% mid LAD, 50% distal LCx    EYE SURGERY      LAMINECTOMY      TOTAL HIP ARTHROPLASTY Right     x's r hip     Family History   Problem Relation Age of Onset    Diabetes Mother     Cancer Mother     Diabetes Sister     Cancer Brother     Cancer Daughter     Diabetes Sister     Diabetes Son     Cancer Maternal Aunt     Kidney disease Neg Hx      Social History   Substance Use Topics    Smoking status: Never Smoker    Smokeless tobacco: Never Used    Alcohol use No       Review of Systems:  Constitutional: no fever or chills  Respiratory: no cough or shortness of breath  Cardiovascular: no chest pain or palpitations  Gastrointestinal: no nausea or vomiting, tolerating diet  Integument/Breast: history of prior ulcerations  Musculoskeletal: positive for arthralgias  Neurological: history of numbness or paresthesias in the feet    OBJECTIVE     Vital Signs (Most Recent):  Temp: 97.6 °F (36.4 °C) (06/18/17 0845)  Pulse: 65 (06/18/17 0845)  Resp: 18 (06/18/17 0845)  BP: (!) 212/78 (06/18/17 0845)  SpO2: 97 % (06/18/17 0845)    Vital Signs Range (Last 24H):  Temp:  [97.6 °F (36.4 °C)-98.4 °F (36.9 °C)]   Pulse:  [64-84]   Resp:  [12-19]   BP: (146-212)/(57-86)   SpO2:  [96 %-100 %]     Physical Exam:    Vascular: Distal DP/PT pulses diminished b/l. CRT < 3 sec to tips of toes.  Edema noted to b/l LE.     Dermatologic: Wound 1: Right heel   Measurement:  Base: fibrous and necrotic eschar base  Periwound skin:  mild erythema, maceration  Drainage: mild purulence, superficial  Erythema: mild  Probe: none, no bone exposed  Tracking none  Undermining: distally from 6-9 o'clock          Musculoskeletal: Equinus noted b/l ankles with < 10 deg DF noted. Pain upon palpation right heel.     Neurological: Light touch, proprioception, and sharp/dull sensation are all decreased bilaterally. Protective threshold with the Geyser-Wienstein monofilament is decreased bilaterally.     Right heel 6/18/17          Laboratory:  CBC:   Recent Labs  Lab 06/18/17  0246   WBC 2.72*   RBC 3.44*   HGB 10.1*   HCT 31.6*   PLT 90*   MCV 92   MCH 29.4   MCHC 32.0     BMP:   Recent Labs  Lab 06/18/17  0246   GLU 57*      K 3.6      CO2 28   BUN 60*   CREATININE 2.5*   CALCIUM 9.3         Clinical Findings:  Right heel ulceration     ASSESSMENT/PLAN     Assessment:  Right heel ulceration   DM2  CKD  Vascular dementia     Plan:  -Ulceration right heel stable no active signs of infection. Cleansed with saline, chelle applied football dressing applied. If still inpatient plan for bandage change Friday.     -Heel protector boots ordered apply to B/L LE while in bed.     - F/u with podiatry within 2-3 days of discharge.     Podiatry will follow    Weightbearing Status: WBAT RLE  Offloading Device: LEONCIO Rincon DPM PGY-2  Pager: [phone]

## 2017-06-18 NOTE — HPI
81 y/o female, who presents to the ED with worsening SOB, BLE edema, orthopnea, and dyspnea with exertion.  Her daughter states that symptoms started roughly about a week ago and have progressed.  They state that she has difficulty even walking short distances without shortness of breath, and that she has to sleep on multiple pillows at night (3). She usually on uses 1.  She last underwent echocardiogram 5/17/17 which suggested diastolic dysfunction, elevated PA pressure (63.72), and decreased EF (45%).  She has a PMH of dementia, Afib on chronic warfarin therapy, SSS s/p pacemaker implantation, CAD, CHF, Hep C, DM, CKD, HLD, HTN. Labs obtained on arrival find BNP elevated at 421, troponin 0.034, % ventricular paced,CXR suggestive of right sided pleural effusion which appears slightly worse from previous study.  She denies CP, N/V/D, recent illness, fever, chills, or  symptoms.  She does endorse urinary incontinence.  Family at bed side state that she is complaint with her medications.  She reports somewhat poor oral intake.  She states she usually eats 1-2 meals a day, albumin 2.7.   Glucose on arrival was noted to be 57 and she received D50 IV while in the ED. At this time she is in NAD and is tolerating RA with O2 saturations ranging between % with HOB at about 40 degrees, mild JVD is present. She was recently seen (6/12/17) outpatient by Dr. Donnelly (Podiatry) related to right foot wound.

## 2017-06-18 NOTE — ASSESSMENT & PLAN NOTE
- Hypoglycemic on arrival requiring D50  - Monitor glucose closely  - Hold long acting insulin for now  - ISS AC/HS

## 2017-06-18 NOTE — ASSESSMENT & PLAN NOTE
- SBP ranging between 146-188  - will likely improve with diuresis   - continue current medication regimen  - monitor closely and consider adding PRN agent if indicated

## 2017-06-18 NOTE — ASSESSMENT & PLAN NOTE
- continue supportive care  - high risk of fall / injury maintain all safety measures and fall precaution  - may benefit from family staying with her or may want to consider sitter if possible

## 2017-06-18 NOTE — ASSESSMENT & PLAN NOTE
- reports poor oral intake  - albumin 2.7  - prealbumin pending  - monitor oral intake if meals less than 75% consumed consider nutrition consultation

## 2017-06-18 NOTE — ASSESSMENT & PLAN NOTE
- creatinine 2.5 on arrival (baseline 1.97-2.45)  - monitor closely during diuresis and trend chemistry daily  - strict I/O's

## 2017-06-18 NOTE — ASSESSMENT & PLAN NOTE
Secondary pulmonary hypertension, SOB (shortness of breath)  - worsening S/D heart failure  - most recent ECHO 5/17/17 EF estimated at 45% ( baseline 60-65%), ongoing diastolic dysfunction, PA systolic pressure 63.72 (baseline 38-91-54.69)  - received 40 mg of furosemide while in ED with reported good response  - continue IV furosemide Q12 for now  - Cardiology consult pending - appreciate input

## 2017-06-19 ENCOUNTER — HOSPITAL ENCOUNTER (OUTPATIENT)
Dept: WOUND CARE | Facility: HOSPITAL | Age: 82
Discharge: HOME | End: 2017-06-19
Attending: PODIATRIST
Payer: MEDICARE

## 2017-06-19 VITALS
DIASTOLIC BLOOD PRESSURE: 67 MMHG | HEIGHT: 72 IN | TEMPERATURE: 98 F | SYSTOLIC BLOOD PRESSURE: 147 MMHG | HEART RATE: 65 BPM | WEIGHT: 199 LBS | BODY MASS INDEX: 26.95 KG/M2

## 2017-06-19 VITALS
SYSTOLIC BLOOD PRESSURE: 142 MMHG | TEMPERATURE: 98 F | BODY MASS INDEX: 27.08 KG/M2 | OXYGEN SATURATION: 96 % | RESPIRATION RATE: 16 BRPM | HEIGHT: 72 IN | DIASTOLIC BLOOD PRESSURE: 73 MMHG | WEIGHT: 199.94 LBS | HEART RATE: 71 BPM

## 2017-06-19 DIAGNOSIS — E10.621 DIABETIC ULCER OF RIGHT HEEL ASSOCIATED WITH TYPE 1 DIABETES MELLITUS, WITH FAT LAYER EXPOSED: ICD-10-CM

## 2017-06-19 DIAGNOSIS — L97.412 DIABETIC ULCER OF RIGHT HEEL ASSOCIATED WITH DIABETES MELLITUS DUE TO UNDERLYING CONDITION, WITH FAT LAYER EXPOSED: ICD-10-CM

## 2017-06-19 DIAGNOSIS — L97.412 DIABETIC ULCER OF RIGHT HEEL ASSOCIATED WITH TYPE 1 DIABETES MELLITUS, WITH FAT LAYER EXPOSED: ICD-10-CM

## 2017-06-19 DIAGNOSIS — E11.22 DIABETES MELLITUS WITH STAGE 4 CHRONIC KIDNEY DISEASE GFR 15-29: Chronic | ICD-10-CM

## 2017-06-19 DIAGNOSIS — E08.621 DIABETIC ULCER OF RIGHT HEEL ASSOCIATED WITH DIABETES MELLITUS DUE TO UNDERLYING CONDITION, WITH FAT LAYER EXPOSED: ICD-10-CM

## 2017-06-19 DIAGNOSIS — L97.411 DIABETIC ULCER OF RIGHT HEEL ASSOCIATED WITH TYPE 1 DIABETES MELLITUS, LIMITED TO BREAKDOWN OF SKIN: Primary | ICD-10-CM

## 2017-06-19 DIAGNOSIS — N18.4 DIABETES MELLITUS WITH STAGE 4 CHRONIC KIDNEY DISEASE GFR 15-29: Chronic | ICD-10-CM

## 2017-06-19 DIAGNOSIS — N18.4 CHRONIC KIDNEY DISEASE, STAGE IV (SEVERE): Chronic | ICD-10-CM

## 2017-06-19 DIAGNOSIS — D63.8 ANEMIA OF CHRONIC DISEASE: ICD-10-CM

## 2017-06-19 DIAGNOSIS — E10.621 DIABETIC ULCER OF RIGHT HEEL ASSOCIATED WITH TYPE 1 DIABETES MELLITUS, LIMITED TO BREAKDOWN OF SKIN: Primary | ICD-10-CM

## 2017-06-19 PROBLEM — R06.02 SOB (SHORTNESS OF BREATH): Status: RESOLVED | Noted: 2017-06-18 | Resolved: 2017-06-19

## 2017-06-19 LAB
ALBUMIN SERPL BCP-MCNC: 2.3 G/DL
ALP SERPL-CCNC: 63 U/L
ALT SERPL W/O P-5'-P-CCNC: 19 U/L
ANION GAP SERPL CALC-SCNC: 7 MMOL/L
AST SERPL-CCNC: 31 U/L
BASOPHILS # BLD AUTO: 0 K/UL
BASOPHILS NFR BLD: 0 %
BILIRUB SERPL-MCNC: 0.5 MG/DL
BUN SERPL-MCNC: 65 MG/DL
CALCIUM SERPL-MCNC: 8.8 MG/DL
CHLORIDE SERPL-SCNC: 104 MMOL/L
CO2 SERPL-SCNC: 28 MMOL/L
CREAT SERPL-MCNC: 2.6 MG/DL
DIFFERENTIAL METHOD: ABNORMAL
EOSINOPHIL # BLD AUTO: 0 K/UL
EOSINOPHIL NFR BLD: 0.7 %
ERYTHROCYTE [DISTWIDTH] IN BLOOD BY AUTOMATED COUNT: 16.4 %
EST. GFR  (AFRICAN AMERICAN): 19.1 ML/MIN/1.73 M^2
EST. GFR  (NON AFRICAN AMERICAN): 16.6 ML/MIN/1.73 M^2
GLUCOSE SERPL-MCNC: 163 MG/DL
HCT VFR BLD AUTO: 28.6 %
HGB BLD-MCNC: 9.3 G/DL
INR PPP: 2.4
LYMPHOCYTES # BLD AUTO: 0.8 K/UL
LYMPHOCYTES NFR BLD: 27.4 %
MAGNESIUM SERPL-MCNC: 1.9 MG/DL
MCH RBC QN AUTO: 29.8 PG
MCHC RBC AUTO-ENTMCNC: 32.5 %
MCV RBC AUTO: 92 FL
MONOCYTES # BLD AUTO: 0.3 K/UL
MONOCYTES NFR BLD: 9.2 %
NEUTROPHILS # BLD AUTO: 1.8 K/UL
NEUTROPHILS NFR BLD: 62.4 %
PHOSPHATE SERPL-MCNC: 3.4 MG/DL
PLATELET # BLD AUTO: 92 K/UL
PMV BLD AUTO: 10.6 FL
POCT GLUCOSE: 174 MG/DL (ref 70–110)
POTASSIUM SERPL-SCNC: 4.4 MMOL/L
PROT SERPL-MCNC: 5.4 G/DL
PROTHROMBIN TIME: 23.6 SEC
RBC # BLD AUTO: 3.12 M/UL
SODIUM SERPL-SCNC: 139 MMOL/L
WBC # BLD AUTO: 2.92 K/UL

## 2017-06-19 PROCEDURE — 97597 DBRDMT OPN WND 1ST 20 CM/<: CPT | Mod: ,,, | Performed by: PODIATRIST

## 2017-06-19 PROCEDURE — 85610 PROTHROMBIN TIME: CPT

## 2017-06-19 PROCEDURE — 36415 COLL VENOUS BLD VENIPUNCTURE: CPT

## 2017-06-19 PROCEDURE — 94660 CPAP INITIATION&MGMT: CPT

## 2017-06-19 PROCEDURE — 99217 PR OBSERVATION CARE DISCHARGE: CPT | Mod: ,,, | Performed by: PHYSICIAN ASSISTANT

## 2017-06-19 PROCEDURE — 80053 COMPREHEN METABOLIC PANEL: CPT

## 2017-06-19 PROCEDURE — 82962 GLUCOSE BLOOD TEST: CPT

## 2017-06-19 PROCEDURE — 27000221 HC OXYGEN, UP TO 24 HOURS

## 2017-06-19 PROCEDURE — 99499 NO LOS: ICD-10-PCS | Mod: ,,, | Performed by: PODIATRIST

## 2017-06-19 PROCEDURE — 25000003 PHARM REV CODE 250: Performed by: GENERAL PRACTICE

## 2017-06-19 PROCEDURE — 25000003 PHARM REV CODE 250: Performed by: NURSE PRACTITIONER

## 2017-06-19 PROCEDURE — 99900035 HC TECH TIME PER 15 MIN (STAT)

## 2017-06-19 PROCEDURE — 99499 UNLISTED E&M SERVICE: CPT | Mod: ,,, | Performed by: PODIATRIST

## 2017-06-19 PROCEDURE — 94761 N-INVAS EAR/PLS OXIMETRY MLT: CPT

## 2017-06-19 PROCEDURE — 85025 COMPLETE CBC W/AUTO DIFF WBC: CPT

## 2017-06-19 PROCEDURE — 97597 DBRDMT OPN WND 1ST 20 CM/<: CPT

## 2017-06-19 PROCEDURE — 84100 ASSAY OF PHOSPHORUS: CPT

## 2017-06-19 PROCEDURE — G0378 HOSPITAL OBSERVATION PER HR: HCPCS

## 2017-06-19 PROCEDURE — 97597 PR DEBRIDEMENT OPEN WOUND 20 SQ CM<: ICD-10-PCS | Mod: ,,, | Performed by: PODIATRIST

## 2017-06-19 PROCEDURE — 83735 ASSAY OF MAGNESIUM: CPT

## 2017-06-19 RX ADMIN — HYDRALAZINE HYDROCHLORIDE 100 MG: 25 TABLET, FILM COATED ORAL at 09:06

## 2017-06-19 RX ADMIN — ESCITALOPRAM 5 MG: 5 TABLET, FILM COATED ORAL at 09:06

## 2017-06-19 RX ADMIN — ISOSORBIDE DINITRATE 40 MG: 20 TABLET ORAL at 09:06

## 2017-06-19 RX ADMIN — CARVEDILOL 25 MG: 25 TABLET, FILM COATED ORAL at 09:06

## 2017-06-19 RX ADMIN — MEMANTINE HYDROCHLORIDE 10 MG: 10 TABLET ORAL at 09:06

## 2017-06-19 RX ADMIN — DOXAZOSIN MESYLATE 4 MG: 2 TABLET ORAL at 09:06

## 2017-06-19 RX ADMIN — AMIODARONE HYDROCHLORIDE 200 MG: 200 TABLET ORAL at 09:06

## 2017-06-19 RX ADMIN — PRAVASTATIN SODIUM 20 MG: 20 TABLET ORAL at 09:06

## 2017-06-19 RX ADMIN — CYANOCOBALAMIN TAB 250 MCG 1000 MCG: 250 TAB at 09:06

## 2017-06-19 RX ADMIN — STANDARDIZED SENNA CONCENTRATE AND DOCUSATE SODIUM 1 TABLET: 8.6; 5 TABLET, FILM COATED ORAL at 09:06

## 2017-06-19 RX ADMIN — FERROUS SULFATE TAB EC 325 MG (65 MG FE EQUIVALENT) 325 MG: 325 (65 FE) TABLET DELAYED RESPONSE at 09:06

## 2017-06-19 NOTE — CONSULTS
"  [facility]  Adult Nutrition  Consult Note    SUMMARY     Consult received d/t pt with poor PO intake in setting of hypoalbuminemia. Thank you.     General Information Comments: Pt lethargic at time of visit. Per family, pt ate 90% of breakfast and 50% of lunch. Denies n/v. Requests to be taken off non select trays. FR 1500 mL.    Recommendations    1. Continue current diet order   2. Take pt off non select menu   3. Encoouraged good PO intake     RD following     Goals: PO intake > 50%   Nutrition Goal Status: new    Continuum of Care Plan    Nursing Team: obtain weekly wts    Reason for Assessment    Reason for Assessment: physician consult  Diagnosis:  (SOB)  Relevent Medical History: CAD, DM CHF, HLD, HTN, renal disease   Interdisciplinary Rounds: did not attend  Nutrition Discharge Planning: Pt to d/c on DM, renal, cardiac diet with adequate PO intake as tolerated    Nutrition Prescription Ordered    Current Diet Order: DM 2000 kcal  Nutrition Order Comments: FR: 1500 mL/d  Oral Nutrition Supplement: none ordered at this time     Nutrition Risk Screen     Nutrition Risk Screen: no indicators present    Nutrition/Diet History    Patient Reported Diet/Restrictions/Preferences: diabetic diet (from previous RD note)  Food Preferences: AUBREY    Labs/Tests/Procedures/Meds    Pertinent Labs Reviewed: reviewed  Pertinent Labs Comments: BG 57  Pertinent Medications Reviewed: reviewed  Pertinent Medications Comments: lasix, senna, coumadin, statin, ferrous sulfate    Physical Findings    Overall Physical Appearance: lethargic  Tubes:  (none)  Oral/Mouth Cavity:  (AUBREY)  Skin:  (right foot wound)    Anthropometrics    Temp: 98 °F (36.7 °C)  Height: 6' 2" (188 cm)  Weight Method: Stated  Weight: 88.9 kg (196 lb)  Ideal Body Weight (IBW), Female: 170 lb  % Ideal Body Weight, Female (lb): 115.29 lb  BMI (Calculated): 25.2  BMI Grade: 25 - 29.9 - overweight     Estimated/Assessed Needs    Weight Used For " Calorie Calculations: 88.9 kg (195 lb 15.8 oz)   Energy Need Method: Newton-St Jeor (1900 kcal/d (PAL 1.25))  RMR (Newton-St. Jeor Equation): 1492.8  Weight Used For Protein Calculations: 88.9 kg (195 lb 15.8 oz)  Protein Requirements:  gm/d (1.0-1.2 gm/kg of ABW)  Fluid Need Method: RDA Method (1900 mL/d or per MD)  CHO Requirement: 50% of daily kcals     Nutrition Dx     No nutrition dx at this time    Monitor and Evaluation    Food and Nutrient Intake: food and beverage intake  Food and Nutrient Adminstration: diet order  Anthropometric Measurements: weight change  Biochemical Data, Medical Tests and Procedures: electrolyte and renal panel, glucose/endocrine profile  Nutrition-Focused Physical Findings: overall appearance    Nutrition Risk    Level of Risk:  (f/u 1x/week)    Nutrition Follow-Up    RD Follow-up?: Yes     Ebony Boles RD, LDN

## 2017-06-19 NOTE — PLAN OF CARE
Problem: Patient Care Overview  Goal: Plan of Care Review  General Information Comments: Pt lethargic at time of visit. Per family, pt ate 90% of breakfast and 50% of lunch. Denies n/v. Requests to be taken off non select trays. FR 1500 mL.    Recommendations    1. Continue current diet order   2. Take pt off non select menu   3. Encouraged good PO intake     RD following     Ebony Boles, ABILIO, LDN

## 2017-06-19 NOTE — ASSESSMENT & PLAN NOTE
- Daughter reports good oral intake at home with supplements PRN  - Albumin 2.7, prealbumin 13  - Consulted nutrition and appreciate recs; per recs, continue current diet order and encourage PO intake

## 2017-06-19 NOTE — ASSESSMENT & PLAN NOTE
- SBP ranging between 146-188  - Will likely improve s/p home meds and diuresis  - Continue current medication regimen. Consider adding PRN agent if indicated  6/18-6/19: BP controlled with home regimen. Will continue to monitor

## 2017-06-19 NOTE — ASSESSMENT & PLAN NOTE
- Followed outpatient by Dr. Donnelly - last seen 6/12/17  - Podiatry consult pending  6/18: Per podiatry, ulceration R heel stable with no active signs of infection; cleansed and dressing applied; heel protector boots ordered apply to B/L LE while in bed; f/u with podiatry within 2-3 days of discharge.   6/19: Pt to follow up with podiatry today at 3.

## 2017-06-19 NOTE — PROGRESS NOTES
Received discharge orders. Patient is awake, alert and oriented to self. VSS. Patient in no acute distress. Removed cardiac monitoring. Removed IVX1. Catheter intact. No hematoma or swelling noted. Reviewed medication and appointment with son at bedside. Discharge instruction given. Son verbalized understanding. Patient left via  for discharge to home.

## 2017-06-19 NOTE — ASSESSMENT & PLAN NOTE
Secondary pulmonary hypertension, SOB (shortness of breath)  - Most recent ECHO 5/17/17 EF estimated at 45% ( baseline 60-65%), ongoing diastolic dysfunction, PA systolic pressure 63.72 (baseline 38-91-54.69)  - Received 40 mg of furosemide while in ED with reported good response  - Continue IV furosemide Q12 for now  6/18: Spoke with cardiology who rec BP control, continued diuresis, and optimal nutrition. Will continue IV lasix q12 as patient tolerates.   6/19: Pt with better BP control and adequate diuresis. Pt discharged home with [facility]. Pt to follow up with podiatry today at 3. Family educated on optimal nutrition and supplementation.

## 2017-06-19 NOTE — DISCHARGE SUMMARY
[facility] Hospital Medicine  Discharge Summary      Patient Name: Chey Trujillo  MRN: [id number]  Admission Date: 6/18/2017  Hospital Length of Stay: 0 days  Discharge Date and Time:  06/19/2017 11:54 AM  Attending Physician: Nichole Damian MD   Discharging Provider: Adela Lora PA-C  Primary Care Provider: Viviana Nguyen MD  Hospital Medicine Team: [facility] HOSP MED F Adela Lora PA-C    HPI:   81 y/o female, who presents to the ED with worsening SOB, BLE edema, orthopnea, and dyspnea with exertion.  Her daughter states that symptoms started roughly about a week ago and have progressed.  They state that she has difficulty even walking short distances without shortness of breath, and that she has to sleep on multiple pillows at night (3). She usually on uses 1.  She last underwent echocardiogram 5/17/17 which suggested diastolic dysfunction, elevated PA pressure (63.72), and decreased EF (45%).  She has a PMH of dementia, Afib on chronic warfarin therapy, SSS s/p pacemaker implantation, CAD, CHF, Hep C, DM, CKD, HLD, HTN. Labs obtained on arrival find BNP elevated at 421, troponin 0.034, % ventricular paced,CXR suggestive of right sided pleural effusion which appears slightly worse from previous study.  She denies CP, N/V/D, recent illness, fever, chills, or  symptoms.  She does endorse urinary incontinence.  Family at bed side state that she is complaint with her medications.  She reports somewhat poor oral intake.  She states she usually eats 1-2 meals a day, albumin 2.7.   Glucose on arrival was noted to be 57 and she received D50 IV while in the ED. At this time she is in NAD and is tolerating RA with O2 saturations ranging between % with HOB at about 40 degrees, mild JVD is present. She was recently seen (6/12/17) outpatient by Dr. Donnelly (Podiatry) related to right foot wound.      * No surgery found *      Indwelling Lines/Drains at time of discharge:    Lines/Drains/Airways          No matching active lines, drains, or airways        Hospital Course:   Pt admitted to observation for acute on chronic CHF exacerbation. Podiatry consulted for foot ulcer.   6/18: Spoke with cardiology who rec BP control, continued diuresis, and optimal nutrition. Will continue IV lasix q12 as patient tolerates. Consulted nutrition and PT/OT. Per podiatry, ulceration R heel stable with no active signs of infection; cleansed and dressing applied; heel protector boots ordered apply to B/L LE while in bed; f/u with podiatry within 2-3 days of discharge.   6/19: Pt with better BP control and adequate diuresis. Pt discharged home with [facility]. Pt to follow up with podiatry today at 3. Family educated on optimal nutrition and supplementation.      Consults:   Consults         Status Ordering Provider     Case Management/  Once     Provider:  (Not yet assigned)    Acknowledged SHANI HENNESSY     Inpatient consult to Podiatry  Once     Provider:  (Not yet assigned)    Completed SHANI HENNESSY     IP consult to dietary  Once     Provider:  (Not yet assigned)    Completed SARAH LÓPEZ          Significant Diagnostic Studies: Radiology: X-Ray: CXR: X-Ray Chest 1 View (CXR):   Results for orders placed or performed during the hospital encounter of 06/18/17   X-Ray Chest 1 View    Narrative    Chest AP radiograph.    Comparison: 3/8/2017.    Findings: The left lung is clear noting minimal left basilar subsegmental atelectasis. There is a small to moderate right-sided pleural effusion with associated atelectasis/consolidation in the right lower lobe, worsened since previous exam. Mediastinal structures are midline. There is a left-sided pacemaker with 2 separate transvenous leads extending into the right atrium and right ventricle. The aorta is atherosclerotic and ectatic. Tracheobronchial calcifications noted. The cardiac silhouette remains enlarged. The osseous structures demonstrate  degenerative changes. No evidence of pneumothorax or pneumoperitoneum.    Impression    Continued right-sided pleural effusion with associated compressive atelectasis/consolidation in the right lower lobe, slightly progressed since 3/8/2017.  Superimposed infection not excluded.  ______________________________________     Electronically signed by resident: JT NAVARRO MD  Date:     06/18/17  Time:    03:10            As the supervising and teaching physician, I personally reviewed the images and resident's interpretation and I agree with the findings.          Electronically signed by: DOUGLAS GRIDER MD  Date:     06/18/17  Time:    03:13        Pending Diagnostic Studies:     None        Final Active Diagnoses:    Diagnosis Date Noted POA    PRINCIPAL PROBLEM:  Heart failure, acute on chronic, systolic and diastolic [I50.43] 06/18/2017 Yes    Essential hypertension [I10] 05/12/2017 Yes    Secondary pulmonary hypertension [I27.2] 08/13/2013 Yes    CAD S/P percutaneous coronary angioplasty [I25.10, Z98.61] 09/27/2012 Not Applicable     Chronic    Paroxysmal atrial fibrillation [I48.0] 06/29/2012 Yes     Chronic    Diabetic ulcer of right heel [E11.621, L97.419] 04/17/2017 Yes    Diabetes mellitus with stage 4 chronic kidney disease GFR 15-29 [E11.22, N18.4] 08/16/2016 Yes     Chronic    Chronic kidney disease, stage IV (severe) [N18.4] 12/13/2013 Yes     Chronic    Pancytopenia [D61.818] 06/18/2017 Yes    Protein calorie malnutrition [E46] 06/18/2017 Yes    Vascular dementia with behavior disturbance [F01.51] 01/13/2017 Yes      Problems Resolved During this Admission:    Diagnosis Date Noted Date Resolved POA    SOB (shortness of breath) [R06.02] 06/18/2017 06/19/2017 Yes      * Heart failure, acute on chronic, systolic and diastolic    Secondary pulmonary hypertension, SOB (shortness of breath)  - Most recent ECHO 5/17/17 EF estimated at 45% ( baseline 60-65%), ongoing diastolic dysfunction, PA  systolic pressure 63.72 (baseline 38-91-54.69)  - Received 40 mg of furosemide while in ED with reported good response  - Continue IV furosemide Q12 for now  6/18: Spoke with cardiology who rec BP control, continued diuresis, and optimal nutrition. Will continue IV lasix q12 as patient tolerates.   6/19: Pt with better BP control and adequate diuresis. Pt discharged home with [facility]. Pt to follow up with podiatry today at 3. Family educated on optimal nutrition and supplementation.         SOB (shortness of breath)-resolved as of 6/19/2017    - 2/2 acute on chronic CHF exacerbation  - See above          Essential hypertension    - SBP ranging between 146-188  - Will likely improve s/p home meds and diuresis  - Continue current medication regimen. Consider adding PRN agent if indicated  6/18-6/19: BP controlled with home regimen. Will continue to monitor        Secondary pulmonary hypertension    - See above          CAD S/P percutaneous coronary angioplasty    - Mildly elevated troponin 0.034 on arrival (previous result of 0.04); likely elevated 2/2 demand ischemia in setting of hypervolemia and uncontrolled BP  - Will trend  - Maintain telemetry throughout hospitalization   - Continue BB, nitrate, and statin as directed        Paroxysmal atrial fibrillation    On warfarin  - Continue home warfarin dosage  - PT/INR daily (2.7 on arrival)  - Hx of SSS s/p pacemaker placement  - Continue amiodarone as directed        Diabetic ulcer of right heel    - Followed outpatient by Dr. Donnelly - last seen 6/12/17  - Podiatry consult pending  6/18: Per podiatry, ulceration R heel stable with no active signs of infection; cleansed and dressing applied; heel protector boots ordered apply to B/L LE while in bed; f/u with podiatry within 2-3 days of discharge.   6/19: Pt to follow up with podiatry today at 3.        Diabetes mellitus with stage 4 chronic kidney disease GFR 15-29    - Hypoglycemic on arrival requiring D50  - Monitor  glucose closely  - Hold long acting insulin for now  - ISS AC/HS        Chronic kidney disease, stage IV (severe)    - Creatinine 2.5 on arrival (baseline 1.97-2.45)  - Monitor closely during diuresis and trend chemistry daily  - Strict I/O's  6/19: Scheduled outpatient labs and PCP f/u for continued monitoring of labs.        Pancytopenia    - Appears relatively stable  - Trend CBC daily  6/19: Pt to follow up with PCP as an outpatient. Scheduled outpatient labs.        Protein calorie malnutrition    - Daughter reports good oral intake at home with supplements PRN  - Albumin 2.7, prealbumin 13  - Consulted nutrition and appreciate recs; per recs, continue current diet order and encourage PO intake        Vascular dementia with behavior disturbance    - Continue supportive care  - High risk of fall / injury maintain all safety measures and fall precaution  - Consider sitter if family not at bedside  6/19: Pt discharged with [facility]            Discharged Condition: stable    Disposition: Home or Self Care    Follow Up:  Follow-up Information     Viviana Nguyen MD In 2 weeks.    Specialty:  Internal Medicine  Why:  hospital follow up  Contact information:  [address]  [place] [address] [phone]                 Patient Instructions:     Diet Diabetic 1800 Calories     Diet Cardiac     Activity as tolerated     Call MD for:  temperature >100.4     Call MD for:  severe uncontrolled pain     Call MD for:  redness, tenderness, or signs of infection (pain, swelling, redness, odor or green/yellow discharge around incision site)     Call MD for:  difficulty breathing or increased cough       Medications:  Reconciled Home Medications:   Current Discharge Medication List      CONTINUE these medications which have NOT CHANGED    Details   ACCU-CHEK SMARTVIEW TEST STRIP Strp test FOUR TIMES A DAY  Qty: 150 each, Refills: 11    Associated Diagnoses: Type 2 diabetes mellitus with chronic kidney disease       amiodarone (PACERONE) 200 MG Tab Take 1 tablet (200 mg total) by mouth once daily.  Qty: 90 tablet, Refills: 3      amlodipine (NORVASC) 10 MG tablet Take 1 tablet (10 mg total) by mouth once daily.  Qty: 90 tablet, Refills: 3      aspirin 81 MG chewable tablet Take 1 tablet by mouth Every morning.      blood-glucose meter Misc Dispense Accu-Chek Natalia meter  Qty: 1 each, Refills: 0    Associated Diagnoses: Type 2 diabetes mellitus with chronic kidney disease      carvedilol (COREG) 25 MG tablet Take 1 tablet (25 mg total) by mouth 2 (two) times daily with meals.  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  Qty: 90 g, Refills: 1    Associated Diagnoses: Foot ulcer due to secondary DM      cyanocobalamin, vitamin B-12, 1,000 mcg TbSR Take 1,000 mcg by mouth once daily.  Qty: 30 each, Refills: 0      dorzolamide (TRUSOPT) 2 % ophthalmic solution INSTILL ONE DROP INTO EACH EYE TWICE DAILY  Qty: 10 mL, Refills: 11    Associated Diagnoses: Primary open-angle glaucoma, severe stage      doxazosin (CARDURA) 4 MG tablet Take 1 tablet (4 mg total) by mouth once daily.  Qty: 30 tablet, Refills: 12      escitalopram oxalate (LEXAPRO) 5 MG Tab Take 1 tablet (5 mg total) by mouth once daily.  Qty: 30 tablet, Refills: 5    Associated Diagnoses: Vascular dementia with behavior disturbance      ferrous sulfate 324 mg (65 mg iron) TbEC Take 1 tablet (325 mg total) by mouth 2 (two) times daily.  Qty: 60 tablet, Refills: 0    Associated Diagnoses: Anemia of chronic disease      hydrALAZINE (APRESOLINE) 100 MG tablet Take 1 tablet (100 mg total) by mouth 2 (two) times daily.  Qty: 270 tablet, Refills: 4    Associated Diagnoses: Chronic diastolic heart failure; Malignant essential hypertension with congestive heart failure with renal disease      insulin  glargine (LANTUS) 100 unit/mL injection INJECT 10 units nightly.  Qty: 30 mL, Refills: 12    Associated Diagnoses: Type 2 diabetes mellitus with chronic kidney disease      insulin lispro (HUMALOG) 100 unit/mL injection Inject 8 units w/ meals.  Qty: 3 vial, Refills: 6    Associated Diagnoses: Type 2 diabetes mellitus not at goal      insulin syringe-needle U-100 (EASY TOUCH INSULIN SYRINGE) 0.5 mL 31 gauge x 5/16 Syrg To use 4 times daily with insulin injections  Qty: 150 each, Refills: 2      isosorbide dinitrate (ISORDIL) 40 MG Tab Take 1 tablet (40 mg total) by mouth 2 (two) times daily.  Qty: 270 tablet, Refills: 3    Associated Diagnoses: Chronic diastolic heart failure      KLOR-CON M10 10 mEq tablet TAKE ONE TABLET BY MOUTH TWICE DAILY  Qty: 60 tablet, Refills: 0      lactulose (CEPHULAC) 20 gram Pack Take 1 packet (20 g total) by mouth once daily.  Qty: 90 packet, Refills: 0    Associated Diagnoses: Chronic hepatitis C without hepatic coma      lancets Misc 1 lancet by Misc.(Non-Drug; Combo Route) route 4 (four) times daily.  Qty: 150 each, Refills: 11    Associated Diagnoses: Type 2 diabetes mellitus with chronic kidney disease      latanoprost 0.005 % ophthalmic solution INSTILL ONE DROP INTO EACH EYE IN THE EVENING  Qty: 3 Bottle, Refills: 12    Associated Diagnoses: Primary open-angle glaucoma, severe stage      memantine (NAMENDA) 10 MG Tab Take 1 tablet (10 mg total) by mouth 2 (two) times daily.  Qty: 60 tablet, Refills: 5    Associated Diagnoses: Vascular dementia with behavior disturbance      nitroGLYCERIN 0.4 MG/DOSE TL SPRY (NITROLINGUAL) 400 mcg/spray spray PLACE ONE SPRAY UNDER THE TONGUE EVERY 5 MINUTES AS NEEDED FOR CHEST PAIN.  Qty: 4.9 g, Refills: 0      NITROSTAT 0.3 mg SL tablet DISSOLVE ONE TABLET UNDER THE TONGUE EVERY 5 MINUTES AS NEEDED FOR CHEST PAIN.  DO NOT EXCEED A TOTAL OF 3 DOSES IN 15 MINUTES  Qty: 100 tablet, Refills: 0      pravastatin (PRAVACHOL) 20 MG tablet TAKE 1 TABLET  BY MOUTH once DAILY  Qty: 90 tablet, Refills: 1    Associated Diagnoses: Hyperlipidemia      torsemide (DEMADEX) 20 MG Tab 1 tablet (20 mg) in the morning, half a tablet (10 mg) in the afternoon.  Qty: 180 tablet, Refills: 3    Associated Diagnoses: Chronic diastolic heart failure      warfarin (COUMADIN) 5 MG tablet Take 0.5-1 tablets (2.5-5 mg total) by mouth Daily. As directed by [facility]  Qty: 30 tablet, Refills: 3    Associated Diagnoses: Paroxysmal atrial fibrillation; Anticoagulation monitoring by pharmacist           Time spent on the discharge of patient: 30 minutes    Adela Lora PA-C  Department of Hospital Medicine  [facility]

## 2017-06-19 NOTE — PLAN OF CARE
Problem: Patient Care Overview  Goal: Plan of Care Review  Outcome: Ongoing (interventions implemented as appropriate)  Patient maintained on fall precautions. Bed locked and lowest position. Call light within reach. Patient and family member instructed to use call light prn. Family and patient verbalized understanding. Patient has dressing to LLE. Diabetic education completed. Plan of care updated.

## 2017-06-19 NOTE — ASSESSMENT & PLAN NOTE
- Continue supportive care  - High risk of fall / injury maintain all safety measures and fall precaution  - Consider sitter if family not at bedside  6/19: Pt discharged with [facility]

## 2017-06-19 NOTE — PLAN OF CARE
[facility]    HOME HEALTH ORDERS  FACE TO FACE ENCOUNTER    Patient Name: Chey Trujillo  YOB: 1935    PCP: Viviana Nguyen MD   PCP Address[facility] [address] [facility] [place] [address]  PCP Phone Number: [phone]  PCP Fax: [phone]    Encounter Date: 06/19/2017    Admit to Home Health    Diagnoses:  Active Hospital Problems    Diagnosis  POA    *Heart failure, acute on chronic, systolic and diastolic [I50.43]  Yes     Priority: 1 - High    SOB (shortness of breath) [R06.02]  Yes     Priority: 2     Essential hypertension [I10]  Yes     Priority: 3     Secondary pulmonary hypertension [I27.2]  Yes     Priority: 3     CAD S/P percutaneous coronary angioplasty [I25.10, Z98.61]  Not Applicable     Priority: 3      Chronic    Paroxysmal atrial fibrillation [I48.0]  Yes     Priority: 3      Chronic    Diabetic ulcer of right heel [E11.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       Future Appointments  Date Time Provider Department Center   6/19/2017 3:00 PM Brandie Donnelly DPM [facility] WOUND [facility]   6/28/2017 11:40 AM TELEPHONE CHECK, PACEMAKER [facility]   6/29/2017 1:20 PM Carlos Escobar MD [facility] CARDIO [facility]   7/7/2017 1:00 PM Fabian Ledezma, PharmD [facility] COUMAD [facility]   7/21/2017 3:00 PM John Quezada MD [facility] NEURO [place]   8/16/2017 12:00 PM Viviana Nguyen MD [facility] PCW   9/6/2017 11:00 AM HOSPITAL LAB, [place] [facility]  [facility]   9/13/2017 2:30 PM Lindsay Mcintyre, APRN, FNP [facility] [address]     Follow-up Information     Viviana Nguyen MD In 2 weeks.    Specialty:  Internal Medicine  Why:  hospital follow up  Contact information:  [address]  [facility] [address]  [phone]                     I have seen and examined this patient face to face today. My clinical findings that support the need for the home health skilled services and home bound status are the following:  Weakness/numbness causing balance and gait disturbance due to Weakness/Debility making it taxing to leave home.    Allergies:  Review of patient's allergies indicates:   Allergen Reactions    Losartan Other (See Comments)     Hyperkalemia    0.225 % sodium chloride      Other reaction(s): Eye irritation    Amitriptyline      Other reaction(s): Vomiting  Other reaction(s): Vomiting    Azopt  [brinzolamide]      Other reaction(s): Eye irritation    Cantil  [mepenzolate bromide]      Other reaction(s): Unknown  Other reaction(s): Unknown    Ciprofloxacin Nausea And Vomiting     Other reaction(s): Stomach upset    Codeine      Other reaction(s): Unknown  Other reaction(s): Unknown    Duragal-s      Other reaction(s): Vomiting    Erythromycin      Other reaction(s): Unknown  Other reaction(s): Unknown    Fentanyl      Other reaction(s): Vomiting    Hydrocodone-acetaminophen      Other reaction(s): Unknown  Other reaction(s): Unknown    Indomethacin      Other reaction(s): Unknown  Other reaction(s): Unknown    Meperidine      Other reaction(s): Unknown  Other reaction(s): Unknown    Minoxidil      Other reaction(s): druged  Other reaction(s): nausea and vomiting  Other reaction(s): nausea and vomiting  Other reaction(s): druged    Morphine      Other reaction(s): Unknown  Other reaction(s): Unknown    Neuromuscular blockers, steroidal      Other reaction(s): Unknown    Nifedipine      Other reaction(s): Swelling  Other reaction(s): Swelling     Oxycodone hcl-oxycodone-asa      Other reaction(s): Unknown  Other reaction(s): Unknown    Penicillins Hives     Other reaction(s): Unknown    Propoxyphene      Other reaction(s): Unknown    Sensipar [cinacalcet] Nausea Only    Talwin  [pentazocine lactate]      Other reaction(s): Unknown    Talwin compound      Other reaction(s): Unknown    Valsartan Rash and Hives       Diet: cardiac diet, diabetic diet: 2000 calorie and renal diet    Activities: activity as tolerated    Nursing:   SN to complete comprehensive assessment including routine vital signs. Instruct on disease process and s/s of complications to report to MD. Review/verify medication list sent home with the patient at time of discharge  and instruct patient/caregiver as needed. Frequency may be adjusted depending on start of care date.    Notify MD if SBP > 160 or < 90; DBP > 90 or < 50; HR > 120 or < 50; Temp > 101      CONSULTS:    Physical Therapy to evaluate and treat. Evaluate for home safety and equipment needs; Establish/upgrade home exercise program. Perform / instruct on therapeutic exercises, gait training, transfer training, and Range of Motion.  Occupational Therapy to evaluate and treat. Evaluate home environment for safety and equipment needs. Perform/Instruct on transfers, ADL training, ROM, and therapeutic exercises.    MISCELLANEOUS CARE:  N/A    WOUND CARE ORDERS  n/a      Medications: Review discharge medications with patient and family and provide education.      Current Discharge Medication List      CONTINUE these medications which have NOT CHANGED    Details   ACCU-CHEK SMARTVIEW TEST STRIP Strp test FOUR TIMES A DAY  Qty: 150 each, Refills: 11    Associated Diagnoses: Type 2 diabetes mellitus with chronic kidney disease      amiodarone (PACERONE) 200 MG Tab Take 1 tablet (200 mg total) by mouth once daily.  Qty: 90 tablet, Refills: 3      amlodipine (NORVASC) 10 MG tablet Take 1 tablet (10 mg total) by mouth once  daily.  Qty: 90 tablet, Refills: 3      aspirin 81 MG chewable tablet Take 1 tablet by mouth Every morning.      blood-glucose meter Misc Dispense Accu-Chek Natalia meter  Qty: 1 each, Refills: 0    Associated Diagnoses: Type 2 diabetes mellitus with chronic kidney disease      carvedilol (COREG) 25 MG tablet Take 1 tablet (25 mg total) by mouth 2 (two) times daily with meals.  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  Qty: 90 g, Refills: 1    Associated Diagnoses: Foot ulcer due to secondary DM      cyanocobalamin, vitamin B-12, 1,000 mcg TbSR Take 1,000 mcg by mouth once daily.  Qty: 30 each, Refills: 0      dorzolamide (TRUSOPT) 2 % ophthalmic solution INSTILL ONE DROP INTO EACH EYE TWICE DAILY  Qty: 10 mL, Refills: 11    Associated Diagnoses: Primary open-angle glaucoma, severe stage      doxazosin (CARDURA) 4 MG tablet Take 1 tablet (4 mg total) by mouth once daily.  Qty: 30 tablet, Refills: 12      escitalopram oxalate (LEXAPRO) 5 MG Tab Take 1 tablet (5 mg total) by mouth once daily.  Qty: 30 tablet, Refills: 5    Associated Diagnoses: Vascular dementia with behavior disturbance      ferrous sulfate 324 mg (65 mg iron) TbEC Take 1 tablet (325 mg total) by mouth 2 (two) times daily.  Qty: 60 tablet, Refills: 0    Associated Diagnoses: Anemia of chronic disease      hydrALAZINE (APRESOLINE) 100 MG tablet Take 1 tablet (100 mg total) by mouth 2 (two) times daily.  Qty: 270 tablet, Refills: 4    Associated Diagnoses: Chronic diastolic heart failure; Malignant essential hypertension with congestive heart failure with renal disease      insulin glargine (LANTUS) 100 unit/mL injection INJECT 10 units nightly.  Qty: 30 mL, Refills: 12    Associated Diagnoses: Type 2 diabetes mellitus with chronic kidney disease      insulin lispro (HUMALOG)  100 unit/mL injection Inject 8 units w/ meals.  Qty: 3 vial, Refills: 6    Associated Diagnoses: Type 2 diabetes mellitus not at goal      insulin syringe-needle U-100 (EASY TOUCH INSULIN SYRINGE) 0.5 mL 31 gauge x 5/16 Syrg To use 4 times daily with insulin injections  Qty: 150 each, Refills: 2      isosorbide dinitrate (ISORDIL) 40 MG Tab Take 1 tablet (40 mg total) by mouth 2 (two) times daily.  Qty: 270 tablet, Refills: 3    Associated Diagnoses: Chronic diastolic heart failure      KLOR-CON M10 10 mEq tablet TAKE ONE TABLET BY MOUTH TWICE DAILY  Qty: 60 tablet, Refills: 0      lactulose (CEPHULAC) 20 gram Pack Take 1 packet (20 g total) by mouth once daily.  Qty: 90 packet, Refills: 0    Associated Diagnoses: Chronic hepatitis C without hepatic coma      lancets Misc 1 lancet by Misc.(Non-Drug; Combo Route) route 4 (four) times daily.  Qty: 150 each, Refills: 11    Associated Diagnoses: Type 2 diabetes mellitus with chronic kidney disease      latanoprost 0.005 % ophthalmic solution INSTILL ONE DROP INTO EACH EYE IN THE EVENING  Qty: 3 Bottle, Refills: 12    Associated Diagnoses: Primary open-angle glaucoma, severe stage      memantine (NAMENDA) 10 MG Tab Take 1 tablet (10 mg total) by mouth 2 (two) times daily.  Qty: 60 tablet, Refills: 5    Associated Diagnoses: Vascular dementia with behavior disturbance      nitroGLYCERIN 0.4 MG/DOSE TL SPRY (NITROLINGUAL) 400 mcg/spray spray PLACE ONE SPRAY UNDER THE TONGUE EVERY 5 MINUTES AS NEEDED FOR CHEST PAIN.  Qty: 4.9 g, Refills: 0      NITROSTAT 0.3 mg SL tablet DISSOLVE ONE TABLET UNDER THE TONGUE EVERY 5 MINUTES AS NEEDED FOR CHEST PAIN.  DO NOT EXCEED A TOTAL OF 3 DOSES IN 15 MINUTES  Qty: 100 tablet, Refills: 0      pravastatin (PRAVACHOL) 20 MG tablet TAKE 1 TABLET BY MOUTH once DAILY  Qty: 90 tablet, Refills: 1    Associated Diagnoses: Hyperlipidemia      torsemide (DEMADEX) 20 MG Tab 1 tablet (20 mg) in the morning, half a tablet (10 mg) in the  afternoon.  Qty: 180 tablet, Refills: 3    Associated Diagnoses: Chronic diastolic heart failure      warfarin (COUMADIN) 5 MG tablet Take 0.5-1 tablets (2.5-5 mg total) by mouth Daily. As directed by [facility]  Qty: 30 tablet, Refills: 3    Associated Diagnoses: Paroxysmal atrial fibrillation; Anticoagulation monitoring by pharmacist             I certify that this patient is confined to her home and needs physical therapy and occupational therapy.      Adela Lora PA-C  Department of Hospital Medicine

## 2017-06-19 NOTE — ASSESSMENT & PLAN NOTE
- Appears relatively stable  - Trend CBC daily  6/19: Pt to follow up with PCP as an outpatient. Scheduled outpatient labs.

## 2017-06-19 NOTE — PLAN OF CARE
Problem: Fall Risk (Adult)  Intervention: Monitor/Assist with Self Care   17   Daily Care Interventions   Self-Care Promotion independence encouraged;BADL personal objects within reach;BADL personal routines maintained   Functional Level Current   Toileting 4 - completely dependent   Bathing 4 - completely dependent   Dressing 2 - assistive person   Eating 0 - independent   Communication 2 - difficulty speaking (not related to language barrier)   Swallowing 0 - swallows foods/liquids without difficulty   Activity   Activity Assistance Provided assistance, 2 people     Intervention: Reduce Risk/Promote Restraint Free Environment   17   Safety Interventions   Environmental Safety Modification assistive device/personal items within reach;clutter free environment maintained;lighting adjusted;room organization consistent   Safety Interventions   Safety Precautions emergency equipment at bedside   Prevent  Drop/Fall   Safety/Security Measures bed alarm set;family to remain at bedside     Intervention: Review Medications/Identify Contributors to Fall Risk   17   Safety Interventions   Medication Review/Management medications reviewed;high risk medications identified     Intervention: Patient Rounds   17   Safety Interventions   Patient Rounds bed in low position;bed wheels locked;call light in reach;clutter free environment maintained;ID band on;placement of personal items at bedside;toileting offered;visualized patient     Intervention: Safety Promotion/Fall Prevention   17   Safety Interventions   Safety Promotion/Fall Prevention assistive device/personal item within reach;bed alarm set;Fall Risk reviewed with patient/family;Fall Risk signage in place;family to remain at bedside;high risk medications identified;lighting adjusted;medications reviewed;side rails raised x 2     Intervention: Safety Precautions   17   Safety Interventions   Safety  Precautions emergency equipment at bedside       Goal: Identify Related Risk Factors and Signs and Symptoms  Related risk factors and signs and symptoms are identified upon initiation of Human Response Clinical Practice Guideline (CPG)    06/19/17 0435   Fall Risk   Related Risk Factors (Fall Risk) age-related changes;confusion/agitation;culprit medication(s);fatigue/slow reaction;gait/mobility problems;polypharmacy;sensory deficits;environment unfamiliar   Signs and Symptoms (Fall Risk) presence of risk factors     Goal: Absence of Falls  Patient will demonstrate the desired outcomes by discharge/transition of care.    06/19/17 0435   Fall Risk (Adult)   Absence of Falls making progress toward outcome       Problem: Patient Care Overview  Goal: Plan of Care Review   06/18/17 2000   Coping/Psychosocial   Plan Of Care Reviewed With patient;son

## 2017-06-19 NOTE — PLAN OF CARE
06/19/17 1302   Discharge Assessment   Assessment Type Discharge Planning Assessment   Confirmed/corrected address and phone number on facesheet? Yes   Assessment information obtained from? Patient;Caregiver   Expected Length of Stay (days) 2   Communicated expected length of stay with patient/caregiver yes   Prior to hospitilization cognitive status: Alert/Oriented   Prior to hospitalization functional status: Assistive Equipment   Current cognitive status: Alert/Oriented   Current Functional Status: Assistive Equipment   Arrived From home health   Lives With child(kirill), adult   Able to Return to Prior Arrangements yes   Is patient able to care for self after discharge? Yes   Patient's perception of discharge disposition home health   Readmission Within The Last 30 Days no previous admission in last 30 days   Patient currently being followed by outpatient case management? No   Does the patient currently use HME? Yes   Equipment Currently Used at Home walker, rolling;wheelchair   Discharge Plan A Home Health   Discharge Plan B Home with family   Patient/Family In Agreement With Plan yes   Patient and family request [facility]. Referral to [facility] via [facility].

## 2017-06-20 ENCOUNTER — PATIENT MESSAGE (OUTPATIENT)
Dept: INTERNAL MEDICINE | Facility: CLINIC | Age: 82
End: 2017-06-20

## 2017-06-20 DIAGNOSIS — I50.32 CHRONIC DIASTOLIC HEART FAILURE: Chronic | ICD-10-CM

## 2017-06-20 RX ORDER — ISOSORBIDE DINITRATE 40 MG/1
40 TABLET ORAL 2 TIMES DAILY
Qty: 270 TABLET | Refills: 0 | Status: ON HOLD | OUTPATIENT
Start: 2017-06-20 | End: 2017-06-29 | Stop reason: SDUPTHER

## 2017-06-20 RX ORDER — POTASSIUM CHLORIDE 750 MG/1
TABLET, EXTENDED RELEASE ORAL
Qty: 60 TABLET | Refills: 0 | Status: CANCELLED | OUTPATIENT
Start: 2017-06-20

## 2017-06-20 RX ORDER — FERROUS SULFATE 324(65)MG
TABLET, DELAYED RELEASE (ENTERIC COATED) ORAL
Qty: 60 TABLET | Refills: 0 | Status: SHIPPED | OUTPATIENT
Start: 2017-06-20

## 2017-06-22 RX ORDER — INSULIN GLARGINE 100 [IU]/ML
INJECTION, SOLUTION SUBCUTANEOUS
Qty: 10 ML | Refills: 6 | Status: ON HOLD | OUTPATIENT
Start: 2017-06-22 | End: 2017-06-29 | Stop reason: HOSPADM

## 2017-06-23 ENCOUNTER — HOSPITAL ENCOUNTER (INPATIENT)
Facility: HOSPITAL | Age: 82
LOS: 7 days | Discharge: NURSING, REHAB OR LONG-TERM CARE | DRG: 291 | End: 2017-06-30
Attending: EMERGENCY MEDICINE | Admitting: HOSPITALIST
Payer: COMMERCIAL

## 2017-06-23 DIAGNOSIS — B18.2 CHRONIC HEPATITIS C WITHOUT HEPATIC COMA: ICD-10-CM

## 2017-06-23 DIAGNOSIS — I25.10 CAD S/P PERCUTANEOUS CORONARY ANGIOPLASTY: Chronic | ICD-10-CM

## 2017-06-23 DIAGNOSIS — I13.0: ICD-10-CM

## 2017-06-23 DIAGNOSIS — Z98.61 CAD S/P PERCUTANEOUS CORONARY ANGIOPLASTY: Chronic | ICD-10-CM

## 2017-06-23 DIAGNOSIS — I50.32 CHRONIC DIASTOLIC HEART FAILURE: Chronic | ICD-10-CM

## 2017-06-23 DIAGNOSIS — I48.91 A-FIB: ICD-10-CM

## 2017-06-23 DIAGNOSIS — G47.33 OBSTRUCTIVE SLEEP APNEA ON CPAP: Chronic | ICD-10-CM

## 2017-06-23 DIAGNOSIS — I48.92 ATRIAL FLUTTER, UNSPECIFIED TYPE: ICD-10-CM

## 2017-06-23 DIAGNOSIS — I48.92 ATRIAL FLUTTER: ICD-10-CM

## 2017-06-23 DIAGNOSIS — I50.23 ACUTE ON CHRONIC SYSTOLIC CONGESTIVE HEART FAILURE: Primary | ICD-10-CM

## 2017-06-23 DIAGNOSIS — I48.20 CHRONIC ATRIAL FIBRILLATION: ICD-10-CM

## 2017-06-23 DIAGNOSIS — R07.9 CHEST PAIN: ICD-10-CM

## 2017-06-23 DIAGNOSIS — I50.43 HEART FAILURE, ACUTE ON CHRONIC, SYSTOLIC AND DIASTOLIC: ICD-10-CM

## 2017-06-23 DIAGNOSIS — E11.9 TYPE 2 DIABETES MELLITUS NOT AT GOAL: ICD-10-CM

## 2017-06-23 DIAGNOSIS — E11.22 TYPE 2 DIABETES MELLITUS WITH CHRONIC KIDNEY DISEASE: ICD-10-CM

## 2017-06-23 DIAGNOSIS — Z95.0 PRESENCE OF CARDIAC PACEMAKER: ICD-10-CM

## 2017-06-23 DIAGNOSIS — R06.02 SOB (SHORTNESS OF BREATH): ICD-10-CM

## 2017-06-23 PROBLEM — I49.5 SSS (SICK SINUS SYNDROME): Chronic | Status: ACTIVE | Noted: 2017-06-23

## 2017-06-23 PROBLEM — F03.90 DEMENTIA: Chronic | Status: ACTIVE | Noted: 2017-06-23

## 2017-06-23 PROBLEM — J90 RECURRENT PLEURAL EFFUSION ON RIGHT: Status: ACTIVE | Noted: 2017-06-23

## 2017-06-23 LAB
ALBUMIN SERPL BCP-MCNC: 2.8 G/DL
ALP SERPL-CCNC: 79 U/L
ALT SERPL W/O P-5'-P-CCNC: 23 U/L
ANION GAP SERPL CALC-SCNC: 8 MMOL/L
ANISOCYTOSIS BLD QL SMEAR: SLIGHT
AST SERPL-CCNC: 40 U/L
BASOPHILS # BLD AUTO: 0.01 K/UL
BASOPHILS NFR BLD: 0.3 %
BILIRUB SERPL-MCNC: 0.5 MG/DL
BNP SERPL-MCNC: 294 PG/ML
BUN SERPL-MCNC: 64 MG/DL
CALCIUM SERPL-MCNC: 9.7 MG/DL
CHLORIDE SERPL-SCNC: 104 MMOL/L
CO2 SERPL-SCNC: 27 MMOL/L
CREAT SERPL-MCNC: 2.7 MG/DL
DIFFERENTIAL METHOD: ABNORMAL
EOSINOPHIL # BLD AUTO: 0 K/UL
EOSINOPHIL NFR BLD: 1 %
ERYTHROCYTE [DISTWIDTH] IN BLOOD BY AUTOMATED COUNT: 18.7 %
EST. GFR  (AFRICAN AMERICAN): 18.2 ML/MIN/1.73 M^2
EST. GFR  (NON AFRICAN AMERICAN): 15.8 ML/MIN/1.73 M^2
GLUCOSE SERPL-MCNC: 115 MG/DL
HCT VFR BLD AUTO: 33.1 %
HGB BLD-MCNC: 10.2 G/DL
INR PPP: 2
LYMPHOCYTES # BLD AUTO: 0.7 K/UL
LYMPHOCYTES NFR BLD: 24.2 %
MCH RBC QN AUTO: 29.5 PG
MCHC RBC AUTO-ENTMCNC: 30.8 %
MCV RBC AUTO: 96 FL
MONOCYTES # BLD AUTO: 0.3 K/UL
MONOCYTES NFR BLD: 9.9 %
NEUTROPHILS # BLD AUTO: 1.9 K/UL
NEUTROPHILS NFR BLD: 64.6 %
PLATELET # BLD AUTO: 140 K/UL
PLATELET BLD QL SMEAR: ABNORMAL
PMV BLD AUTO: 12.6 FL
POCT GLUCOSE: 113 MG/DL (ref 70–110)
POCT GLUCOSE: 127 MG/DL (ref 70–110)
POCT GLUCOSE: 151 MG/DL (ref 70–110)
POCT GLUCOSE: 195 MG/DL (ref 70–110)
POTASSIUM SERPL-SCNC: 4.2 MMOL/L
PROT SERPL-MCNC: 6.4 G/DL
PROTHROMBIN TIME: 20.4 SEC
RBC # BLD AUTO: 3.46 M/UL
SODIUM SERPL-SCNC: 139 MMOL/L
TROPONIN I SERPL DL<=0.01 NG/ML-MCNC: 0.03 NG/ML
TROPONIN I SERPL DL<=0.01 NG/ML-MCNC: 0.03 NG/ML
WBC # BLD AUTO: 2.93 K/UL

## 2017-06-23 PROCEDURE — 83880 ASSAY OF NATRIURETIC PEPTIDE: CPT

## 2017-06-23 PROCEDURE — 63600175 PHARM REV CODE 636 W HCPCS: Performed by: HOSPITALIST

## 2017-06-23 PROCEDURE — 99900035 HC TECH TIME PER 15 MIN (STAT)

## 2017-06-23 PROCEDURE — 85025 COMPLETE CBC W/AUTO DIFF WBC: CPT

## 2017-06-23 PROCEDURE — 99223 1ST HOSP IP/OBS HIGH 75: CPT | Mod: ,,, | Performed by: HOSPITALIST

## 2017-06-23 PROCEDURE — 99223 1ST HOSP IP/OBS HIGH 75: CPT | Mod: ,,, | Performed by: INTERNAL MEDICINE

## 2017-06-23 PROCEDURE — 93041 RHYTHM ECG TRACING: CPT | Mod: 59

## 2017-06-23 PROCEDURE — 84484 ASSAY OF TROPONIN QUANT: CPT

## 2017-06-23 PROCEDURE — 93280 PM DEVICE PROGR EVAL DUAL: CPT | Mod: 26,,, | Performed by: INTERNAL MEDICINE

## 2017-06-23 PROCEDURE — 27000221 HC OXYGEN, UP TO 24 HOURS

## 2017-06-23 PROCEDURE — 20600002 HC STEP DOWN SEMI-PRIVATE ROOM

## 2017-06-23 PROCEDURE — 25000003 PHARM REV CODE 250: Performed by: HOSPITALIST

## 2017-06-23 PROCEDURE — 25000003 PHARM REV CODE 250: Performed by: NURSE PRACTITIONER

## 2017-06-23 PROCEDURE — 25000242 PHARM REV CODE 250 ALT 637 W/ HCPCS: Performed by: NURSE PRACTITIONER

## 2017-06-23 PROCEDURE — 63600175 PHARM REV CODE 636 W HCPCS: Performed by: NURSE PRACTITIONER

## 2017-06-23 PROCEDURE — 94640 AIRWAY INHALATION TREATMENT: CPT

## 2017-06-23 PROCEDURE — 36415 COLL VENOUS BLD VENIPUNCTURE: CPT

## 2017-06-23 PROCEDURE — 94660 CPAP INITIATION&MGMT: CPT

## 2017-06-23 PROCEDURE — 93010 ELECTROCARDIOGRAM REPORT: CPT | Mod: 77,,, | Performed by: INTERNAL MEDICINE

## 2017-06-23 PROCEDURE — 93010 ELECTROCARDIOGRAM REPORT: CPT | Mod: S$GLB,,, | Performed by: INTERNAL MEDICINE

## 2017-06-23 PROCEDURE — 84484 ASSAY OF TROPONIN QUANT: CPT | Mod: 91

## 2017-06-23 PROCEDURE — 99284 EMERGENCY DEPT VISIT MOD MDM: CPT | Mod: 25

## 2017-06-23 PROCEDURE — 85610 PROTHROMBIN TIME: CPT

## 2017-06-23 PROCEDURE — 80053 COMPREHEN METABOLIC PANEL: CPT

## 2017-06-23 PROCEDURE — 27000190 HC CPAP FULL FACE MASK W/VALVE

## 2017-06-23 PROCEDURE — 99285 EMERGENCY DEPT VISIT HI MDM: CPT | Mod: ,,, | Performed by: PHYSICIAN ASSISTANT

## 2017-06-23 PROCEDURE — 93005 ELECTROCARDIOGRAM TRACING: CPT | Mod: 59

## 2017-06-23 PROCEDURE — 93280 PM DEVICE PROGR EVAL DUAL: CPT

## 2017-06-23 RX ORDER — IBUPROFEN 200 MG
16 TABLET ORAL
Status: DISCONTINUED | OUTPATIENT
Start: 2017-06-23 | End: 2017-06-30 | Stop reason: HOSPADM

## 2017-06-23 RX ORDER — AMLODIPINE BESYLATE 10 MG/1
10 TABLET ORAL DAILY
Status: DISCONTINUED | OUTPATIENT
Start: 2017-06-23 | End: 2017-06-30 | Stop reason: HOSPADM

## 2017-06-23 RX ORDER — FUROSEMIDE 10 MG/ML
80 INJECTION INTRAMUSCULAR; INTRAVENOUS 2 TIMES DAILY
Status: DISCONTINUED | OUTPATIENT
Start: 2017-06-23 | End: 2017-06-23

## 2017-06-23 RX ORDER — IPRATROPIUM BROMIDE AND ALBUTEROL SULFATE 2.5; .5 MG/3ML; MG/3ML
3 SOLUTION RESPIRATORY (INHALATION) EVERY 4 HOURS
Status: DISCONTINUED | OUTPATIENT
Start: 2017-06-23 | End: 2017-06-23

## 2017-06-23 RX ORDER — HYDRALAZINE HYDROCHLORIDE 50 MG/1
100 TABLET, FILM COATED ORAL EVERY 12 HOURS
Status: DISCONTINUED | OUTPATIENT
Start: 2017-06-23 | End: 2017-06-27

## 2017-06-23 RX ORDER — DOXAZOSIN 4 MG/1
4 TABLET ORAL DAILY
Status: DISCONTINUED | OUTPATIENT
Start: 2017-06-23 | End: 2017-06-30 | Stop reason: HOSPADM

## 2017-06-23 RX ORDER — GLUCAGON 1 MG
1 KIT INJECTION
Status: DISCONTINUED | OUTPATIENT
Start: 2017-06-23 | End: 2017-06-30 | Stop reason: HOSPADM

## 2017-06-23 RX ORDER — NAPROXEN SODIUM 220 MG/1
81 TABLET, FILM COATED ORAL DAILY
Status: DISCONTINUED | OUTPATIENT
Start: 2017-06-23 | End: 2017-06-30 | Stop reason: HOSPADM

## 2017-06-23 RX ORDER — FUROSEMIDE 10 MG/ML
80 INJECTION INTRAMUSCULAR; INTRAVENOUS 3 TIMES DAILY
Status: DISCONTINUED | OUTPATIENT
Start: 2017-06-23 | End: 2017-06-24

## 2017-06-23 RX ORDER — MEMANTINE HYDROCHLORIDE 5 MG/1
10 TABLET ORAL 2 TIMES DAILY
Status: DISCONTINUED | OUTPATIENT
Start: 2017-06-23 | End: 2017-06-23

## 2017-06-23 RX ORDER — IBUPROFEN 200 MG
24 TABLET ORAL
Status: DISCONTINUED | OUTPATIENT
Start: 2017-06-23 | End: 2017-06-30 | Stop reason: HOSPADM

## 2017-06-23 RX ORDER — IPRATROPIUM BROMIDE AND ALBUTEROL SULFATE 2.5; .5 MG/3ML; MG/3ML
3 SOLUTION RESPIRATORY (INHALATION) EVERY 4 HOURS
Status: DISCONTINUED | OUTPATIENT
Start: 2017-06-23 | End: 2017-06-24

## 2017-06-23 RX ORDER — WARFARIN SODIUM 5 MG/1
5 TABLET ORAL DAILY
Status: DISCONTINUED | OUTPATIENT
Start: 2017-06-23 | End: 2017-06-26

## 2017-06-23 RX ORDER — INSULIN ASPART 100 [IU]/ML
0-5 INJECTION, SOLUTION INTRAVENOUS; SUBCUTANEOUS
Status: DISCONTINUED | OUTPATIENT
Start: 2017-06-23 | End: 2017-06-30 | Stop reason: HOSPADM

## 2017-06-23 RX ORDER — SODIUM CHLORIDE 0.9 % (FLUSH) 0.9 %
3 SYRINGE (ML) INJECTION EVERY 8 HOURS
Status: DISCONTINUED | OUTPATIENT
Start: 2017-06-23 | End: 2017-06-30 | Stop reason: HOSPADM

## 2017-06-23 RX ORDER — ISOSORBIDE DINITRATE 20 MG/1
40 TABLET ORAL 2 TIMES DAILY
Status: DISCONTINUED | OUTPATIENT
Start: 2017-06-23 | End: 2017-06-27

## 2017-06-23 RX ORDER — AMIODARONE HYDROCHLORIDE 200 MG/1
200 TABLET ORAL DAILY
Status: DISCONTINUED | OUTPATIENT
Start: 2017-06-23 | End: 2017-06-30 | Stop reason: HOSPADM

## 2017-06-23 RX ORDER — INSULIN ASPART 100 [IU]/ML
4 INJECTION, SOLUTION INTRAVENOUS; SUBCUTANEOUS
Status: DISCONTINUED | OUTPATIENT
Start: 2017-06-23 | End: 2017-06-30 | Stop reason: HOSPADM

## 2017-06-23 RX ORDER — ESCITALOPRAM OXALATE 5 MG/1
5 TABLET ORAL DAILY
Status: DISCONTINUED | OUTPATIENT
Start: 2017-06-23 | End: 2017-06-30 | Stop reason: HOSPADM

## 2017-06-23 RX ORDER — PRAVASTATIN SODIUM 20 MG/1
20 TABLET ORAL DAILY
Status: DISCONTINUED | OUTPATIENT
Start: 2017-06-23 | End: 2017-06-30 | Stop reason: HOSPADM

## 2017-06-23 RX ORDER — CARVEDILOL 12.5 MG/1
25 TABLET ORAL 2 TIMES DAILY
Status: DISCONTINUED | OUTPATIENT
Start: 2017-06-23 | End: 2017-06-30 | Stop reason: HOSPADM

## 2017-06-23 RX ADMIN — INSULIN ASPART 4 UNITS: 100 INJECTION, SOLUTION INTRAVENOUS; SUBCUTANEOUS at 05:06

## 2017-06-23 RX ADMIN — IPRATROPIUM BROMIDE AND ALBUTEROL SULFATE 3 ML: .5; 3 SOLUTION RESPIRATORY (INHALATION) at 04:06

## 2017-06-23 RX ADMIN — FUROSEMIDE 80 MG: 10 INJECTION, SOLUTION INTRAVENOUS at 10:06

## 2017-06-23 RX ADMIN — Medication 3 ML: at 01:06

## 2017-06-23 RX ADMIN — AMIODARONE HYDROCHLORIDE 200 MG: 200 TABLET ORAL at 01:06

## 2017-06-23 RX ADMIN — IPRATROPIUM BROMIDE AND ALBUTEROL SULFATE 3 ML: .5; 3 SOLUTION RESPIRATORY (INHALATION) at 07:06

## 2017-06-23 RX ADMIN — WARFARIN SODIUM 5 MG: 5 TABLET ORAL at 05:06

## 2017-06-23 RX ADMIN — FUROSEMIDE 80 MG: 10 INJECTION, SOLUTION INTRAVENOUS at 01:06

## 2017-06-23 RX ADMIN — AMLODIPINE BESYLATE 10 MG: 10 TABLET ORAL at 01:06

## 2017-06-23 RX ADMIN — PRAVASTATIN SODIUM 20 MG: 20 TABLET ORAL at 01:06

## 2017-06-23 RX ADMIN — ASPIRIN 81 MG CHEWABLE TABLET 81 MG: 81 TABLET CHEWABLE at 01:06

## 2017-06-23 RX ADMIN — CARVEDILOL 25 MG: 12.5 TABLET, FILM COATED ORAL at 09:06

## 2017-06-23 RX ADMIN — DOXAZOSIN 4 MG: 4 TABLET ORAL at 01:06

## 2017-06-23 RX ADMIN — ISOSORBIDE DINITRATE 40 MG: 20 TABLET ORAL at 09:06

## 2017-06-23 RX ADMIN — HYDRALAZINE HYDROCHLORIDE 100 MG: 50 TABLET ORAL at 09:06

## 2017-06-23 RX ADMIN — ESCITALOPRAM 5 MG: 5 TABLET, FILM COATED ORAL at 01:06

## 2017-06-23 RX ADMIN — MEMANTINE HYDROCHLORIDE 10 MG: 5 TABLET ORAL at 01:06

## 2017-06-23 RX ADMIN — IPRATROPIUM BROMIDE AND ALBUTEROL SULFATE 3 ML: .5; 3 SOLUTION RESPIRATORY (INHALATION) at 12:06

## 2017-06-23 RX ADMIN — Medication 3 ML: at 10:06

## 2017-06-23 RX ADMIN — INSULIN DETEMIR 8 UNITS: 100 INJECTION, SOLUTION SUBCUTANEOUS at 01:06

## 2017-06-23 NOTE — PLAN OF CARE
Problem: Patient Care Overview  Goal: Plan of Care Review  Outcome: Ongoing (interventions implemented as appropriate)  Pt free of falls/traumas/injuries. Skin remains clean, dry, and intact. Pt started on IVP lasix 80 mg BID. Scheduled for Resp TX for expiratory wheezing. Pt is incontinent. Wound care placed for ulceration to heel, pt has bandage to heel that is changed once a week per podiatry out pt. Pt re-educated on importance of measuring accurate intake and out put; pt verbalized and demonstrates understanding. Reviewed plan of care with pt; and pt verbalized understanding.  Pt VSS in no distress will continue to monitor.

## 2017-06-23 NOTE — H&P
SUBJECTIVE:      Chief Complaint/Reason for Admission:   SOB     History of Present Illness:  Ms Chey Trujillo is an 82 y.o. lady presenting to the ED with progressive SOB, SCHWAB, orthopnea, and diffuse non-specific chest/back pain last night. Per pt pain was 6/10 did not radiate and was dull, aggravated with deep breathing and alleviated to 1/10 with nitro. EKG on arrival to ED without ST changes, Troponin 0.031, . Patient is a poor historian s/t dementia and history was obtained from daughter at bedside. Daughter states patient was recently discharged from [facility] 4 days ago (6/19/2017) following obs hospitalization for SOB and CHF. Echo last admission (5/17/2017) revealed reduction in EF to 45% (from 65% 7 mo prior), mild MR, mild TR, grade III diastolic dysfunction. Following diuresis pt was discharged to home with resolution of symptoms. Daughter states after discharge her mother was able to ambulate with walker ~1 block before onset of SOB. Over 3 days symptoms progressed despite adherence to home med regimen (with exception of intermittent use of home CPAP) until yesterday her mother was unable to walk 10ft to toilet without SOB and with audible wheezing. Per daughter the patient's diuretic dose was decreased per Nephrologist at last visit from torsemide 20mg BID to torsemide 20mg qam and 10mg for afternoon dose. She has PMH of dementia, chronic Afib on coumadin, SSS s/p PPM, CAD s/p PCI (2002, 2005), CHF, Hep C, DM, CKD stage IV, HLD, PND (pt uses 3 pillows at home), HTN, VEE on home CPAP. CXR in ED revealed mod R pleural effusion (large R pl effusion noted last hospitalization). Mild JVD, no LE edema, no c/o SOB in bed @ 30 degrees and O2 sats 96% on RA      Review of Systems:  Constitutional: no fever or chills  Eyes: no visual changes  ENT: no nasal congestion or sore throat  Respiratory: +SOB, +wheezing  Cardiovascular: + chest pain or palpitations  Gastrointestinal: no nausea or vomiting, no  abdominal pain or change in bowel habits  Genitourinary: no hematuria or dysuria  Integument/Breast: no rash or pruritis  Hematologic/Lymphatic: no easy bruising or lymphadenopathy  Allergy/Immunology: none  Musculoskeletal: no arthralgias or myalgias  Neurological: no seizures or tremors  Behavioral/Psych: no auditory or visual hallucinations  Endocrine: no heat or cold intolerance     HISTORY:           Past Medical History:   Diagnosis Date    Anticoagulation monitoring by pharmacist 6/29/2012    At risk for amiodarone toxicity with long term use 1/27/2014    Atrial fibrillation      Bilateral carotid artery disease 1/11/2016    Blood transfusion      CAD S/P percutaneous coronary angioplasty 9/27/2012    Chronic diastolic heart failure 9/27/2012     Echo 3-: 1 - Concentric hypertrophy.  2 - Normal left ventricular systolic function (EF 60-65%).  3 - Right ventricular enlargement with hypertrophy, with normal systolic function.  4 - Left ventricular diastolic dysfunction.  5 - Biatrial enlargement.  6 - Mild tricuspid regurgitation.  7 - Pulmonary hypertension. The estimated PA systolic pressure is 55 mmHg.  8 - Increased central venous pressure (IVC is greater than 3cm in diameter).     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  ANABELA u/s 4/14 wnl 9/12/2012    Secondary pulmonary hypertension 8/13/2013    Spinal stenosis      Thyroid nodule: per ENDO  repeat in  and see ENDO then (note 16) 2013    Tricuspid regurgitation 2016    Tubular adenoma x 3 2014    Type 2 DM with CKD stage 4 and hypertension 2015    Urinary, incontinence, stress female                 Past Surgical History:   Procedure Laterality Date    CARDIAC CATHETERIZATION        CARDIAC PACEMAKER PLACEMENT   2012     Quinn Reply , serial #[id number]    CATARACT EXTRACTION         Status post cataract extraction and insertion of intraocular lens of right eye    CORONARY ANGIOPLASTY         ROWAN to prox RCA  for UA/+stress test.  ROWAN placed just proximal to stent in  for UA.  Cath 2011: normal LMCA, 40% mid LAD, 50% distal LCx    EYE SURGERY        LAMINECTOMY        TOTAL HIP ARTHROPLASTY Right       x's r hip               Family History   Problem Relation Age of Onset    Diabetes Mother      Cancer Mother      Diabetes Sister      Cancer Brother      Cancer Daughter      Diabetes Sister      Diabetes Son      Cancer Maternal Aunt      Kidney disease Neg Hx           Social History            Social History    Marital status:        Spouse name: N/A    Number of children: N/A    Years of education: N/A           Social History Main Topics    Smoking status: Never Smoker    Smokeless tobacco: Never Used    Alcohol use No    Drug use: No    Sexual activity: No           Other Topics Concern    Not on file          Social History Narrative     Son lives with her. Spouse .          MEDICATIONS & ALLERGIES:      No current facility-administered medications for this encounter.            Current Outpatient Prescriptions   Medication Sig    ACCU-CHEK SMARTVIEW TEST STRIP Strp test FOUR TIMES A DAY    amiodarone (PACERONE) 200 MG Tab Take 1 tablet (200 mg total) by mouth once daily.    amlodipine (NORVASC) 10 MG tablet Take 1 tablet (10 mg total) by mouth once daily.    aspirin 81 MG chewable tablet Take 1 tablet  by mouth Every morning.    blood-glucose meter Misc Dispense Accu-Chek Natalia meter    carvedilol (COREG) 25 MG tablet Take 1 tablet (25 mg total) by mouth 2 (two) times daily with meals.    collagenase ointment Apply topically once daily.    cyanocobalamin, vitamin B-12, 1,000 mcg TbSR Take 1,000 mcg by mouth once daily.    dorzolamide (TRUSOPT) 2 % ophthalmic solution INSTILL ONE DROP INTO EACH EYE TWICE DAILY    doxazosin (CARDURA) 4 MG tablet Take 1 tablet (4 mg total) by mouth once daily.    escitalopram oxalate (LEXAPRO) 5 MG Tab Take 1 tablet (5 mg total) by mouth once daily.    ferrous sulfate 324 mg (65 mg iron) TbEC TAKE ONE TABLET BY MOUTH TWICE DAILY    hydrALAZINE (APRESOLINE) 100 MG tablet Take 1 tablet (100 mg total) by mouth 2 (two) times daily.    insulin glargine (LANTUS) 100 unit/mL injection INJECT 10 units nightly.    insulin lispro (HUMALOG) 100 unit/mL injection Inject 8 units w/ meals.    insulin syringe-needle U-100 (EASY TOUCH INSULIN SYRINGE) 0.5 mL 31 gauge x 5/16 Syrg To use 4 times daily with insulin injections    isosorbide dinitrate (ISORDIL) 40 MG Tab Take 1 tablet (40 mg total) by mouth 2 (two) times daily.    KLOR-CON M10 10 mEq tablet TAKE ONE TABLET BY MOUTH TWICE DAILY    lactulose (CEPHULAC) 20 gram Pack Take 1 packet (20 g total) by mouth once daily.    lancets Misc 1 lancet by Misc.(Non-Drug; Combo Route) route 4 (four) times daily.    LANTUS 100 unit/mL injection INJECT 11 UNITS SUBCUTANEOUSLY NIGHTLY. CHANGE VIAL AFTER 30 DAYS    latanoprost 0.005 % ophthalmic solution INSTILL ONE DROP INTO EACH EYE IN THE EVENING    memantine (NAMENDA) 10 MG Tab Take 1 tablet (10 mg total) by mouth 2 (two) times daily.    nitroGLYCERIN 0.4 MG/DOSE TL SPRY (NITROLINGUAL) 400 mcg/spray spray PLACE ONE SPRAY UNDER THE TONGUE EVERY 5 MINUTES AS NEEDED FOR CHEST PAIN.    NITROSTAT 0.3 mg SL tablet DISSOLVE ONE TABLET UNDER THE TONGUE EVERY 5 MINUTES AS NEEDED FOR CHEST PAIN.   DO NOT EXCEED A TOTAL OF 3 DOSES IN 15 MINUTES    pravastatin (PRAVACHOL) 20 MG tablet TAKE 1 TABLET BY MOUTH once DAILY    torsemide (DEMADEX) 20 MG Tab 1 tablet (20 mg) in the morning, half a tablet (10 mg) in the afternoon.    warfarin (COUMADIN) 5 MG tablet Take 0.5-1 tablets (2.5-5 mg total) by mouth Daily. As directed by [facility]               Review of patient's allergies indicates:   Allergen Reactions    Losartan Other (See Comments)       Hyperkalemia    0.225 % sodium chloride         Other reaction(s): Eye irritation    Amitriptyline         Other reaction(s): Vomiting  Other reaction(s): Vomiting    Azopt  [brinzolamide]         Other reaction(s): Eye irritation    Cantil  [mepenzolate bromide]         Other reaction(s): Unknown  Other reaction(s): Unknown    Ciprofloxacin Nausea And Vomiting       Other reaction(s): Stomach upset    Codeine         Other reaction(s): Unknown  Other reaction(s): Unknown    Duragal-s         Other reaction(s): Vomiting    Erythromycin         Other reaction(s): Unknown  Other reaction(s): Unknown    Fentanyl         Other reaction(s): Vomiting    Hydrocodone-acetaminophen         Other reaction(s): Unknown  Other reaction(s): Unknown    Indomethacin         Other reaction(s): Unknown  Other reaction(s): Unknown    Meperidine         Other reaction(s): Unknown  Other reaction(s): Unknown    Minoxidil         Other reaction(s): druged  Other reaction(s): nausea and vomiting  Other reaction(s): nausea and vomiting  Other reaction(s): druged    Morphine         Other reaction(s): Unknown  Other reaction(s): Unknown    Neuromuscular blockers, steroidal         Other reaction(s): Unknown    Nifedipine         Other reaction(s): Swelling  Other reaction(s): Swelling    Oxycodone hcl-oxycodone-asa         Other reaction(s): Unknown  Other reaction(s): Unknown    Penicillins Hives       Other reaction(s): Unknown    Propoxyphene         Other  reaction(s): Unknown    Sensipar [cinacalcet] Nausea Only    Talwin  [pentazocine lactate]         Other reaction(s): Unknown    Talwin compound         Other reaction(s): Unknown    Valsartan Rash and Hives         OBJECTIVE:      Vital Signs:  Temp:  [98.3 °F (36.8 °C)-98.4 °F (36.9 °C)] 98.4 °F (36.9 °C)  Pulse:  [64-81] 81  Resp:  [15-27] 25  SpO2:  [93 %-99 %] 97 %  BP: (132-191)/(61-81) 167/63     Physical Exam:  General: well developed, well nourished, no distress  Eyes: conjunctivae/corneas clear. PERRL..  HENT: Head:normocephalic, atraumatic. Ears:bilateral TM's and external ear canals normal. Nose: Nares normal. Septum midline. Mucosa normal. No drainage or sinus tenderness., no discharge. Throat: lips, mucosa, and tongue normal; teeth and gums normal and no throat erythema.  Neck: supple, symmetrical, trachea midline, mild JVD and thyroid not enlarged, symmetric, no tenderness/mass/nodules  Lungs:  +Wheezing, diminished BS on R>L, normal respiratory effort  Cardiovascular: Heart: regular rate and rhythm, S1, S2 normal, no murmur, click, rub or gallop. Chest Wall: no tenderness. Extremities: no cyanosis or edema, or clubbing. Pulses: 2+ and symmetric.  Abdomen/Rectal: Abdomen: soft, non-tender, non-distented; bowel sounds hyperactive; no masses,  no organomegaly. Rectal: not examined  Skin: Skin color, texture, turgor normal. R heel diabetic ulcer- wrapped   Musculoskeletal:normal gait and no clubbing, cyanosis  Lymph Nodes: No cervical or supraclavicular adenopathy  Neurologic: Normal strength and tone. No focal numbness or weakness  Psych/Behavioral:  Alert and oriented Speech: appropriate quality, quantity and organization of sentences, dementia      Laboratory   Recent Results          Recent Results (from the past 24 hour(s))   CBC auto differential     Collection Time: 06/23/17  4:36 AM   Result Value Ref Range     WBC 2.93 (L) 3.90 - 12.70 K/uL     RBC 3.46 (L) 4.00 - 5.40 M/uL     Hemoglobin  10.2 (L) 12.0 - 16.0 g/dL     Hematocrit 33.1 (L) 37.0 - 48.5 %     MCV 96 82 - 98 fL     MCH 29.5 27.0 - 31.0 pg     MCHC 30.8 (L) 32.0 - 36.0 %     RDW 18.7 (H) 11.5 - 14.5 %     Platelets 140 (L) 150 - 350 K/uL     MPV 12.6 9.2 - 12.9 fL     Gran # 1.9 1.8 - 7.7 K/uL     Lymph # 0.7 (L) 1.0 - 4.8 K/uL     Mono # 0.3 0.3 - 1.0 K/uL     Eos # 0.0 0.0 - 0.5 K/uL     Baso # 0.01 0.00 - 0.20 K/uL     Gran% 64.6 38.0 - 73.0 %     Lymph% 24.2 18.0 - 48.0 %     Mono% 9.9 4.0 - 15.0 %     Eosinophil% 1.0 0.0 - 8.0 %     Basophil% 0.3 0.0 - 1.9 %     Platelet Estimate Decreased (A)       Aniso Slight       Differential Method Automated     Troponin I     Collection Time: 06/23/17  4:36 AM   Result Value Ref Range     Troponin I 0.031 (H) 0.000 - 0.026 ng/mL   Brain natriuretic peptide     Collection Time: 06/23/17  4:36 AM   Result Value Ref Range      (H) 0 - 99 pg/mL   Comprehensive metabolic panel     Collection Time: 06/23/17  6:42 AM   Result Value Ref Range     Sodium 139 136 - 145 mmol/L     Potassium 4.2 3.5 - 5.1 mmol/L     Chloride 104 95 - 110 mmol/L     CO2 27 23 - 29 mmol/L     Glucose 115 (H) 70 - 110 mg/dL     BUN, Bld 64 (H) 8 - 23 mg/dL     Creatinine 2.7 (H) 0.5 - 1.4 mg/dL     Calcium 9.7 8.7 - 10.5 mg/dL     Total Protein 6.4 6.0 - 8.4 g/dL     Albumin 2.8 (L) 3.5 - 5.2 g/dL     Total Bilirubin 0.5 0.1 - 1.0 mg/dL     Alkaline Phosphatase 79 55 - 135 U/L     AST 40 10 - 40 U/L     ALT 23 10 - 44 U/L     Anion Gap 8 8 - 16 mmol/L     eGFR if  18.2 (A) >60 mL/min/1.73 m^2     eGFR if non  15.8 (A) >60 mL/min/1.73 m^2            Diagnostic Results:  ECG: Reviewed  X-Ray: Reviewed     ASSESSMENT & PLAN:      Patient Active Problem List   Diagnosis    Chronic atrial fibrillation    Anticoagulation monitoring by pharmacist    Obstructive sleep apnea on CPAP - setting = 5     CAD S/P percutaneous coronary angioplasty    Chronic diastolic heart failure    Secondary  pulmonary hypertension    Peripheral vascular disease    Mixed hyperlipidemia    Chronic kidney disease, stage IV (severe)    Pacemaker 2/09/12    At risk for amiodarone toxicity with long term use    Chronic hepatitis C without hepatic coma    Thrombocytopenia    Bilateral carotid artery disease: 40-49% 2016 Bilateral    Anemia of chronic disease    Non compliance w medication regimen    Diabetes mellitus with stage 4 chronic kidney disease GFR 15-29    Osteoporosis: see DEXA 1/16    Thyroid nodule    Nonrheumatic aortic valve stenosis    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), managed as type 1    Vitamin D deficiency    Vascular dementia with behavior disturbance    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    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 due to underlying condition, with fat layer exposed    Essential hypertension    Recurrent major depressive disorder, in partial remission    Diabetic ulcer of right heel associated with type 1 diabetes mellitus, limited to breakdown of skin    SOB (shortness of breath)    Pancytopenia    Protein calorie malnutrition    Heart failure, acute on chronic, systolic and diastolic    Acute on chronic systolic congestive heart failure    Dementia    SSS (sick sinus syndrome)    Recurrent pleural effusion on right     Dementia:   -Continue Namenda     Chronic A Fib:  -Continue Amiodarone 200 mg daily for rhythm and Coreg 25 mg BID for rate control  -Coumadin 5mg (pharmacy to assist with dosing, appreciate their assistance)     SSS s/p PPM:  -EKG c V paced rhythm   -Telemetry     Acute on Chronic Heart Failure:   -BNP: 294  -Echo (6/17/2017) LVEF 45%, mild MR, mild TR, grade III diastolic dysfunction  -Reduction in EF from 65% over last 7 mo.-> cardiology c/s   -Diuresis c Lasix 80 IV BID     HTN:   -Coreg 25 BID  -Amlodipine 10 mg  -Doxazosin 4 mg  -Hydralazine 100 BID  -Isordil 40mg BID    CAD s/p PCI:  -S/p PCI (ROWAN) to proxRCA (2002,  2005)   -Cath (12/2011) 40% mid LAD, 50% distal LCx stenosis  -Continue ASA 81mg daily  -Troponin 0.031, trend q6hr  -Will need PET stress/further w/u outpatient as currently other etiologies to explain CP/SOB   -Nitro prn    Mixed HLD:  -Continue home pravastatin 20 mg     VEE:  -CPAP qhs  -Duonebs q4hr    Recurrent R pleural effusion:  -Diuresis as above, continue to monitor     ANTONIO on CKD -Stage IV:  -Crt 2.7 (reported BL ~2.2, 2.6 upon d/c 4 days ago)  -Closely monitor c daily BMP, Mag, Phos in setting of active diuresis   -Avoid nephrotoxic agents      DM:   -SSI- 8 un Lantus q am & 4 un AC  -Diabetic Heel Ulcer- c/s Wound Care  -Follow with Podiatry      Prophylaxis- SCDs, and pt AC on coumadin     Advance directives-      Post-acute care- pending clinical condition    Ivania Syed, NP

## 2017-06-23 NOTE — ED NOTES
Attempted to call report, nurse unable to receive report at this time, nurse stated she will call back.

## 2017-06-23 NOTE — PLAN OF CARE
06/23/17 1318   Discharge Assessment   Assessment Type Discharge Planning Assessment   Confirmed/corrected address and phone number on facesheet? Yes   Assessment information obtained from? Caregiver;Medical Record   Expected Length of Stay (days) 3   Prior to hospitilization cognitive status: Not Oriented to Person;Not Oriented to Place;Not Oriented to Time   Prior to hospitalization functional status: Assistive Equipment;Needs Assistance   Current cognitive status: Not Oriented to Person;Not Oriented to Place;Not Oriented to Time   Current Functional Status: Assistive Equipment;Needs Assistance   Arrived From home or self-care   Lives With child(kirill), adult   Able to Return to Prior Arrangements yes   Is patient able to care for self after discharge? No   Patient's perception of discharge disposition home or selfcare;home health   Readmission Within The Last 30 Days no previous admission in last 30 days   Patient currently being followed by outpatient case management? No   Patient currently receives home health services? Yes   Patient previously received home health services and would like to resume services if necessary? Yes   If yes, name of home health provider: [facility]   Does the patient currently use HME? Yes   Patient currently receives private duty nursing? No   Patient currently receives any other outside agency services? No   Equipment Currently Used at Home walker, rolling;wheelchair   Do you have any problems affording any of your prescribed medications? No   Is the patient taking medications as prescribed? yes   Do you have any financial concerns preventing you from receiving the healthcare you need? Yes   Does the patient have transportation to healthcare appointments? Yes   Transportation Available family or friend will provide   On Dialysis? No   Does the patient receive services at the [facility]? Yes   Are there any open cases? No   Discharge Plan A Home with family;Home Health    Patient/Family In Agreement With Plan yes   Admitted with CHF. Lives with daughter and requires assist in ADLs. Plan is to DC home. Current with [facility].

## 2017-06-23 NOTE — ED NOTES
Notified JEANNIE Bal of pt decrease in blood pressure, and notified [facility] of pt mediation needs. Will send pt to floor.

## 2017-06-23 NOTE — NURSING TRANSFER
Nursing Transfer Note      6/23/2017     Transfer From: ED    Transfer via stretcher    Transfer with cardiac monitoring    Transported by transport    Medicines sent: none    Chart send with patient: Yes    Notified: daughter    Patient reassessed at: 0900, 6/23/2017     Upon arrival to floor: cardiac monitor applied, patient oriented to room, call bell in reach and bed in lowest position. Notified Dr. Davis of pt's arrival. Pt VSS in no distress, wheezing noted and MD notified.

## 2017-06-23 NOTE — PROGRESS NOTES
Pt is audibly wheezing, SpO2 96% and above, pt states she is short of breath. Notified Dr. Marisa Davis MD is working on orders will continue to monitor.

## 2017-06-23 NOTE — ED PROVIDER NOTES
Encounter Date: 6/23/2017    SCRIBE #1 NOTE: I, Reza Jason, am scribing for, and in the presence of,  Dr. Mccormick. I have scribed the following portions of the note - Other sections scribed: the x-ray reading.       History     Chief Complaint   Patient presents with    Shortness of Breath     Increasing orthopnea over the past few nights. Also right shoulder pain. History of heart failure     82-year-old female presents to the ER for evaluation of increased shortness of breath and orthopnea.  Patient was recently admitted and discharged from the hospital 4 days ago for a CHF exacerbation.  Workup at that time.  A large right-sided pleural effusion, EF 45%.  Patient was diuresed in observation and discharged home.  Currently the patient appears to be in no acute distress.  She is statting well.           Review of patient's allergies indicates:   Allergen Reactions    Losartan Other (See Comments)     Hyperkalemia    0.225 % sodium chloride      Other reaction(s): Eye irritation    Amitriptyline      Other reaction(s): Vomiting  Other reaction(s): Vomiting    Azopt  [brinzolamide]      Other reaction(s): Eye irritation    Cantil  [mepenzolate bromide]      Other reaction(s): Unknown  Other reaction(s): Unknown    Ciprofloxacin Nausea And Vomiting     Other reaction(s): Stomach upset    Codeine      Other reaction(s): Unknown  Other reaction(s): Unknown    Duragal-s      Other reaction(s): Vomiting    Erythromycin      Other reaction(s): Unknown  Other reaction(s): Unknown    Fentanyl      Other reaction(s): Vomiting    Hydrocodone-acetaminophen      Other reaction(s): Unknown  Other reaction(s): Unknown    Indomethacin      Other reaction(s): Unknown  Other reaction(s): Unknown    Meperidine      Other reaction(s): Unknown  Other reaction(s): Unknown    Minoxidil      Other reaction(s): druged  Other reaction(s): nausea and vomiting  Other reaction(s): nausea and vomiting  Other reaction(s): druged     Morphine      Other reaction(s): Unknown  Other reaction(s): Unknown    Neuromuscular blockers, steroidal      Other reaction(s): Unknown    Nifedipine      Other reaction(s): Swelling  Other reaction(s): Swelling    Oxycodone hcl-oxycodone-asa      Other reaction(s): Unknown  Other reaction(s): Unknown    Penicillins Hives     Other reaction(s): Unknown    Propoxyphene      Other reaction(s): Unknown    Sensipar [cinacalcet] Nausea Only    Talwin  [pentazocine lactate]      Other reaction(s): Unknown    Talwin compound      Other reaction(s): Unknown    Valsartan Rash and Hives     Past Medical History:   Diagnosis Date    Anticoagulation monitoring by pharmacist 6/29/2012    At risk for amiodarone toxicity with long term use 1/27/2014    Atrial fibrillation     Bilateral carotid artery disease 1/11/2016    Blood transfusion     CAD S/P percutaneous coronary angioplasty 9/27/2012    Chronic diastolic heart failure 9/27/2012    Echo 3-: 1 - Concentric hypertrophy.  2 - Normal left ventricular systolic function (EF 60-65%).  3 - Right ventricular enlargement with hypertrophy, with normal systolic function.  4 - Left ventricular diastolic dysfunction.  5 - Biatrial enlargement.  6 - Mild tricuspid regurgitation.  7 - Pulmonary hypertension. The estimated PA systolic pressure is 55 mmHg.  8 - Increased central venous pressure (IVC is greater than 3cm in diameter).     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  ROWNA placed just proximal to stent in 2005 for UA.  Cath 12/2011: normal LMCA, 40% mid LAD, 50% distal LCx    EYE SURGERY      LAMINECTOMY      TOTAL HIP ARTHROPLASTY Right     x's r hip     Family History   Problem Relation Age of Onset    Diabetes Mother     Cancer Mother     Diabetes Sister     Cancer Brother     Cancer Daughter     Diabetes Sister     Diabetes Son     Cancer Maternal Aunt     Kidney disease Neg Hx      Social History   Substance Use Topics    Smoking status: Never Smoker    Smokeless tobacco: Never Used    Alcohol use No     Review of Systems   Constitutional: Negative for fever.   HENT: Negative for sore throat.    Respiratory: Positive for cough and shortness of breath.    Cardiovascular: Positive for chest pain and leg swelling.   Gastrointestinal: Negative for nausea.   Genitourinary: Negative for dysuria.   Musculoskeletal: Negative for back pain.   Skin: Negative for rash.   Neurological: Negative for weakness.   Hematological: Does not bruise/bleed easily.       Physical Exam     Initial Vitals [06/23/17 0337]   BP Pulse Resp Temp SpO2   132/61 67 16 98.3 °F (36.8 °C) 99 %      MAP       84.67         Physical  Exam    Constitutional: Vital signs are normal. She appears well-developed and well-nourished.   HENT:   Head: Normocephalic and atraumatic.   Eyes: Conjunctivae are normal.   Cardiovascular: Normal rate and regular rhythm.   Pulmonary/Chest: No respiratory distress. She has wheezes. She has rhonchi. She has rales. She exhibits no tenderness.   Decreased breath sounds throughout worsened on the right   Abdominal: Soft. Normal appearance, normal aorta and bowel sounds are normal.   Musculoskeletal: Normal range of motion.   Neurological: She is alert and oriented to person, place, and time.   Skin: Skin is warm and intact.   Psychiatric: She has a normal mood and affect. Her speech is normal and behavior is normal. Cognition and memory are normal.         ED Course   Procedures  Labs Reviewed   CBC W/ AUTO DIFFERENTIAL - Abnormal; Notable for the following:        Result Value    WBC 2.93 (*)     RBC 3.46 (*)     Hemoglobin 10.2 (*)     Hematocrit 33.1 (*)     MCHC 30.8 (*)     RDW 18.7 (*)     Platelets 140 (*)     Lymph # 0.7 (*)     Platelet Estimate Decreased (*)     All other components within normal limits   TROPONIN I - Abnormal; Notable for the following:     Troponin I 0.031 (*)     All other components within normal limits   B-TYPE NATRIURETIC PEPTIDE - Abnormal; Notable for the following:      (*)     All other components within normal limits   COMPREHENSIVE METABOLIC PANEL          X-Rays:   Independently Interpreted Readings:   Chest X-Ray: Cardiomegaly present. Moderate right-sided pleural effusion which appears increased from CXR done in March 2017.      Medical Decision Making:   History:   Old Medical Records: I decided to obtain old medical records.  Independently Interpreted Test(s):   I have ordered and independently interpreted X-rays - see prior notes.  Clinical Tests:   Lab Tests: Reviewed and Ordered  Radiological Study: Reviewed and Ordered  Medical Tests: Reviewed and Ordered  ED  Management:  82-year-old female to the ER with acute on chronic CHF exacerbation.  Her labs were slightly improved in a few days ago with her recent discharge but her chest x-ray appears slightly worsened.  She is symptomatic with worsening orthopnea.  plan to admit for IV diuresis.            Scribe Attestation:   Scribe #1: I performed the above scribed service and the documentation accurately describes the services I performed. I attest to the accuracy of the note.    Attending Attestation:           Physician Attestation for Scribe:  Physician Attestation Statement for Scribe #1: I, Dr. Mccormick, reviewed documentation, as scribed by Reza Jason in my presence, and it is both accurate and complete.                 ED Course     Clinical Impression:   The primary encounter diagnosis was Acute on chronic systolic congestive heart failure. A diagnosis of SOB (shortness of breath) was also pertinent to this visit.    Disposition:   Disposition: Admitted  Condition: Stable                        Lior Zhou PA-C  06/23/17 0619

## 2017-06-23 NOTE — ED NOTES
Attempted to call report, floor refusing report until blood pressure addressed. Med C paged. Waiting further orders.

## 2017-06-23 NOTE — PROGRESS NOTES
Hx with [facility] clinic, no f/u this year. Appointment at [facility] next month. Wound care, some cognitive impairment. Pt is not a candidate for DM HF Program at this time.    Removed from hf list.

## 2017-06-23 NOTE — CONSULTS
HISTORY & PHYSICAL  Cardiology    Team: [facility]    PRESENTING HISTORY     Chief Complaint/Reason for Admission:  SOB, wheezing    History of Present Illness:  Ms. Chey Trujillo is a 82 y.o. female with Afib on coumadin (INR 2.0), longstanding HTN, SSS s/p permanent pacemaker, chronic diastolic heart failure (EF 43%)  DM (HbA1C 7.0), CAD s/p stents (2003), AFib, and CKD Stage 4 (baseline Cr 1.9-2.1). She presents from home for wheezing and progressive worsening of SOB. She was recently admitted on 6/18 for acute on chronic CHF exacerbation. Echo demonstrated decreased in LVEF 65-->45% from 7mo prior. She was diuresed with IV lasix, then discharged home with home health. No changes in home medications. Patient's daughter reports full compliance with home meds. Denies recent illness, changes in dietary habits, increase sodium intake.    The patient has taken torsemide 20mg BID since 2014. This was decreased to 20mg daily by Dr. Nguyen, her PCP, in May 2017. She has subsequently had progressive worsening of SOB with exertion. Dr. Grace, her Nephrologist, increased her dose to torsemide 20mg daily and 10mg nightly on 6/16, but she has only taken the increased dose once on 6/21.    The patient was noted to have a new pleural effusion in March 2017. It persists to the present and has not been evaluated by thoracentesis.     Review of EKGs shows change from atrial-paced rhythm (9/19/16) to ventricular-paced rhythm (6/18/17) to default paced rhythm (75bpm on 6/23/17). Patient presently in atrial flutter. She is followed as an outpatient by Dr. Kay of .    Review of Systems:  Constitutional: no chills, no fevers  HEENT: no issues  Resp: +SOB, no cough   Cardiac: no chest pain, no palpitations  Abd: no nausea or vomiting. No abdominal pain  Neuro: no headache, no dizziness.   Psych: no depression, no agitation    PAST HISTORY:     Past Medical History:   Diagnosis Date    Anticoagulation monitoring by  pharmacist 6/29/2012    At risk for amiodarone toxicity with long term use 1/27/2014    Atrial fibrillation     Bilateral carotid artery disease 1/11/2016    Blood transfusion     CAD S/P percutaneous coronary angioplasty 9/27/2012    Chronic diastolic heart failure 9/27/2012    Echo 3-: 1 - Concentric hypertrophy.  2 - Normal left ventricular systolic function (EF 60-65%).  3 - Right ventricular enlargement with hypertrophy, with normal systolic function.  4 - Left ventricular diastolic dysfunction.  5 - Biatrial enlargement.  6 - Mild tricuspid regurgitation.  7 - Pulmonary hypertension. The estimated PA systolic pressure is 55 mmHg.  8 - Increased central venous pressure (IVC is greater than 3cm in diameter).     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  ROWAN placed just proximal to stent in  for UA.  Cath 2011: normal LMCA, 40% mid LAD, 50% distal LCx    EYE SURGERY      LAMINECTOMY      TOTAL HIP ARTHROPLASTY Right     x's r hip       Family History   Problem Relation Age of Onset    Diabetes Mother     Cancer Mother     Diabetes Sister     Cancer Brother     Cancer Daughter     Diabetes Sister     Diabetes Son     Cancer Maternal Aunt     Kidney disease Neg Hx        Social History     Social History    Marital status:      Spouse name: N/A    Number of children: N/A    Years of education: N/A     Social History Main Topics    Smoking status: Never Smoker    Smokeless tobacco: Never Used    Alcohol use No    Drug use: No    Sexual activity: No     Other Topics Concern    None     Social History Narrative    Son lives with her. Spouse .        MEDICATIONS & ALLERGIES:     No current facility-administered medications on file prior to encounter.      Current Outpatient Prescriptions on File Prior to Encounter   Medication Sig Dispense Refill    amiodarone (PACERONE) 200 MG Tab Take 1 tablet (200 mg total) by mouth once daily. 90 tablet 3    amlodipine (NORVASC) 10 MG tablet Take 1 tablet (10 mg total) by mouth once daily. 90 tablet 3    aspirin 81 MG chewable tablet Take 1 tablet by mouth Every morning.      carvedilol (COREG) 25 MG tablet Take 1 tablet (25 mg total) by mouth 2 (two) times daily with meals. 270 tablet 4    collagenase ointment Apply topically once daily. 90 g 1    cyanocobalamin, vitamin B-12, 1,000 mcg TbSR Take 1,000 mcg by mouth once daily. 30 each 0    dorzolamide (TRUSOPT) 2 % ophthalmic solution INSTILL ONE DROP INTO EACH EYE TWICE DAILY 10 mL 11    doxazosin (CARDURA) 4 MG tablet Take 1 tablet (4 mg total) by mouth once daily. 30 tablet 12    escitalopram oxalate (LEXAPRO) 5 MG Tab Take 1 tablet (5 mg  total) by mouth once daily. 30 tablet 5    ferrous sulfate 324 mg (65 mg iron) TbEC TAKE ONE TABLET BY MOUTH TWICE DAILY 60 tablet 0    hydrALAZINE (APRESOLINE) 100 MG tablet Take 1 tablet (100 mg total) by mouth 2 (two) times daily. 270 tablet 4    insulin glargine (LANTUS) 100 unit/mL injection INJECT 10 units nightly. 30 mL 12    insulin lispro (HUMALOG) 100 unit/mL injection Inject 8 units w/ meals. 3 vial 6    isosorbide dinitrate (ISORDIL) 40 MG Tab Take 1 tablet (40 mg total) by mouth 2 (two) times daily. 270 tablet 0    KLOR-CON M10 10 mEq tablet TAKE ONE TABLET BY MOUTH TWICE DAILY 60 tablet 0    lactulose (CEPHULAC) 20 gram Pack Take 1 packet (20 g total) by mouth once daily. 90 packet 0    LANTUS 100 unit/mL injection INJECT 11 UNITS SUBCUTANEOUSLY NIGHTLY. CHANGE VIAL AFTER 30 DAYS 10 mL 6    memantine (NAMENDA) 10 MG Tab Take 1 tablet (10 mg total) by mouth 2 (two) times daily. 60 tablet 5    pravastatin (PRAVACHOL) 20 MG tablet TAKE 1 TABLET BY MOUTH once DAILY 90 tablet 1    torsemide (DEMADEX) 20 MG Tab 1 tablet (20 mg) in the morning, half a tablet (10 mg) in the afternoon. 180 tablet 3    warfarin (COUMADIN) 5 MG tablet Take 0.5-1 tablets (2.5-5 mg total) by mouth Daily. As directed by [facility] 30 tablet 3    ACCU-CHEK SMARTVIEW TEST STRIP Strp test FOUR TIMES A  each 11    blood-glucose meter Misc Dispense Accu-Chek Natalia meter 1 each 0    insulin syringe-needle U-100 (EASY TOUCH INSULIN SYRINGE) 0.5 mL 31 gauge x 5/16 Syrg To use 4 times daily with insulin injections 150 each 2    lancets Misc 1 lancet by Misc.(Non-Drug; Combo Route) route 4 (four) times daily. 150 each 11    latanoprost 0.005 % ophthalmic solution INSTILL ONE DROP INTO EACH EYE IN THE EVENING 3 Bottle 12    nitroGLYCERIN 0.4 MG/DOSE TL SPRY (NITROLINGUAL) 400 mcg/spray spray PLACE ONE SPRAY UNDER THE TONGUE EVERY 5 MINUTES AS NEEDED FOR CHEST PAIN. 4.9 g 0    NITROSTAT 0.3 mg SL tablet DISSOLVE  ONE TABLET UNDER THE TONGUE EVERY 5 MINUTES AS NEEDED FOR CHEST PAIN.  DO NOT EXCEED A TOTAL OF 3 DOSES IN 15 MINUTES 100 tablet 0        Review of patient's allergies indicates:   Allergen Reactions    Losartan Other (See Comments)     Hyperkalemia    0.225 % sodium chloride      Other reaction(s): Eye irritation    Amitriptyline      Other reaction(s): Vomiting  Other reaction(s): Vomiting    Azopt  [brinzolamide]      Other reaction(s): Eye irritation    Cantil  [mepenzolate bromide]      Other reaction(s): Unknown  Other reaction(s): Unknown    Ciprofloxacin Nausea And Vomiting     Other reaction(s): Stomach upset    Codeine      Other reaction(s): Unknown  Other reaction(s): Unknown    Duragal-s      Other reaction(s): Vomiting    Erythromycin      Other reaction(s): Unknown  Other reaction(s): Unknown    Fentanyl      Other reaction(s): Vomiting    Hydrocodone-acetaminophen      Other reaction(s): Unknown  Other reaction(s): Unknown    Indomethacin      Other reaction(s): Unknown  Other reaction(s): Unknown    Meperidine      Other reaction(s): Unknown  Other reaction(s): Unknown    Minoxidil      Other reaction(s): druged  Other reaction(s): nausea and vomiting  Other reaction(s): nausea and vomiting  Other reaction(s): druged    Morphine      Other reaction(s): Unknown  Other reaction(s): Unknown    Neuromuscular blockers, steroidal      Other reaction(s): Unknown    Nifedipine      Other reaction(s): Swelling  Other reaction(s): Swelling    Oxycodone hcl-oxycodone-asa      Other reaction(s): Unknown  Other reaction(s): Unknown    Penicillins Hives     Other reaction(s): Unknown    Propoxyphene      Other reaction(s): Unknown    Sensipar [cinacalcet] Nausea Only    Talwin  [pentazocine lactate]      Other reaction(s): Unknown    Talwin compound      Other reaction(s): Unknown    Valsartan Rash and Hives       OBJECTIVE:     Vital Signs:  Temp:  [97.9 °F (36.6 °C)-98.4 °F (36.9 °C)]  97.9 °F (36.6 °C)  Pulse:  [64-81] 74  Resp:  [15-27] 20  SpO2:  [93 %-99 %] 99 %  BP: (132-191)/(61-83) 150/83  Body mass index is 24.27 kg/m².     Physical Exam:  General: Elderly AAF in NAD  HEENT: Conjunctiva clear; Oropharynx clear  Neck: Elevated JVD, distended veins visible on her face and brow  CV- faint systolic murmur, Normal S1, S2 with no rubs  Resp- Distant rales in RLL otherwise clear in other lung fields with no notable wheezing. Unlabored.  Abdomen- NTND, BS normoactive x4 quads, soft  Extrem- 1+R LE edema. Trace L LE edema.  Skin- No rashes, lesions, ulcers  Neuro: drowsy but easily rousable. Alert. no FND.    Laboratory  Lab Results   Component Value Date    WBC 2.93 (L) 06/23/2017    HGB 10.2 (L) 06/23/2017    HCT 33.1 (L) 06/23/2017    MCV 96 06/23/2017     (L) 06/23/2017       Recent Labs  Lab 06/23/17  0642   *      K 4.2      CO2 27   BUN 64*   CREATININE 2.7*   CALCIUM 9.7     Lab Results   Component Value Date    INR 2.0 (H) 06/23/2017    INR 2.4 (H) 06/19/2017    INR 2.7 (H) 06/18/2017     Lab Results   Component Value Date    HGBA1C 7.0 (H) 06/14/2017     Recent Labs      06/23/17   0912  06/23/17   1314   POCTGLUCOSE  113*  151*         ASSESSMENT & PLAN:     Current Problems List:  Active Hospital Problems    Diagnosis  POA    *Heart failure, acute on chronic, systolic and diastolic [I50.43]  Yes    Dementia [F03.90]  Yes     Chronic    SSS (sick sinus syndrome) [I49.5]  Yes     Chronic     S/p PPM       Recurrent pleural effusion on right [J90]  Unknown    SOB (shortness of breath) [R06.02]  Unknown    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 [E11.621, L97.409]  Yes    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), 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]  Yes    Diabetes mellitus with stage 4 chronic kidney disease GFR 15-29 [E11.22, N18.4]  Yes     Chronic    Bilateral carotid artery disease: 40-49% 2016 Bilateral [I77.9]  Yes    Chronic hepatitis C without hepatic coma [B18.2]  Yes     Thrombocytopenia [D69.6]  Yes    Mixed hyperlipidemia [E78.2]  Yes    Peripheral vascular disease [I73.9]  Yes    Secondary pulmonary hypertension [I27.2]  Yes    CAD S/P percutaneous coronary angioplasty [I25.10, Z98.61]  Not Applicable     Chronic    Chronic diastolic heart failure [I50.32]  Yes     Chronic     Echo 3-:  1 - Concentric hypertrophy.   2 - Normal left ventricular systolic function (EF 60-65%).   3 - Right ventricular enlargement with hypertrophy, with normal systolic function.   4 - Left ventricular diastolic dysfunction.   5 - Biatrial enlargement.   6 - Mild tricuspid regurgitation.   7 - Pulmonary hypertension. The estimated PA systolic pressure is 55 mmHg.   8 - Increased central venous pressure (IVC is greater than 3cm in diameter).       Obstructive sleep apnea on CPAP - setting = 5  [G47.33, Z99.89]  Not Applicable     Chronic    Chronic atrial fibrillation [I48.2]  Yes      Resolved Hospital Problems    Diagnosis Date Resolved POA   No resolved problems to display.       Problem Assessment & Treatment Plan:    Ms. Chey Trujillo is an 82 y.o. female with Afib (presently atrial flutter), HTN, SSS s/p permanent pacemaker, chronic diastolic heart failure (EF 43%), DM, CAD s/p stents (2003), and CKD Stage 4 (baseline Cr 1.9-2.1) who presents with acute decompensation of chronic heart failure.    #ADHF: Acute decompensation of heart failure secondary to under dosing of daily diuretic, decreased physiologic reserve 2/2 R pleural effusion, decreased EF 2/2 RV pacing with functional LBBB  - Trop .03 (6/18) --> .03 (6/23);  (6/18) --> 294 (6/23); ECG with no concerning ST segment changes; low suspicion for new infarct  - Agree with IV lasix diuresis; may require higher dose for effective diuresis  -- 40mg torsemide daily was former effective oral dose, during decompensation treat with 2.5x daily dose which is 200mg IV furosemide total daily  - Recommend increasing home  regimen to torsemide 20mg BID on discharge  - Agree with isosorbide dinitrate 40mg PO BID  - monitor daily standing weight, strict I/Os  - consult placed to EP for high RV pacing burden    #HTN  - hydralazine 100mg q8h if BP remains elevated    #R Pleural Effusion  - Perform diagnostic thoracentesis    Code: FULL  DVT ppx: Therapeutic on coumadin. INR 2.0.  Diet: Cardiac    Staff attestation to follow.    Rei Fox MD, PGY-2  Internal Medicine  [phone]

## 2017-06-23 NOTE — PROGRESS NOTES
Notified Dr. Davis pt is diabetic and receives lantus and Humalog, no such orders noted. MD will assess and add as appropriate. Will continue to monitor.

## 2017-06-24 LAB
ANION GAP SERPL CALC-SCNC: 5 MMOL/L
BASOPHILS # BLD AUTO: 0.01 K/UL
BASOPHILS NFR BLD: 0.3 %
BUN SERPL-MCNC: 66 MG/DL
CALCIUM SERPL-MCNC: 9.5 MG/DL
CHLORIDE SERPL-SCNC: 106 MMOL/L
CO2 SERPL-SCNC: 30 MMOL/L
CREAT SERPL-MCNC: 2.7 MG/DL
DIFFERENTIAL METHOD: ABNORMAL
EOSINOPHIL # BLD AUTO: 0 K/UL
EOSINOPHIL NFR BLD: 1.3 %
ERYTHROCYTE [DISTWIDTH] IN BLOOD BY AUTOMATED COUNT: 16.3 %
EST. GFR  (AFRICAN AMERICAN): 18.2 ML/MIN/1.73 M^2
EST. GFR  (NON AFRICAN AMERICAN): 15.8 ML/MIN/1.73 M^2
GLUCOSE SERPL-MCNC: 82 MG/DL
HCT VFR BLD AUTO: 29.5 %
HGB BLD-MCNC: 9.3 G/DL
INR PPP: 2.5
LYMPHOCYTES # BLD AUTO: 0.9 K/UL
LYMPHOCYTES NFR BLD: 28.7 %
MAGNESIUM SERPL-MCNC: 2 MG/DL
MCH RBC QN AUTO: 29.1 PG
MCHC RBC AUTO-ENTMCNC: 31.5 %
MCV RBC AUTO: 92 FL
MONOCYTES # BLD AUTO: 0.3 K/UL
MONOCYTES NFR BLD: 9.8 %
NEUTROPHILS # BLD AUTO: 1.9 K/UL
NEUTROPHILS NFR BLD: 59.6 %
PHOSPHATE SERPL-MCNC: 3 MG/DL
PLATELET # BLD AUTO: 103 K/UL
PMV BLD AUTO: 10.7 FL
POCT GLUCOSE: 142 MG/DL (ref 70–110)
POCT GLUCOSE: 189 MG/DL (ref 70–110)
POCT GLUCOSE: 200 MG/DL (ref 70–110)
POCT GLUCOSE: 203 MG/DL (ref 70–110)
POTASSIUM SERPL-SCNC: 3.7 MMOL/L
PROTHROMBIN TIME: 24.6 SEC
RBC # BLD AUTO: 3.2 M/UL
SODIUM SERPL-SCNC: 141 MMOL/L
WBC # BLD AUTO: 3.17 K/UL

## 2017-06-24 PROCEDURE — 85610 PROTHROMBIN TIME: CPT

## 2017-06-24 PROCEDURE — 99900035 HC TECH TIME PER 15 MIN (STAT)

## 2017-06-24 PROCEDURE — 94660 CPAP INITIATION&MGMT: CPT

## 2017-06-24 PROCEDURE — 99232 SBSQ HOSP IP/OBS MODERATE 35: CPT | Mod: ,,, | Performed by: INTERNAL MEDICINE

## 2017-06-24 PROCEDURE — 84100 ASSAY OF PHOSPHORUS: CPT

## 2017-06-24 PROCEDURE — 25000003 PHARM REV CODE 250: Performed by: HOSPITALIST

## 2017-06-24 PROCEDURE — 27000221 HC OXYGEN, UP TO 24 HOURS

## 2017-06-24 PROCEDURE — 25000242 PHARM REV CODE 250 ALT 637 W/ HCPCS: Performed by: NURSE PRACTITIONER

## 2017-06-24 PROCEDURE — 80048 BASIC METABOLIC PNL TOTAL CA: CPT

## 2017-06-24 PROCEDURE — 25000003 PHARM REV CODE 250: Performed by: NURSE PRACTITIONER

## 2017-06-24 PROCEDURE — 20600002 HC STEP DOWN SEMI-PRIVATE ROOM

## 2017-06-24 PROCEDURE — 83735 ASSAY OF MAGNESIUM: CPT

## 2017-06-24 PROCEDURE — 85025 COMPLETE CBC W/AUTO DIFF WBC: CPT

## 2017-06-24 PROCEDURE — 63600175 PHARM REV CODE 636 W HCPCS: Performed by: HOSPITALIST

## 2017-06-24 PROCEDURE — 36415 COLL VENOUS BLD VENIPUNCTURE: CPT

## 2017-06-24 PROCEDURE — 94640 AIRWAY INHALATION TREATMENT: CPT

## 2017-06-24 PROCEDURE — 99233 SBSQ HOSP IP/OBS HIGH 50: CPT | Mod: ,,, | Performed by: HOSPITALIST

## 2017-06-24 PROCEDURE — 63600175 PHARM REV CODE 636 W HCPCS: Performed by: NURSE PRACTITIONER

## 2017-06-24 RX ORDER — METOLAZONE 5 MG/1
5 TABLET ORAL NIGHTLY
Status: COMPLETED | OUTPATIENT
Start: 2017-06-24 | End: 2017-06-24

## 2017-06-24 RX ORDER — IPRATROPIUM BROMIDE AND ALBUTEROL SULFATE 2.5; .5 MG/3ML; MG/3ML
3 SOLUTION RESPIRATORY (INHALATION) EVERY 4 HOURS PRN
Status: DISCONTINUED | OUTPATIENT
Start: 2017-06-24 | End: 2017-06-30 | Stop reason: HOSPADM

## 2017-06-24 RX ORDER — FUROSEMIDE 10 MG/ML
120 INJECTION INTRAMUSCULAR; INTRAVENOUS 3 TIMES DAILY
Status: DISCONTINUED | OUTPATIENT
Start: 2017-06-24 | End: 2017-06-24

## 2017-06-24 RX ORDER — FUROSEMIDE 10 MG/ML
80 INJECTION INTRAMUSCULAR; INTRAVENOUS 3 TIMES DAILY
Status: DISCONTINUED | OUTPATIENT
Start: 2017-06-24 | End: 2017-06-25

## 2017-06-24 RX ADMIN — IPRATROPIUM BROMIDE AND ALBUTEROL SULFATE 3 ML: .5; 3 SOLUTION RESPIRATORY (INHALATION) at 12:06

## 2017-06-24 RX ADMIN — INSULIN ASPART 2 UNITS: 100 INJECTION, SOLUTION INTRAVENOUS; SUBCUTANEOUS at 05:06

## 2017-06-24 RX ADMIN — HYDRALAZINE HYDROCHLORIDE 100 MG: 50 TABLET ORAL at 09:06

## 2017-06-24 RX ADMIN — FUROSEMIDE 80 MG: 10 INJECTION, SOLUTION INTRAVENOUS at 05:06

## 2017-06-24 RX ADMIN — DOXAZOSIN 4 MG: 4 TABLET ORAL at 09:06

## 2017-06-24 RX ADMIN — ISOSORBIDE DINITRATE 40 MG: 20 TABLET ORAL at 09:06

## 2017-06-24 RX ADMIN — IPRATROPIUM BROMIDE AND ALBUTEROL SULFATE 3 ML: .5; 3 SOLUTION RESPIRATORY (INHALATION) at 09:06

## 2017-06-24 RX ADMIN — INSULIN ASPART 4 UNITS: 100 INJECTION, SOLUTION INTRAVENOUS; SUBCUTANEOUS at 05:06

## 2017-06-24 RX ADMIN — INSULIN DETEMIR 8 UNITS: 100 INJECTION, SOLUTION SUBCUTANEOUS at 09:06

## 2017-06-24 RX ADMIN — AMLODIPINE BESYLATE 10 MG: 10 TABLET ORAL at 09:06

## 2017-06-24 RX ADMIN — METOLAZONE 5 MG: 5 TABLET ORAL at 09:06

## 2017-06-24 RX ADMIN — IPRATROPIUM BROMIDE AND ALBUTEROL SULFATE 3 ML: .5; 3 SOLUTION RESPIRATORY (INHALATION) at 03:06

## 2017-06-24 RX ADMIN — FUROSEMIDE 80 MG: 10 INJECTION, SOLUTION INTRAVENOUS at 10:06

## 2017-06-24 RX ADMIN — Medication 3 ML: at 05:06

## 2017-06-24 RX ADMIN — Medication 3 ML: at 11:06

## 2017-06-24 RX ADMIN — ESCITALOPRAM 5 MG: 5 TABLET, FILM COATED ORAL at 09:06

## 2017-06-24 RX ADMIN — CARVEDILOL 25 MG: 12.5 TABLET, FILM COATED ORAL at 09:06

## 2017-06-24 RX ADMIN — WARFARIN SODIUM 5 MG: 5 TABLET ORAL at 05:06

## 2017-06-24 RX ADMIN — AMIODARONE HYDROCHLORIDE 200 MG: 200 TABLET ORAL at 09:06

## 2017-06-24 RX ADMIN — Medication 3 ML: at 02:06

## 2017-06-24 RX ADMIN — FUROSEMIDE 80 MG: 10 INJECTION, SOLUTION INTRAVENOUS at 02:06

## 2017-06-24 RX ADMIN — IPRATROPIUM BROMIDE AND ALBUTEROL SULFATE 3 ML: .5; 3 SOLUTION RESPIRATORY (INHALATION) at 01:06

## 2017-06-24 RX ADMIN — INSULIN ASPART 4 UNITS: 100 INJECTION, SOLUTION INTRAVENOUS; SUBCUTANEOUS at 12:06

## 2017-06-24 RX ADMIN — ASPIRIN 81 MG CHEWABLE TABLET 81 MG: 81 TABLET CHEWABLE at 09:06

## 2017-06-24 RX ADMIN — IPRATROPIUM BROMIDE AND ALBUTEROL SULFATE 3 ML: .5; 3 SOLUTION RESPIRATORY (INHALATION) at 04:06

## 2017-06-24 RX ADMIN — PRAVASTATIN SODIUM 20 MG: 20 TABLET ORAL at 09:06

## 2017-06-24 NOTE — PLAN OF CARE
Problem: Patient Care Overview  Goal: Plan of Care Review  Patient is free of fall/trauma/injury. Denies CP, SOB, or pain/discomfort. IVP lasix ongoing. Pt tolerating well. Blood glucose monitoring ongoing. Insulin administered per scheduled orders. No supplemental insulin required per PRN sliding scale. Plan of care discussed with pt. Pt verbalizes understanding. All questions addressed. Will continue to monitor

## 2017-06-24 NOTE — PROGRESS NOTES
Progress Note  Hospital Medicine      Admit Date: 6/23/2017    SUBJECTIVE:     Follow-up For:  Heart failure, acute on chronic, systolic and diastolic    HPI and Hospital Course: see prior note    Interval History:  NAEON, Is/Os not accurate, starting PureWick for accurate Is/Os, continue Lasix 80 IV TID and add dose of metolazone 5mg PO x 1 tonight. Episode of agitation in the late afternoon per daughter at bedside, then calmed down    Review of Systems:  Pain Scale: 0 /10   Constitutional: no fever or chills  Respiratory: no cough or shortness of breath  Cardiovascular: no chest pain or palpitations  Gastrointestinal: no nausea or vomiting, no abdominal pain or change in bowel habits  Genitourinary: no hematuria or dysuria  Integument/Breast: no rash or pruritis  Hematologic/Lymphatic: no easy bruising or lymphadenopathy  Musculoskeletal: no arthralgias or myalgias  Neurological: no seizures or tremors  Behavioral/Psych: no depression or anxiety    OBJECTIVE:     Vital Signs Range (Last 24H):  Temp:  [97.6 °F (36.4 °C)-98.8 °F (37.1 °C)]   Pulse:  [52-68]   Resp:  [16-18]   BP: (123-170)/(59-85)   SpO2:  [93 %-99 %]     I & O (Last 24H):  Intake/Output Summary (Last 24 hours) at 06/24/17 1817  Last data filed at 06/24/17 1500   Gross per 24 hour   Intake              750 ml   Output              200 ml   Net              550 ml       Physical Exam:  General appearance: no distress, elderly lady, supine in bed, sleeping but arousable to tactile stimuli  Mental status: see above  HEENT:  conjunctivae/corneas clear, PERRL  Neck: supple, thyroid not enlarged, +JVD  Pulm:   normal respiratory effort, CTA B, no c/w/r  Card: RRR  Abd: soft, NT, ND, BS present; no masses, no organomegaly  Ext: no c/c/e, warm  Pulses: 2+, symmetric  Skin: color, texture, turgor normal. No rashes or lesions  Neuro: CN II-XII grossly intact, no focal numbness or weakness, normal strength and tone     Diagnostic Results:  Labs  reviewed    Recent Results (from the past 24 hour(s))   POCT glucose    Collection Time: 06/23/17 10:43 PM   Result Value Ref Range    POCT Glucose 127 (H) 70 - 110 mg/dL   CBC auto differential    Collection Time: 06/24/17  6:26 AM   Result Value Ref Range    WBC 3.17 (L) 3.90 - 12.70 K/uL    RBC 3.20 (L) 4.00 - 5.40 M/uL    Hemoglobin 9.3 (L) 12.0 - 16.0 g/dL    Hematocrit 29.5 (L) 37.0 - 48.5 %    MCV 92 82 - 98 fL    MCH 29.1 27.0 - 31.0 pg    MCHC 31.5 (L) 32.0 - 36.0 %    RDW 16.3 (H) 11.5 - 14.5 %    Platelets 103 (L) 150 - 350 K/uL    MPV 10.7 9.2 - 12.9 fL    Gran # 1.9 1.8 - 7.7 K/uL    Lymph # 0.9 (L) 1.0 - 4.8 K/uL    Mono # 0.3 0.3 - 1.0 K/uL    Eos # 0.0 0.0 - 0.5 K/uL    Baso # 0.01 0.00 - 0.20 K/uL    Gran% 59.6 38.0 - 73.0 %    Lymph% 28.7 18.0 - 48.0 %    Mono% 9.8 4.0 - 15.0 %    Eosinophil% 1.3 0.0 - 8.0 %    Basophil% 0.3 0.0 - 1.9 %    Differential Method Automated    Magnesium - if not done in ED    Collection Time: 06/24/17  6:26 AM   Result Value Ref Range    Magnesium 2.0 1.6 - 2.6 mg/dL   Phosphorus - if not done in ED    Collection Time: 06/24/17  6:26 AM   Result Value Ref Range    Phosphorus 3.0 2.7 - 4.5 mg/dL   Basic metabolic panel     Collection Time: 06/24/17  6:26 AM   Result Value Ref Range    Sodium 141 136 - 145 mmol/L    Potassium 3.7 3.5 - 5.1 mmol/L    Chloride 106 95 - 110 mmol/L    CO2 30 (H) 23 - 29 mmol/L    Glucose 82 70 - 110 mg/dL    BUN, Bld 66 (H) 8 - 23 mg/dL    Creatinine 2.7 (H) 0.5 - 1.4 mg/dL    Calcium 9.5 8.7 - 10.5 mg/dL    Anion Gap 5 (L) 8 - 16 mmol/L    eGFR if African American 18.2 (A) >60 mL/min/1.73 m^2    eGFR if non  15.8 (A) >60 mL/min/1.73 m^2   Protime-INR    Collection Time: 06/24/17  6:26 AM   Result Value Ref Range    Prothrombin Time 24.6 (H) 9.0 - 12.5 sec    INR 2.5 (H) 0.8 - 1.2   POCT glucose    Collection Time: 06/24/17  9:22 AM   Result Value Ref Range    POCT Glucose 142 (H) 70 - 110 mg/dL   POCT glucose    Collection  "Time: 06/24/17  1:27 PM   Result Value Ref Range    POCT Glucose 189 (H) 70 - 110 mg/dL   POCT glucose    Collection Time: 06/24/17  5:23 PM   Result Value Ref Range    POCT Glucose 203 (H) 70 - 110 mg/dL           ASSESSMENT/PLAN:     Acute on chronic combined CHF - likely multifactorial due to decreased EF 2/2 RV pacing with subsequent functional LBBB, recent decreased dose of diuretic at home, and possible contribution from R pleural effusion (lending decreased physiologic reserve in CHF patient).  Per cardiology, "Trop .03 (6/18) --> .03 (6/23);  (6/18) --> 294 (6/23); ECG with no concerning ST segment changes; low suspicion for new infarct." Echo (6/17/2017) LVEF 45%, mild MR, mild TR, grade III diastolic dysfunctionReduction in EF from 65% over last 7 mo.-> cardiology c/s   - per cardiology, "40mg torsemide daily was former effective oral dose, during decompensation treat with 2.5x daily dose which is 200mg IV furosemide total daily"  - Is/Os may not be accurate as she has been wearing diaper; start PureWick and f/u Is/OS  - continue lasix 80 TID with metolazone 5mg PO x 1 30 mins before and follow Is/Os  - upon d/c home, increase home torsemide to 20mg BID   - EP consult edfor high RV pacing burden, which is much improved after changes to device on 6/23 afternoon\    Aflutter, h/o Afib  - Aflutter found on telemetry 6/24  - EP plans for RFA ablation this week (no need for LAKHWINDER as therapeutic on coumadin for >1 mo)  - home Amiodarone 200 mg daily for rhythm  - home Coreg 25 mg BID for rate control  - home Coumadin 5mg, daily INR     R pleural effusion  - consulted pulmonology (non-emergent) for evaluation and possible Dx +/- Tx thoracentesis    ANTONIO on CKD-4. Cr stable at 2.7 (<--2.7)  reported BL ~2.2, 2.6 upon d/c 4 days ago  - Closely monitor c daily BMP, Mag, Phos in setting of active diuresis   - Avoid nephrotoxic agents  - renally dose all meds     HTN, renovascular  - home Coreg 25 BID  - home " Amlodipine 10 mg  - home Doxazosin 4 mg  - home Isordil 40mg BID  - consider increasing hydralazine to 100mg q8h (from q12)  - defer to cardiology re: any med changes     MAYELA, treated as Type 1 DM, controlled, with renal manifestations and PVD  - insulin regimen   - SSI- 8 un Lantus q am & 4 un AC  - f/u Wound Care consult for diabetic heel ulcer  - Follow with Podiatry     Dementia without behavioral disturbance  - hold home memantine as this is non-essential med and can contribute to encephalopathy/delirium     VEE  - CPAP nightly     SSS s/p PPM  -EKG c V paced rhythm   -Telemetry      CAD s/p PCI. S/p PCI (ROWAN) to proxRCA (2002, 2005).  Cath (12/2011) 40% mid LAD, 50% distal LCx stenosis  -Continue ASA 81mg daily  -Troponin 0.031, trend q6hr  -Nitro prn  - defer to cardiology re: any ischemic workup     Mixed HLD  -  home pravastatin 20 mg     Prophylaxis- SCDs, coumadin     Advance directives- full code     Post-acute care- pending clinical condition    Time spent in care of patient: 60 mins    Marisa Davis MD  Hospital Medicine Staff

## 2017-06-24 NOTE — CONSULTS
Electrophysiology Consult Note  Attending Physician: Marisa Davis MD  Reason for Consult: RV pacing     HPI:   82 y.o. woman with PMH of Afib on coumadin (INR 2.0), longstanding HTN, SSS s/p permanent pacemaker, chronic diastolic heart failure (EF 43%)  DM (HbA1C 7.0), CAD s/p stents (2003), AFib, and CKD Stage 4 (baseline Cr 1.9-2.1) who presented with acute on chronic heart failure. She also noticed a 3-4/10 in severity dull chest pain that was worse with deep breathing. Since her discharge, she has developed gradually worsening SOB and CP. On device interrogation, it appears that she has a around 41% RV pacing. EP has been consulted for the significant amount of RV pacing.    ROS:    Constitution: Negative for fever, chills, weight loss or gain.   HENT: Negative for sore throat, rhinorrhea, or headache.  Eyes: Negative for blurred or double vision.   Cardiovascular: See above  Pulmonary: Positive for SOB   Gastrointestinal: Negative for abdominal pain, nausea, vomiting, or diarrhea.   : Negative for dysuria.   Neurological: Negative for focal weakness or sensory changes.  PMH:     PTA Medications   Medication Sig    amiodarone (PACERONE) 200 MG Tab Take 1 tablet (200 mg total) by mouth once daily.    amlodipine (NORVASC) 10 MG tablet Take 1 tablet (10 mg total) by mouth once daily.    aspirin 81 MG chewable tablet Take 1 tablet by mouth Every morning.    carvedilol (COREG) 25 MG tablet Take 1 tablet (25 mg total) by mouth 2 (two) times daily with meals.    collagenase ointment Apply topically once daily.    cyanocobalamin, vitamin B-12, 1,000 mcg TbSR Take 1,000 mcg by mouth once daily.    dorzolamide (TRUSOPT) 2 % ophthalmic solution INSTILL ONE DROP INTO EACH EYE TWICE DAILY    doxazosin (CARDURA) 4 MG tablet Take 1 tablet (4 mg total) by mouth once daily.    escitalopram oxalate (LEXAPRO) 5 MG Tab Take 1 tablet (5 mg total) by mouth once daily.    ferrous sulfate 324 mg (65 mg iron) TbEC  TAKE ONE TABLET BY MOUTH TWICE DAILY    hydrALAZINE (APRESOLINE) 100 MG tablet Take 1 tablet (100 mg total) by mouth 2 (two) times daily.    insulin glargine (LANTUS) 100 unit/mL injection INJECT 10 units nightly.    insulin lispro (HUMALOG) 100 unit/mL injection Inject 8 units w/ meals.    isosorbide dinitrate (ISORDIL) 40 MG Tab Take 1 tablet (40 mg total) by mouth 2 (two) times daily.    KLOR-CON M10 10 mEq tablet TAKE ONE TABLET BY MOUTH TWICE DAILY    lactulose (CEPHULAC) 20 gram Pack Take 1 packet (20 g total) by mouth once daily.    LANTUS 100 unit/mL injection INJECT 11 UNITS SUBCUTANEOUSLY NIGHTLY. CHANGE VIAL AFTER 30 DAYS    memantine (NAMENDA) 10 MG Tab Take 1 tablet (10 mg total) by mouth 2 (two) times daily.    pravastatin (PRAVACHOL) 20 MG tablet TAKE 1 TABLET BY MOUTH once DAILY    torsemide (DEMADEX) 20 MG Tab 1 tablet (20 mg) in the morning, half a tablet (10 mg) in the afternoon.    warfarin (COUMADIN) 5 MG tablet Take 0.5-1 tablets (2.5-5 mg total) by mouth Daily. As directed by [facility]    ACCU-CHEK SMARTVIEW TEST STRIP Strp test FOUR TIMES A DAY    blood-glucose meter Misc Dispense Accu-Chek Natalia meter    insulin syringe-needle U-100 (EASY TOUCH INSULIN SYRINGE) 0.5 mL 31 gauge x 5/16 Syrg To use 4 times daily with insulin injections    lancets Misc 1 lancet by Misc.(Non-Drug; Combo Route) route 4 (four) times daily.    latanoprost 0.005 % ophthalmic solution INSTILL ONE DROP INTO EACH EYE IN THE EVENING    nitroGLYCERIN 0.4 MG/DOSE TL SPRY (NITROLINGUAL) 400 mcg/spray spray PLACE ONE SPRAY UNDER THE TONGUE EVERY 5 MINUTES AS NEEDED FOR CHEST PAIN.    NITROSTAT 0.3 mg SL tablet DISSOLVE ONE TABLET UNDER THE TONGUE EVERY 5 MINUTES AS NEEDED FOR CHEST PAIN.  DO NOT EXCEED A TOTAL OF 3 DOSES IN 15 MINUTES      Review of patient's allergies indicates:   Allergen Reactions    Losartan Other (See Comments)     Hyperkalemia    0.225 % sodium chloride      Other reaction(s):  Eye irritation    Amitriptyline      Other reaction(s): Vomiting  Other reaction(s): Vomiting    Azopt  [brinzolamide]      Other reaction(s): Eye irritation    Cantil  [mepenzolate bromide]      Other reaction(s): Unknown  Other reaction(s): Unknown    Ciprofloxacin Nausea And Vomiting     Other reaction(s): Stomach upset    Codeine      Other reaction(s): Unknown  Other reaction(s): Unknown    Duragal-s      Other reaction(s): Vomiting    Erythromycin      Other reaction(s): Unknown  Other reaction(s): Unknown    Fentanyl      Other reaction(s): Vomiting    Hydrocodone-acetaminophen      Other reaction(s): Unknown  Other reaction(s): Unknown    Indomethacin      Other reaction(s): Unknown  Other reaction(s): Unknown    Meperidine      Other reaction(s): Unknown  Other reaction(s): Unknown    Minoxidil      Other reaction(s): druged  Other reaction(s): nausea and vomiting  Other reaction(s): nausea and vomiting  Other reaction(s): druged    Morphine      Other reaction(s): Unknown  Other reaction(s): Unknown    Neuromuscular blockers, steroidal      Other reaction(s): Unknown    Nifedipine      Other reaction(s): Swelling  Other reaction(s): Swelling    Oxycodone hcl-oxycodone-asa      Other reaction(s): Unknown  Other reaction(s): Unknown    Penicillins Hives     Other reaction(s): Unknown    Propoxyphene      Other reaction(s): Unknown    Sensipar [cinacalcet] Nausea Only    Talwin  [pentazocine lactate]      Other reaction(s): Unknown    Talwin compound      Other reaction(s): Unknown    Valsartan Rash and Hives       Past Medical History:   Diagnosis Date    Anticoagulation monitoring by pharmacist 6/29/2012    At risk for amiodarone toxicity with long term use 1/27/2014    Atrial fibrillation     Bilateral carotid artery disease 1/11/2016    Blood transfusion     CAD S/P percutaneous coronary angioplasty 9/27/2012    Chronic diastolic heart failure 9/27/2012    Echo 3-: 1 -  Concentric hypertrophy.  2 - Normal left ventricular systolic function (EF 60-65%).  3 - Right ventricular enlargement with hypertrophy, with normal systolic function.  4 - Left ventricular diastolic dysfunction.  5 - Biatrial enlargement.  6 - Mild tricuspid regurgitation.  7 - Pulmonary hypertension. The estimated PA systolic pressure is 55 mmHg.  8 - Increased central venous pressure (IVC is greater than 3cm in diameter).     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  ANABELA u/s 4/14 wnl 9/12/2012    Secondary pulmonary hypertension 8/13/2013    Spinal stenosis     Thyroid nodule: per ENDO repeat in 2017 and see ENDO then (note 1/29/16) 1/2/2013    Tricuspid regurgitation 1/11/2016    Tubular adenoma x 3 6/13 5/19/2014    Type 2 DM with CKD stage 4 and hypertension 1/16/2015    Urinary, incontinence, stress female        Past Surgical History:   Procedure Laterality Date    CARDIAC CATHETERIZATION      CARDIAC PACEMAKER PLACEMENT  2/9/2012    Quinn Reply , serial #[id number]    CATARACT EXTRACTION      Status post cataract extraction and insertion of intraocular lens of right eye    CORONARY ANGIOPLASTY      ROWAN to prox RCA 2002 for UA/+stress test.  ROWAN placed just proximal to stent in 2005 for UA.  Cath 12/2011: normal LMCA, 40% mid LAD, 50% distal LCx    EYE SURGERY      LAMINECTOMY      TOTAL HIP ARTHROPLASTY Right     x's r hip      Family History   Problem  Relation Age of Onset    Diabetes Mother     Cancer Mother     Diabetes Sister     Cancer Brother     Cancer Daughter     Diabetes Sister     Diabetes Son     Cancer Maternal Aunt     Kidney disease Neg Hx        Social History   Substance Use Topics    Smoking status: Never Smoker    Smokeless tobacco: Never Used    Alcohol use No        Allergies:     Review of patient's allergies indicates:   Allergen Reactions    Losartan Other (See Comments)     Hyperkalemia    0.225 % sodium chloride      Other reaction(s): Eye irritation    Amitriptyline      Other reaction(s): Vomiting  Other reaction(s): Vomiting    Azopt  [brinzolamide]      Other reaction(s): Eye irritation    Cantil  [mepenzolate bromide]      Other reaction(s): Unknown  Other reaction(s): Unknown    Ciprofloxacin Nausea And Vomiting     Other reaction(s): Stomach upset    Codeine      Other reaction(s): Unknown  Other reaction(s): Unknown    Duragal-s      Other reaction(s): Vomiting    Erythromycin      Other reaction(s): Unknown  Other reaction(s): Unknown    Fentanyl      Other reaction(s): Vomiting    Hydrocodone-acetaminophen      Other reaction(s): Unknown  Other reaction(s): Unknown    Indomethacin      Other reaction(s): Unknown  Other reaction(s): Unknown    Meperidine      Other reaction(s): Unknown  Other reaction(s): Unknown    Minoxidil      Other reaction(s): druged  Other reaction(s): nausea and vomiting  Other reaction(s): nausea and vomiting  Other reaction(s): druged    Morphine      Other reaction(s): Unknown  Other reaction(s): Unknown    Neuromuscular blockers, steroidal      Other reaction(s): Unknown    Nifedipine      Other reaction(s): Swelling  Other reaction(s): Swelling    Oxycodone hcl-oxycodone-asa      Other reaction(s): Unknown  Other reaction(s): Unknown    Penicillins Hives     Other reaction(s): Unknown    Propoxyphene      Other reaction(s): Unknown    Sensipar [cinacalcet] Nausea Only     Talwin  [pentazocine lactate]      Other reaction(s): Unknown    Talwin compound      Other reaction(s): Unknown    Valsartan Rash and Hives     Medications:     No current facility-administered medications on file prior to encounter.      Current Outpatient Prescriptions on File Prior to Encounter   Medication Sig Dispense Refill    amiodarone (PACERONE) 200 MG Tab Take 1 tablet (200 mg total) by mouth once daily. 90 tablet 3    amlodipine (NORVASC) 10 MG tablet Take 1 tablet (10 mg total) by mouth once daily. 90 tablet 3    aspirin 81 MG chewable tablet Take 1 tablet by mouth Every morning.      carvedilol (COREG) 25 MG tablet Take 1 tablet (25 mg total) by mouth 2 (two) times daily with meals. 270 tablet 4    collagenase ointment Apply topically once daily. 90 g 1    cyanocobalamin, vitamin B-12, 1,000 mcg TbSR Take 1,000 mcg by mouth once daily. 30 each 0    dorzolamide (TRUSOPT) 2 % ophthalmic solution INSTILL ONE DROP INTO EACH EYE TWICE DAILY 10 mL 11    doxazosin (CARDURA) 4 MG tablet Take 1 tablet (4 mg total) by mouth once daily. 30 tablet 12    escitalopram oxalate (LEXAPRO) 5 MG Tab Take 1 tablet (5 mg total) by mouth once daily. 30 tablet 5    ferrous sulfate 324 mg (65 mg iron) TbEC TAKE ONE TABLET BY MOUTH TWICE DAILY 60 tablet 0    hydrALAZINE (APRESOLINE) 100 MG tablet Take 1 tablet (100 mg total) by mouth 2 (two) times daily. 270 tablet 4    insulin glargine (LANTUS) 100 unit/mL injection INJECT 10 units nightly. 30 mL 12    insulin lispro (HUMALOG) 100 unit/mL injection Inject 8 units w/ meals. 3 vial 6    isosorbide dinitrate (ISORDIL) 40 MG Tab Take 1 tablet (40 mg total) by mouth 2 (two) times daily. 270 tablet 0    KLOR-CON M10 10 mEq tablet TAKE ONE TABLET BY MOUTH TWICE DAILY 60 tablet 0    lactulose (CEPHULAC) 20 gram Pack Take 1 packet (20 g total) by mouth once daily. 90 packet 0    LANTUS 100 unit/mL injection INJECT 11 UNITS SUBCUTANEOUSLY NIGHTLY. CHANGE VIAL  AFTER 30 DAYS 10 mL 6    memantine (NAMENDA) 10 MG Tab Take 1 tablet (10 mg total) by mouth 2 (two) times daily. 60 tablet 5    pravastatin (PRAVACHOL) 20 MG tablet TAKE 1 TABLET BY MOUTH once DAILY 90 tablet 1    torsemide (DEMADEX) 20 MG Tab 1 tablet (20 mg) in the morning, half a tablet (10 mg) in the afternoon. 180 tablet 3    warfarin (COUMADIN) 5 MG tablet Take 0.5-1 tablets (2.5-5 mg total) by mouth Daily. As directed by [facility] 30 tablet 3    ACCU-CHEK SMARTVIEW TEST STRIP Strp test FOUR TIMES A  each 11    blood-glucose meter Misc Dispense Accu-Chek Natalia meter 1 each 0    insulin syringe-needle U-100 (EASY TOUCH INSULIN SYRINGE) 0.5 mL 31 gauge x 5/16 Syrg To use 4 times daily with insulin injections 150 each 2    lancets Misc 1 lancet by Misc.(Non-Drug; Combo Route) route 4 (four) times daily. 150 each 11    latanoprost 0.005 % ophthalmic solution INSTILL ONE DROP INTO EACH EYE IN THE EVENING 3 Bottle 12    nitroGLYCERIN 0.4 MG/DOSE TL SPRY (NITROLINGUAL) 400 mcg/spray spray PLACE ONE SPRAY UNDER THE TONGUE EVERY 5 MINUTES AS NEEDED FOR CHEST PAIN. 4.9 g 0    NITROSTAT 0.3 mg SL tablet DISSOLVE ONE TABLET UNDER THE TONGUE EVERY 5 MINUTES AS NEEDED FOR CHEST PAIN.  DO NOT EXCEED A TOTAL OF 3 DOSES IN 15 MINUTES 100 tablet 0       Inpatient Medications   Continuous Infusions:   Scheduled Meds:   albuterol-ipratropium 2.5mg-0.5mg/3mL  3 mL Nebulization Q4H    amiodarone  200 mg Oral Daily    amlodipine  10 mg Oral Daily    aspirin  81 mg Oral Daily    carvedilol  25 mg Oral BID    doxazosin  4 mg Oral Daily    escitalopram oxalate  5 mg Oral Daily    furosemide  80 mg Intravenous TID    hydrALAZINE  100 mg Oral Q12H    insulin aspart  4 Units Subcutaneous TIDWM    insulin detemir  8 Units Subcutaneous Daily    isosorbide dinitrate  40 mg Oral BID    pravastatin  20 mg Oral Daily    sodium chloride 0.9%  3 mL Intravenous Q8H    warfarin  5 mg Oral Daily     PRN  Meds:dextrose 50%, dextrose 50%, glucagon (human recombinant), glucose, glucose, insulin aspart     Social History:     Social History   Substance Use Topics    Smoking status: Never Smoker    Smokeless tobacco: Never Used    Alcohol use No     Family History:     Family History   Problem Relation Age of Onset    Diabetes Mother     Cancer Mother     Diabetes Sister     Cancer Brother     Cancer Daughter     Diabetes Sister     Diabetes Son     Cancer Maternal Aunt     Kidney disease Neg Hx      Physical Exam:     Vitals:  Temp:  [97.9 °F (36.6 °C)-98.4 °F (36.9 °C)]   Pulse:  [54-81]   Resp:  [15-27]   BP: (132-191)/(61-83)   SpO2:  [93 %-100 %]  on RA I/O's:    Intake/Output Summary (Last 24 hours) at 06/23/17 1954  Last data filed at 06/23/17 1500   Gross per 24 hour   Intake              360 ml   Output                0 ml   Net              360 ml        Constitutional: NAD, conversant  HEENT: Sclera anicteric, PERRLA, EOMI  Neck: +ve JVD,  CV: RRR, no murmur, normal S1/S2  Pulm: CTAB, no wheezes, rales, or ronchi  GI: Abdomen soft, NTND, +BS  Extremities: No LE edema, warm and well perfused  Skin: No ecchymosis, erythema, or ulcers  Psych: AOx3, appropriate affect  Neuro: CNII-XII intact, no focal deficits    Labs:     CBC: CMP:      Recent Labs  Lab 06/18/17  0246 06/19/17  0352 06/23/17  0436   WBC 2.72* 2.92* 2.93*   HGB 10.1* 9.3* 10.2*   HCT 31.6* 28.6* 33.1*   PLT 90* 92* 140*   MCV 92 92 96   RDW 16.2* 16.4* 18.7*      Recent Labs  Lab 06/18/17  0246 06/19/17  0352 06/23/17  0642    139 139   K 3.6 4.4 4.2    104 104   CO2 28 28 27   BUN 60* 65* 64*   CREATININE 2.5* 2.6* 2.7*   CALCIUM 9.3 8.8 9.7   PROT 6.4 5.4* 6.4   BILITOT 0.5 0.5 0.5   ALKPHOS 74 63 79   ALT 20 19 23   AST 39 31 40   MG  --  1.9  --    PHOS  --  3.4  --         Cholesterol: Coagulation   Lab Results   Component Value Date    CHOL 99 (L) 11/15/2016    HDL 29 (L) 11/15/2016    LDLCALC 47.0 (L) 11/15/2016     TRIG 115 11/15/2016      Recent Labs  Lab 06/23/17  0436   INR 2.0*        Misc:     Recent Labs  Lab 06/23/17  1516   TROPONINI 0.030*      Lab Results   Component Value Date    HGBA1C 7.0 (H) 06/14/2017        Micro:   Microbiology Results (last 7 days)     ** No results found for the last 168 hours. **           Imaging:     CXR (06/23/2017):   Findings: Pacer device projects over the LEFT chest wall with transvenous leads extending over the RIGHT heart.  Cardiomediastinal silhouette is enlarged.  There is calcific plaque of the aortic arch.  The trachea is midline.  There is no pneumothorax.  The LEFT lung is clear.  There is a moderate size RIGHT pleural effusion with volume loss and airspace opacity at the RIGHT lung base suggesting compressive atelectasis.  No acute bony abnormality.     EKG (06/23/3017):   Atrial Flutter with V-pacing.      Telemetry:    Atrial Flutter with less V pacing.     2D Echo (05/17/2017):     1 - Mildly depressed left ventricular systolic function (EF 45-50%).     2 - Right ventricular enlargement with normal systolic function.     3 - Restrictive LV filling pattern, indicating markedly elevated LAP (grade 3 diastolic dysfunction).     4 - Biatrial enlargement.     5 - Mild mitral regurgitation.     6 - Mild tricuspid regurgitation.     7 - Pulmonary hypertension. The estimated PA systolic pressure is 64 mmHg.     8 - Increased central venous pressure.      Assessment:      82 y.o. woman with PMH of Afib on coumadin (INR 2.0), longstanding HTN, SSS s/p permanent pacemaker, chronic diastolic heart failure (EF 43%)  DM (HbA1C 7.0), CAD s/p stents (2003), AFib, and CKD Stage 4 (baseline Cr 1.9-2.1) who presented with acute on chronic heart failure.     Plan:     #Atrial Flutter  --continue amiodarone, coreg  --PPM has been adjusted so that safe mode has been removed and rate has been dropped to 40bpm  --will determine pacing requirements afterwards  --obtain a 12 lead rhythm strip to  see p-waves better  --may require ablation to treat the aflutter    Thank you for the consult. Staff addendum to follow. We will follow along.    Signed:  Rosa M Nolasco MD  Cardiology Fellow, PGY4  6/23/2017 7:54 PM

## 2017-06-24 NOTE — PROGRESS NOTES
Cardiology Progress Note    Subjective:     Interval History:   Ms Trujillo had no acute events overnight. She remains in atrial flutter. She remains on lasix IV TID.    Meds:     Scheduled Meds:   albuterol-ipratropium 2.5mg-0.5mg/3mL  3 mL Nebulization Q4H    amiodarone  200 mg Oral Daily    amlodipine  10 mg Oral Daily    aspirin  81 mg Oral Daily    carvedilol  25 mg Oral BID    doxazosin  4 mg Oral Daily    escitalopram oxalate  5 mg Oral Daily    furosemide  80 mg Intravenous TID    hydrALAZINE  100 mg Oral Q12H    insulin aspart  4 Units Subcutaneous TIDWM    insulin detemir  8 Units Subcutaneous Daily    isosorbide dinitrate  40 mg Oral BID    pravastatin  20 mg Oral Daily    sodium chloride 0.9%  3 mL Intravenous Q8H    warfarin  5 mg Oral Daily     PRN Meds:dextrose 50%, dextrose 50%, glucagon (human recombinant), glucose, glucose, insulin aspart  Continuous Infusions:     Physical Exam:     Vitals:  Temp:  [97.6 °F (36.4 °C)-98.5 °F (36.9 °C)]   Pulse:  [52-83]   Resp:  [16-20]   BP: (150-170)/(65-85)   SpO2:  [93 %-100 %]   I/O's:    Intake/Output Summary (Last 24 hours) at 06/24/17 0953  Last data filed at 06/24/17 0700   Gross per 24 hour   Intake              540 ml   Output                0 ml   Net              540 ml        Constitutional: NAD, conversant  HEENT:   Sclera anicteric  Neck:    JVD +  CV:    Bradycardic, regular  Pulm:    CTAB  GI:    Abdomen soft, NTND, +BS  Extremities:   No LE edema, warm with palpable pulses  Skin:    No ecchymosis, erythema, or ulcers  Neuro:   AAOX3, no focal motor deficits    Labs:     Recent Results (from the past 336 hour(s))   CBC auto differential    Collection Time: 06/24/17  6:26 AM   Result Value Ref Range    WBC 3.17 (L) 3.90 - 12.70 K/uL    Hemoglobin 9.3 (L) 12.0 - 16.0 g/dL    Hematocrit 29.5 (L) 37.0 - 48.5 %    Platelets 103 (L) 150 - 350 K/uL   CBC auto differential    Collection Time: 06/23/17  4:36 AM   Result Value Ref Range    WBC  2.93 (L) 3.90 - 12.70 K/uL    Hemoglobin 10.2 (L) 12.0 - 16.0 g/dL    Hematocrit 33.1 (L) 37.0 - 48.5 %    Platelets 140 (L) 150 - 350 K/uL   CBC with Automated Differential    Collection Time: 06/19/17  3:52 AM   Result Value Ref Range    WBC 2.92 (L) 3.90 - 12.70 K/uL    Hemoglobin 9.3 (L) 12.0 - 16.0 g/dL    Hematocrit 28.6 (L) 37.0 - 48.5 %    Platelets 92 (L) 150 - 350 K/uL       Recent Results (from the past 336 hour(s))   Basic metabolic panel     Collection Time: 06/24/17  6:26 AM   Result Value Ref Range    Sodium 141 136 - 145 mmol/L    Potassium 3.7 3.5 - 5.1 mmol/L    Chloride 106 95 - 110 mmol/L    CO2 30 (H) 23 - 29 mmol/L    BUN, Bld 66 (H) 8 - 23 mg/dL    Creatinine 2.7 (H) 0.5 - 1.4 mg/dL    Calcium 9.5 8.7 - 10.5 mg/dL    Anion Gap 5 (L) 8 - 16 mmol/L         Recent Labs  Lab 06/18/17  0907 06/18/17  1444 06/23/17  0436 06/23/17  1516   TROPONINI 0.032* 0.030* 0.031* 0.030*       Estimated Creatinine Clearance: 19.7 mL/min (based on Cr of 2.7).    EKG:   Atrial flutter, appears to be atypical     Telemetry:  Atrial flutter with ventricular rate in 50-60s. RV pacing much decreased after changes to device.    Assessment & Plan:     Ms Trujillo is an 81yo F with atrial fibrillation on coumadin, HTN, SSS s/p permanent pacemaker, chronic diastolic heart failure (EF 43%)  DM, CAD s/p stents (2003), and CKD stage 4 (baseline Cr 1.9-2.1) who presented with acute on chronic heart failure. She was found to be in atrial flutter.    # Atrial flutter  - will plan for RFA ablation in the upcoming week  - on warfarin for anticoagulation; INR 2.5 today   - with INR therapeutic for >1 month, no need for LAKHWINDER    Signed:  Jordin Murphy MD  Cardiology Fellow, PGY-5  Pager: [phone]  6/24/2017 9:53 AM    Attending Addendum:

## 2017-06-24 NOTE — PROGRESS NOTES
Pt remained free of injuries, falls, and trauma. Pt hooked up to [facility] for strict intake and output. Glucose monitored. Pt's son at bedside. Pt started on IVP Lasix 80 mg BID. Reviewed plan of care with pt. Pt verbalized understanding. Will continue to monitor.

## 2017-06-24 NOTE — PROGRESS NOTES
Pt has had several unmeasured urine out pt; pt has dementia and is incontinent; order for strict intake and out put. Notified Dr. Davis and ok to place pt on pure wick; pt place on pure wick with no incident.

## 2017-06-25 PROBLEM — I48.92 ATRIAL FLUTTER: Status: ACTIVE | Noted: 2017-06-25

## 2017-06-25 LAB
ANION GAP SERPL CALC-SCNC: 6 MMOL/L
BASOPHILS # BLD AUTO: 0.01 K/UL
BASOPHILS NFR BLD: 0.4 %
BUN SERPL-MCNC: 70 MG/DL
CALCIUM SERPL-MCNC: 9.4 MG/DL
CHLORIDE SERPL-SCNC: 105 MMOL/L
CO2 SERPL-SCNC: 29 MMOL/L
CREAT SERPL-MCNC: 3 MG/DL
DIFFERENTIAL METHOD: ABNORMAL
EOSINOPHIL # BLD AUTO: 0.1 K/UL
EOSINOPHIL NFR BLD: 1.8 %
ERYTHROCYTE [DISTWIDTH] IN BLOOD BY AUTOMATED COUNT: 16.3 %
EST. GFR  (AFRICAN AMERICAN): 16.1 ML/MIN/1.73 M^2
EST. GFR  (NON AFRICAN AMERICAN): 13.9 ML/MIN/1.73 M^2
GLUCOSE SERPL-MCNC: 125 MG/DL
HCT VFR BLD AUTO: 28.3 %
HGB BLD-MCNC: 9.2 G/DL
INR PPP: 3
LYMPHOCYTES # BLD AUTO: 0.6 K/UL
LYMPHOCYTES NFR BLD: 20.4 %
MAGNESIUM SERPL-MCNC: 1.9 MG/DL
MCH RBC QN AUTO: 29.9 PG
MCHC RBC AUTO-ENTMCNC: 32.5 %
MCV RBC AUTO: 92 FL
MONOCYTES # BLD AUTO: 0.4 K/UL
MONOCYTES NFR BLD: 15.3 %
NEUTROPHILS # BLD AUTO: 1.7 K/UL
NEUTROPHILS NFR BLD: 61.4 %
PHOSPHATE SERPL-MCNC: 3 MG/DL
PLATELET # BLD AUTO: 101 K/UL
PMV BLD AUTO: 11.1 FL
POCT GLUCOSE: 103 MG/DL (ref 70–110)
POCT GLUCOSE: 105 MG/DL (ref 70–110)
POCT GLUCOSE: 74 MG/DL (ref 70–110)
POTASSIUM SERPL-SCNC: 3.4 MMOL/L
PROTHROMBIN TIME: 29.7 SEC
RBC # BLD AUTO: 3.08 M/UL
SODIUM SERPL-SCNC: 140 MMOL/L
WBC # BLD AUTO: 2.75 K/UL

## 2017-06-25 PROCEDURE — 27000221 HC OXYGEN, UP TO 24 HOURS

## 2017-06-25 PROCEDURE — 20600001 HC STEP DOWN PRIVATE ROOM

## 2017-06-25 PROCEDURE — 99233 SBSQ HOSP IP/OBS HIGH 50: CPT | Mod: ,,, | Performed by: HOSPITALIST

## 2017-06-25 PROCEDURE — 25000003 PHARM REV CODE 250: Performed by: HOSPITALIST

## 2017-06-25 PROCEDURE — G8979 MOBILITY GOAL STATUS: HCPCS | Mod: CK

## 2017-06-25 PROCEDURE — 85025 COMPLETE CBC W/AUTO DIFF WBC: CPT

## 2017-06-25 PROCEDURE — G8978 MOBILITY CURRENT STATUS: HCPCS | Mod: CM

## 2017-06-25 PROCEDURE — 84100 ASSAY OF PHOSPHORUS: CPT

## 2017-06-25 PROCEDURE — 97161 PT EVAL LOW COMPLEX 20 MIN: CPT

## 2017-06-25 PROCEDURE — 83735 ASSAY OF MAGNESIUM: CPT

## 2017-06-25 PROCEDURE — 63600175 PHARM REV CODE 636 W HCPCS: Performed by: HOSPITALIST

## 2017-06-25 PROCEDURE — 36415 COLL VENOUS BLD VENIPUNCTURE: CPT

## 2017-06-25 PROCEDURE — 85610 PROTHROMBIN TIME: CPT

## 2017-06-25 PROCEDURE — 25000003 PHARM REV CODE 250: Performed by: NURSE PRACTITIONER

## 2017-06-25 PROCEDURE — 99233 SBSQ HOSP IP/OBS HIGH 50: CPT | Mod: ,,, | Performed by: INTERNAL MEDICINE

## 2017-06-25 PROCEDURE — 80048 BASIC METABOLIC PNL TOTAL CA: CPT

## 2017-06-25 PROCEDURE — 25000003 PHARM REV CODE 250: Performed by: INTERNAL MEDICINE

## 2017-06-25 PROCEDURE — 94660 CPAP INITIATION&MGMT: CPT

## 2017-06-25 RX ORDER — CALCIUM CARBONATE 200(500)MG
1000 TABLET,CHEWABLE ORAL 3 TIMES DAILY PRN
Status: DISCONTINUED | OUTPATIENT
Start: 2017-06-25 | End: 2017-06-25

## 2017-06-25 RX ORDER — POTASSIUM CHLORIDE 20 MEQ/1
40 TABLET, EXTENDED RELEASE ORAL ONCE
Status: COMPLETED | OUTPATIENT
Start: 2017-06-25 | End: 2017-06-25

## 2017-06-25 RX ORDER — TORSEMIDE 20 MG/1
20 TABLET ORAL 2 TIMES DAILY
Status: DISCONTINUED | OUTPATIENT
Start: 2017-06-26 | End: 2017-06-30 | Stop reason: HOSPADM

## 2017-06-25 RX ADMIN — ESCITALOPRAM 5 MG: 5 TABLET, FILM COATED ORAL at 08:06

## 2017-06-25 RX ADMIN — ISOSORBIDE DINITRATE 40 MG: 20 TABLET ORAL at 08:06

## 2017-06-25 RX ADMIN — CARVEDILOL 25 MG: 12.5 TABLET, FILM COATED ORAL at 08:06

## 2017-06-25 RX ADMIN — WARFARIN SODIUM 5 MG: 5 TABLET ORAL at 05:06

## 2017-06-25 RX ADMIN — DOXAZOSIN 4 MG: 4 TABLET ORAL at 08:06

## 2017-06-25 RX ADMIN — PRAVASTATIN SODIUM 20 MG: 20 TABLET ORAL at 08:06

## 2017-06-25 RX ADMIN — FUROSEMIDE 80 MG: 10 INJECTION, SOLUTION INTRAVENOUS at 06:06

## 2017-06-25 RX ADMIN — HYDRALAZINE HYDROCHLORIDE 100 MG: 50 TABLET ORAL at 08:06

## 2017-06-25 RX ADMIN — INSULIN ASPART 4 UNITS: 100 INJECTION, SOLUTION INTRAVENOUS; SUBCUTANEOUS at 08:06

## 2017-06-25 RX ADMIN — AMIODARONE HYDROCHLORIDE 200 MG: 200 TABLET ORAL at 08:06

## 2017-06-25 RX ADMIN — ISOSORBIDE DINITRATE 40 MG: 20 TABLET ORAL at 09:06

## 2017-06-25 RX ADMIN — INSULIN DETEMIR 8 UNITS: 100 INJECTION, SOLUTION SUBCUTANEOUS at 08:06

## 2017-06-25 RX ADMIN — INSULIN ASPART 4 UNITS: 100 INJECTION, SOLUTION INTRAVENOUS; SUBCUTANEOUS at 12:06

## 2017-06-25 RX ADMIN — ASPIRIN 81 MG CHEWABLE TABLET 81 MG: 81 TABLET CHEWABLE at 08:06

## 2017-06-25 RX ADMIN — AMLODIPINE BESYLATE 10 MG: 10 TABLET ORAL at 08:06

## 2017-06-25 RX ADMIN — CALCIUM CARBONATE (ANTACID) CHEW TAB 500 MG 1000 MG: 500 CHEW TAB at 09:06

## 2017-06-25 RX ADMIN — HYDRALAZINE HYDROCHLORIDE 100 MG: 50 TABLET ORAL at 09:06

## 2017-06-25 RX ADMIN — Medication 3 ML: at 06:06

## 2017-06-25 RX ADMIN — CARVEDILOL 25 MG: 12.5 TABLET, FILM COATED ORAL at 09:06

## 2017-06-25 RX ADMIN — POTASSIUM CHLORIDE 40 MEQ: 1500 TABLET, EXTENDED RELEASE ORAL at 12:06

## 2017-06-25 NOTE — CONSULTS
Pulmonary Consult Note    Case discussed with Dr Davis.  Would like to defer thoracentesis since patient on anti-coagulation therapy, and suspicion for pleural infection or malignancy is low.  Will reconsider if clinical picture changes.    MD Zoran  [phone]

## 2017-06-25 NOTE — PT/OT/SLP EVAL
Physical Therapy  Evaluation    Chey Trujillo   MRN: [id number]   Admitting Diagnosis: Heart failure, acute on chronic, systolic and diastolic    PT Received On: 06/25/17  PT Start Time: 0955     PT Stop Time: 1022    PT Total Time (min): 27 min       Billable Minutes:  Evaluation 17 min and Gait [id number] min    Diagnosis: Heart failure, acute on chronic, systolic and diastolic  To ED with c/o SOB.    Past Medical History:   Diagnosis Date    Anticoagulation monitoring by pharmacist 6/29/2012    At risk for amiodarone toxicity with long term use 1/27/2014    Atrial fibrillation     Bilateral carotid artery disease 1/11/2016    Blood transfusion     CAD S/P percutaneous coronary angioplasty 9/27/2012    Chronic diastolic heart failure 9/27/2012    Echo 3-: 1 - Concentric hypertrophy.  2 - Normal left ventricular systolic function (EF 60-65%).  3 - Right ventricular enlargement with hypertrophy, with normal systolic function.  4 - Left ventricular diastolic dysfunction.  5 - Biatrial enlargement.  6 - Mild tricuspid regurgitation.  7 - Pulmonary hypertension. The estimated PA systolic pressure is 55 mmHg.  8 - Increased central venous pressure (IVC is greater than 3cm in diameter).     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  ROWAN placed just proximal to stent in 2005 for UA.  Cath 12/2011: normal LMCA, 40% mid LAD, 50% distal LCx    EYE SURGERY      LAMINECTOMY      TOTAL HIP ARTHROPLASTY Right     x's r hip       Referring physician: Ivania Syed  Date referred to PT: 6/23/17    General Precautions: Standard, fall  Orthopedic Precautions:     Braces:         Do you have any cultural, spiritual, Gnosticist conflicts, given your current situation?: none    Patient History:  Lives With: child(kirill), adult (pt lives with her son who is her caregiver and other family will assist if needed.)  Living Arrangements: house (1 story, w/c ramp entrance with B handrails)  Equipment Currently Used at Home:  (rollator, RW, w/c BSC)  DME owned (not currently used): none    Previous Level of Function:  Ambulation Skills: needs device (pt used rollator prior to admit.)  Transfer Skills: needs device (pt used rollator prior to admit.)    Subjective:  Communicated with nurse prior to session.    Chief Complaint: pt had no complaints during treatment.   Patient goals:  Family goals for pt to return home at previous functional level.     Pain/Comfort  Pain Rating 1: 0/10  Pain Rating Post-Intervention 1: 0/10      Objective:   Patient found with: telemetry (hep lock IV, B sheila Alireza boots)     Cognitive Exam:  Oriented to: Place and name only.     Follows Commands/attention: Follows two-step  commands  Communication: clear/fluent  Safety awareness/insight to disability: impaired    Physical Exam:  Lower Extremity Range of Motion:  Right Lower Extremity: WFL  Left Lower Extremity: WFL    Lower Extremity Strength:  Right Lower Extremity: WFL  Left Lower Extremity: WFL       Functional Mobility:  Bed Mobility:  Rolling/Turning to Left: Moderate assistance (pt needed verbal cues for hand placement and sequencing for functional mobility. )  Supine to Sit: Moderate Assistance  Sit to Supine: Minimum Assistance    Transfers:  Sit <> Stand Assistance: Minimum Assistance  Sit <> Stand Assistive Device: Rolling Walker    Gait:   Gait Distance: 52 ft. pt needed verbal cues to keep head upright with gait training.   Assistance 1: Contact Guard Assistance  Gait Assistive Device: Rolling walker  Gait Pattern: 3-point gait      AM-PAC 6 CLICK MOBILITY  How much help from another person does this patient currently need?   1 = Unable, Total/Dependent Assistance  2 = A lot, Maximum/Moderate Assistance  3 = A little, Minimum/Contact Guard/Supervision  4 = None, Modified Gordon/Independent    Turning over in bed (including adjusting bedclothes, sheets and blankets)?: 2  Sitting down on and standing up from a chair with arms (e.g., wheelchair, bedside commode, etc.): 3  Moving from lying on back to sitting on the side of the bed?: 2  Moving to and from a bed to a chair (including a wheelchair)?: 3  Need to walk in hospital room?: 3  Climbing 3-5 steps with a railing?: 1  Total Score: 14     AM-PAC Raw Score CMS G-Code Modifier Level of Impairment Assistance   6 % Total / Unable   7 - 9 CM 80 - 100% Maximal Assist   10 - 14 CL 60 - 80% Moderate Assist   15 - 19 CK 40 - 60% Moderate Assist   20 - 22 CJ 20 - 40% Minimal Assist   23 CI 1-20% SBA / CGA   24 CH 0% Independent/ Mod I     Patient left supine with all lines intact, call button in reach and daughters present.    Assessment:   Chey Trujillo is a 82  y.o. female with a medical diagnosis of Heart failure, acute on chronic, systolic and diastolic and presents with decreased mobility, transfers and decreased distance ambulated. Pt would benefit from cont PT to address deficits and will be able to discharge home with HHPT. Pt will benefit from skilled PT 5x/wk to progress physically and decrease complications from immobility. .    Rehab identified problem list/impairments: Rehab identified problem list/impairments: weakness, impaired endurance, impaired functional mobilty, gait instability, impaired balance, impaired cognition, decreased safety awareness    Rehab potential is fair.    Activity tolerance: Good    Discharge recommendations: Discharge Facility/Level Of Care Needs: home health PT     Barriers to discharge: Barriers to Discharge: None    Equipment recommendations: Equipment Needed After Discharge: none     GOALS:    Physical Therapy Goals        Problem: Physical Therapy Goal    Goal Priority Disciplines Outcome Goal Variances Interventions   Physical Therapy Goal     PT/OT, PT      Description:  Goals to be met by: 17    Patient will increase functional independence with mobility by performin. Supine to sit with Contact Guard Assistance - not met  2. Sit to stand transfer with Contact Guard Assistance with RW.- not met  3. Gait  x 150 feet with Contact Guard Assistance using Rolling Walker. - not met                      PLAN:    Patient to be seen 5 x/week to address the above listed problems via gait training, therapeutic activities  Plan of Care expires: 17  Plan of Care reviewed with: patient, daughter    Functional Assessment Tool Used: FIM  Score: 2  Functional Limitation: Mobility: Walking and moving around  Mobility: Walking and Moving Around Current Status (): CM  Mobility: Walking and Moving Around Goal Status (): TIFFANY Suárez, PT  2017

## 2017-06-25 NOTE — PROGRESS NOTES
Progress Note  Hospital Medicine      Admit Date: 6/23/2017    SUBJECTIVE:     Follow-up For:  Heart failure, acute on chronic, systolic and diastolic    HPI and Hospital Course: see prior note. On second day of admit, Is/Os not accurate, starting PureWick for accurate Is/Os, continue Lasix 80 IV TID and add dose of metolazone 5mg PO x 1 tonight. Episode of agitation in the late afternoon per daughter at bedside, then calmed down    Interval History:  Looks and feels well, IV diuresis complete with possible over-diuresis    Review of Systems:  Pain Scale: 0 /10   Constitutional: no fever or chills  Respiratory: no cough or shortness of breath  Cardiovascular: no chest pain or palpitations  Gastrointestinal: no nausea or vomiting, no abdominal pain or change in bowel habits  Genitourinary: no hematuria or dysuria  Integument/Breast: no rash or pruritis  Hematologic/Lymphatic: no easy bruising or lymphadenopathy  Musculoskeletal: no arthralgias or myalgias  Neurological: no seizures or tremors  Behavioral/Psych: no depression or anxiety    OBJECTIVE:     Vital Signs Range (Last 24H):  Temp:  [98.2 °F (36.8 °C)-98.8 °F (37.1 °C)]   Pulse:  [54-65]   Resp:  [18]   BP: (123-179)/(59-75)   SpO2:  [91 %-99 %]     I & O (Last 24H):    Intake/Output Summary (Last 24 hours) at 06/25/17 0936  Last data filed at 06/25/17 0800   Gross per 24 hour   Intake              600 ml   Output             1055 ml   Net             -455 ml       Physical Exam:  General appearance: no distress, elderly lady, supine in bed, sleeping but arousable to tactile stimuli  Mental status: see above  HEENT:  conjunctivae/corneas clear, PERRL  Neck: supple, thyroid not enlarged, +JVD  Pulm:   normal respiratory effort, CTA B, no c/w/r  Card: RRR  Abd: soft, NT, ND, BS present; no masses, no organomegaly  Ext: no c/c/e, warm  Pulses: 2+, symmetric  Skin: color, texture, turgor normal. No rashes or lesions  Neuro: CN II-XII grossly intact, no focal  numbness or weakness, normal strength and tone     Diagnostic Results:  Labs reviewed    Recent Results (from the past 24 hour(s))   POCT glucose    Collection Time: 06/24/17  1:27 PM   Result Value Ref Range    POCT Glucose 189 (H) 70 - 110 mg/dL   POCT glucose    Collection Time: 06/24/17  5:23 PM   Result Value Ref Range    POCT Glucose 203 (H) 70 - 110 mg/dL   POCT glucose    Collection Time: 06/24/17  9:32 PM   Result Value Ref Range    POCT Glucose 200 (H) 70 - 110 mg/dL   CBC auto differential    Collection Time: 06/25/17  6:37 AM   Result Value Ref Range    WBC 2.75 (L) 3.90 - 12.70 K/uL    RBC 3.08 (L) 4.00 - 5.40 M/uL    Hemoglobin 9.2 (L) 12.0 - 16.0 g/dL    Hematocrit 28.3 (L) 37.0 - 48.5 %    MCV 92 82 - 98 fL    MCH 29.9 27.0 - 31.0 pg    MCHC 32.5 32.0 - 36.0 %    RDW 16.3 (H) 11.5 - 14.5 %    Platelets 101 (L) 150 - 350 K/uL    MPV 11.1 9.2 - 12.9 fL    Gran # 1.7 (L) 1.8 - 7.7 K/uL    Lymph # 0.6 (L) 1.0 - 4.8 K/uL    Mono # 0.4 0.3 - 1.0 K/uL    Eos # 0.1 0.0 - 0.5 K/uL    Baso # 0.01 0.00 - 0.20 K/uL    Gran% 61.4 38.0 - 73.0 %    Lymph% 20.4 18.0 - 48.0 %    Mono% 15.3 (H) 4.0 - 15.0 %    Eosinophil% 1.8 0.0 - 8.0 %    Basophil% 0.4 0.0 - 1.9 %    Differential Method Automated    Magnesium - if not done in ED    Collection Time: 06/25/17  6:37 AM   Result Value Ref Range    Magnesium 1.9 1.6 - 2.6 mg/dL   Phosphorus - if not done in ED    Collection Time: 06/25/17  6:37 AM   Result Value Ref Range    Phosphorus 3.0 2.7 - 4.5 mg/dL   Basic metabolic panel     Collection Time: 06/25/17  6:37 AM   Result Value Ref Range    Sodium 140 136 - 145 mmol/L    Potassium 3.4 (L) 3.5 - 5.1 mmol/L    Chloride 105 95 - 110 mmol/L    CO2 29 23 - 29 mmol/L    Glucose 125 (H) 70 - 110 mg/dL    BUN, Bld 70 (H) 8 - 23 mg/dL    Creatinine 3.0 (H) 0.5 - 1.4 mg/dL    Calcium 9.4 8.7 - 10.5 mg/dL    Anion Gap 6 (L) 8 - 16 mmol/L    eGFR if African American 16.1 (A) >60 mL/min/1.73 m^2    eGFR if non African American  "13.9 (A) >60 mL/min/1.73 m^2   Protime-INR    Collection Time: 06/25/17  6:37 AM   Result Value Ref Range    Prothrombin Time 29.7 (H) 9.0 - 12.5 sec    INR 3.0 (H) 0.8 - 1.2   POCT glucose    Collection Time: 06/25/17  8:09 AM   Result Value Ref Range    POCT Glucose 105 70 - 110 mg/dL           ASSESSMENT/PLAN:     Acute on chronic combined CHF - likely multifactorial due to decreased EF 2/2 RV pacing with subsequent functional LBBB, recent decreased dose of diuretic at home, and possible contribution from R pleural effusion (lending decreased physiologic reserve in CHF patient).  Per cardiology, "Trop .03 (6/18) --> .03 (6/23);  (6/18) --> 294 (6/23); ECG with no concerning ST segment changes; low suspicion for new infarct." Echo (6/17/2017) LVEF 45%, mild MR, mild TR, grade III diastolic dysfunctionReduction in EF from 65% over last 7 mo.-> cardiology c/s   - per cardiology, "40mg torsemide daily was former effective oral dose, during decompensation treat with 2.5x daily dose which is 200mg IV furosemide total daily"  - Is/Os may not be accurate as she has been wearing diaper; start PureWick and f/u Is/OS  - gave lasix 80 TID with metolazone 5mg PO x 1 30 mins before  - Per Dr Bhakta, "becoming pre-renal secondary to poor RV forward stroke volume (signifcant TR)."  - STOP Lasix today, start home regimen torsemide 20 PO BID tomorrow  - Is/Os  - upon d/c home, increase home torsemide to 20mg BID   - EP consulted for high RV pacing burden, which is much improved after changes to device on 6/23 afternoon\    Aflutter, h/o Afib  - Aflutter found on telemetry 6/24  - EP plans for RFA ablation this week (no need for LAKHWINDER as therapeutic on coumadin for >1 mo)  - home Amiodarone 200 mg daily for rhythm  - home Coreg 25 mg BID for rate control  - home Coumadin 5mg, daily INR     R pleural effusion  - consulted pulmonology (non-emergent) for evaluation and possible Dx +/- Tx thoracentesis    ANTONIO on CKD-4. Cr stable at " 2.7 (<--2.7)  reported BL ~2.2, 2.6 upon d/c 4 days ago  - Closely monitor c daily BMP, Mag, Phos in setting of active diuresis   - Avoid nephrotoxic agents  - renally dose all meds     HTN, renovascular  - home Coreg 25 BID  - home Amlodipine 10 mg  - home Doxazosin 4 mg  - home Isordil 40mg BID  - consider increasing hydralazine to 100mg q8h (from q12)  - defer to cardiology re: any med changes     MAYELA, treated as Type 1 DM, controlled, with renal manifestations and PVD  - insulin regimen   - SSI- 8 un Lantus q am & 4 un AC  - f/u Wound Care consult for diabetic heel ulcer  - Follow with Podiatry     Dementia without behavioral disturbance  - hold home memantine as this is non-essential med and can contribute to encephalopathy/delirium     VEE  - CPAP nightly     SSS s/p PPM  -EKG c V paced rhythm   -Telemetry      CAD s/p PCI. S/p PCI (ROWAN) to proxRCA (2002, 2005).  Cath (12/2011) 40% mid LAD, 50% distal LCx stenosis  -Continue ASA 81mg daily  -Troponin 0.031, trend q6hr  -Nitro prn  - defer to cardiology re: any ischemic workup     Mixed HLD  -  home pravastatin 20 mg     Prophylaxis- SCDs, coumadin     Advance directives- full code     Post-acute care- pending clinical condition    Time spent in care of patient: 60 mins    Marisa Davis MD  Hospital Medicine Staff

## 2017-06-25 NOTE — PLAN OF CARE
Problem: Patient Care Overview  Goal: Plan of Care Review  Plan of care discussed with patient. Patient is free of fall/trauma/injury. Denies CP, SOB, or pain/discomfort. All questions addressed. receiving Lasix IVP. Cardioversion Monday or Tuesday. Will continue to monitor.

## 2017-06-25 NOTE — PLAN OF CARE
Problem: Physical Therapy Goal  Goal: Physical Therapy Goal  Goals to be met by: 17    Patient will increase functional independence with mobility by performin. Supine to sit with Contact Guard Assistance - not met  2. Sit to stand transfer with Contact Guard Assistance with RW.- not met  3. Gait  x 150 feet with Contact Guard Assistance using Rolling Walker. - not met    eval completed and goals appropriate. Addis Suárez, PT 2017

## 2017-06-25 NOTE — PLAN OF CARE
Problem: Patient Care Overview  Goal: Plan of Care Review  Outcome: Ongoing (interventions implemented as appropriate)  Pt remained free of injuries, falls, and trauma. Pt hooked up to [facility] for strict intake and output. Glucose monitored. Pt's daughter at bedside. Pt received metolazone 30 mins before IV Lasix. Reviewed plan of care with pt and daughter. Pt verbalized understanding. Will continue to monitor

## 2017-06-25 NOTE — PROGRESS NOTES
Cardiology Progress Note    Subjective:     Interval History:   Ms Trujillo had no acute events overnight. She remains in atrial flutter. Fluid status improving but not back at baseline.  Meds:     Scheduled Meds:   amiodarone  200 mg Oral Daily    amlodipine  10 mg Oral Daily    aspirin  81 mg Oral Daily    carvedilol  25 mg Oral BID    doxazosin  4 mg Oral Daily    escitalopram oxalate  5 mg Oral Daily    furosemide  80 mg Intravenous TID    hydrALAZINE  100 mg Oral Q12H    insulin aspart  4 Units Subcutaneous TIDWM    insulin detemir  8 Units Subcutaneous Daily    isosorbide dinitrate  40 mg Oral BID    potassium chloride SA  40 mEq Oral Once    pravastatin  20 mg Oral Daily    sodium chloride 0.9%  3 mL Intravenous Q8H    warfarin  5 mg Oral Daily     PRN Meds:albuterol-ipratropium 2.5mg-0.5mg/3mL, dextrose 50%, dextrose 50%, glucagon (human recombinant), glucose, glucose, insulin aspart  Continuous Infusions:     Physical Exam:     Vitals:  Temp:  [98.2 °F (36.8 °C)-98.8 °F (37.1 °C)]   Pulse:  [54-65]   Resp:  [18]   BP: (123-179)/(59-75)   SpO2:  [91 %-99 %]   I/O's:    Intake/Output Summary (Last 24 hours) at 06/25/17 1000  Last data filed at 06/25/17 0800   Gross per 24 hour   Intake              600 ml   Output             1055 ml   Net             -455 ml        Constitutional:NAD, conversant  HEENT:  Sclera anicteric  Neck: JVD +  CV: Bradycardic, regular  Pulm: CTAB  GI: Abdomen soft, NTND, +BS  Extremities: No LE edema, warm with palpable pulses  Skin: No ecchymosis, erythema, or ulcers  Neuro:  AAOX3, no focal motor deficits    Labs:     Recent Results (from the past 336 hour(s))   CBC auto differential    Collection Time: 06/25/17  6:37 AM   Result Value Ref Range    WBC 2.75 (L) 3.90 - 12.70 K/uL    Hemoglobin 9.2 (L) 12.0 - 16.0 g/dL    Hematocrit 28.3 (L) 37.0 - 48.5 %    Platelets 101 (L) 150 - 350 K/uL   CBC auto differential    Collection Time: 06/24/17  6:26 AM   Result Value Ref  Range    WBC 3.17 (L) 3.90 - 12.70 K/uL    Hemoglobin 9.3 (L) 12.0 - 16.0 g/dL    Hematocrit 29.5 (L) 37.0 - 48.5 %    Platelets 103 (L) 150 - 350 K/uL   CBC auto differential    Collection Time: 06/23/17  4:36 AM   Result Value Ref Range    WBC 2.93 (L) 3.90 - 12.70 K/uL    Hemoglobin 10.2 (L) 12.0 - 16.0 g/dL    Hematocrit 33.1 (L) 37.0 - 48.5 %    Platelets 140 (L) 150 - 350 K/uL       Recent Results (from the past 336 hour(s))   Basic metabolic panel     Collection Time: 06/25/17  6:37 AM   Result Value Ref Range    Sodium 140 136 - 145 mmol/L    Potassium 3.4 (L) 3.5 - 5.1 mmol/L    Chloride 105 95 - 110 mmol/L    CO2 29 23 - 29 mmol/L    BUN, Bld 70 (H) 8 - 23 mg/dL    Creatinine 3.0 (H) 0.5 - 1.4 mg/dL    Calcium 9.4 8.7 - 10.5 mg/dL    Anion Gap 6 (L) 8 - 16 mmol/L   Basic metabolic panel     Collection Time: 06/24/17  6:26 AM   Result Value Ref Range    Sodium 141 136 - 145 mmol/L    Potassium 3.7 3.5 - 5.1 mmol/L    Chloride 106 95 - 110 mmol/L    CO2 30 (H) 23 - 29 mmol/L    BUN, Bld 66 (H) 8 - 23 mg/dL    Creatinine 2.7 (H) 0.5 - 1.4 mg/dL    Calcium 9.5 8.7 - 10.5 mg/dL    Anion Gap 5 (L) 8 - 16 mmol/L         Recent Labs  Lab 06/18/17  1444 06/23/17  0436 06/23/17  1516   TROPONINI 0.030* 0.031* 0.030*       Estimated Creatinine Clearance: 17.7 mL/min (based on Cr of 3).      Telemetry:  Atrial flutter with ventricular rate in 50-60s.    Assessment & Plan:     Ms Trujillo is an 81yo F with atrial fibrillation on coumadin, HTN, SSS s/p permanent pacemaker, chronic diastolic heart failure (EF 43%)  DM, CAD s/p stents (2003), and CKD stage 4 (baseline Cr 1.9-2.1) who presented with acute on chronic heart failure. She was found to be in atrial flutter.     # Atrial flutter  - will plan for RFA ablation in the upcoming week  - on warfarin for anticoagulation with therapeutic INR (no LAKHWINDER needed)   - keep NPO after MN in case of procedure tomorrow  - continue amiodarone 200mg daily and coreg 25mg BID  -  diuresis, per primary team    Signed:  Jordin Murphy MD  Cardiology Fellow, PGY-5  Pager: [phone]  6/25/2017 10:00 AM    Attending Addendum:

## 2017-06-26 ENCOUNTER — ANESTHESIA EVENT (OUTPATIENT)
Dept: MEDSURG UNIT | Facility: HOSPITAL | Age: 82
DRG: 291 | End: 2017-06-26
Payer: COMMERCIAL

## 2017-06-26 ENCOUNTER — ANESTHESIA (OUTPATIENT)
Dept: MEDSURG UNIT | Facility: HOSPITAL | Age: 82
DRG: 291 | End: 2017-06-26
Payer: COMMERCIAL

## 2017-06-26 LAB
ANION GAP SERPL CALC-SCNC: 8 MMOL/L
BASOPHILS # BLD AUTO: 0.01 K/UL
BASOPHILS NFR BLD: 0.3 %
BUN SERPL-MCNC: 66 MG/DL
CALCIUM SERPL-MCNC: 9.2 MG/DL
CHLORIDE SERPL-SCNC: 104 MMOL/L
CO2 SERPL-SCNC: 29 MMOL/L
CREAT SERPL-MCNC: 2.9 MG/DL
DIFFERENTIAL METHOD: ABNORMAL
EOSINOPHIL # BLD AUTO: 0.1 K/UL
EOSINOPHIL NFR BLD: 2.6 %
ERYTHROCYTE [DISTWIDTH] IN BLOOD BY AUTOMATED COUNT: 16.5 %
EST. GFR  (AFRICAN AMERICAN): 16.7 ML/MIN/1.73 M^2
EST. GFR  (NON AFRICAN AMERICAN): 14.5 ML/MIN/1.73 M^2
GLUCOSE SERPL-MCNC: 87 MG/DL
HCT VFR BLD AUTO: 28.8 %
HGB BLD-MCNC: 9.2 G/DL
INR PPP: 3.1
LYMPHOCYTES # BLD AUTO: 1 K/UL
LYMPHOCYTES NFR BLD: 34.1 %
MAGNESIUM SERPL-MCNC: 1.7 MG/DL
MCH RBC QN AUTO: 29.7 PG
MCHC RBC AUTO-ENTMCNC: 31.9 %
MCV RBC AUTO: 93 FL
MONOCYTES # BLD AUTO: 0.3 K/UL
MONOCYTES NFR BLD: 10.6 %
NEUTROPHILS # BLD AUTO: 1.6 K/UL
NEUTROPHILS NFR BLD: 52.1 %
PHOSPHATE SERPL-MCNC: 3.1 MG/DL
PLATELET # BLD AUTO: 100 K/UL
PMV BLD AUTO: 10.9 FL
POCT GLUCOSE: 42 MG/DL (ref 70–110)
POCT GLUCOSE: 45 MG/DL (ref 70–110)
POCT GLUCOSE: 75 MG/DL (ref 70–110)
POCT GLUCOSE: 90 MG/DL (ref 70–110)
POCT GLUCOSE: 94 MG/DL (ref 70–110)
POTASSIUM SERPL-SCNC: 3.4 MMOL/L
PROTHROMBIN TIME: 31.6 SEC
RBC # BLD AUTO: 3.1 M/UL
SODIUM SERPL-SCNC: 141 MMOL/L
WBC # BLD AUTO: 3.02 K/UL

## 2017-06-26 PROCEDURE — 80048 BASIC METABOLIC PNL TOTAL CA: CPT

## 2017-06-26 PROCEDURE — 25000003 PHARM REV CODE 250: Performed by: HOSPITALIST

## 2017-06-26 PROCEDURE — 37000008 HC ANESTHESIA 1ST 15 MINUTES: Performed by: INTERNAL MEDICINE

## 2017-06-26 PROCEDURE — 63600175 PHARM REV CODE 636 W HCPCS: Performed by: NURSE ANESTHETIST, CERTIFIED REGISTERED

## 2017-06-26 PROCEDURE — 83735 ASSAY OF MAGNESIUM: CPT

## 2017-06-26 PROCEDURE — 37000009 HC ANESTHESIA EA ADD 15 MINS: Performed by: INTERNAL MEDICINE

## 2017-06-26 PROCEDURE — 85610 PROTHROMBIN TIME: CPT

## 2017-06-26 PROCEDURE — 25000003 PHARM REV CODE 250

## 2017-06-26 PROCEDURE — 99900035 HC TECH TIME PER 15 MIN (STAT)

## 2017-06-26 PROCEDURE — 94760 N-INVAS EAR/PLS OXIMETRY 1: CPT

## 2017-06-26 PROCEDURE — 36415 COLL VENOUS BLD VENIPUNCTURE: CPT

## 2017-06-26 PROCEDURE — 99233 SBSQ HOSP IP/OBS HIGH 50: CPT | Mod: ,,, | Performed by: HOSPITALIST

## 2017-06-26 PROCEDURE — 25000003 PHARM REV CODE 250: Performed by: NURSE PRACTITIONER

## 2017-06-26 PROCEDURE — 93010 ELECTROCARDIOGRAM REPORT: CPT | Mod: 77,S$GLB,, | Performed by: INTERNAL MEDICINE

## 2017-06-26 PROCEDURE — 5A2204Z RESTORATION OF CARDIAC RHYTHM, SINGLE: ICD-10-PCS | Performed by: INTERNAL MEDICINE

## 2017-06-26 PROCEDURE — D9220A PRA ANESTHESIA: Mod: CRNA,,, | Performed by: NURSE ANESTHETIST, CERTIFIED REGISTERED

## 2017-06-26 PROCEDURE — 97535 SELF CARE MNGMENT TRAINING: CPT

## 2017-06-26 PROCEDURE — 25000003 PHARM REV CODE 250: Performed by: NURSE ANESTHETIST, CERTIFIED REGISTERED

## 2017-06-26 PROCEDURE — 85025 COMPLETE CBC W/AUTO DIFF WBC: CPT

## 2017-06-26 PROCEDURE — 84100 ASSAY OF PHOSPHORUS: CPT

## 2017-06-26 PROCEDURE — 82962 GLUCOSE BLOOD TEST: CPT | Performed by: INTERNAL MEDICINE

## 2017-06-26 PROCEDURE — 20600001 HC STEP DOWN PRIVATE ROOM

## 2017-06-26 PROCEDURE — 99233 SBSQ HOSP IP/OBS HIGH 50: CPT | Mod: ,,, | Performed by: INTERNAL MEDICINE

## 2017-06-26 PROCEDURE — 97165 OT EVAL LOW COMPLEX 30 MIN: CPT

## 2017-06-26 PROCEDURE — 93005 ELECTROCARDIOGRAM TRACING: CPT

## 2017-06-26 PROCEDURE — D9220A PRA ANESTHESIA: Mod: ANES,,, | Performed by: ANESTHESIOLOGY

## 2017-06-26 PROCEDURE — 93010 ELECTROCARDIOGRAM REPORT: CPT | Mod: S$GLB,,, | Performed by: INTERNAL MEDICINE

## 2017-06-26 RX ORDER — SODIUM CHLORIDE 0.9 % (FLUSH) 0.9 %
3 SYRINGE (ML) INJECTION
Status: DISCONTINUED | OUTPATIENT
Start: 2017-06-26 | End: 2017-06-26 | Stop reason: HOSPADM

## 2017-06-26 RX ORDER — SODIUM CHLORIDE 0.9 % (FLUSH) 0.9 %
3 SYRINGE (ML) INJECTION EVERY 8 HOURS
Status: DISCONTINUED | OUTPATIENT
Start: 2017-06-26 | End: 2017-06-26 | Stop reason: HOSPADM

## 2017-06-26 RX ORDER — ONDANSETRON 2 MG/ML
4 INJECTION INTRAMUSCULAR; INTRAVENOUS DAILY PRN
Status: DISCONTINUED | OUTPATIENT
Start: 2017-06-26 | End: 2017-06-26 | Stop reason: HOSPADM

## 2017-06-26 RX ORDER — DEXMEDETOMIDINE HYDROCHLORIDE 100 UG/ML
INJECTION, SOLUTION INTRAVENOUS
Status: DISCONTINUED | OUTPATIENT
Start: 2017-06-26 | End: 2017-06-26

## 2017-06-26 RX ORDER — FENTANYL CITRATE 50 UG/ML
25 INJECTION, SOLUTION INTRAMUSCULAR; INTRAVENOUS EVERY 5 MIN PRN
Status: DISCONTINUED | OUTPATIENT
Start: 2017-06-26 | End: 2017-06-26 | Stop reason: HOSPADM

## 2017-06-26 RX ORDER — SODIUM CHLORIDE 9 MG/ML
INJECTION, SOLUTION INTRAVENOUS CONTINUOUS
Status: DISCONTINUED | OUTPATIENT
Start: 2017-06-26 | End: 2017-06-27

## 2017-06-26 RX ORDER — PROPOFOL 10 MG/ML
VIAL (ML) INTRAVENOUS
Status: DISCONTINUED | OUTPATIENT
Start: 2017-06-26 | End: 2017-06-26

## 2017-06-26 RX ORDER — WARFARIN 2.5 MG/1
2.5 TABLET ORAL DAILY
Status: DISCONTINUED | OUTPATIENT
Start: 2017-06-26 | End: 2017-06-30 | Stop reason: HOSPADM

## 2017-06-26 RX ORDER — POTASSIUM CHLORIDE 20 MEQ/1
40 TABLET, EXTENDED RELEASE ORAL ONCE
Status: COMPLETED | OUTPATIENT
Start: 2017-06-26 | End: 2017-06-26

## 2017-06-26 RX ORDER — HYDROMORPHONE HYDROCHLORIDE 2 MG/ML
INJECTION, SOLUTION INTRAMUSCULAR; INTRAVENOUS; SUBCUTANEOUS
Status: DISCONTINUED | OUTPATIENT
Start: 2017-06-26 | End: 2017-06-26

## 2017-06-26 RX ADMIN — HYDRALAZINE HYDROCHLORIDE 100 MG: 50 TABLET ORAL at 08:06

## 2017-06-26 RX ADMIN — CARVEDILOL 25 MG: 12.5 TABLET, FILM COATED ORAL at 08:06

## 2017-06-26 RX ADMIN — HYDROMORPHONE HYDROCHLORIDE 0.1 MG: 2 INJECTION, SOLUTION INTRAMUSCULAR; INTRAVENOUS; SUBCUTANEOUS at 04:06

## 2017-06-26 RX ADMIN — PROPOFOL 20 MG: 10 INJECTION, EMULSION INTRAVENOUS at 04:06

## 2017-06-26 RX ADMIN — ISOSORBIDE DINITRATE 40 MG: 20 TABLET ORAL at 10:06

## 2017-06-26 RX ADMIN — TORSEMIDE 20 MG: 20 TABLET ORAL at 08:06

## 2017-06-26 RX ADMIN — DOXAZOSIN 4 MG: 4 TABLET ORAL at 08:06

## 2017-06-26 RX ADMIN — POTASSIUM CHLORIDE 40 MEQ: 1500 TABLET, EXTENDED RELEASE ORAL at 11:06

## 2017-06-26 RX ADMIN — ISOSORBIDE DINITRATE 40 MG: 20 TABLET ORAL at 08:06

## 2017-06-26 RX ADMIN — AMLODIPINE BESYLATE 10 MG: 10 TABLET ORAL at 08:06

## 2017-06-26 RX ADMIN — ASPIRIN 81 MG CHEWABLE TABLET 81 MG: 81 TABLET CHEWABLE at 08:06

## 2017-06-26 RX ADMIN — DEXTROSE MONOHYDRATE 25 G: 25 INJECTION, SOLUTION INTRAVENOUS at 06:06

## 2017-06-26 RX ADMIN — DEXMEDETOMIDINE HYDROCHLORIDE 44 MCG: 100 INJECTION, SOLUTION, CONCENTRATE INTRAVENOUS at 04:06

## 2017-06-26 RX ADMIN — DEXMEDETOMIDINE HYDROCHLORIDE 0.5 MCG/KG/HR: 100 INJECTION, SOLUTION, CONCENTRATE INTRAVENOUS at 04:06

## 2017-06-26 RX ADMIN — ESCITALOPRAM 5 MG: 5 TABLET, FILM COATED ORAL at 08:06

## 2017-06-26 RX ADMIN — AMIODARONE HYDROCHLORIDE 200 MG: 200 TABLET ORAL at 08:06

## 2017-06-26 RX ADMIN — SODIUM CHLORIDE, SODIUM GLUCONATE, SODIUM ACETATE, POTASSIUM CHLORIDE, MAGNESIUM CHLORIDE, SODIUM PHOSPHATE, DIBASIC, AND POTASSIUM PHOSPHATE: .53; .5; .37; .037; .03; .012; .00082 INJECTION, SOLUTION INTRAVENOUS at 04:06

## 2017-06-26 RX ADMIN — INSULIN DETEMIR 8 UNITS: 100 INJECTION, SOLUTION SUBCUTANEOUS at 08:06

## 2017-06-26 RX ADMIN — PRAVASTATIN SODIUM 20 MG: 20 TABLET ORAL at 08:06

## 2017-06-26 RX ADMIN — WARFARIN SODIUM 2.5 MG: 2.5 TABLET ORAL at 08:06

## 2017-06-26 NOTE — TRANSFER OF CARE
"Anesthesia Transfer of Care Note    Patient: Chey Trujillo    Procedure(s) Performed: Procedure(s) (LRB):  ABLATION (Left)    Patient location: PACU    Anesthesia Type: general    Transport from OR: Transported from OR on 6-10 L/min O2 by face mask with adequate spontaneous ventilation    Post pain: adequate analgesia    Post assessment: no apparent anesthetic complications and tolerated procedure well    Post vital signs: stable    Level of consciousness: sedated    Nausea/Vomiting: no nausea/vomiting    Complications: none    Transfer of care protocol was followed      Last vitals:   Visit Vitals  BP (!) 180/80   Pulse (!) 58   Temp 36 °C (96.8 °F) (Axillary)   Resp 14   Ht 6' 2" (1.88 m)   Wt 88.5 kg (195 lb)   LMP  (LMP Unknown)   SpO2 100%   Breastfeeding? No   BMI 25.04 kg/m²     "

## 2017-06-26 NOTE — PLAN OF CARE
Problem: Pressure Ulcer Risk (Popeye Scale) (Adult,Obstetrics,Pediatric)  Goal: Skin Integrity  Patient will demonstrate the desired outcomes by discharge/transition of care.   Reviewed wound care with patient and her son. Son assumes care when dressing becomes dislodged.  As wound edges were macerated, will change to silver hydrofiber for more absorbency. Covered with Mepilex border heel dressing and applied offloading football dressing. Plan to change weekly as they have been scheduled at [facility].  Denies any questions regarding wound care

## 2017-06-26 NOTE — ANESTHESIA PREPROCEDURE EVALUATION
06/26/2017  Chey Trujillo is a 82 y.o., female with past medical history of Type 2 diabetes mellitus; Neuropathy; CAD (coronary artery disease); Hyperlipidemia; Atrial fibrillation; Osteoarthritis; Hepatitis C; Hyperthyroidism; Urinary, incontinence, stress female; Pulmonary hypertension; Depression; Spinal stenosis; Hypertension; Gallstones; Hyperparathyroidism, unspecified; Pulmonary embolism; Obstructive sleep apnea; Chronic kidney disease; Goiter; Blood transfusion; CHF (congestive heart failure); Anticoagulant long-term use; Carotid artery occlusion; Syncope and collapse; Gallstones (8/5/2013); Tubular adenoma x 3 6/13 (5/19/2014); and Hepatitis C (5/26/2014). The patient also have thick toenails and b/l leg pain and swelling x 3 weeks. She currently takes fluid pills but reports continues pain and swelling     Pre-operative evaluation for Procedure(s) (LRB):  ABLATION (Left)    Chey Trujillo is a 82 y.o. female     Patient Active Problem List   Diagnosis    Chronic atrial fibrillation    Anticoagulation monitoring by pharmacist    Obstructive sleep apnea on CPAP - setting = 5     CAD S/P percutaneous coronary angioplasty    Chronic diastolic heart failure    Secondary pulmonary hypertension    Peripheral vascular disease    Mixed hyperlipidemia    Chronic kidney disease, stage IV (severe)    Pacemaker 2/09/12    At risk for amiodarone toxicity with long term use    Chronic hepatitis C without hepatic coma    Thrombocytopenia    Bilateral carotid artery disease: 40-49% 2016 Bilateral    Anemia of chronic disease    Non compliance w medication regimen    Diabetes mellitus with stage 4 chronic kidney disease GFR 15-29    Osteoporosis: see DEXA 1/16    Thyroid nodule    Nonrheumatic aortic valve stenosis    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), managed as type 1    Vitamin  D deficiency    Vascular dementia with behavior disturbance    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    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 due to underlying condition, with fat layer exposed    Essential hypertension    Recurrent major depressive disorder, in partial remission    Diabetic ulcer of right heel associated with type 1 diabetes mellitus, limited to breakdown of skin    SOB (shortness of breath)    Pancytopenia    Protein calorie malnutrition    Heart failure, acute on chronic, systolic and diastolic    Acute on chronic systolic congestive heart failure    Dementia    SSS (sick sinus syndrome)    Recurrent pleural effusion on right    Atrial flutter       Review of patient's allergies indicates:   Allergen Reactions    Losartan Other (See Comments)     Hyperkalemia    0.225 % sodium chloride      Other reaction(s): Eye irritation    Amitriptyline      Other reaction(s): Vomiting  Other reaction(s): Vomiting    Azopt  [brinzolamide]      Other reaction(s): Eye irritation    Cantil  [mepenzolate bromide]      Other reaction(s): Unknown  Other reaction(s): Unknown    Ciprofloxacin Nausea And Vomiting     Other reaction(s): Stomach upset    Codeine      Other reaction(s): Unknown  Other reaction(s): Unknown    Duragal-s      Other reaction(s): Vomiting    Erythromycin      Other reaction(s): Unknown  Other reaction(s): Unknown    Fentanyl      Other reaction(s): Vomiting    Hydrocodone-acetaminophen      Other reaction(s): Unknown  Other reaction(s): Unknown    Indomethacin      Other reaction(s): Unknown  Other reaction(s): Unknown    Meperidine      Other reaction(s): Unknown  Other reaction(s): Unknown    Minoxidil      Other reaction(s): druged  Other reaction(s): nausea and vomiting  Other reaction(s): nausea and vomiting  Other reaction(s): druged    Morphine      Other reaction(s): Unknown  Other reaction(s): Unknown    Neuromuscular blockers, steroidal       Other reaction(s): Unknown    Nifedipine      Other reaction(s): Swelling  Other reaction(s): Swelling    Oxycodone hcl-oxycodone-asa      Other reaction(s): Unknown  Other reaction(s): Unknown    Penicillins Hives     Other reaction(s): Unknown    Propoxyphene      Other reaction(s): Unknown    Sensipar [cinacalcet] Nausea Only    Talwin  [pentazocine lactate]      Other reaction(s): Unknown    Talwin compound      Other reaction(s): Unknown    Valsartan Rash and Hives       No current facility-administered medications on file prior to encounter.      Current Outpatient Prescriptions on File Prior to Encounter   Medication Sig Dispense Refill    amiodarone (PACERONE) 200 MG Tab Take 1 tablet (200 mg total) by mouth once daily. 90 tablet 3    amlodipine (NORVASC) 10 MG tablet Take 1 tablet (10 mg total) by mouth once daily. 90 tablet 3    aspirin 81 MG chewable tablet Take 1 tablet by mouth Every morning.      carvedilol (COREG) 25 MG tablet Take 1 tablet (25 mg total) by mouth 2 (two) times daily with meals. 270 tablet 4    collagenase ointment Apply topically once daily. 90 g 1    cyanocobalamin, vitamin B-12, 1,000 mcg TbSR Take 1,000 mcg by mouth once daily. 30 each 0    dorzolamide (TRUSOPT) 2 % ophthalmic solution INSTILL ONE DROP INTO EACH EYE TWICE DAILY 10 mL 11    doxazosin (CARDURA) 4 MG tablet Take 1 tablet (4 mg total) by mouth once daily. 30 tablet 12    escitalopram oxalate (LEXAPRO) 5 MG Tab Take 1 tablet (5 mg total) by mouth once daily. 30 tablet 5    ferrous sulfate 324 mg (65 mg iron) TbEC TAKE ONE TABLET BY MOUTH TWICE DAILY 60 tablet 0    hydrALAZINE (APRESOLINE) 100 MG tablet Take 1 tablet (100 mg total) by mouth 2 (two) times daily. 270 tablet 4    insulin glargine (LANTUS) 100 unit/mL injection INJECT 10 units nightly. 30 mL 12    insulin lispro (HUMALOG) 100 unit/mL injection Inject 8 units w/ meals. 3 vial 6    isosorbide dinitrate (ISORDIL) 40 MG Tab Take 1 tablet  (40 mg total) by mouth 2 (two) times daily. 270 tablet 0    KLOR-CON M10 10 mEq tablet TAKE ONE TABLET BY MOUTH TWICE DAILY 60 tablet 0    lactulose (CEPHULAC) 20 gram Pack Take 1 packet (20 g total) by mouth once daily. 90 packet 0    LANTUS 100 unit/mL injection INJECT 11 UNITS SUBCUTANEOUSLY NIGHTLY. CHANGE VIAL AFTER 30 DAYS 10 mL 6    memantine (NAMENDA) 10 MG Tab Take 1 tablet (10 mg total) by mouth 2 (two) times daily. 60 tablet 5    pravastatin (PRAVACHOL) 20 MG tablet TAKE 1 TABLET BY MOUTH once DAILY 90 tablet 1    torsemide (DEMADEX) 20 MG Tab 1 tablet (20 mg) in the morning, half a tablet (10 mg) in the afternoon. 180 tablet 3    warfarin (COUMADIN) 5 MG tablet Take 0.5-1 tablets (2.5-5 mg total) by mouth Daily. As directed by [facility] 30 tablet 3    ACCU-CHEK SMARTVIEW TEST STRIP Strp test FOUR TIMES A  each 11    blood-glucose meter Misc Dispense Accu-Chek Natalia meter 1 each 0    insulin syringe-needle U-100 (EASY TOUCH INSULIN SYRINGE) 0.5 mL 31 gauge x 5/16 Syrg To use 4 times daily with insulin injections 150 each 2    lancets Misc 1 lancet by Misc.(Non-Drug; Combo Route) route 4 (four) times daily. 150 each 11    latanoprost 0.005 % ophthalmic solution INSTILL ONE DROP INTO EACH EYE IN THE EVENING 3 Bottle 12    nitroGLYCERIN 0.4 MG/DOSE TL SPRY (NITROLINGUAL) 400 mcg/spray spray PLACE ONE SPRAY UNDER THE TONGUE EVERY 5 MINUTES AS NEEDED FOR CHEST PAIN. 4.9 g 0    NITROSTAT 0.3 mg SL tablet DISSOLVE ONE TABLET UNDER THE TONGUE EVERY 5 MINUTES AS NEEDED FOR CHEST PAIN.  DO NOT EXCEED A TOTAL OF 3 DOSES IN 15 MINUTES 100 tablet 0       Past Surgical History:   Procedure Laterality Date    CARDIAC CATHETERIZATION      CARDIAC PACEMAKER PLACEMENT  2/9/2012    Quinn Jerome DR, serial #[id number]    CATARACT EXTRACTION      Status post cataract extraction and insertion of intraocular lens of right eye    CORONARY ANGIOPLASTY      ROWAN to prox RCA 2002 for UA/+stress test.  ROWAN  placed just proximal to stent in  for UA.  Cath 2011: normal LMCA, 40% mid LAD, 50% distal LCx    EYE SURGERY      LAMINECTOMY      TOTAL HIP ARTHROPLASTY Right     x's r hip       Social History     Social History    Marital status:      Spouse name: N/A    Number of children: N/A    Years of education: N/A     Occupational History    Not on file.     Social History Main Topics    Smoking status: Never Smoker    Smokeless tobacco: Never Used    Alcohol use No    Drug use: No    Sexual activity: No     Other Topics Concern    Not on file     Social History Narrative    Son lives with her. Spouse .          Vital Signs Range (Last 24H):  Temp:  [36.6 °C (97.9 °F)-37.1 °C (98.7 °F)]   Pulse:  [48-72]   Resp:  [10-20]   BP: (128-181)/(62-81)   SpO2:  [97 %-99 %]       CBC:   Recent Labs      17   0637  17   0642   WBC  2.75*  3.02*   RBC  3.08*  3.10*   HGB  9.2*  9.2*   HCT  28.3*  28.8*   PLT  101*  100*   MCV  92  93   MCH  29.9  29.7   MCHC  32.5  31.9*       CMP:   Recent Labs      17   0637  17   0642   NA  140  141   K  3.4*  3.4*   CL  105  104   CO2  29  29   BUN  70*  66*   CREATININE  3.0*  2.9*   GLU  125*  87   MG  1.9  1.7   PHOS  3.0  3.1   CALCIUM  9.4  9.2       INR  Recent Labs      17   0626  17   0637  17   0642   INR  2.5*  3.0*  3.1*           Diagnostic Studies:      E[facility] Echo:      C2D Echo ONCLUSIONS   1 - Eccentric hypertrophy.   2 - Normal left ventricular systolic function (EF 60-65%).   3 - Left ventricular diastolic dysfunction.   4 - Biatrial enlargement.   5 - Normal right ventricular systolic function .   6 - Pulmonary hypertension.   7 - Moderate tricuspid regurgitation.   8 - Mild pulmonic regurgitation.   9 - Increased central venous pressure.       Pre-op Assessment    I have reviewed the Patient Summary Reports.     I have reviewed the Nursing Notes.   I have reviewed the Medications.      Review of Systems  Anesthesia Hx:  No problems with previous Anesthesia  Denies Family Hx of Anesthesia complications.   Denies Personal Hx of Anesthesia complications.   Social:  Non-Smoker, No Alcohol Use    Hematology/Oncology:  Hematology Normal   Oncology Normal     EENT/Dental:EENT/Dental Normal   Cardiovascular:   Hypertension, poorly controlled CAD asymptomatic Dysrhythmias atrial fibrillation CHF  Hypertension CAD  Dysrhythmias  CHF  Functional Capacity 2 METS  Coronary Artery Disease:  Congestive Heart Failure (CHF)  Hypertension, Essential Hypertension  Disorder of Cardiac Rhythm, Atrial Fibrillation, on warfarin Rx  Disorder of Cardiac Conduction, Sick Sinus Syndrome   Pulmonary:   Sleep Apnea, CPAP Denies COPD.  Denies Asthma. Sleep Apnea  Obstructive Sleep Apnea (VEE). Pulmonary Hypertension (WHO) Functional Severity Class III - Marked limitation of physical activity. Comfortable at rest. Echocardiographic Estimated Pulmonary Artery Systolic Pressure 45 - 60     Renal/:   Chronic Renal Disease, CRI Chronic Renal Disease, CRI  Kidney Function/Disease, Chronic Kidney Disease (CKD) , CKD Stage IV (GFR 15-29) , contributing etiologies of Hypertensive Nephrosclerosis.   Hepatic/GI:   Liver Disease, Hepatitis, C Liver Disease, Hepatitis, C Denies Esophageal / Stomach Disorders  Liver Disease, Hepatitis, chronic, Viral Hepatitis Type C     Musculoskeletal:   Arthritis     Neurological:  Neurology Normal    Endocrine:   Diabetes, poorly controlled, type 2, using insulin Hyperthyroidism Diabetes, poorly controlled, type 2, using insulin Hyperthyroidism Diabetes, Type 2 Diabetes , controlled by insulin. , most recent HgA1c value was 7.8  Denies Thyroid Disease  Metabolic Disorders, Hyperlipoproteinemia   Psych:   Psychiatric History                                                                                                              Physical Exam  General:  Well nourished, Obesity     Airway/Jaw/Neck:  Airway Findings: Mouth Opening: Normal Tongue: Normal  General Airway Assessment: Adult  Mallampati: II  Improves to II with phonation.      Dental:  Dental Findings:    Chest/Lungs:  Chest/Lungs Findings: Clear to auscultation, Normal Respiratory Rate     Heart/Vascular:  Heart Findings: Rate: Normal  Rhythm: Regular Rhythm        Mental Status:  Mental Status Findings:  Cooperative, Alert and Oriented         Anesthesia Plan  Type of Anesthesia, risks & benefits discussed:  Anesthesia Type:  general, MAC  Patient's Preference:   Intra-op Monitoring Plan: standard ASA monitors and arterial line  Intra-op Monitoring Plan Comments:   Post Op Pain Control Plan: IV/PO Opioids PRN  Post Op Pain Control Plan Comments:   Induction:   IV  Beta Blocker:  Patient is on a Beta-Blocker and has received one dose within the past 24 hours (No further documentation required).       Informed Consent: Patient understands risks and agrees with Anesthesia plan.  Questions answered. Anesthesia consent signed with patient.  ASA Score: 3     Day of Surgery Review of History & Physical:    H&P update referred to the surgeon.         Ready For Surgery From Anesthesia Perspective.

## 2017-06-26 NOTE — PLAN OF CARE
Problem: Patient Care Overview  Goal: Plan of Care Review  Outcome: Ongoing (interventions implemented as appropriate)  Spoke with the patient regarding the plan of care. Multiple family members at bedside. Patient will be NPO for an ablation in the morning. Encouraged to discuss any questions with MD. Encouraged turning and repositioning every 2 hours for prevention of skin issues, will continue to monitor.

## 2017-06-26 NOTE — PROGRESS NOTES
Progress Note  Hospital Medicine      Admit Date: 6/23/2017    SUBJECTIVE:     Follow-up For:  Heart failure, acute on chronic, systolic and diastolic    HPI and Hospital Course: see prior note. On second day of admit, Is/Os not accurate, starting PureWick for accurate Is/Os, continue Lasix 80 IV TID and add dose of metolazone 5mg PO x 1 tonight. Episode of agitation in the late afternoon per daughter at bedside, then calmed down. On Sunday 6/25, looked and felt well, IV diuresis complete with possible over-diuresis so stopped IV Lasix. EP planning for RFA ablation this week    Interval History:  No issues, net -935, euvolemic so on lasix PO maintenance now, await RFA ablation by EP this week    Review of Systems:  Pain Scale: 0 /10   Constitutional: no fever or chills  Respiratory: no cough or shortness of breath  Cardiovascular: no chest pain or palpitations  Gastrointestinal: no nausea or vomiting, no abdominal pain or change in bowel habits  Genitourinary: no hematuria or dysuria  Integument/Breast: no rash or pruritis  Hematologic/Lymphatic: no easy bruising or lymphadenopathy  Musculoskeletal: no arthralgias or myalgias  Neurological: no seizures or tremors  Behavioral/Psych: no depression or anxiety    OBJECTIVE:     Vital Signs Range (Last 24H):  Temp:  [97.9 °F (36.6 °C)-98.7 °F (37.1 °C)]   Pulse:  [48-72]   Resp:  [18-20]   BP: (123-181)/(58-75)   SpO2:  [91 %-99 %]     I & O (Last 24H):    Intake/Output Summary (Last 24 hours) at 06/26/17 0739  Last data filed at 06/26/17 0500   Gross per 24 hour   Intake              570 ml   Output             1505 ml   Net             -935 ml       Physical Exam:  General appearance: no distress, elderly lady, supine in bed, sleeping but arousable to tactile stimuli  Mental status: see above  HEENT:  conjunctivae/corneas clear, PERRL  Neck: supple, thyroid not enlarged, +JVD (likely from a TR jet per cardiology)  Pulm:   normal respiratory effort, CTA B, no  c/w/r  Card: RRR  Abd: soft, NT, ND, BS present; no masses, no organomegaly  Ext: no c/c/e, warm  Pulses: 2+, symmetric  Skin: color, texture, turgor normal. No rashes or lesions  Neuro: CN II-XII grossly intact, no focal numbness or weakness, normal strength and tone     Diagnostic Results:  Labs reviewed    Recent Results (from the past 24 hour(s))   POCT glucose    Collection Time: 06/25/17 12:40 PM   Result Value Ref Range    POCT Glucose 103 70 - 110 mg/dL   POCT glucose    Collection Time: 06/25/17  5:12 PM   Result Value Ref Range    POCT Glucose 74 70 - 110 mg/dL   CBC auto differential    Collection Time: 06/26/17  6:42 AM   Result Value Ref Range    WBC 3.02 (L) 3.90 - 12.70 K/uL    RBC 3.10 (L) 4.00 - 5.40 M/uL    Hemoglobin 9.2 (L) 12.0 - 16.0 g/dL    Hematocrit 28.8 (L) 37.0 - 48.5 %    MCV 93 82 - 98 fL    MCH 29.7 27.0 - 31.0 pg    MCHC 31.9 (L) 32.0 - 36.0 %    RDW 16.5 (H) 11.5 - 14.5 %    Platelets 100 (L) 150 - 350 K/uL    MPV 10.9 9.2 - 12.9 fL    Gran # 1.6 (L) 1.8 - 7.7 K/uL    Lymph # 1.0 1.0 - 4.8 K/uL    Mono # 0.3 0.3 - 1.0 K/uL    Eos # 0.1 0.0 - 0.5 K/uL    Baso # 0.01 0.00 - 0.20 K/uL    Gran% 52.1 38.0 - 73.0 %    Lymph% 34.1 18.0 - 48.0 %    Mono% 10.6 4.0 - 15.0 %    Eosinophil% 2.6 0.0 - 8.0 %    Basophil% 0.3 0.0 - 1.9 %    Differential Method Automated    Magnesium - if not done in ED    Collection Time: 06/26/17  6:42 AM   Result Value Ref Range    Magnesium 1.7 1.6 - 2.6 mg/dL   Phosphorus - if not done in ED    Collection Time: 06/26/17  6:42 AM   Result Value Ref Range    Phosphorus 3.1 2.7 - 4.5 mg/dL   Basic metabolic panel     Collection Time: 06/26/17  6:42 AM   Result Value Ref Range    Sodium 141 136 - 145 mmol/L    Potassium 3.4 (L) 3.5 - 5.1 mmol/L    Chloride 104 95 - 110 mmol/L    CO2 29 23 - 29 mmol/L    Glucose 87 70 - 110 mg/dL    BUN, Bld 66 (H) 8 - 23 mg/dL    Creatinine 2.9 (H) 0.5 - 1.4 mg/dL    Calcium 9.2 8.7 - 10.5 mg/dL    Anion Gap 8 8 - 16 mmol/L    eGFR  "if  16.7 (A) >60 mL/min/1.73 m^2    eGFR if non African American 14.5 (A) >60 mL/min/1.73 m^2   Protime-INR    Collection Time: 06/26/17  6:42 AM   Result Value Ref Range    Prothrombin Time 31.6 (H) 9.0 - 12.5 sec    INR 3.1 (H) 0.8 - 1.2           ASSESSMENT/PLAN:     Acute on chronic combined CHF - likely multifactorial due to decreased EF 2/2 RV pacing with subsequent functional LBBB, recent decreased dose of diuretic at home, and possible contribution from R pleural effusion (lending decreased physiologic reserve in CHF patient).  Per cardiology, "Trop .03 (6/18) --> .03 (6/23);  (6/18) --> 294 (6/23); ECG with no concerning ST segment changes; low suspicion for new infarct." Echo (6/17/2017) LVEF 45%, mild MR, mild TR, grade III diastolic dysfunctionReduction in EF from 65% over last 7 mo.-> cardiology c/s   - per cardiology, "40mg torsemide daily was former effective oral dose, during decompensation treat with 2.5x daily dose which is 200mg IV furosemide total daily"  - Is/Os may not be accurate as she has been wearing diaper; start PureWick and f/u Is/OS  - gave lasix 80 TID with metolazone 5mg PO x 1 30 mins before  - Per Dr Bhakta, "becoming pre-renal secondary to poor RV forward stroke volume (signifcant TR)."  - STOPPED Lasix 6/25  - started home regimen torsemide 20 PO BID today  - Is/Os are not accurate (only net -415 since admit, but she had many unmeasured urination episodes day 1 and 2 of admit while wearing diaper)  - upon d/c home, increase home torsemide to 20mg BID   - EP consulted for high RV pacing burden, which is much improved after changes to device on 6/23 afternoon  -  Still has JVD even though she is euvolemic; JVD is likely from a TR jet per cardiology    Aflutter, h/o Afib  - Aflutter found on telemetry 6/24  - EP plans for RFA ablation this week (no need for LAKHWINDER as therapeutic on coumadin for >1 mo)  - home Amiodarone 200 mg daily for rhythm  - home Coreg 25 mg " BID for rate control  - home Coumadin 5mg, daily INR - INR 3.1 today so hold dose and defer to pharmacy re: decreasing dose tomorrow     R pleural effusion  - consulted pulmonology (non-emergent) for evaluation and possible Dx +/- Tx thoracentesis    ANTONIO on CKD-4. Cr stable at 2.7 (<--2.7)  reported BL ~2.2, 2.6 upon d/c 4 days ago  - Closely monitor c daily BMP, Mag, Phos in setting of active diuresis   - Avoid nephrotoxic agents  - renally dose all meds     HTN, renovascular  - home Coreg 25 BID  - home Amlodipine 10 mg  - home Doxazosin 4 mg  - home Isordil 40mg BID  - consider increasing hydralazine to 100mg q8h (from q12)  - defer to cardiology re: any med changes     MAYELA, treated as Type 1 DM, controlled, with renal manifestations and PVD  - insulin regimen   - SSI- 8 un Lantus q am & 4 un AC  - f/u Wound Care consult for diabetic heel ulcer  - Follow with Podiatry     Dementia without behavioral disturbance  - hold home memantine as this is non-essential med and can contribute to encephalopathy/delirium     VEE  - CPAP nightly     SSS s/p PPM  -EKG c V paced rhythm   -Telemetry      CAD s/p PCI. S/p PCI (ROWAN) to proxRCA (2002, 2005).  Cath (12/2011) 40% mid LAD, 50% distal LCx stenosis  -Continue ASA 81mg daily  -Troponin 0.031, trend q6hr  -Nitro prn  - defer to cardiology re: any ischemic workup     Mixed HLD  -  home pravastatin 20 mg     HypoK due to renal losses from loop diuretic- replace PO    Prophylaxis- SCDs, coumadin     Advance directives- full code     Post-acute care- pending clinical condition and RFA ablation by EP this week    Time spent in care of patient: 40 mins    Marisa Davis MD  Hospital Medicine Staff

## 2017-06-26 NOTE — PROGRESS NOTES
Asked to see for right heel ulcer          06/26/17 1200       Wound 06/18/17 0800 Ulceration heel   Date First Assessed/Time First Assessed: 06/18/17 0800   Pre-existing: Yes  Wound Type: Ulceration  Side: Right  Location: heel   Wound WDL ex   Dressing Appearance dry;intact   Drainage Amount moderate   Drainage Characteristics/Odor serous   Wound Base pink;moist;not granulating   Periwound Area macerated   Wound Edges rolled   Wound Length (cm) 0.7   Wound Width (cm) 1   Depth (cm) 0.6   Cleansed W/ sterile normal saline   Dressing Ag dressings;foam  (mepilex heel dressing, football wrap)     Recommendations:   Wound edges macerated and will apply Aquacel AG hydrofiber to wound base.  Applied a foot ball wrap to offload heel ulcer and well as pt is in heel protector boots.  Rest of bony prominences assessed and she remains free from breakdown.  Shon Haines RN,CWON

## 2017-06-26 NOTE — BRIEF OP NOTE
EP performed to determine atrial rhythm and perform RFA if needed.   RFV >> 7 F [place] ..CS;   7F dodeca >> livewire, RA,CTI  >>   Patient in RA flutter but LA Fib   >> DCCV >> NSR -- no RFA done   Plan: Continue medical Rx

## 2017-06-26 NOTE — PROGRESS NOTES
Notified Dr. Davis that pt's HR was dropping from into high 40s. Stated would adjust meds. Will continue to monitor.

## 2017-06-26 NOTE — PROGRESS NOTES
Cardiology Progress Note  Attending Physician: Marisa Davis MD  Hospital Day: 3    Subjective:   Interval History: patient feels well. She is incontinent in the bed. Breathing has improved.    Medications:   Continuous Infusions:     Scheduled Meds:   amiodarone  200 mg Oral Daily    amlodipine  10 mg Oral Daily    aspirin  81 mg Oral Daily    carvedilol  25 mg Oral BID    doxazosin  4 mg Oral Daily    escitalopram oxalate  5 mg Oral Daily    hydrALAZINE  100 mg Oral Q12H    insulin aspart  4 Units Subcutaneous TIDWM    insulin detemir  8 Units Subcutaneous Daily    isosorbide dinitrate  40 mg Oral BID    pravastatin  20 mg Oral Daily    sodium chloride 0.9%  3 mL Intravenous Q8H    warfarin  5 mg Oral Daily     PRN Meds:albuterol-ipratropium 2.5mg-0.5mg/3mL, calcium carbonate, dextrose 50%, dextrose 50%, glucagon (human recombinant), glucose, glucose, insulin aspart  Objective:     Vitals:  Temp:  [97.9 °F (36.6 °C)-98.4 °F (36.9 °C)]   Pulse:  [48-65]   Resp:  [18]   BP: (123-161)/(58-75)   SpO2:  [91 %-97 %]  on RA I/O's:    Intake/Output Summary (Last 24 hours) at 06/25/17 2140  Last data filed at 06/25/17 1537   Gross per 24 hour   Intake              690 ml   Output              855 ml   Net             -165 ml        Constitutional: NAD, conversant  HEENT: Sclera anicteric, PERRLA, EOMI  Neck: 12-14 cm JVD, no carotid bruits  CV: RRR, Oscar, no murmur, normal S1/S2  Pulm: CTAB, no wheezes, rales, or ronchi  GI: Abdomen soft, NTND, +BS  Extremities: No LE edema, warm and well perfused  Skin: No ecchymosis, erythema, or ulcers  Psych: AOx3, appropriate affect  Neuro: CNII-XII intact, no focal deficits    Labs:       CBC: CMP:      Recent Labs  Lab 06/23/17  0436 06/24/17  0626 06/25/17  0637   WBC 2.93* 3.17* 2.75*   HGB 10.2* 9.3* 9.2*   HCT 33.1* 29.5* 28.3*   * 103* 101*   MCV 96 92 92   RDW 18.7* 16.3* 16.3*      Recent Labs  Lab 06/19/17  0352 06/23/17  0642 06/24/17  0626  06/25/17  0637    139 141 140   K 4.4 4.2 3.7 3.4*    104 106 105   CO2 28 27 30* 29   BUN 65* 64* 66* 70*   CREATININE 2.6* 2.7* 2.7* 3.0*   CALCIUM 8.8 9.7 9.5 9.4   PROT 5.4* 6.4  --   --    BILITOT 0.5 0.5  --   --    ALKPHOS 63 79  --   --    ALT 19 23  --   --    AST 31 40  --   --    MG 1.9  --  2.0 1.9   PHOS 3.4  --  3.0 3.0        Cholesterol: Coagulation   Lab Results   Component Value Date    CHOL 99 (L) 11/15/2016    HDL 29 (L) 11/15/2016    LDLCALC 47.0 (L) 11/15/2016    TRIG 115 11/15/2016      Recent Labs  Lab 06/25/17  0637   INR 3.0*        Misc:     Recent Labs  Lab 06/23/17  1516   TROPONINI 0.030*      Lab Results   Component Value Date    HGBA1C 7.0 (H) 06/14/2017        Microbiology   Microbiology Results (last 7 days)     ** No results found for the last 168 hours. **           Imaging:      CXR (06/23/2017):   Findings: Pacer device projects over the LEFT chest wall with transvenous leads extending over the RIGHT heart.  Cardiomediastinal silhouette is enlarged.  There is calcific plaque of the aortic arch.  The trachea is midline.  There is no pneumothorax.  The LEFT lung is clear.  There is a moderate size RIGHT pleural effusion with volume loss and airspace opacity at the RIGHT lung base suggesting compressive atelectasis.  No acute bony abnormality.      EKG (06/23/3017):   Atrial Flutter with V-pacing.       Telemetry:    Atrial Flutter with less V pacing.      2D Echo (05/17/2017):     1 - Mildly depressed left ventricular systolic function (EF 45-50%).     2 - Right ventricular enlargement with normal systolic function.     3 - Restrictive LV filling pattern, indicating markedly elevated LAP (grade 3 diastolic dysfunction).     4 - Biatrial enlargement.     5 - Mild mitral regurgitation.     6 - Mild tricuspid regurgitation.     7 - Pulmonary hypertension. The estimated PA systolic pressure is 64 mmHg.     8 - Increased central venous pressure.       Assessment:     82  y.o. woman with PMH of Afib on coumadin (INR 2.0), longstanding HTN, SSS s/p permanent pacemaker, chronic diastolic heart failure (EF 43%)  DM (HbA1C 7.0), CAD s/p stents (2003), AFib, and CKD Stage 4 (baseline Cr 1.9-2.1) who presented with acute on chronic heart failure.     Plan:     #Acute on Chronic Heart Failure  --patient does still have JVD, but likely from a TR jet  --increase in BUN/creatinine concerning for over-diuresis  --would hold further lasix today and start torsemide 20 BID starting tomorrow    #HTN  - hydralazine 100mg q8h if BP remains elevated     #Atrial Flutter  --defer to EP    Thank you for the consult. We will continue to follow. Staff addendum to follow    Signed:  Rosa M Nolasco MD  Cardiology Fellow, PGY-4  6/25/2017 9:40 PM

## 2017-06-26 NOTE — PT/OT/SLP EVAL
Occupational Therapy  Evaluation & Treatment    Chey Trujillo   MRN: [id number]   Admitting Diagnosis: Heart failure, acute on chronic, systolic and diastolic    OT Date of Treatment: 06/26/17   OT Start Time: 1026  OT Stop Time: 1104  OT Total Time (min): 38 min    Billable Minutes:  Evaluation 15  Self Care/Home Management 23    Diagnosis: Heart failure, acute on chronic, systolic and diastolic   Pt admitted to hospital secondary to SOB.  She is going for ablation today.     Past Medical History:   Diagnosis Date    Anticoagulation monitoring by pharmacist 6/29/2012    At risk for amiodarone toxicity with long term use 1/27/2014    Atrial fibrillation     Bilateral carotid artery disease 1/11/2016    Blood transfusion     CAD S/P percutaneous coronary angioplasty 9/27/2012    Chronic diastolic heart failure 9/27/2012    Echo 3-: 1 - Concentric hypertrophy.  2 - Normal left ventricular systolic function (EF 60-65%).  3 - Right ventricular enlargement with hypertrophy, with normal systolic function.  4 - Left ventricular diastolic dysfunction.  5 - Biatrial enlargement.  6 - Mild tricuspid regurgitation.  7 - Pulmonary hypertension. The estimated PA systolic pressure is 55 mmHg.  8 - Increased central venous pressure (IVC is greater than 3cm in diameter).     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  ANABELA u/s 4/14 wnl 9/12/2012    Secondary pulmonary  hypertension 8/13/2013    Spinal stenosis     Thyroid nodule: per ENDO repeat in 2017 and see ENDO then (note 1/29/16) 1/2/2013    Tricuspid regurgitation 1/11/2016    Tubular adenoma x 3 6/13 5/19/2014    Type 2 DM with CKD stage 4 and hypertension 1/16/2015    Urinary, incontinence, stress female       Past Surgical History:   Procedure Laterality Date    CARDIAC CATHETERIZATION      CARDIAC PACEMAKER PLACEMENT  2/9/2012    Quinn Waldropy , serial #[id number]    CATARACT EXTRACTION      Status post cataract extraction and insertion of intraocular lens of right eye    CORONARY ANGIOPLASTY      ROWAN to prox RCA 2002 for UA/+stress test.  ROWAN placed just proximal to stent in 2005 for UA.  Cath 12/2011: normal LMCA, 40% mid LAD, 50% distal LCx    EYE SURGERY      LAMINECTOMY      TOTAL HIP ARTHROPLASTY Right     x's r hip       Referring physician: Ivania Syed NP  Date referred to OT: 6/23/2017    General Precautions: Standard, fall  Orthopedic Precautions:    Braces:      Do you have any cultural, spiritual, Voodoo conflicts, given your current situation?: none     Patient History:  Living Environment  Lives With: child(kirill), adult (who assist in her care. Lives with son primarily, daughter comes over to assist with showers)  Living Arrangements: house  Home Accessibility: other (see comments) (they have a ramp)  Stair Railings at Home: outside, present at both sides  Transportation Available: family or friend will provide  Living Environment Comment:  (Pt lives in a H with adult sone who is with her most of the day. Pt was able to ambulate household distances with rollator.  PTA she required assist with ADLs but to a lesser degree than she does now. )  Equipment Currently Used at Home: rollator, bedside commode (son reports they do not have a RW which pt would require)    Prior level of function:   Bed Mobility/Transfers: needs assist  Grooming: needs assist  Bathing: needs assist (daughter gives her  showers)  Upper Body Dressing: needs assist  Lower Body Dressing: needs assist  Toileting: needs assist  Homemaking Responsibilities: No  Driving License: No     Dominant hand: right    Subjective:  Communicated with RN prior to session.  Pt agreeable to therapy.   Chief Complaint: Heel pain  Patient/Family stated goals: Pt agreeable to progressing towards independence with ADLs and functional mobility.     Pain/Comfort  Pain Rating 1: 0/10 at rest, 9/10 pain donning/doffing socks due to sore on heel  Pain Rating 2: 0/10    Objective:  Patient found with: telemetry    Cognitive Exam:  Oriented to: Person  Follows Commands/attention: Inattentive, Easily distracted and Follows one-step commands  Communication: clear/fluent  Memory:  No Deficits noted  Safety awareness/insight to disability: intact  Coping skills/emotional control: Appropriate to situation    Visual/perceptual:  Intact    Physical Exam:  Postural examination/scapula alignment: Rounded shoulder and Head forward  Skin integrity: wound on R heel covered by bandage  Edema: Mild in R arm    Sensation:   Intact    Upper Extremity Range of Motion:  Right Upper Extremity: WFL  Left Upper Extremity: WFL    Upper Extremity Strength:  Right Upper Extremity: WFL  Left Upper Extremity: WFL   Strength: WFL    Fine motor coordination:   Intact during ADL tasks    Functional Mobility:  Bed Mobility:  Rolling/Turning to Left: Moderate assistance  Rolling/Turning Right: Moderate assistance  Scooting/Bridging: Moderate Assistance  Supine to Sit: Moderate Assistance  Sit to Supine: Maximum Assistance    Transfers:  Sit <> Stand Assistance: Maximum Assistance (pt unable to maintain standing position, severe heel pain bringing pt to near crying)  Sit <> Stand Assistive Device: No Assistive Device    Functional Ambulation: Pt had difficulty standing statically at EOB, attempted twice. Pain in heel limiting but pt also needed mod A to rise from seated position.  "    Activities of Daily Living:  Feeding Level of Assistance: Activity did not occur (pt is NPO)  LE Dressing Level of Assistance: Total assistance (for socks)  Grooming Position: Seated, EOB (attempted standing twice but pt unable )  Grooming Level of Assistance: Stand by assistance (set up assist )  Toileting Level of Assistance: Activity did not occur  Bathing Level of Assistance: Activity did not occur    Balance:   Static Sit: GOOD: Takes MODERATE challenges from all directions  Dynamic Sit: GOOD: Maintains balance through MODERATE excursions of active trunk movement  Static Stand: 0: Needs MAXIMAL assist to maintain limited on this date due to heel pain  Dynamic stand: 0: N/A     Additional:   Educated pt and son on role of OT in acute care setting. Provided education on proper positioning techniques in sidelying to make pt more willing to be in this position. Pt positioned in L sidelying with pillow between legs and heels completely off of mattress. She reported to be comfortable. HOB elevated to prevent orthopnea.     AM-PAC 6 CLICK ADL  How much help from another person does this patient currently need?  1 = Unable, Total/Dependent Assistance  2 = A lot, Maximum/Moderate Assistance  3 = A little, Minimum/Contact Guard/Supervision  4 = None, Modified [place]/Independent    Putting on and taking off regular lower body clothing? : 1  Bathing (including washing, rinsing, drying)?: 1  Toileting, which includes using toilet, bedpan, or urinal? : 1  Putting on and taking off regular upper body clothing?: 2  Taking care of personal grooming such as brushing teeth?: 3  Eating meals?: 3 (but pt is NPO, son reports no difficulty)  Total Score: 11    AM-PAC Raw Score CMS "G-Code Modifier Level of Impairment Assistance   6 % Total / Unable   7 - 9 CM 80 - 100% Maximal Assist   10-14 CL 60 - 80% Moderate Assist   15 - 19 CK 40 - 60% Moderate Assist   20 - 22 CJ 20 - 40% Minimal Assist   23 CI 1-20% SBA / CGA "   24 CH 0% Independent/ Mod I       Patient left left sidelying with call button in reach    Assessment:  Chey Trujillo is a 82 y.o. female with a medical diagnosis of Heart failure, acute on chronic, systolic and diastolic and presents with decline in ADLs and functional mobility.  From a review of medical chart pt had a significant change in function between today and yesterday. Despite needing assist with ADLs prior to admission pt is requiring a much higher level of assistance than before. Pt would benefit from skilled OT services in order to maximize independence with ADLs and facilitate safe discharge. Recommend short stay SNF upon d/c to maximize functional outcomes and prevent secondary complications of immobility.     Rehab identified problem list/impairments: Rehab identified problem list/impairments: weakness, impaired endurance, impaired sensation, impaired self care skills, gait instability, impaired balance, impaired functional mobilty, decreased coordination, decreased upper extremity function, decreased lower extremity function, decreased safety awareness, pain, impaired cardiopulmonary response to activity    Rehab potential is good.    Activity tolerance: Good    Discharge recommendations: Discharge Facility/Level Of Care Needs: nursing facility, skilled     Barriers to discharge: Barriers to Discharge: None    Equipment recommendations: none     GOALS:    Occupational Therapy Goals        Problem: Occupational Therapy Goal    Goal Priority Disciplines Outcome Interventions   Occupational Therapy Goal     OT, PT/OT     Description:  Goals to be met by: 7/10/2017    Patient will increase functional independence with ADLs by performing:    UE Dressing with Moderate Assistance.  LE Dressing with Moderate Assistance.  Grooming while standing with Stand-by Assistance.  Toileting from toilet with Maximum Assistance for hygiene and clothing management.   Stand pivot transfers with Stand-by  Assistance.  Toilet transfer to toilet with min A with use of RW.                     PLAN:  Patient to be seen 5 x/week to address the above listed problems via self-care/home management, therapeutic activities, therapeutic exercises, neuromuscular re-education  Plan of Care expires: 06/23/17  Plan of Care reviewed with: patient, pk BRAVO Dinora, GALINA  06/26/2017

## 2017-06-26 NOTE — PLAN OF CARE
met with pt and pts son in room.  Pt lives in [place], [place] with family.  Son is very involved in her care.  Pt has been on the skilled unit in [place] in the past.  If skilled is required family is interested in [place] snf.  sw will follow.

## 2017-06-26 NOTE — PLAN OF CARE
Problem: Occupational Therapy Goal  Goal: Occupational Therapy Goal  Goals to be met by: 7/10/2017    Patient will increase functional independence with ADLs by performing:    UE Dressing with Moderate Assistance.  LE Dressing with Moderate Assistance.  Grooming while standing with Stand-by Assistance.  Toileting from toilet with Maximum Assistance for hygiene and clothing management.   Stand pivot transfers with Stand-by Assistance.  Toilet transfer to toilet with min A with use of RW.   Evaluation completed.  OT plan of care developed and reviewed with patient.     Recommending SNF.

## 2017-06-26 NOTE — PROGRESS NOTES
Cardiology Progress Note  Attending Physician: Marisa Davis MD  Hospital Day: 4    Subjective:   Patient is a 81 y/o F with renovascular HTN, HLD, DM-2 controlled (A1c 7) with renal/neuro manifestations, chronic combined heart failure (EF 43%), CAD/ stents '03 Afib on warfarin/amio, SSS s/p PPM, and CKD-4 (baseline Cr 1.9-2.1), VEE on CPAP (intermitent compliance)    Interval History:   No acute events overnight. Vital signs wnl.   Patient feels well & denies SOB, chest pain, palpitations.   She is incontinent in the bed & has diapers on which is complicating adequate measurement of her UOP    Objective:     Vitals:  Temp:  [97.9 °F (36.6 °C)-98.7 °F (37.1 °C)]   Pulse:  [48-72]   Resp:  [10-20]   BP: (128-181)/(62-81)   SpO2:  [97 %-99 %]  on RA I/O's:    Intake/Output Summary (Last 24 hours) at 06/26/17 1215  Last data filed at 06/26/17 0500   Gross per 24 hour   Intake              570 ml   Output             1400 ml   Net             -830 ml        Constitutional: NAD, conversant  HEENT: Sclera anicteric, PERRLA, EOMI  Neck: 8-10 cm JVD, no carotid bruits  CV: RRR, Oscar, no murmur, normal S1/S2  Pulm: CTAB, no wheezes, rales, or ronchi  GI: Abdomen soft, NTND, +BS  Extremities: No LE edema, warm and well perfused  Skin: No ecchymosis, erythema, or ulcers  Psych: AOx3, appropriate affect  Neuro: CNII-XII intact, no focal deficits    CBC: CMP:      Recent Labs  Lab 06/24/17  0626 06/25/17  0637 06/26/17  0642   WBC 3.17* 2.75* 3.02*   HGB 9.3* 9.2* 9.2*   HCT 29.5* 28.3* 28.8*   * 101* 100*   MCV 92 92 93   RDW 16.3* 16.3* 16.5*      Recent Labs  Lab 06/23/17  0642 06/24/17  0626 06/25/17  0637 06/26/17  0642    141 140 141   K 4.2 3.7 3.4* 3.4*    106 105 104   CO2 27 30* 29 29   BUN 64* 66* 70* 66*   CREATININE 2.7* 2.7* 3.0* 2.9*   CALCIUM 9.7 9.5 9.4 9.2   PROT 6.4  --   --   --    BILITOT 0.5  --   --   --    ALKPHOS 79  --   --   --    ALT 23  --   --   --    AST 40  --   --   --     MG  --  2.0 1.9 1.7   PHOS  --  3.0 3.0 3.1         CXR (06/23/2017):   Findings: Pacer device projects over the LEFT chest wall with transvenous leads extending over the RIGHT heart.  Cardiomediastinal silhouette is enlarged.  There is calcific plaque of the aortic arch.  The trachea is midline.  There is no pneumothorax.  The LEFT lung is clear.  There is a moderate size RIGHT pleural effusion with volume loss and airspace opacity at the RIGHT lung base suggesting compressive atelectasis.  No acute bony abnormality.      EKG (06/23/3017):   Atrial Flutter with V-pacing.       Telemetry:    Atrial Flutter with less V pacing.      2D Echo (05/17/2017):   Results for orders placed or performed during the hospital encounter of 05/17/17   2D Echo w/ Color Flow Doppler   Result Value Ref Range    EF 45 55 - 65    Mitral Valve Regurgitation MILD     Diastolic Dysfunction Yes (A)     Est. PA Systolic Pressure 63.72 (A)     Pericardial Effusion NONE     Mitral Valve Mobility NORMAL     Tricuspid Valve Regurgitation MILD    Conclusion:    1 - Mildly depressed left ventricular systolic function (EF 45-50%).     2 - Right ventricular enlargement with normal systolic function.     3 - Restrictive LV filling pattern, indicating markedly elevated LAP (grade 3 diastolic dysfunction).     4 - Biatrial enlargement.     5 - Mild mitral regurgitation.     6 - Mild tricuspid regurgitation.     7 - Pulmonary hypertension. The estimated PA systolic pressure is 64 mmHg.     8 - Increased central venous pressure.       Assessment & Plan:      Ms. Trujillo is a 82 y.o. woman with PMH of Afib on coumadin (INR 2.0), longstanding HTN, SSS s/p permanent pacemaker, chronic diastolic heart failure (EF 43%)  DM (HbA1C 7.0), CAD s/p stents (2003), AFib, and CKD Stage 4 (baseline Cr 1.9-2.1) who presents with acute on chronic heart failure.     Acute on Chronic Systolic Heart Failure  - diuresis held yesterday for elevated BUN/Cr -> restarted  torsemide 20mg BID today.   - UOP difficult to measure due to incontinence & diaper in place.    HTN  - hydralazine 100mg q8h if BP remains elevated     Atrial Flutter  - patient has PPM for SSS & had high RV pacing burden.  - EP consulted yesterday & plan for RFA ablation today  - anticoagulation w/warfarin. INR 3.1. Will adjust dose to optimize INR    Plan for RFA ablation today. Thank you for the consult. We will continue to follow.     Patient seen and plan of care discussed with staff. Attestation to follow    Signed:  Nasrin Truong MD  Internal Medicine, PGY-1  [phone]

## 2017-06-27 LAB
ANION GAP SERPL CALC-SCNC: 7 MMOL/L
BASOPHILS # BLD AUTO: 0 K/UL
BASOPHILS NFR BLD: 0 %
BUN SERPL-MCNC: 67 MG/DL
CALCIUM SERPL-MCNC: 9.4 MG/DL
CHLORIDE SERPL-SCNC: 103 MMOL/L
CO2 SERPL-SCNC: 31 MMOL/L
CREAT SERPL-MCNC: 2.8 MG/DL
DIFFERENTIAL METHOD: ABNORMAL
EOSINOPHIL # BLD AUTO: 0.1 K/UL
EOSINOPHIL NFR BLD: 1.7 %
ERYTHROCYTE [DISTWIDTH] IN BLOOD BY AUTOMATED COUNT: 16.3 %
EST. GFR  (AFRICAN AMERICAN): 17.5 ML/MIN/1.73 M^2
EST. GFR  (NON AFRICAN AMERICAN): 15.1 ML/MIN/1.73 M^2
GLUCOSE SERPL-MCNC: 96 MG/DL
HCT VFR BLD AUTO: 28.1 %
HGB BLD-MCNC: 9.1 G/DL
INR PPP: 3.2
LYMPHOCYTES # BLD AUTO: 0.9 K/UL
LYMPHOCYTES NFR BLD: 30.2 %
MAGNESIUM SERPL-MCNC: 1.9 MG/DL
MCH RBC QN AUTO: 29.9 PG
MCHC RBC AUTO-ENTMCNC: 32.4 %
MCV RBC AUTO: 92 FL
MONOCYTES # BLD AUTO: 0.3 K/UL
MONOCYTES NFR BLD: 9.3 %
NEUTROPHILS # BLD AUTO: 1.7 K/UL
NEUTROPHILS NFR BLD: 58.1 %
PHOSPHATE SERPL-MCNC: 3.2 MG/DL
PLATELET # BLD AUTO: 97 K/UL
PMV BLD AUTO: 10.5 FL
POCT GLUCOSE: 171 MG/DL (ref 70–110)
POCT GLUCOSE: 179 MG/DL (ref 70–110)
POTASSIUM SERPL-SCNC: 3.4 MMOL/L
PROTHROMBIN TIME: 32.4 SEC
RBC # BLD AUTO: 3.04 M/UL
SODIUM SERPL-SCNC: 141 MMOL/L
WBC # BLD AUTO: 2.91 K/UL

## 2017-06-27 PROCEDURE — 99233 SBSQ HOSP IP/OBS HIGH 50: CPT | Mod: ,,, | Performed by: INTERNAL MEDICINE

## 2017-06-27 PROCEDURE — 27000221 HC OXYGEN, UP TO 24 HOURS

## 2017-06-27 PROCEDURE — 97530 THERAPEUTIC ACTIVITIES: CPT

## 2017-06-27 PROCEDURE — 20600001 HC STEP DOWN PRIVATE ROOM

## 2017-06-27 PROCEDURE — 25000003 PHARM REV CODE 250: Performed by: HOSPITALIST

## 2017-06-27 PROCEDURE — 94660 CPAP INITIATION&MGMT: CPT

## 2017-06-27 PROCEDURE — 83735 ASSAY OF MAGNESIUM: CPT

## 2017-06-27 PROCEDURE — 94760 N-INVAS EAR/PLS OXIMETRY 1: CPT

## 2017-06-27 PROCEDURE — 36415 COLL VENOUS BLD VENIPUNCTURE: CPT

## 2017-06-27 PROCEDURE — 85025 COMPLETE CBC W/AUTO DIFF WBC: CPT

## 2017-06-27 PROCEDURE — 84100 ASSAY OF PHOSPHORUS: CPT

## 2017-06-27 PROCEDURE — 93010 ELECTROCARDIOGRAM REPORT: CPT | Mod: S$GLB,,, | Performed by: INTERNAL MEDICINE

## 2017-06-27 PROCEDURE — 99232 SBSQ HOSP IP/OBS MODERATE 35: CPT | Mod: ,,, | Performed by: INTERNAL MEDICINE

## 2017-06-27 PROCEDURE — 25000003 PHARM REV CODE 250: Performed by: NURSE PRACTITIONER

## 2017-06-27 PROCEDURE — 25000003 PHARM REV CODE 250: Performed by: INTERNAL MEDICINE

## 2017-06-27 PROCEDURE — 93005 ELECTROCARDIOGRAM TRACING: CPT

## 2017-06-27 PROCEDURE — 85610 PROTHROMBIN TIME: CPT

## 2017-06-27 PROCEDURE — 99900035 HC TECH TIME PER 15 MIN (STAT)

## 2017-06-27 PROCEDURE — 99232 SBSQ HOSP IP/OBS MODERATE 35: CPT | Mod: ,,, | Performed by: HOSPITALIST

## 2017-06-27 PROCEDURE — 80048 BASIC METABOLIC PNL TOTAL CA: CPT

## 2017-06-27 RX ORDER — HYDRALAZINE HYDROCHLORIDE 50 MG/1
100 TABLET, FILM COATED ORAL EVERY 8 HOURS
Status: DISCONTINUED | OUTPATIENT
Start: 2017-06-27 | End: 2017-06-30 | Stop reason: HOSPADM

## 2017-06-27 RX ORDER — ISOSORBIDE DINITRATE 20 MG/1
40 TABLET ORAL 3 TIMES DAILY
Status: DISCONTINUED | OUTPATIENT
Start: 2017-06-27 | End: 2017-06-30 | Stop reason: HOSPADM

## 2017-06-27 RX ADMIN — CARVEDILOL 25 MG: 12.5 TABLET, FILM COATED ORAL at 09:06

## 2017-06-27 RX ADMIN — INSULIN DETEMIR 8 UNITS: 100 INJECTION, SOLUTION SUBCUTANEOUS at 08:06

## 2017-06-27 RX ADMIN — ISOSORBIDE DINITRATE 40 MG: 20 TABLET ORAL at 02:06

## 2017-06-27 RX ADMIN — AMLODIPINE BESYLATE 10 MG: 10 TABLET ORAL at 08:06

## 2017-06-27 RX ADMIN — ISOSORBIDE DINITRATE 40 MG: 20 TABLET ORAL at 08:06

## 2017-06-27 RX ADMIN — Medication 3 ML: at 09:06

## 2017-06-27 RX ADMIN — TORSEMIDE 20 MG: 20 TABLET ORAL at 05:06

## 2017-06-27 RX ADMIN — ESCITALOPRAM 5 MG: 5 TABLET, FILM COATED ORAL at 08:06

## 2017-06-27 RX ADMIN — AMIODARONE HYDROCHLORIDE 200 MG: 200 TABLET ORAL at 08:06

## 2017-06-27 RX ADMIN — DOXAZOSIN 4 MG: 4 TABLET ORAL at 08:06

## 2017-06-27 RX ADMIN — WARFARIN SODIUM 2.5 MG: 2.5 TABLET ORAL at 05:06

## 2017-06-27 RX ADMIN — ISOSORBIDE DINITRATE 40 MG: 20 TABLET ORAL at 09:06

## 2017-06-27 RX ADMIN — CARVEDILOL 25 MG: 12.5 TABLET, FILM COATED ORAL at 08:06

## 2017-06-27 RX ADMIN — Medication 3 ML: at 02:06

## 2017-06-27 RX ADMIN — INSULIN ASPART 4 UNITS: 100 INJECTION, SOLUTION INTRAVENOUS; SUBCUTANEOUS at 04:06

## 2017-06-27 RX ADMIN — HYDRALAZINE HYDROCHLORIDE 100 MG: 50 TABLET ORAL at 02:06

## 2017-06-27 RX ADMIN — TORSEMIDE 20 MG: 20 TABLET ORAL at 08:06

## 2017-06-27 RX ADMIN — PRAVASTATIN SODIUM 20 MG: 20 TABLET ORAL at 08:06

## 2017-06-27 RX ADMIN — INSULIN ASPART 4 UNITS: 100 INJECTION, SOLUTION INTRAVENOUS; SUBCUTANEOUS at 12:06

## 2017-06-27 RX ADMIN — HYDRALAZINE HYDROCHLORIDE 100 MG: 50 TABLET ORAL at 09:06

## 2017-06-27 RX ADMIN — HYDRALAZINE HYDROCHLORIDE 100 MG: 50 TABLET ORAL at 08:06

## 2017-06-27 RX ADMIN — ASPIRIN 81 MG CHEWABLE TABLET 81 MG: 81 TABLET CHEWABLE at 08:06

## 2017-06-27 NOTE — PROGRESS NOTES
Updated family Stephanie daughter on patient status. Family in patient hospital room. Patient NAD, AAO, Wctm

## 2017-06-27 NOTE — PLAN OF CARE
Problem: Patient Care Overview  Goal: Plan of Care Review  Outcome: Ongoing (interventions implemented as appropriate)  VSS. Patient states pain is tolerable. No N&V. Transferred to the next Phase of Care.

## 2017-06-27 NOTE — PLAN OF CARE
Problem: Physical Therapy Goal  Goal: Physical Therapy Goal  Goals to be met by: 17    Patient will increase functional independence with mobility by performin. Supine to sit with Contact Guard Assistance - not met  2. Sit to stand transfer with Contact Guard Assistance with RW.- not met  3. Gait  x 150 feet with Contact Guard Assistance using Rolling Walker. - not met     Outcome: Ongoing (interventions implemented as appropriate)  Goals reviewed and remain appropriate. Pt progressing towards goals.    Brenda Mendez, PT, DPT   2017  [phone]

## 2017-06-27 NOTE — PLAN OF CARE
06/27/17 0816   Discharge Reassessment   Assessment Type Discharge Planning Reassessment   Can the patient answer the patient profile reliably? Yes, cognitively intact   How does the patient rate their overall health at the present time? Fair   Describe the patient's ability to walk at the present time. Walks with the help of equipment   How often would a person be available to care for the patient? Whenever needed   Number of comorbid conditions (as recorded on the chart) Four   During the past month, has the patient often been bothered by feeling down, depressed or hopeless? No   During the past month, has the patient often been bothered by little interest or pleasure in doing things? No   Discharge plan remains the same: No   Discharge Plan A Skilled Nursing Facility   Discharge Plan B Home with family;Home Health

## 2017-06-27 NOTE — PT/OT/SLP PROGRESS
"Physical Therapy  Treatment    Chey Trujillo   MRN: [id number]   Admitting Diagnosis: Heart failure, acute on chronic, systolic and diastolic    PT Received On: 06/27/17  PT Start Time: 0957     PT Stop Time: 1032    PT Total Time (min): 35 min       Billable Minutes:  Therapeutic Activity 35    Treatment Type: Treatment  PT/PTA: PT     PTA Visit Number: 0       General Precautions: Standard, fall  Orthopedic Precautions: N/A   Braces: N/A    Do you have any cultural, spiritual, Pentecostal conflicts, given your current situation?: none    Subjective:  Communicated with RN prior to session.  "I'm wet," pt stated multiple times throughout session. Pt not wet upon assessment.     Pain/Comfort  Pain Rating 1: 0/10    Objective:   Patient found with: telemetry    Functional Mobility:  Bed Mobility:   Rolling/Turning Right: Moderate assistance, With side rail  Supine to Sit: Moderate Assistance  Sit to Supine: Minimum Assistance    Max v/c and t/c for sequencing and technique     Transfers:  Sit <> Stand Assistance: Moderate Assistance, Minimum Assistance (x2 reps with modA, x1 rep with Khoa)  Sit <> Stand Assistive Device: Rolling Walker    Max v/c and t/c for RW management and technique.    Pt with decreased safety awareness during transfers and decreased control when lowering from stand>sit.     Gait:   Gait Distance: 3 R lateral steps at EOB + 2 ft. forward/backward + 2 ft. forward/backward  Assistance 1: Minimum assistance (RW management)  Gait Assistive Device: Rolling walker  Gait Pattern: 3-point gait  Gait Deviation(s): decreased cristina, decreased step length, increased time in double stance, decreased velocity of limb motion (increased cervical and trunk flexion)   Max v/c and t/c for upright posture, forward gaze, sequencing and RW management    Balance:   Static Sit: SBA-CGA  Dynamic Sit: CGA-Khoa  Static Stand: CGA-Khoa  Dynamic stand: Khoa     Therapeutic Activities and Exercises:  Discussed d/c recs for SNF " with pt and pt's son. Both parties verbalized understanding. Pt's son agreeable to recs.    Performed therapeutic activities as described above in order to improve technique and (I) with functional mobility. Pt required seated rest breaks between all ambulation trials 2* impaired endurance and fatigue.   Following therapeutic activities, pt positioned in R sidelying with wedge at L side, B pressure relief boots donned, pillow between knees for pressure relief to bony prominences, and pillow under LUE for edema reduction.     AM-PAC 6 CLICK MOBILITY  How much help from another person does this patient currently need?   1 = Unable, Total/Dependent Assistance  2 = A lot, Maximum/Moderate Assistance  3 = A little, Minimum/Contact Guard/Supervision  4 = None, Modified [place]/Independent    Turning over in bed (including adjusting bedclothes, sheets and blankets)?: 3  Sitting down on and standing up from a chair with arms (e.g., wheelchair, bedside commode, etc.): 2  Moving from lying on back to sitting on the side of the bed?: 2  Moving to and from a bed to a chair (including a wheelchair)?: 3  Need to walk in hospital room?: 2  Climbing 3-5 steps with a railing?: 1  Total Score: 13    AM-PAC Raw Score CMS G-Code Modifier Level of Impairment Assistance   6 % Total / Unable   7 - 9 CM 80 - 100% Maximal Assist   10 - 14 CL 60 - 80% Moderate Assist   15 - 19 CK 40 - 60% Moderate Assist   20 - 22 CJ 20 - 40% Minimal Assist   23 CI 1-20% SBA / CGA   24 CH 0% Independent/ Mod I     Patient left right sidelying with all lines intact, call button in reach and pt's son present.    Assessment:  Chey Trujillo is a 82 y.o. female with a medical diagnosis of Heart failure, acute on chronic, systolic and diastolic and presents with limited functional mobility 2* impaired endurance, deconditioning, decreased balance, and weakness. Pt with decline in ambulation distance and (I) with mobility since initial evaluation.  Ambulation limited to small steps near EOB with increased cueing for postural control and RW management. Noted difficulty with gait sequencing and advancing RW. Due to decrease in function, d/c recs now changed to SNF. Pt's son informed and in agreement. Pt would continue to benefit from skilled acute PT in order to address current deficits and progress functional mobility.     Rehab identified problem list/impairments: Rehab identified problem list/impairments: weakness, impaired functional mobilty, impaired balance, impaired endurance, impaired self care skills, gait instability, impaired cardiopulmonary response to activity, impaired cognition, decreased upper extremity function, decreased lower extremity function, decreased safety awareness, decreased coordination, impaired coordination, impaired skin, edema    Rehab potential is good.    Activity tolerance: Good    Discharge recommendations: Discharge Facility/Level Of Care Needs: nursing facility, skilled     Barriers to discharge: Barriers to Discharge: None    Equipment recommendations: Equipment Needed After Discharge: none     GOALS:    Physical Therapy Goals        Problem: Physical Therapy Goal    Goal Priority Disciplines Outcome Goal Variances Interventions   Physical Therapy Goal     PT/OT, PT Ongoing (interventions implemented as appropriate)     Description:  Goals to be met by: 17    Patient will increase functional independence with mobility by performin. Supine to sit with Contact Guard Assistance - not met  2. Sit to stand transfer with Contact Guard Assistance with RW.- not met  3. Gait  x 150 feet with Contact Guard Assistance using Rolling Walker. - not met                      PLAN:    Patient to be seen 5 x/week  to address the above listed problems via gait training, therapeutic activities, therapeutic exercises  Plan of Care expires: 17  Plan of Care reviewed with: patient, son        Brenda Mendez, PT, DPT    6/27/2017  [phone]

## 2017-06-27 NOTE — ANESTHESIA RELEASE NOTE
"Anesthesia Release from PACU Note    Patient: Chey Trujillo    Procedure(s) Performed: Procedure(s) (LRB):  ABLATION (Left)    Anesthesia type: general    Post pain: Adequate analgesia    Post assessment: no apparent anesthetic complications, tolerated procedure well and no evidence of recall    Last Vitals:   Visit Vitals  BP (!) 162/74   Pulse 65   Temp 36.4 °C (97.5 °F) (Temporal)   Resp 14   Ht 6' 2" (1.88 m)   Wt 88.5 kg (195 lb)   LMP  (LMP Unknown)   SpO2 96%   Breastfeeding? No   BMI 25.04 kg/m²       Post vital signs: stable    Level of consciousness: awake, alert  and oriented    Nausea/Vomiting: no nausea/no vomiting    Complications: none    Airway Patency: patent    Respiratory: unassisted    Cardiovascular: stable and blood pressure at baseline    Hydration: euvolemic  "

## 2017-06-27 NOTE — NURSING TRANSFER
Nursing Transfer Note      6/26/2017     Transfer {TRANSFER BACK TO ROOM 387 /FROM: PACU      Transfer via stretcher    Transfer with cardiac monitoring    Transported by RN     Medicines sent: NONE     Chart send with patient: Yes    Notified: daughter, son    Patient reassessed at: 2115 (date, time)    Upon arrival to floor: cardiac monitor applied, patient oriented to room, call bell in reach and bed in lowest position    Arrived on floor via stretcher accompanied by RN. She appears in good spirits, she is awake and alert and in no distress at this time. Multiple family members at the patient's bedside. She states that she is hungry and wants to eat. She is alert enough to eat at this time. See flowsheet charting, will continue to monitor.

## 2017-06-27 NOTE — ANESTHESIA POSTPROCEDURE EVALUATION
"Anesthesia Post Evaluation    Patient: Chey Trujillo    Procedure(s) Performed: Procedure(s) (LRB):  ABLATION (Left)    Final Anesthesia Type: general  Patient location during evaluation: PACU  Patient participation: Yes- Able to Participate  Level of consciousness: awake and alert  Post-procedure vital signs: reviewed and stable  Pain management: adequate  Airway patency: patent  PONV status at discharge: No PONV  Anesthetic complications: no      Cardiovascular status: hemodynamically stable  Respiratory status: unassisted, spontaneous ventilation and room air  Hydration status: euvolemic  Follow-up not needed.        Visit Vitals  BP (!) 162/74   Pulse 65   Temp 36.4 °C (97.5 °F) (Temporal)   Resp 14   Ht 6' 2" (1.88 m)   Wt 88.5 kg (195 lb)   LMP  (LMP Unknown)   SpO2 96%   Breastfeeding? No   BMI 25.04 kg/m²       Pain/Prudence Score: Pain Assessment Performed: Yes (6/26/2017  8:30 PM)  Presence of Pain: non-verbal indicators absent (6/26/2017  8:30 PM)  Prudence Score: 9 (6/26/2017  8:30 PM)      "

## 2017-06-27 NOTE — PROGRESS NOTES
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 Overnight had some gingival bleeding last night which resolved spontaneously & mouth flush. Else, VS wnl.   Patient feels well & denies SOB, chest pain, palpitations.   She is incontinent in the bed & has diapers on which is complicating adequate measurement of her UOP    Objective:     Vitals:  Temp:  [96.3 °F (35.7 °C)-98.8 °F (37.1 °C)]   Pulse:  [58-68]   Resp:  [8-20]   BP: (115-182)/(57-82)   SpO2:  [92 %-100 %]  on RA I/O's:    Intake/Output Summary (Last 24 hours) at 06/27/17 1157  Last data filed at 06/27/17 0600   Gross per 24 hour   Intake              340 ml   Output                0 ml   Net              340 ml      Constitutional: NAD, conversant  HEENT: Sclera anicteric, PERRLA, EOMI  Neck: 8-10 cm JVD, no carotid bruits  CV: RRR, Oscar, no murmur, normal S1/S2  Pulm: has decreased breath sounds in right lung base  GI: Abdomen soft, NTND, +BS  Extremities: No LE edema, warm and well perfused  Skin: No ecchymosis, erythema, or ulcers  Psych: AOx3, appropriate affect    CMP: CBC:      Recent Labs  Lab 06/23/17  0642  06/27/17  0638     < > 141   K 4.2  < > 3.4*     < > 103   CO2 27  < > 31*   BUN 64*  < > 67*   CREATININE 2.7*  < > 2.8*   CALCIUM 9.7  < > 9.4   PROT 6.4  --   --    BILITOT 0.5  --   --    ALKPHOS 79  --   --    ALT 23  --   --    AST 40  --   --    < > = values in this interval not displayed.   Recent Labs  Lab 06/27/17  0638   WBC 2.91*   HGB 9.1*   HCT 28.1*   PLT 97*          Assessment & Plan:      Ms. Trujillo is a 82 y.o. woman with PMH of  Afib on coumadin (INR 2.0), longstanding HTN, SSS s/p permanent pacemaker, chronic diastolic heart failure (EF 43%)  DM (HbA1C 7.0), CAD s/p stents (2003), AFib, and CKD Stage 4 (baseline Cr 1.9-2.1) who presents with acute on chronic heart failure.     Acute on Chronic Systolic Heart Failure  - continuing torsemide 20mg daily.   - UOP difficult to measure due to incontinence & diaper in place.    HTN  - increasing hydralazine 100mg to TID  - continue amlodipine 10mg, coreg 25mg BID, doxazosin 4mg, isosorbide dinitrite 40mg BID at current dose.   - goal BP < 150/90    Atrial Flutter/Atrial Fibrillation  - patient has PPM for SSS & had high RV pacing burden -> plan for interrogation today & mode switch today  - EP consulted yesterday &  Pt is s/p DCCV on 6/26 for atrial fib  - CHADsVASC 7 (age, F, HTN, CHF, DM). Currnetly anticoagulated w/warfarin. INR 3.2 . Will continue to monitor daily.    Thank you for the consult. We will continue to follow.     Patient seen and plan of care discussed with staff. Attestation to follow    Signed:  Nasrin Truong MD  Internal Medicine, PGY-1  [phone]

## 2017-06-27 NOTE — PROGRESS NOTES
Electrophysiology Progress Note  Attending Physician: Pebbles Hunter MD  Hospital Day: 5    Subjective:   Interval History: patient is s/p ablation. She no longer states that she's tired. Heart rates are much better controlled.    Medications:   Continuous Infusions:     Scheduled Meds:   amiodarone  200 mg Oral Daily    amlodipine  10 mg Oral Daily    aspirin  81 mg Oral Daily    carvedilol  25 mg Oral BID    doxazosin  4 mg Oral Daily    escitalopram oxalate  5 mg Oral Daily    hydrALAZINE  100 mg Oral Q8H    insulin aspart  4 Units Subcutaneous TIDWM    [START ON 6/28/2017] insulin detemir  5 Units Subcutaneous Daily    isosorbide dinitrate  40 mg Oral TID    pravastatin  20 mg Oral Daily    sodium chloride 0.9%  3 mL Intravenous Q8H    torsemide  20 mg Oral BID    warfarin  2.5 mg Oral Daily     PRN Meds:albuterol-ipratropium 2.5mg-0.5mg/3mL, dextrose 50%, dextrose 50%, glucagon (human recombinant), glucose, glucose, insulin aspart  Objective:     Vitals:  Temp:  [96.3 °F (35.7 °C)-97.9 °F (36.6 °C)]   Pulse:  [58-68]   Resp:  [8-20]   BP: (108-182)/(53-82)   SpO2:  [92 %-100 %]  on RA I/O's:    Intake/Output Summary (Last 24 hours) at 06/27/17 1525  Last data filed at 06/27/17 1400   Gross per 24 hour   Intake              820 ml   Output                0 ml   Net              820 ml        Constitutional: NAD, conversant  HEENT: Sclera anicteric, PERRLA, EOMI  Neck: No JVD, no carotid bruits  CV: RRR, no murmur, normal S1/S2  Pulm: CTAB, no wheezes, rales, or ronchi  GI: Abdomen soft, NTND, +BS  Groin: sites c/d/i. No hematomas  Extremities: No LE edema, warm and well perfused  Skin: No ecchymosis, erythema, or ulcers  Psych: AOx3, appropriate affect  Neuro: CNII-XII intact, no focal deficits    Labs:       CBC: CMP:      Recent Labs  Lab 06/25/17  0637 06/26/17  0642 06/27/17  0638   WBC 2.75* 3.02* 2.91*   HGB 9.2* 9.2* 9.1*   HCT 28.3* 28.8* 28.1*   * 100* 97*   MCV 92 93 92    RDW 16.3* 16.5* 16.3*      Recent Labs  Lab 06/23/17  0642  06/25/17  0637 06/26/17  0642 06/27/17  0638     < > 140 141 141   K 4.2  < > 3.4* 3.4* 3.4*     < > 105 104 103   CO2 27  < > 29 29 31*   BUN 64*  < > 70* 66* 67*   CREATININE 2.7*  < > 3.0* 2.9* 2.8*   CALCIUM 9.7  < > 9.4 9.2 9.4   PROT 6.4  --   --   --   --    BILITOT 0.5  --   --   --   --    ALKPHOS 79  --   --   --   --    ALT 23  --   --   --   --    AST 40  --   --   --   --    MG  --   < > 1.9 1.7 1.9   PHOS  --   < > 3.0 3.1 3.2   < > = values in this interval not displayed.     Cholesterol: Coagulation   Lab Results   Component Value Date    CHOL 99 (L) 11/15/2016    HDL 29 (L) 11/15/2016    LDLCALC 47.0 (L) 11/15/2016    TRIG 115 11/15/2016      Recent Labs  Lab 06/27/17  0638   INR 3.2*        Misc:     Recent Labs  Lab 06/23/17  1516   TROPONINI 0.030*      Lab Results   Component Value Date    HGBA1C 7.0 (H) 06/14/2017        Microbiology   Microbiology Results (last 7 days)     ** No results found for the last 168 hours. **           Imaging:     CXR (06/27/2017):   Results: Single view. Cardiac pacemaker remains in place with the tip of one of the leads in the right atrium and the other in the area of the right ventricle. Cardiomegaly, unchanged. Pulmonary vascular pattern is stable. The left lung remains well-expanded and clear. Continued moderate amount of pleural fluid on the right with associated volume loss/consolidation. A there may have been a little worsening of aeration on the right since the previous study. No pneumothorax or other detrimental change.     2D Echo (05/17/2017):     1 - Mildly depressed left ventricular systolic function (EF 45-50%).     2 - Right ventricular enlargement with normal systolic function.     3 - Restrictive LV filling pattern, indicating markedly elevated LAP (grade 3 diastolic dysfunction).     4 - Biatrial enlargement.     5 - Mild mitral regurgitation.     6 - Mild tricuspid  regurgitation.     7 - Pulmonary hypertension. The estimated PA systolic pressure is 64 mmHg.     8 - Increased central venous pressure.      Assessment:     Ms Trujillo is an 81yo F with atrial fibrillation on coumadin, HTN, SSS s/p permanent pacemaker, chronic diastolic heart failure (EF 43%)  DM, CAD s/p stents (2003), and CKD stage 4 (baseline Cr 1.9-2.1) who presented with acute on chronic heart failure. She was found to be in atrial flutter.    Plan:     # Atrial flutter  - S/P RFA ablation.  - on coumadin. INR of 3.2  - continue amiodarone 200mg daily and coreg 25mg BID  - diuresis, per primary team     Thank you for the consult. We will continue to follow. Staff addendum to follow       Signed:  Rosa M Nolasco MD  Cardiology Fellow, PGY-4  6/27/2017 3:25 PM

## 2017-06-27 NOTE — PLAN OF CARE
met with pt and spoke to her daughter per telephone conversation to obtain choices for skilled nursing.  Daughter is requesting [facility] skilled as first choice, [facility] and [facility].  sw will complete locet.  Will keven.

## 2017-06-27 NOTE — PROGRESS NOTES
Paged Cardiology Fellow and EP fellow appears pacemaker not capturing. Patient AAO to self and place. NAD.

## 2017-06-27 NOTE — PLAN OF CARE
Problem: Patient Care Overview  Goal: Plan of Care Review  Outcome: Ongoing (interventions implemented as appropriate)  Spoke with the patient and her family regarding the plan of care. Noted that the patient has a groin site, clear at this time. She has no pain or discomfort, she is disoriented at times. Continued turning every 2 hours and lalito care for skin protection. Monitor blood sugars, diabetes teachings and continued teaching on heart failure, Will continue to monitor.

## 2017-06-27 NOTE — NURSING TRANSFER
Nursing Transfer Note      6/26/2017      Transfer 387 from PACU      Transfer via Stretcher    Transfer with Chart    Transported by RN    Medicines sent: NA    Chart send with patient: YES    Notified: Stephanie daughter    Patient reassessed at: 6/26/17 20:30

## 2017-06-27 NOTE — PROGRESS NOTES
Sr. Flood at bedside, no new orders, Patient NAD, AAO, tolerating sips of water and night-time PO medications. Wctm

## 2017-06-27 NOTE — PROGRESS NOTES
Dr. Remigio goodson Cardiology notified of pacemaker not capturing. MD to see patient. Orders received to interrogate pacemaker, per MD for tomorrow. Wctm

## 2017-06-28 LAB
ANION GAP SERPL CALC-SCNC: 7 MMOL/L
BASOPHILS # BLD AUTO: 0.01 K/UL
BASOPHILS NFR BLD: 0.3 %
BUN SERPL-MCNC: 69 MG/DL
CALCIUM SERPL-MCNC: 9 MG/DL
CHLORIDE SERPL-SCNC: 103 MMOL/L
CO2 SERPL-SCNC: 31 MMOL/L
CREAT SERPL-MCNC: 2.9 MG/DL
DIFFERENTIAL METHOD: ABNORMAL
EOSINOPHIL # BLD AUTO: 0 K/UL
EOSINOPHIL NFR BLD: 1.3 %
ERYTHROCYTE [DISTWIDTH] IN BLOOD BY AUTOMATED COUNT: 16.1 %
EST. GFR  (AFRICAN AMERICAN): 16.7 ML/MIN/1.73 M^2
EST. GFR  (NON AFRICAN AMERICAN): 14.5 ML/MIN/1.73 M^2
GLUCOSE SERPL-MCNC: 93 MG/DL
HCT VFR BLD AUTO: 26.4 %
HGB BLD-MCNC: 8.4 G/DL
INR PPP: 3.1
LYMPHOCYTES # BLD AUTO: 1 K/UL
LYMPHOCYTES NFR BLD: 32.2 %
MAGNESIUM SERPL-MCNC: 1.7 MG/DL
MCH RBC QN AUTO: 29.7 PG
MCHC RBC AUTO-ENTMCNC: 31.8 %
MCV RBC AUTO: 93 FL
MONOCYTES # BLD AUTO: 0.3 K/UL
MONOCYTES NFR BLD: 10.2 %
NEUTROPHILS # BLD AUTO: 1.8 K/UL
NEUTROPHILS NFR BLD: 55.7 %
PHOSPHATE SERPL-MCNC: 2.9 MG/DL
PLATELET # BLD AUTO: 103 K/UL
PMV BLD AUTO: 11.5 FL
POCT GLUCOSE: 118 MG/DL (ref 70–110)
POCT GLUCOSE: 122 MG/DL (ref 70–110)
POCT GLUCOSE: 192 MG/DL (ref 70–110)
POCT GLUCOSE: 221 MG/DL (ref 70–110)
POCT GLUCOSE: 76 MG/DL (ref 70–110)
POTASSIUM SERPL-SCNC: 3.3 MMOL/L
PROTHROMBIN TIME: 31.5 SEC
RBC # BLD AUTO: 2.83 M/UL
SODIUM SERPL-SCNC: 141 MMOL/L
WBC # BLD AUTO: 3.14 K/UL

## 2017-06-28 PROCEDURE — 99232 SBSQ HOSP IP/OBS MODERATE 35: CPT | Mod: ,,, | Performed by: HOSPITALIST

## 2017-06-28 PROCEDURE — 20600001 HC STEP DOWN PRIVATE ROOM

## 2017-06-28 PROCEDURE — 83735 ASSAY OF MAGNESIUM: CPT

## 2017-06-28 PROCEDURE — 25000003 PHARM REV CODE 250: Performed by: HOSPITALIST

## 2017-06-28 PROCEDURE — 94760 N-INVAS EAR/PLS OXIMETRY 1: CPT

## 2017-06-28 PROCEDURE — 80048 BASIC METABOLIC PNL TOTAL CA: CPT

## 2017-06-28 PROCEDURE — 25000003 PHARM REV CODE 250: Performed by: NURSE PRACTITIONER

## 2017-06-28 PROCEDURE — 85610 PROTHROMBIN TIME: CPT

## 2017-06-28 PROCEDURE — 99900035 HC TECH TIME PER 15 MIN (STAT)

## 2017-06-28 PROCEDURE — 36415 COLL VENOUS BLD VENIPUNCTURE: CPT

## 2017-06-28 PROCEDURE — 85025 COMPLETE CBC W/AUTO DIFF WBC: CPT

## 2017-06-28 PROCEDURE — 84100 ASSAY OF PHOSPHORUS: CPT

## 2017-06-28 PROCEDURE — 94660 CPAP INITIATION&MGMT: CPT

## 2017-06-28 PROCEDURE — 25000003 PHARM REV CODE 250: Performed by: INTERNAL MEDICINE

## 2017-06-28 PROCEDURE — 27000221 HC OXYGEN, UP TO 24 HOURS

## 2017-06-28 RX ORDER — POTASSIUM CHLORIDE 20 MEQ/1
40 TABLET, EXTENDED RELEASE ORAL 2 TIMES DAILY
Status: COMPLETED | OUTPATIENT
Start: 2017-06-28 | End: 2017-06-29

## 2017-06-28 RX ADMIN — ISOSORBIDE DINITRATE 40 MG: 20 TABLET ORAL at 09:06

## 2017-06-28 RX ADMIN — INSULIN ASPART 4 UNITS: 100 INJECTION, SOLUTION INTRAVENOUS; SUBCUTANEOUS at 05:06

## 2017-06-28 RX ADMIN — CARVEDILOL 25 MG: 12.5 TABLET, FILM COATED ORAL at 09:06

## 2017-06-28 RX ADMIN — Medication 3 ML: at 06:06

## 2017-06-28 RX ADMIN — Medication 3 ML: at 10:06

## 2017-06-28 RX ADMIN — INSULIN ASPART 4 UNITS: 100 INJECTION, SOLUTION INTRAVENOUS; SUBCUTANEOUS at 11:06

## 2017-06-28 RX ADMIN — Medication 3 ML: at 02:06

## 2017-06-28 RX ADMIN — ISOSORBIDE DINITRATE 40 MG: 20 TABLET ORAL at 06:06

## 2017-06-28 RX ADMIN — HYDRALAZINE HYDROCHLORIDE 100 MG: 50 TABLET ORAL at 02:06

## 2017-06-28 RX ADMIN — AMLODIPINE BESYLATE 10 MG: 10 TABLET ORAL at 09:06

## 2017-06-28 RX ADMIN — ASPIRIN 81 MG CHEWABLE TABLET 81 MG: 81 TABLET CHEWABLE at 09:06

## 2017-06-28 RX ADMIN — HYDRALAZINE HYDROCHLORIDE 100 MG: 50 TABLET ORAL at 09:06

## 2017-06-28 RX ADMIN — TORSEMIDE 20 MG: 20 TABLET ORAL at 05:06

## 2017-06-28 RX ADMIN — INSULIN ASPART 1 UNITS: 100 INJECTION, SOLUTION INTRAVENOUS; SUBCUTANEOUS at 10:06

## 2017-06-28 RX ADMIN — AMIODARONE HYDROCHLORIDE 200 MG: 200 TABLET ORAL at 09:06

## 2017-06-28 RX ADMIN — HYDRALAZINE HYDROCHLORIDE 100 MG: 50 TABLET ORAL at 06:06

## 2017-06-28 RX ADMIN — POTASSIUM CHLORIDE 40 MEQ: 1500 TABLET, EXTENDED RELEASE ORAL at 09:06

## 2017-06-28 RX ADMIN — ISOSORBIDE DINITRATE 40 MG: 20 TABLET ORAL at 02:06

## 2017-06-28 RX ADMIN — WARFARIN SODIUM 2.5 MG: 2.5 TABLET ORAL at 05:06

## 2017-06-28 RX ADMIN — ESCITALOPRAM 5 MG: 5 TABLET, FILM COATED ORAL at 09:06

## 2017-06-28 RX ADMIN — DOXAZOSIN 4 MG: 4 TABLET ORAL at 09:06

## 2017-06-28 RX ADMIN — PRAVASTATIN SODIUM 20 MG: 20 TABLET ORAL at 09:06

## 2017-06-28 RX ADMIN — TORSEMIDE 20 MG: 20 TABLET ORAL at 09:06

## 2017-06-28 NOTE — ASSESSMENT & PLAN NOTE
Nutrition Problem  increased nutrient needs    Related to (etiology):   Non-healing wounds    Signs and Symptoms (as evidenced by):   decubitus ulcer (r. Heel)    Interventions/Recommendations (treatment strategy):  1. Provide pt w/ Arginaid  2. Encourage HVP w/ each meal    Nutrition Diagnosis Status:   New

## 2017-06-28 NOTE — ASSESSMENT & PLAN NOTE
multifactorial due to decreased EF 2/2 RV pacing with subsequent functional LBBB, recent decreased dose of diuretic at home, and possible contribution from R pleural effusion   cont torsemide 20 BID  Strict I/o ; daily weights

## 2017-06-28 NOTE — CONSULTS
[facility]  Adult Nutrition  Consult Note    SUMMARY     Recommendations    Recommendation/Intervention: Consider modifying current diet to Diabetic 1800 Cardiac  2. encourage HPV with each meal with additional Arginaid to promote wound healing. 3.Encourage po intake >/= 75% 4. RD will continue to follow  Goals: PO intake =/> 75% EEN/EPN  Nutrition Goal Status: new  Communication of RD Recs: reviewed with RN    Reason for Assessment    Reason for Assessment: nurse/nurse practitioner consult  Diagnosis: cardiac disease  Relevant Medical History: Hep-c, HF, HTN, DM   Interdisciplinary Rounds: attended  General Information Comments: Pt po intake 50-75%, Reports no N/V/D  Nutrition Discharge Planning: d/c cardiac/diabetic diet    Nutrition Prescription Ordered    Current Diet Order: Cardiac     Evaluation of Received Nutrients/Fluid Intake     Intake/Output Summary (Last 24 hours) at 06/28/17 0513  Last data filed at 06/27/17 2200   Gross per 24 hour   Intake              840 ml   Output                0 ml   Net              840 ml       % Intake of Estimated Energy Needs: 50 - 75 %  % Meal Intake: 50%     Nutrition Risk Screen     Nutrition Risk Screen: no indicators present    Nutrition/Diet History    Patient Reported Diet/Restrictions/Preferences: diabetic diet  Typical Food/Fluid Intake: decreased PTA  Food Preferences: no cultural or Yarsani preferences  Factors Affecting Nutritional Intake: decreased appetite, early satiety     Labs/Tests/Procedures/Meds    Pertinent Labs Reviewed: reviewed  Pertinent Labs Comments: INR-3.2, Na-141, BUN-67, Cr-2.8, Alb-2.8, PreAlb-13, CRP-0.5, Glu-96, A1c-7.0  Pertinent Medications Reviewed: reviewed  Pertinent Medications Comments: hydralazine, insulin, statin, coumadin     Physical Findings    Overall Physical Appearance: loss of muscle mass, loss of subcutaneous fat, shortness of breath, weak  Oral/Mouth Cavity: WDL  Skin: incision, non-healing  "wound(s)    Anthropometrics    Temp: 99.8 °F (37.7 °C)  Height: 6' 2" (188 cm)  Weight Method: Bed Scale  Weight: 88.5 kg (195 lb)  Ideal Body Weight (IBW), Female: 170 lb  % Ideal Body Weight, Female (lb): 111.18 lb  BMI (Calculated): 24.3  BMI Grade: 18.5-24.9 - normal     Estimated/Assessed Needs    Weight Used For Calorie Calculations: 88.5 kg (195 lb 1.7 oz)   Height (cm): 188 cm  Energy Calorie Requirements (kcal): 1861  Energy Need Method: Dysart-St Jeor (x 1.25 (PAL) 1861kcal)   RMR (Dysart-St. 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 Heel)    Interventions/Recommendations (treatment strategy):  1. Provide pt w/ Arginaid  2. Encourage HVP w/ each meal    Nutrition Diagnosis Status:   New                  Monitor and Evaluation    Food and Nutrient Intake: energy intake, food and beverage intake  Food and Nutrient Administration: diet order  Physical Activity and Function: nutrition-related ADLs and IADLs  Anthropometric Measurements: weight, weight change  Biochemical Data, Medical Tests and Procedures:  (All labs)  Nutrition-Focused Physical Findings: overall appearance, skin    Nutrition Risk    Level of Risk:  (f/u 1x week)    Nutrition Follow-Up    RD Follow-up?: Yes  "

## 2017-06-28 NOTE — ASSESSMENT & PLAN NOTE
- insulin regimen   - SSI- decrease to 5un Long acting q am & 4 un AC  - f/u Wound Care consult for diabetic heel ulcer  - Follow with Podiatry

## 2017-06-28 NOTE — ASSESSMENT & PLAN NOTE
patient has PPM for SSS & had high RV pacing burden -> plan for interrogation today & mode switch today  - EP consulted to  determine atrial rhythm and perform RFA if needed. Pt was in Patient in RA flutter but LA Fib s/p DCCV to  NSR -- no RFA done  - CHADsVASC 7 (age, F, HTN, CHF, DM). Currnetly anticoagulated w/warfarin. INR 3.2 . Will continue to monitor daily.

## 2017-06-28 NOTE — PLAN OF CARE
received call today from Xin in admissions at [facility].  They can accept pt clinically and will submit to insurance, [employer] for authorization.   notified pts daughter of the above.

## 2017-06-28 NOTE — PLAN OF CARE
Problem: Patient Care Overview  Goal: Plan of Care Review  Outcome: Ongoing (interventions implemented as appropriate)  Spoke with the patient regarding the plan of care. Planning for discharge soon, may need to go to SNF. No distress noted at this time. Will continue to monitor.

## 2017-06-28 NOTE — HOSPITAL COURSE
Pt was admitted to [facility]. Started on Lasix 80 IV TID with additional dose of metolazone 5mg PO x 1. On 6/25, pt looked and felt well, thus IV diuresis stopped given concern for possible over-diuresis. Pt was seen and evaluated by EP to determine atrial rhythm and assess need for possible RFA ablation. During EP evaluation, pt's RA was found to be in flutter but LA in Fib. EP performed DCCV and pt was converted to  NSR -- no RFA done. Pt was continued on medical therapy for afib/aflutter ( CHADsVASC 7 -age, F, HTN, CHF, DM) with warfarin. INR 3.2,  ,coreg and amio. Medication regimen converted to PO diuretics and BP regimen titrated for better BP control.

## 2017-06-28 NOTE — SUBJECTIVE & OBJECTIVE
Interval History: NAEON. No complaints this am.    Review of Systems   Constitutional: Negative for activity change, appetite change, chills, fever and unexpected weight change.   HENT: Negative for rhinorrhea and sore throat.    Eyes: Negative for pain and visual disturbance.   Respiratory: Positive for shortness of breath. Negative for cough.    Cardiovascular: Negative for chest pain and palpitations.   Gastrointestinal: Negative for abdominal pain, diarrhea and nausea.   Genitourinary: Negative for dysuria, hematuria and urgency.   Musculoskeletal: Negative for gait problem and neck stiffness.   Skin: Negative for rash.   Neurological: Negative for syncope, light-headedness and headaches.     Objective:     Vital Signs (Most Recent):  Temp: 99.8 °F (37.7 °C) (06/27/17 2300)  Pulse: 64 (06/27/17 2313)  Resp: 18 (06/27/17 2300)  BP: 139/60 (06/27/17 2300)  SpO2: (!) 90 % (06/27/17 2300) Vital Signs (24h Range):  Temp:  [96.3 °F (35.7 °C)-99.8 °F (37.7 °C)] 99.8 °F (37.7 °C)  Pulse:  [64-67] 64  Resp:  [18-20] 18  SpO2:  [90 %-99 %] 90 %  BP: (108-175)/(53-79) 139/60     Weight: 88.5 kg (195 lb)  Body mass index is 25.04 kg/m².    Intake/Output Summary (Last 24 hours) at 06/28/17 0023  Last data filed at 06/27/17 2200   Gross per 24 hour   Intake              840 ml   Output                0 ml   Net              840 ml      Physical Exam   Constitutional: She is oriented to person, place, and time. She appears well-developed and well-nourished.   Neck: JVD present.   Cardiovascular: Normal rate, regular rhythm and normal heart sounds.  Exam reveals no gallop and no friction rub.    No murmur heard.  Pulmonary/Chest: Effort normal and breath sounds normal. No respiratory distress. She has no wheezes. She has no rales.   Abdominal: Soft. Bowel sounds are normal. She exhibits no distension. There is no tenderness.   Musculoskeletal: Normal range of motion.   Neurological: She is alert and oriented to person, place,  and time. She displays normal reflexes. No cranial nerve deficit. Coordination normal.   Skin: Skin is warm and dry.       Significant Labs:   BMP:   Recent Labs  Lab 06/27/17  0638   GLU 96      K 3.4*      CO2 31*   BUN 67*   CREATININE 2.8*   CALCIUM 9.4   MG 1.9     CBC:   Recent Labs  Lab 06/26/17  0642 06/27/17  0638   WBC 3.02* 2.91*   HGB 9.2* 9.1*   HCT 28.8* 28.1*   * 97*       Significant Imaging: I have reviewed and interpreted all pertinent imaging results/findings within the past 24 hours.

## 2017-06-28 NOTE — PLAN OF CARE
Problem: Patient Care Overview  Goal: Plan of Care Review  Outcome: Ongoing (interventions implemented as appropriate)  Recommendations     Recommendation/Intervention: Consider modifying current diet to Diabetic 1800 Cardiac  2. encourage HPV with each meal with additional Arginaid to promote wound healing. 3.Encourage po intake >/= 75% 4. RD will continue to follow  Goals: PO intake =/> 75% EEN/EPN  Nutrition Goal Status: new  Communication of RD Recs: reviewed with RN     Reason for Assessment     Reason for Assessment: nurse/nurse practitioner consult  Diagnosis: cardiac disease  Relevant Medical History: Hep-c, HF, HTN, DM   Interdisciplinary Rounds: attended  General Information Comments: Pt po intake 50-75%, Reports no N/V/D  Nutrition Discharge Planning: d/c cardiac/diabetic diet

## 2017-06-28 NOTE — HPI
Ms Chey Trujillo is an 82 y.o. lady presenting to the ED with progressive SOB, SCHWAB, orthopnea, and diffuse non-specific chest/back pain last night. Per pt pain was 6/10 did not radiate and was dull, aggravated with deep breathing and alleviated to 1/10 with nitro. EKG on arrival to ED without ST changes, Troponin 0.031, . Patient is a poor historian s/t dementia and history was obtained from daughter at bedside. Daughter states patient was recently discharged [place] [facility] [place] ago (6/19/2017) following obs hospitalization for SOB and CHF. Echo last admission (5/17/2017) revealed reduction in EF to 45% (from 65% 7 mo prior), mild MR, mild TR, grade III diastolic dysfunction. Following diuresis pt was discharged to home with resolution of symptoms. Daughter states after discharge her mother was able to ambulate with walker ~1 block before onset of SOB. Over 3 days symptoms progressed despite adherence to home med regimen (with exception of intermittent use of home CPAP) until yesterday her mother was unable to walk 10ft to toilet without SOB and with audible wheezing. Per daughter the patient's diuretic dose was decreased per Nephrologist at last visit from torsemide 20mg BID to torsemide 20mg qam and 10mg for afternoon dose. She has PMH of dementia, chronic Afib on coumadin, SSS s/p PPM, CAD s/p PCI (2002, 2005), CHF, Hep C, DM, CKD stage IV, HLD, PND (pt uses 3 pillows at home), HTN, VEE on home CPAP. CXR in ED revealed mod R pleural effusion (large R pl effusion noted last hospitalization). Mild JVD, no LE edema, no c/o SOB in bed @ 30 degrees and O2 sats 96% on RA

## 2017-06-28 NOTE — PLAN OF CARE
Problem: Patient Care Overview  Goal: Plan of Care Review  Plan of care discussed with patient. Patient is free of fall/trauma/injury. Denies CP, SOB. Complains of pain in legs. PureWIcc in place.Chest x ray completed today. Diuresing with Lasix.  All questions addressed. Will continue to monitor.

## 2017-06-28 NOTE — PLAN OF CARE
Pt was denied placement to [facility] skilled unit.   obtained additional choices from daughter.  Submitted paperwork to [facility] and [facility].  sw will follow.

## 2017-06-28 NOTE — ASSESSMENT & PLAN NOTE
- home Coreg 25 BID  - home Amlodipine 10 mg  - home Doxazosin 4 mg  - incrrease home Isordil to 40mg tid  - increasing hydralazine to 100mg q8h (from q12)  - defer to cardiology re: any med changes

## 2017-06-28 NOTE — PROGRESS NOTES
[facility] Hospital Medicine  Progress Note    Patient Name: Chey Trujillo  MRN: [id number]  Patient Class: IP- Inpatient   Admission Date: 6/23/2017  Length of Stay: 5 days  Attending Physician: Pebbles Hunter MD  Primary Care Provider: Viviana Nguyen MD    Hospital Medicine Team: [facility]OSP MED C Pebbles Hunter MD    Subjective:     Principal Problem:Acute on chronic systolic congestive heart failure    HPI:  Ms Chey Trujillo is an 82 y.o. lady presenting to the ED with progressive SOB, SCHWAB, orthopnea, and diffuse non-specific chest/back pain last night. Per pt pain was 6/10 did not radiate and was dull, aggravated with deep breathing and alleviated to 1/10 with nitro. EKG on arrival to ED without ST changes, Troponin 0.031, . Patient is a poor historian s/t dementia and history was obtained from daughter at bedside. Daughter states patient was recently discharged from [facility] 4 days ago (6/19/2017) following obs hospitalization for SOB and CHF. Echo last admission (5/17/2017) revealed reduction in EF to 45% (from 65% 7 mo prior), mild MR, mild TR, grade III diastolic dysfunction. Following diuresis pt was discharged to home with resolution of symptoms. Daughter states after discharge her mother was able to ambulate with walker ~1 block before onset of SOB. Over 3 days symptoms progressed despite adherence to home med regimen (with exception of intermittent use of home CPAP) until yesterday her mother was unable to walk 10ft to toilet without SOB and with audible wheezing. Per daughter the patient's diuretic dose was decreased per Nephrologist at last visit from torsemide 20mg BID to torsemide 20mg qam and 10mg for afternoon dose. She has PMH of dementia, chronic Afib on coumadin, SSS s/p PPM, CAD s/p PCI (2002, 2005), CHF, Hep C, DM, CKD stage IV, HLD, PND (pt uses 3 pillows at home), HTN, VEE on home CPAP. CXR in ED revealed mod R pleural effusion (large R pl effusion noted last  hospitalization). Mild JVD, no LE edema, no c/o SOB in bed @ 30 degrees and O2 sats 96% on RA     Hospital Course:  On second day of admit, Is/Os not accurate, starting PureWick for accurate Is/Os, continue Lasix 80 IV TID and add dose of metolazone 5mg PO x 1 tonight. Episode of agitation in the late afternoon per daughter at bedside, then calmed down. On Sunday 6/25, looked and felt well, IV diuresis complete with possible over-diuresis so stopped IV Lasix. EP planning for RFA ablation this week       Interval History: NAEON. No complaints this am.    Review of Systems   Constitutional: Negative for activity change, appetite change, chills, fever and unexpected weight change.   HENT: Negative for rhinorrhea and sore throat.    Eyes: Negative for pain and visual disturbance.   Respiratory: Positive for shortness of breath. Negative for cough.    Cardiovascular: Negative for chest pain and palpitations.   Gastrointestinal: Negative for abdominal pain, diarrhea and nausea.   Genitourinary: Negative for dysuria, hematuria and urgency.   Musculoskeletal: Negative for gait problem and neck stiffness.   Skin: Negative for rash.   Neurological: Negative for syncope, light-headedness and headaches.     Objective:     Vital Signs (Most Recent):  Temp: 99.8 °F (37.7 °C) (06/27/17 2300)  Pulse: 64 (06/27/17 2313)  Resp: 18 (06/27/17 2300)  BP: 139/60 (06/27/17 2300)  SpO2: (!) 90 % (06/27/17 2300) Vital Signs (24h Range):  Temp:  [96.3 °F (35.7 °C)-99.8 °F (37.7 °C)] 99.8 °F (37.7 °C)  Pulse:  [64-67] 64  Resp:  [18-20] 18  SpO2:  [90 %-99 %] 90 %  BP: (108-175)/(53-79) 139/60     Weight: 88.5 kg (195 lb)  Body mass index is 25.04 kg/m².    Intake/Output Summary (Last 24 hours) at 06/28/17 0023  Last data filed at 06/27/17 2200   Gross per 24 hour   Intake              840 ml   Output                0 ml   Net              840 ml      Physical Exam   Constitutional: She is oriented to person, place, and time. She appears  well-developed and well-nourished.   Neck: JVD present.   Cardiovascular: Normal rate, regular rhythm and normal heart sounds.  Exam reveals no gallop and no friction rub.    No murmur heard.  Pulmonary/Chest: Effort normal and breath sounds normal. No respiratory distress. She has no wheezes. She has no rales.   Abdominal: Soft. Bowel sounds are normal. She exhibits no distension. There is no tenderness.   Musculoskeletal: Normal range of motion.   Neurological: She is alert and oriented to person, place, and time. She displays normal reflexes. No cranial nerve deficit. Coordination normal.   Skin: Skin is warm and dry.       Significant Labs:   BMP:   Recent Labs  Lab 06/27/17  0638   GLU 96      K 3.4*      CO2 31*   BUN 67*   CREATININE 2.8*   CALCIUM 9.4   MG 1.9     CBC:   Recent Labs  Lab 06/26/17  0642 06/27/17  0638   WBC 3.02* 2.91*   HGB 9.2* 9.1*   HCT 28.8* 28.1*   * 97*       Significant Imaging: I have reviewed and interpreted all pertinent imaging results/findings within the past 24 hours.    Assessment/Plan:      * Acute on chronic systolic congestive heart failure     multifactorial due to decreased EF 2/2 RV pacing with subsequent functional LBBB, recent decreased dose of diuretic at home, and possible contribution from R pleural effusion   cont torsemide 20 BID  Strict I/o ; daily weights         Atrial flutter    patient has PPM for SSS & had high RV pacing burden -> plan for interrogation today & mode switch today  - EP consulted to  determine atrial rhythm and perform RFA if needed. Pt was in Patient in RA flutter but LA Fib s/p DCCV to  NSR -- no RFA done  - CHADsVASC 7 (age, F, HTN, CHF, DM). Currnetly anticoagulated w/warfarin. INR 3.2 . Will continue to monitor daily.             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), managed as type 1    - insulin regimen   - SSI- decrease to 5un Long acting q am & 4 un AC  - f/u Wound Care consult for diabetic heel ulcer  -  Follow with Podiatry         Essential hypertension    - home Coreg 25 BID  - home Amlodipine 10 mg  - home Doxazosin 4 mg  - incrrease home Isordil to 40mg tid  - increasing hydralazine to 100mg q8h (from q12)  - defer to cardiology re: any med changes          VTE Risk Mitigation         Ordered     warfarin (COUMADIN) tablet 2.5 mg  Daily     Route:  Oral        06/26/17 1116     Medium Risk of VTE  Once      06/23/17 1223     Reason for No Pharmacological VTE Prophylaxis  Once      06/23/17 1223     Place sequential compression device  Until discontinued      06/23/17 1223          Pebbles Hunter MD  Department of Hospital Medicine   [facility]

## 2017-06-29 LAB
ANION GAP SERPL CALC-SCNC: 8 MMOL/L
BASOPHILS # BLD AUTO: 0.01 K/UL
BASOPHILS NFR BLD: 0.4 %
BUN SERPL-MCNC: 66 MG/DL
CALCIUM SERPL-MCNC: 9.2 MG/DL
CHLORIDE SERPL-SCNC: 103 MMOL/L
CO2 SERPL-SCNC: 31 MMOL/L
CREAT SERPL-MCNC: 2.8 MG/DL
DIFFERENTIAL METHOD: ABNORMAL
EOSINOPHIL # BLD AUTO: 0 K/UL
EOSINOPHIL NFR BLD: 1.5 %
ERYTHROCYTE [DISTWIDTH] IN BLOOD BY AUTOMATED COUNT: 15.7 %
EST. GFR  (AFRICAN AMERICAN): 17.5 ML/MIN/1.73 M^2
EST. GFR  (NON AFRICAN AMERICAN): 15.1 ML/MIN/1.73 M^2
FOLATE SERPL-MCNC: 10.5 NG/ML
GLUCOSE SERPL-MCNC: 116 MG/DL
HCT VFR BLD AUTO: 27.9 %
HGB BLD-MCNC: 9.1 G/DL
INR PPP: 2.9
LYMPHOCYTES # BLD AUTO: 0.8 K/UL
LYMPHOCYTES NFR BLD: 28.2 %
MAGNESIUM SERPL-MCNC: 1.6 MG/DL
MCH RBC QN AUTO: 30.2 PG
MCHC RBC AUTO-ENTMCNC: 32.6 %
MCV RBC AUTO: 93 FL
MONOCYTES # BLD AUTO: 0.3 K/UL
MONOCYTES NFR BLD: 11 %
NEUTROPHILS # BLD AUTO: 1.6 K/UL
NEUTROPHILS NFR BLD: 58.5 %
PHOSPHATE SERPL-MCNC: 2.9 MG/DL
PLATELET # BLD AUTO: 96 K/UL
PMV BLD AUTO: 10.3 FL
POCT GLUCOSE: 132 MG/DL (ref 70–110)
POCT GLUCOSE: 152 MG/DL (ref 70–110)
POCT GLUCOSE: 211 MG/DL (ref 70–110)
POCT GLUCOSE: 246 MG/DL (ref 70–110)
POTASSIUM SERPL-SCNC: 3.3 MMOL/L
PROTHROMBIN TIME: 29.1 SEC
RBC # BLD AUTO: 3.01 M/UL
SODIUM SERPL-SCNC: 142 MMOL/L
VIT B12 SERPL-MCNC: 1587 PG/ML
WBC # BLD AUTO: 2.73 K/UL

## 2017-06-29 PROCEDURE — 84100 ASSAY OF PHOSPHORUS: CPT

## 2017-06-29 PROCEDURE — 99900035 HC TECH TIME PER 15 MIN (STAT)

## 2017-06-29 PROCEDURE — 99232 SBSQ HOSP IP/OBS MODERATE 35: CPT | Mod: ,,, | Performed by: HOSPITALIST

## 2017-06-29 PROCEDURE — 85025 COMPLETE CBC W/AUTO DIFF WBC: CPT

## 2017-06-29 PROCEDURE — 82607 VITAMIN B-12: CPT

## 2017-06-29 PROCEDURE — 97530 THERAPEUTIC ACTIVITIES: CPT

## 2017-06-29 PROCEDURE — 94761 N-INVAS EAR/PLS OXIMETRY MLT: CPT

## 2017-06-29 PROCEDURE — 25000003 PHARM REV CODE 250: Performed by: HOSPITALIST

## 2017-06-29 PROCEDURE — 25000003 PHARM REV CODE 250: Performed by: NURSE PRACTITIONER

## 2017-06-29 PROCEDURE — 97116 GAIT TRAINING THERAPY: CPT | Mod: 59

## 2017-06-29 PROCEDURE — 85610 PROTHROMBIN TIME: CPT

## 2017-06-29 PROCEDURE — 27000221 HC OXYGEN, UP TO 24 HOURS

## 2017-06-29 PROCEDURE — 82746 ASSAY OF FOLIC ACID SERUM: CPT

## 2017-06-29 PROCEDURE — 94660 CPAP INITIATION&MGMT: CPT

## 2017-06-29 PROCEDURE — 20600001 HC STEP DOWN PRIVATE ROOM

## 2017-06-29 PROCEDURE — 80048 BASIC METABOLIC PNL TOTAL CA: CPT

## 2017-06-29 PROCEDURE — 83735 ASSAY OF MAGNESIUM: CPT

## 2017-06-29 PROCEDURE — 36415 COLL VENOUS BLD VENIPUNCTURE: CPT

## 2017-06-29 PROCEDURE — 25000003 PHARM REV CODE 250: Performed by: INTERNAL MEDICINE

## 2017-06-29 RX ORDER — POTASSIUM CHLORIDE 20 MEQ/1
40 TABLET, EXTENDED RELEASE ORAL ONCE
Status: COMPLETED | OUTPATIENT
Start: 2017-06-29 | End: 2017-06-29

## 2017-06-29 RX ORDER — INSULIN LISPRO 100 [IU]/ML
INJECTION, SOLUTION INTRAVENOUS; SUBCUTANEOUS
Qty: 3 VIAL | Refills: 6
Start: 2017-06-29 | End: 2017-07-21 | Stop reason: SDUPTHER

## 2017-06-29 RX ORDER — HYDRALAZINE HYDROCHLORIDE 100 MG/1
100 TABLET, FILM COATED ORAL EVERY 8 HOURS
Qty: 270 TABLET | Refills: 4 | Status: ON HOLD
Start: 2017-06-29 | End: 2017-07-05 | Stop reason: HOSPADM

## 2017-06-29 RX ORDER — NAPROXEN SODIUM 220 MG
TABLET ORAL
Qty: 150 EACH | Refills: 2
Start: 2017-06-29

## 2017-06-29 RX ORDER — TORSEMIDE 20 MG/1
20 TABLET ORAL 2 TIMES DAILY
Qty: 180 TABLET | Refills: 3
Start: 2017-06-29 | End: 2017-08-16 | Stop reason: SDUPTHER

## 2017-06-29 RX ORDER — INSULIN GLARGINE 100 [IU]/ML
INJECTION, SOLUTION SUBCUTANEOUS
Qty: 30 ML | Refills: 12
Start: 2017-06-29 | End: 2017-08-16 | Stop reason: SDUPTHER

## 2017-06-29 RX ADMIN — Medication 3 ML: at 10:06

## 2017-06-29 RX ADMIN — POTASSIUM CHLORIDE 40 MEQ: 1500 TABLET, EXTENDED RELEASE ORAL at 12:06

## 2017-06-29 RX ADMIN — POTASSIUM CHLORIDE 40 MEQ: 1500 TABLET, EXTENDED RELEASE ORAL at 10:06

## 2017-06-29 RX ADMIN — ESCITALOPRAM 5 MG: 5 TABLET, FILM COATED ORAL at 10:06

## 2017-06-29 RX ADMIN — Medication 3 ML: at 03:06

## 2017-06-29 RX ADMIN — ISOSORBIDE DINITRATE 40 MG: 20 TABLET ORAL at 06:06

## 2017-06-29 RX ADMIN — INSULIN ASPART 4 UNITS: 100 INJECTION, SOLUTION INTRAVENOUS; SUBCUTANEOUS at 08:06

## 2017-06-29 RX ADMIN — INSULIN ASPART 4 UNITS: 100 INJECTION, SOLUTION INTRAVENOUS; SUBCUTANEOUS at 05:06

## 2017-06-29 RX ADMIN — HYDRALAZINE HYDROCHLORIDE 100 MG: 50 TABLET ORAL at 10:06

## 2017-06-29 RX ADMIN — Medication 3 ML: at 06:06

## 2017-06-29 RX ADMIN — PRAVASTATIN SODIUM 20 MG: 20 TABLET ORAL at 10:06

## 2017-06-29 RX ADMIN — TORSEMIDE 20 MG: 20 TABLET ORAL at 10:06

## 2017-06-29 RX ADMIN — HYDRALAZINE HYDROCHLORIDE 100 MG: 50 TABLET ORAL at 06:06

## 2017-06-29 RX ADMIN — WARFARIN SODIUM 2.5 MG: 2.5 TABLET ORAL at 05:06

## 2017-06-29 RX ADMIN — INSULIN ASPART 2 UNITS: 100 INJECTION, SOLUTION INTRAVENOUS; SUBCUTANEOUS at 05:06

## 2017-06-29 RX ADMIN — ISOSORBIDE DINITRATE 40 MG: 20 TABLET ORAL at 03:06

## 2017-06-29 RX ADMIN — INSULIN ASPART 2 UNITS: 100 INJECTION, SOLUTION INTRAVENOUS; SUBCUTANEOUS at 12:06

## 2017-06-29 RX ADMIN — ASPIRIN 81 MG CHEWABLE TABLET 81 MG: 81 TABLET CHEWABLE at 10:06

## 2017-06-29 RX ADMIN — AMIODARONE HYDROCHLORIDE 200 MG: 200 TABLET ORAL at 10:06

## 2017-06-29 RX ADMIN — INSULIN ASPART 4 UNITS: 100 INJECTION, SOLUTION INTRAVENOUS; SUBCUTANEOUS at 12:06

## 2017-06-29 RX ADMIN — DOXAZOSIN 4 MG: 4 TABLET ORAL at 10:06

## 2017-06-29 RX ADMIN — TORSEMIDE 20 MG: 20 TABLET ORAL at 05:06

## 2017-06-29 RX ADMIN — HYDRALAZINE HYDROCHLORIDE 100 MG: 50 TABLET ORAL at 03:06

## 2017-06-29 RX ADMIN — CARVEDILOL 25 MG: 12.5 TABLET, FILM COATED ORAL at 10:06

## 2017-06-29 RX ADMIN — AMLODIPINE BESYLATE 10 MG: 10 TABLET ORAL at 10:06

## 2017-06-29 RX ADMIN — ISOSORBIDE DINITRATE 40 MG: 20 TABLET ORAL at 10:06

## 2017-06-29 NOTE — PLAN OF CARE
Problem: Physical Therapy Goal  Goal: Physical Therapy Goal  Goals to be met by: 17    Patient will increase functional independence with mobility by performin. Supine to sit with Contact Guard Assistance - not met  2. Sit to stand transfer with Contact Guard Assistance with RW.- not met  3. Gait  x 150 feet with Contact Guard Assistance using Rolling Walker. - not met  4. Lower Extremity exercises x10-15 reps with assistance as needed for strengthening and endurance with functional mobility.            Pt progressing towards goals. All goals remain appropriate at this time.    Adela Baca, PT, DPT  2017

## 2017-06-29 NOTE — PROGRESS NOTES
[facility] Hospital Medicine  Progress Note    Patient Name: Chey Trujillo  MRN: [id number]  Patient Class: IP- Inpatient   Admission Date: 6/23/2017  Length of Stay: 6 days  Attending Physician: Pebbles Hunter MD  Primary Care Provider: Vivaina Nguyen MD    Hospital Medicine Team: [facility] C Pebbles Hunter MD    Subjective:     Principal Problem:Acute on chronic systolic congestive heart failure    HPI:  Ms Chey Trujillo is an 82 y.o. lady presenting to the ED with progressive SOB, SCHWAB, orthopnea, and diffuse non-specific chest/back pain last night. Per pt pain was 6/10 did not radiate and was dull, aggravated with deep breathing and alleviated to 1/10 with nitro. EKG on arrival to ED without ST changes, Troponin 0.031, . Patient is a poor historian s/t dementia and history was obtained from daughter at bedside. Daughter states patient was recently discharged from [facility] 4 days ago (6/19/2017) following obs hospitalization for SOB and CHF. Echo last admission (5/17/2017) revealed reduction in EF to 45% (from 65% 7 mo prior), mild MR, mild TR, grade III diastolic dysfunction. Following diuresis pt was discharged to home with resolution of symptoms. Daughter states after discharge her mother was able to ambulate with walker ~1 block before onset of SOB. Over 3 days symptoms progressed despite adherence to home med regimen (with exception of intermittent use of home CPAP) until yesterday her mother was unable to walk 10ft to toilet without SOB and with audible wheezing. Per daughter the patient's diuretic dose was decreased per Nephrologist at last visit from torsemide 20mg BID to torsemide 20mg qam and 10mg for afternoon dose. She has PMH of dementia, chronic Afib on coumadin, SSS s/p PPM, CAD s/p PCI (2002, 2005), CHF, Hep C, DM, CKD stage IV, HLD, PND (pt uses 3 pillows at home), HTN, VEE on home CPAP. CXR in ED revealed mod R pleural effusion (large R pl effusion noted last  hospitalization). Mild JVD, no LE edema, no c/o SOB in bed @ 30 degrees and O2 sats 96% on RA     Hospital Course:  Pt was admitted to [facility]. Started on Lasix 80 IV TID with additional dose of metolazone 5mg PO x 1. On 6/25, pt looked and felt well,  thus IV diuresis stopped given concern for possible over-diuresis. Pt was seen and evaluated by EP to determine atrial rhythm and assess need for possible RFA ablation. During EP evaluation, pt's RA was found to be in flutter but LA in Fib. EP performed DCCV and pt was converted to  NSR -- no RFA done. Pt was continued on medical therapy for afib/aflutter ( CHADsVASC 7 -age, F, HTN, CHF, DM) with warfarin. INR 3.2,  ,coreg and amio. Medication regimen converted to PO diuretics and BP regimen titrated for better BP control.        Interval History: NAEON. No complaints this am.    Review of Systems   Constitutional: Negative for activity change, appetite change, chills, fever and unexpected weight change.   HENT: Negative for rhinorrhea and sore throat.    Eyes: Negative for pain and visual disturbance.   Respiratory: Positive for shortness of breath. Negative for cough.    Cardiovascular: Negative for chest pain and palpitations.   Gastrointestinal: Negative for abdominal pain, diarrhea and nausea.   Genitourinary: Negative for dysuria, hematuria and urgency.   Musculoskeletal: Negative for gait problem and neck stiffness.   Skin: Negative for rash.   Neurological: Negative for syncope, light-headedness and headaches.     Objective:     Vital Signs (Most Recent):  Temp: 98.6 °F (37 °C) (06/29/17 1200)  Pulse: 65 (06/29/17 1400)  Resp: 20 (06/29/17 1200)  BP: (!) 147/65 (06/29/17 1200)  SpO2: 95 % (06/29/17 1200) Vital Signs (24h Range):  Temp:  [98.2 °F (36.8 °C)-99 °F (37.2 °C)] 98.6 °F (37 °C)  Pulse:  [64-66] 65  Resp:  [18-20] 20  SpO2:  [95 %-100 %] 95 %  BP: (124-147)/(58-70) 147/65     Weight: 88.5 kg (195 lb)  Body mass index is 25.04 kg/m².    Intake/Output  Summary (Last 24 hours) at 06/29/17 1434  Last data filed at 06/29/17 0800   Gross per 24 hour   Intake              660 ml   Output             1350 ml   Net             -690 ml      Physical Exam   Constitutional: She is oriented to person, place, and time. She appears well-developed and well-nourished.   Neck: JVD present.   Cardiovascular: Normal rate, regular rhythm and normal heart sounds.  Exam reveals no gallop and no friction rub.    No murmur heard.  Pulmonary/Chest: Effort normal and breath sounds normal. No respiratory distress. She has no wheezes. She has no rales.   Abdominal: Soft. Bowel sounds are normal. She exhibits no distension. There is no tenderness.   Musculoskeletal: Normal range of motion.   Neurological: She is alert and oriented to person, place, and time. She displays normal reflexes. No cranial nerve deficit. Coordination normal.   Skin: Skin is warm and dry.       Significant Labs:   BMP:     Recent Labs  Lab 06/29/17  0624   *      K 3.3*      CO2 31*   BUN 66*   CREATININE 2.8*   CALCIUM 9.2   MG 1.6     CBC:     Recent Labs  Lab 06/28/17  0644 06/29/17  0624   WBC 3.14* 2.73*   HGB 8.4* 9.1*   HCT 26.4* 27.9*   * 96*       Significant Imaging: I have reviewed and interpreted all pertinent imaging results/findings within the past 24 hours.    Assessment/Plan:      * Acute on chronic systolic congestive heart failure    Likely Multifactorial due to decreased EF 2/2 RV pacing with subsequent functional LBBB, recent decreased dose of diuretic at home, and possible contribution from R pleural effusion   - cont torsemide 20 BID, coreg 25 Bid   - Strict I/o ; daily standing weights   - Seems euvolemic at this time          Atrial flutter    Patient has PPM for SSS & had high RV pacing burden. EP consulted to  determine atrial rhythm and perform RFA if needed. RA flutter but LA Fib s/p DCCV to  NSR -- no RFA done. - CHADsVASC 7 (age, F, HTN, CHF, DM).   - cont  warfarin. INR 3.2   - cont coreg and amio              Pleural effusion    Noted on imaging since 3/2017. Possible chf related vs. Malignancy vs. Other. No evidence of infection/ Respiratory status seems close to baseline. Pt satting well on RA.    - Re-assess after diuresis. May need chest CT and/or thoracentesis to further evaluate - Likley outpt given normal respiratory status. Discussed with family - conservative management for now with diuresis and re-assessment as an outpt.        Essential hypertension    Better controlled today.   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), managed as type 1    - insulin regimen   - SSI-  5u Long acting q am & 4 un AC  - f/u Wound Care consult for diabetic heel ulcer  - Follow with Podiatry         Pancytopenia    Heme/onc eval outpt            VTE Risk Mitigation         Ordered     warfarin (COUMADIN) tablet 2.5 mg  Daily     Route:  Oral        06/26/17 1116     Medium Risk of VTE  Once      06/23/17 1223     Reason for No Pharmacological VTE Prophylaxis  Once      06/23/17 1223     Place sequential compression device  Until discontinued      06/23/17 1223          Pebbles Hunter MD  Department of Hospital Medicine   [facility]

## 2017-06-29 NOTE — ASSESSMENT & PLAN NOTE
Better controlled today.   - home Coreg 25 BID  - home Amlodipine 10 mg  - home Doxazosin 4 mg  - increase home Isordil to 40mg tid  - cont hydralazine to 100mg q8h

## 2017-06-29 NOTE — NURSING
Results for TONY MARTINS (MRN [id number]) as of 6/29/2017 17:09   Ref. Range 6/26/2017 06:42 6/27/2017 06:38 6/28/2017 06:44 6/29/2017 06:24 6/29/2017 10:28   Coumadin Monitoring INR Latest Ref Range: 0.8 - 1.2  3.1 (H) 3.2 (H) 3.1 (H) 2.9 (H)    Called HERON Hunter M.D. to inquire about 1700 hours administration of Coumadin.  Ordered to administer Coumadin 2.5 mg.

## 2017-06-29 NOTE — PROGRESS NOTES
[facility] Hospital Medicine  Progress Note    Patient Name: Chey Trujillo  MRN: [id number]  Patient Class: IP- Inpatient   Admission Date: 6/23/2017  Length of Stay: 5 days  Attending Physician: Pebbles Hunter MD  Primary Care Provider: Viviana Nguyen MD    Hospital Medicine Team: [facility] [place] HOSP MED C Pebbles Hunter MD    Subjective:     Principal Problem:Acute on chronic systolic congestive heart failure    HPI:  Ms Chey Trujillo is an 82 y.o. lady presenting to the ED with progressive SOB, SCHWBA, orthopnea, and diffuse non-specific chest/back pain last night. Per pt pain was 6/10 did not radiate and was dull, aggravated with deep breathing and alleviated to 1/10 with nitro. EKG on arrival to ED without ST changes, Troponin 0.031, . Patient is a poor historian s/t dementia and history was obtained from daughter at bedside. Daughter states patient was recently discharged from [facility] [place] 4 days ago (6/19/2017) following obs hospitalization for SOB and CHF. Echo last admission (5/17/2017) revealed reduction in EF to 45% (from 65% 7 mo prior), mild MR, mild TR, grade III diastolic dysfunction. Following diuresis pt was discharged to home with resolution of symptoms. Daughter states after discharge her mother was able to ambulate with walker ~1 block before onset of SOB. Over 3 days symptoms progressed despite adherence to home med regimen (with exception of intermittent use of home CPAP) until yesterday her mother was unable to walk 10ft to toilet without SOB and with audible wheezing. Per daughter the patient's diuretic dose was decreased per Nephrologist at last visit from torsemide 20mg BID to torsemide 20mg qam and 10mg for afternoon dose. She has PMH of dementia, chronic Afib on coumadin, SSS s/p PPM, CAD s/p PCI (2002, 2005), CHF, Hep C, DM, CKD stage IV, HLD, PND (pt uses 3 pillows at home), HTN, VEE on home CPAP. CXR in ED revealed mod R pleural effusion (large R pl effusion noted last  hospitalization). Mild JVD, no LE edema, no c/o SOB in bed @ 30 degrees and O2 sats 96% on RA     Hospital Course:  Pt was admitted to [facility]. Started on Lasix 80 IV TID with additional dose of metolazone 5mg PO x 1. On 6/25, pt looked and felt well,  thus IV diuresis stopped given concern for possible over-diuresis. Pt was seen and evaluated by EP to determine atrial rhythm and assess need for possible RFA ablation. During evaluation, pt's RA was found to be in  flutter but LA Fib. EP performed DCCV and pt was converted to  NSR -- no RFA done. Pt was continued on medical therapy for afib/aflutter ( CHADsVASC 7 -age, F, HTN, CHF, DM) with warfarin. INR 3.2,  ,coreg and amio. Medications regimen converted to PO diuretics and BP regimen titrated for better BP control.        Interval History: NAEON. No complaints this am.    Review of Systems   Constitutional: Negative for activity change, appetite change, chills, fever and unexpected weight change.   HENT: Negative for rhinorrhea and sore throat.    Eyes: Negative for pain and visual disturbance.   Respiratory: Positive for shortness of breath. Negative for cough.    Cardiovascular: Negative for chest pain and palpitations.   Gastrointestinal: Negative for abdominal pain, diarrhea and nausea.   Genitourinary: Negative for dysuria, hematuria and urgency.   Musculoskeletal: Negative for gait problem and neck stiffness.   Skin: Negative for rash.   Neurological: Negative for syncope, light-headedness and headaches.     Objective:     Vital Signs (Most Recent):  Temp: 98.2 °F (36.8 °C) (06/28/17 2030)  Pulse: 65 (06/28/17 2030)  Resp: 20 (06/28/17 2030)  BP: (!) 144/60 (06/28/17 2030)  SpO2: 98 % (06/28/17 2030) Vital Signs (24h Range):  Temp:  [98.2 °F (36.8 °C)-99.8 °F (37.7 °C)] 98.2 °F (36.8 °C)  Pulse:  [64-67] 65  Resp:  [14-20] 20  SpO2:  [90 %-99 %] 98 %  BP: (132-184)/(60-77) 144/60     Weight: 88.5 kg (195 lb)  Body mass index is 25.04 kg/m².    Intake/Output  Summary (Last 24 hours) at 06/28/17 2108  Last data filed at 06/28/17 1500   Gross per 24 hour   Intake             1020 ml   Output              350 ml   Net              670 ml      Physical Exam   Constitutional: She is oriented to person, place, and time. She appears well-developed and well-nourished.   Neck: JVD present.   Cardiovascular: Normal rate, regular rhythm and normal heart sounds.  Exam reveals no gallop and no friction rub.    No murmur heard.  Pulmonary/Chest: Effort normal and breath sounds normal. No respiratory distress. She has no wheezes. She has no rales.   Abdominal: Soft. Bowel sounds are normal. She exhibits no distension. There is no tenderness.   Musculoskeletal: Normal range of motion.   Neurological: She is alert and oriented to person, place, and time. She displays normal reflexes. No cranial nerve deficit. Coordination normal.   Skin: Skin is warm and dry.       Significant Labs:   BMP:     Recent Labs  Lab 06/28/17  0644   GLU 93      K 3.3*      CO2 31*   BUN 69*   CREATININE 2.9*   CALCIUM 9.0   MG 1.7     CBC:     Recent Labs  Lab 06/27/17  0638 06/28/17  0644   WBC 2.91* 3.14*   HGB 9.1* 8.4*   HCT 28.1* 26.4*   PLT 97* 103*       Significant Imaging: I have reviewed and interpreted all pertinent imaging results/findings within the past 24 hours.    Assessment/Plan:      * Acute on chronic systolic congestive heart failure    Likely Multifactorial due to decreased EF 2/2 RV pacing with subsequent functional LBBB, recent decreased dose of diuretic at home, and possible contribution from R pleural effusion   - cont torsemide 20 BID, coreg 25 Bid   - Strict I/o ; daily standing weights           Atrial flutter    Patient has PPM for SSS & had high RV pacing burden. EP consulted to  determine atrial rhythm and perform RFA if needed. RA flutter but LA Fib s/p DCCV to  NSR -- no RFA done. - CHADsVASC 7 (age, F, HTN, CHF, DM).   - cont warfarin. INR 3.2   - cont coreg and  amio              Pleural effusion    Noted on imaging since 3/2017. Possible chf related. Respiratory status seems close to baseline.- satting well on RA.    - Chest xray to re-assess with lateral decubitus   - Assess after diuresis. May need chest CT and/or thoracentesis to further evaluate - possibly outpt given normal respiratory status           Essential hypertension    Better controlled today.   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), managed as type 1    - insulin regimen   - SSI- decrease to 5un Long acting q am & 4 un AC  - f/u Wound Care consult for diabetic heel ulcer  - Follow with Podiatry           VTE Risk Mitigation         Ordered     warfarin (COUMADIN) tablet 2.5 mg  Daily     Route:  Oral        06/26/17 1116     Medium Risk of VTE  Once      06/23/17 1223     Reason for No Pharmacological VTE Prophylaxis  Once      06/23/17 1223     Place sequential compression device  Until discontinued      06/23/17 1223          Pebbles Hunter MD  Department of Hospital Medicine   [facility]

## 2017-06-29 NOTE — PHYSICIAN QUERY
PT Name: Chey Trujillo  MR #: [id number]    Physician Query Form - Nutrition Clarification     CDS/: Lea Crow RN, CCDS         Contact information: [email or web address]    This form is a permanent document in the medical record.     Query Date: June 29, 2017    By submitting this query, we are merely seeking further clarification of documentation.. Please utilize your independent clinical judgment when addressing the question(s) below.    The Medical record contains the following:   Indicators  Supporting Clinical Findings Location in Medical Record   X % of Estimated Energy Intake over a time frame from p.o., TF, or TPN Decreased appetite, early satiety  % Meal Intake: 50%  6/28 nutrition note    Weight Status over a time frame     X Subcutaneous Fat and/or Muscle Loss Loss of muscle mass, loss of subcutaneous fat 6/28 nutrition note    Fluid Accumulation or Edema      Reduced  Strength      Wt / BMI / Usual Body Weight     X Delayed Wound Healing / Failure to Thrive Diabetic heel ulcer 6/23 h/p   X Acute or Chronic Illness Acute on chronic combined systolic/diastolic HF  Atrial flutter, ANTONIO on CKD 4  Protein calorie malnutrition 6/24 progress note      6/23 h/p active problem list    Medication      Treatment     X Other Alb 2.8, prealbumin 13 6/28 nutrition note     AND / ASPEN Clinical Characteristics (October 2011)  A minimum of two characteristics is recommended for diagnosing either moderate or severe malnutrition   Mild Malnutrition Moderate Malnutrition Severe Malnutrition   Energy Intake from p.o., TF or TPN. < 75% intake of estimated energy needs for less than 7 days < 75% intake of estimated energy needs for greater than 7 days < 50% intake of estimated energy needs for > 5 days   Weight Loss 1-2% in 1 month  5% in 3 months  7.5% in 6 months  10% in 1 year 1-2 % in 1 week  5% in 1 month  7.5% in 3 months  10% in 6 months  20% in 1 year > 2% in 1 week  > 5% in 1 month  > 7.5% in 3 months  >  10% in 6 months  > 20% in 1 year   Physical Findings     None *Mild subcutaneous fat and/or muscle loss  *Mild fluid accumulation  *Stage II decubitus  *Surgical wound or non-healing wound *Mod/severe subcutaneous fat and/or muscle loss  *Mod/severe fluid accumulation  *Stage III or IV decubitus  *Non-healing surgical wound     Provider, please specify diagnosis or diagnoses associated with above clinical findings.    [x ] Mild Protein-Calorie Malnutrition  [ ] Moderate Protein-Calorie Malnutrition  [ ] Cachexia  [ ] Anorexia  [ ] Other Nutritional Diagnosis (please specify): ____________________________________  [ ] Other: ________________________________  [ ] Clinically Undetermined    Please document in your progress notes daily for the duration of treatment until resolved and include in your discharge summary.

## 2017-06-29 NOTE — ASSESSMENT & PLAN NOTE
Likely Multifactorial due to decreased EF 2/2 RV pacing with subsequent functional LBBB, recent decreased dose of diuretic at home, and possible contribution from R pleural effusion   - cont torsemide 20 BID, coreg 25 Bid   - Strict I/o ; daily standing weights   - Seems euvolemic at this time

## 2017-06-29 NOTE — ASSESSMENT & PLAN NOTE
Noted on imaging since 3/2017. Possible chf related. Respiratory status seems close to baseline.- satting well on RA.    - Chest xray to re-assess with lateral decubitus   - Assess after diuresis. May need chest CT and/or thoracentesis to further evaluate - possibly outpt given normal respiratory status

## 2017-06-29 NOTE — PT/OT/SLP PROGRESS
Occupational Therapy      Cheysa ADRIANO Trujillo  MRN: [id number]    Patient not seen today by OT. Pt working with PT this morning and required rest break following.   OT unable to return after lunch on this date. Sorry for the convience. Will follow up at later time.     GALINA Tobar  6/29/2017

## 2017-06-29 NOTE — PLAN OF CARE
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), 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]  Yes     Priority: 3     Dementia [F03.90]  Yes     Chronic    SSS (sick sinus syndrome) [I49.5]  Yes     Chronic     S/p PPM       SOB (shortness of breath) [R06.02]  Unknown    Heart failure, acute on chronic, systolic and diastolic [I50.43]  Yes    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 due to underlying condition, with fat layer exposed [E08.621, L97.412]  Yes    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 [E11.621, L97.409]  Yes    Diabetes mellitus with stage 4 chronic kidney disease GFR 15-29 [E11.22, N18.4]  Yes     Chronic    Bilateral carotid artery disease: 40-49% 2016 Bilateral [I77.9]  Yes    Chronic hepatitis C without hepatic coma [B18.2]  Yes    Thrombocytopenia [D69.6]  Yes    Mixed hyperlipidemia [E78.2]  Yes    Peripheral vascular disease [I73.9]  Yes    Secondary pulmonary hypertension [I27.2]  Yes    CAD S/P percutaneous coronary angioplasty [I25.10, Z98.61]  Not Applicable     Chronic    Chronic diastolic heart failure [I50.32]  Yes     Chronic     Echo 3-:  1 - Concentric hypertrophy.   2 - Normal left ventricular systolic function (EF 60-65%).   3 - Right ventricular enlargement with hypertrophy, with normal systolic function.   4 - Left ventricular diastolic dysfunction.   5 - Biatrial enlargement.    6 - Mild tricuspid regurgitation.   7 - Pulmonary hypertension. The estimated PA systolic pressure is 55 mmHg.   8 - Increased central venous pressure (IVC is greater than 3cm in diameter).       Obstructive sleep apnea on CPAP - setting = 5  [G47.33, Z99.89]  Not Applicable     Chronic    Chronic atrial fibrillation [I48.2]  Yes      Resolved Hospital Problems    Diagnosis Date Resolved POA   No resolved problems to display.       Patient is homebound due to:  Acute on chronic systolic congestive heart failure    Allergies:  Review of patient's allergies indicates:   Allergen Reactions    Losartan Other (See Comments)     Hyperkalemia    0.225 % sodium chloride      Other reaction(s): Eye irritation    Amitriptyline      Other reaction(s): Vomiting  Other reaction(s): Vomiting    Azopt  [brinzolamide]      Other reaction(s): Eye irritation    Cantil  [mepenzolate bromide]      Other reaction(s): Unknown  Other reaction(s): Unknown    Ciprofloxacin Nausea And Vomiting     Other reaction(s): Stomach upset    Codeine      Other reaction(s): Unknown  Other reaction(s): Unknown    Duragal-s      Other reaction(s): Vomiting    Erythromycin      Other reaction(s): Unknown  Other reaction(s): Unknown    Fentanyl      Other reaction(s): Vomiting    Hydrocodone-acetaminophen      Other reaction(s): Unknown  Other reaction(s): Unknown    Indomethacin      Other reaction(s): Unknown  Other reaction(s): Unknown    Meperidine      Other reaction(s): Unknown  Other reaction(s): Unknown    Minoxidil      Other reaction(s): druged  Other reaction(s): nausea and vomiting  Other reaction(s): nausea and vomiting  Other reaction(s): druged    Morphine      Other reaction(s): Unknown  Other reaction(s): Unknown    Neuromuscular blockers, steroidal      Other reaction(s): Unknown    Nifedipine      Other reaction(s): Swelling  Other reaction(s): Swelling    Oxycodone hcl-oxycodone-asa      Other reaction(s):  Unknown  Other reaction(s): Unknown    Penicillins Hives     Other reaction(s): Unknown    Propoxyphene      Other reaction(s): Unknown    Sensipar [cinacalcet] Nausea Only    Talwin  [pentazocine lactate]      Other reaction(s): Unknown    Talwin compound      Other reaction(s): Unknown    Valsartan Rash and Hives       Vitals:       Every shift (Skilled Nursing patients)    Diet:    Supplement:  1 can every three times a day with meals                         Type:   Glucerna         Acitivities:     - Up in a chair each morning as tolerated   - Ambulate with assistance to bathroom   - Scheduled walks once each shift (every 8 hours)   - May ambulate independently   - May use walker, cane, or self-propelled wheelchair   - Weight bearing: as tolerated     LABS:  Per facility protocol   CMP, CBC daily x 1 week then each month for 3 months     Nursing Precautions:    - Aspiration precautions:             - Total assistance with meals            -  Upright 90 degrees befor during and after meals             -  Suction at bedside          - Fall precautions per nursing home protocol   - Decubitus precautions:        -  for positioning   - Pressure reducing foam mattress   - Turn patient every two hours. Use wedge pillows to anchor patient    CONSULTS:      Physical Therapy to evaluate and treat     Occupational Therapy to evaluate and treat     Speech Therapy  to evaluate and treat     Nutrition to evaluate and recommend diet     Psychiatry to evaluate and follow patients for delirium    MISCELLANEOUS CARE:                Routine Skin for Bedridden Patients:  Apply moisture barrier cream to all    skin folds and wet areas in perineal area daily and after baths and                           all bowel movements.                       DIABETES CARE:      Check blood sugar:      Fingerstick blood sugar AC and HS     Report CBG < 60 or > 400 to physician.                                          Insulin Sliding  Scale          Glucose  Novolog Insulin Subcutaneous        0 - 60   Orange juice or glucose tablet, hold insulin      No insulin   201-250  2 units   251-300  4 units   301-350  6 units   351-400  8 units   >400   10 units then call physician      Medications: Discontinue all previous medication orders, if any. See new list below.      Chey Trujillo   Home Medication Instructions DALIA:[id number]    Printed on:06/29/17 8582   Medication Information                      ACCU-CHEK SMARTVIEW TEST STRIP Strp  test FOUR TIMES A DAY             amiodarone (PACERONE) 200 MG Tab  Take 1 tablet (200 mg total) by mouth once daily.             amlodipine (NORVASC) 10 MG tablet  Take 1 tablet (10 mg total) by mouth once daily.             aspirin 81 MG chewable tablet  Take 1 tablet by mouth Every morning.             blood-glucose meter Misc  Dispense Accu-Chek Natalia meter             carvedilol (COREG) 25 MG tablet  Take 1 tablet (25 mg total) by mouth 2 (two) times daily with meals.             cyanocobalamin, vitamin B-12, 1,000 mcg TbSR  Take 1,000 mcg by mouth once daily.             dorzolamide (TRUSOPT) 2 % ophthalmic solution  INSTILL ONE DROP INTO EACH EYE TWICE DAILY             doxazosin (CARDURA) 4 MG tablet  Take 1 tablet (4 mg total) by mouth once daily.             escitalopram oxalate (LEXAPRO) 5 MG Tab  Take 1 tablet (5 mg total) by mouth once daily.             ferrous sulfate 324 mg (65 mg iron) TbEC  TAKE ONE TABLET BY MOUTH TWICE DAILY             hydrALAZINE (APRESOLINE) 100 MG tablet  Take 1 tablet (100 mg total) by mouth every 8 (eight) hours.             insulin glargine (LANTUS) 100 unit/mL injection  INJECT 5 units nightly.             insulin lispro (HUMALOG) 100 unit/mL injection  Inject 4 units w/ meals.             insulin syringe-needle U-100 (EASY TOUCH INSULIN SYRINGE) 0.5 mL 31 gauge x 5/16 Syrg  To use 4 times daily with insulin injections             isosorbide dinitrate (ISOCHRON)  40 mg TbSR  Take 1 tablet (40 mg total) by mouth every 8 (eight) hours.             KLOR-CON M10 10 mEq tablet  TAKE ONE TABLET BY MOUTH TWICE DAILY             lancets Misc  1 lancet by Misc.(Non-Drug; Combo Route) route 4 (four) times daily.             latanoprost 0.005 % ophthalmic solution  INSTILL ONE DROP INTO EACH EYE IN THE EVENING             memantine (NAMENDA) 10 MG Tab  Take 1 tablet (10 mg total) by mouth 2 (two) times daily.             nitroGLYCERIN 0.4 MG/DOSE TL SPRY (NITROLINGUAL) 400 mcg/spray spray  PLACE ONE SPRAY UNDER THE TONGUE EVERY 5 MINUTES AS NEEDED FOR CHEST PAIN.             NITROSTAT 0.3 mg SL tablet  DISSOLVE ONE TABLET UNDER THE TONGUE EVERY 5 MINUTES AS NEEDED FOR CHEST PAIN.  DO NOT EXCEED A TOTAL OF 3 DOSES IN 15 MINUTES             pravastatin (PRAVACHOL) 20 MG tablet  TAKE 1 TABLET BY MOUTH once DAILY             torsemide (DEMADEX) 20 MG Tab  Take 1 tablet (20 mg total) by mouth 2 (two) times daily. .             warfarin (COUMADIN) 5 MG tablet  Take 0.5-1 tablets (2.5-5 mg total) by mouth Daily. As directed by [facility]                       _________________________________  Pebbles Hunter MD  06/29/2017

## 2017-06-29 NOTE — NURSING
ANISA Ortiz NP, stated to inform patient that a Chest X-Ray is not needed if technician arrives.  Notified technician in hallway.

## 2017-06-29 NOTE — SUBJECTIVE & OBJECTIVE
Interval History: NAEON. No complaints this am.    Review of Systems   Constitutional: Negative for activity change, appetite change, chills, fever and unexpected weight change.   HENT: Negative for rhinorrhea and sore throat.    Eyes: Negative for pain and visual disturbance.   Respiratory: Positive for shortness of breath. Negative for cough.    Cardiovascular: Negative for chest pain and palpitations.   Gastrointestinal: Negative for abdominal pain, diarrhea and nausea.   Genitourinary: Negative for dysuria, hematuria and urgency.   Musculoskeletal: Negative for gait problem and neck stiffness.   Skin: Negative for rash.   Neurological: Negative for syncope, light-headedness and headaches.     Objective:     Vital Signs (Most Recent):  Temp: 98.6 °F (37 °C) (06/29/17 1200)  Pulse: 65 (06/29/17 1400)  Resp: 20 (06/29/17 1200)  BP: (!) 147/65 (06/29/17 1200)  SpO2: 95 % (06/29/17 1200) Vital Signs (24h Range):  Temp:  [98.2 °F (36.8 °C)-99 °F (37.2 °C)] 98.6 °F (37 °C)  Pulse:  [64-66] 65  Resp:  [18-20] 20  SpO2:  [95 %-100 %] 95 %  BP: (124-147)/(58-70) 147/65     Weight: 88.5 kg (195 lb)  Body mass index is 25.04 kg/m².    Intake/Output Summary (Last 24 hours) at 06/29/17 1434  Last data filed at 06/29/17 0800   Gross per 24 hour   Intake              660 ml   Output             1350 ml   Net             -690 ml      Physical Exam   Constitutional: She is oriented to person, place, and time. She appears well-developed and well-nourished.   Neck: JVD present.   Cardiovascular: Normal rate, regular rhythm and normal heart sounds.  Exam reveals no gallop and no friction rub.    No murmur heard.  Pulmonary/Chest: Effort normal and breath sounds normal. No respiratory distress. She has no wheezes. She has no rales.   Abdominal: Soft. Bowel sounds are normal. She exhibits no distension. There is no tenderness.   Musculoskeletal: Normal range of motion.   Neurological: She is alert and oriented to person, place, and  time. She displays normal reflexes. No cranial nerve deficit. Coordination normal.   Skin: Skin is warm and dry.       Significant Labs:   BMP:     Recent Labs  Lab 06/29/17  0624   *      K 3.3*      CO2 31*   BUN 66*   CREATININE 2.8*   CALCIUM 9.2   MG 1.6     CBC:     Recent Labs  Lab 06/28/17  0644 06/29/17  0624   WBC 3.14* 2.73*   HGB 8.4* 9.1*   HCT 26.4* 27.9*   * 96*       Significant Imaging: I have reviewed and interpreted all pertinent imaging results/findings within the past 24 hours.

## 2017-06-29 NOTE — NURSING
Transport arrived for chest X-Ray.  Per ANISA Ortiz NP,   patient does not need x-ray now.  Called Radiology.

## 2017-06-29 NOTE — PLAN OF CARE
Pt has been accepted to [facility]; however they are awaiting on [employer] authorization.  sw will follow.

## 2017-06-29 NOTE — ASSESSMENT & PLAN NOTE
Likely Multifactorial due to decreased EF 2/2 RV pacing with subsequent functional LBBB, recent decreased dose of diuretic at home, and possible contribution from R pleural effusion   - cont torsemide 20 BID, coreg 25 Bid   - Strict I/o ; daily standing weights

## 2017-06-29 NOTE — PT/OT/SLP PROGRESS
"Physical Therapy  Treatment    Chey Trujillo   MRN: [id number]   Admitting Diagnosis: Acute on chronic systolic congestive heart failure    PT Received On: 06/29/17  PT Start Time: 0930     PT Stop Time: 1009    PT Total Time (min): 39 min       Billable Minutes:  Gait [id number] and Therapeutic Activity 23    Treatment Type: Treatment  PT/PTA: PT     PTA Visit Number: 0       General Precautions: Standard, fall  Orthopedic Precautions: N/A   Braces: N/A    Do you have any cultural, spiritual, Bahai conflicts, given your current situation?: none    Subjective:  Communicated with RN prior to session.  "ok" per patient  "She needs to get up and walk. I keep telling her she can't just lay around. Sometimes you have to push her some." per son.     Pain/Comfort  Pain Rating 1:  (no rating stated)  Location 1:  (R LE)  Pain Rating Post-Intervention 1: 0/10 (at rest)    Objective:   Patient found with: telemetry (Pure Wick)    Functional Mobility:  Bed Mobility:  Rolling: Minimal Assistance cueing to reach for railing to assist; (A) for sequencing and coordinating movements to complete; performed bilateral directions  Supine to Sit:  Moderate Assistance assist at trunk  Sit to supine: Maximum Assistance required assist with trunk and B LE's; min A at trunk; mod A at LE's  Scooting: Contact Guard Assistance towards EOB; drawsheet towards HOB      Transfers:   Sit to stand:Moderate Assistance with Rolling Walker from EOB   Required CGA/Min A for fwd weight-shift & lift from EOB; on stand to sit transfer pt able to initiate fwd weight-shift & bend at hips, but as lowering to surface quickly demonstrates posterior lean with upper trunk requiring PT assist with eccentric lowering & safety.    Gait:   Patient ambulated ~2 feet fwd back, then ~4 feet total fwd/back/turning from EOB with Rolling Walker with Moderate Assistance.  Pt demonstrated step-to gait with decrease step length bilaterally, but L LE especially since unable " to fully WB on R LE due to discomfort. PT provided assist with weight-shift bilaterally to promote better gait pattern and assist managing RW.        Balance:  Static Sitting: Stand-by Assistance   Dynamic Sitting: Stand-by Assistance   Static Standing: Contact Guard Assistance with B UE support on RW  Dynamic Standing: Minimal Assistance B UE support on RW; assist with weight-shift & coordination      Therapeutic Activities and Exercises:  Performed sit to stand & gait for functional mobility and strengthening. Prior to mobility pt performed sit to stand and tolerated static standing while PT assisted with donning brief during gait trial. Tolerated well.        Education:  Education provided to pt regarding: safety with mobility. Verbalized understanding.     Whiteboard updated with correct mobility information. RN/PCT notified.  Pt ok to transfer via stand pivot with RN/PCT. Use RW & 2 person assist as needed; pt demonstrates enough strength to require on 1 person assist, but may present inconsistently pending pain/endurance level.      AM-PAC 6 CLICK MOBILITY  How much help from another person does this patient currently need?   1 = Unable, Total/Dependent Assistance  2 = A lot, Maximum/Moderate Assistance  3 = A little, Minimum/Contact Guard/Supervision  4 = None, Modified [place]/Independent    Turning over in bed (including adjusting bedclothes, sheets and blankets)?: 3  Sitting down on and standing up from a chair with arms (e.g., wheelchair, bedside commode, etc.): 2  Moving from lying on back to sitting on the side of the bed?: 2  Moving to and from a bed to a chair (including a wheelchair)?: 3  Need to walk in hospital room?: 2  Climbing 3-5 steps with a railing?: 1  Total Score: 13    AM-PAC Raw Score CMS G-Code Modifier Level of Impairment Assistance   6 % Total / Unable   7 - 9 CM 80 - 100% Maximal Assist   10 - 14 CL 60 - 80% Moderate Assist   15 - 19 CK 40 - 60% Moderate Assist   20 - 22  CJ 20 - 40% Minimal Assist   23 CI 1-20% SBA / CGA   24 CH 0% Independent/ Mod I     Patient left HOB elevated with all lines intact and call button in reach.    Assessment:  Chey Trujillo is a 82 y.o. female with a medical diagnosis of Acute on chronic systolic congestive heart failure and presents with decrease endurance, strength, balance, coordination, and cognition impairing safety and ability to perform functional mobility without assist from another person. Pt tolerated session well and continues to progress towards goals.  Pt able to work on turning this date, but required increase assist managing RW & coordinating movements due to R LE pain/discomfort. Progress towards goals limited/impacted by discomfort R LE.  Pt would benefit from continued skilled physical therapy to address listed impairments, improve functional independence, and maximize safety with mobility.  PT recommends dc disposition to SNF for further strengthening and mobility prior to d/c home. Pt demonstrates good potential to progress and would benefit from daily therapy to maximize recovery to gain functional independence prior to d/c home.  .  .    Rehab identified problem list/impairments: Rehab identified problem list/impairments: weakness, impaired endurance, impaired balance, gait instability, decreased lower extremity function, decreased upper extremity function, decreased safety awareness, pain, impaired cognition, impaired self care skills, impaired functional mobilty, decreased coordination, impaired skin    Rehab potential is good.    Activity tolerance: Good    Discharge recommendations: Discharge Facility/Level Of Care Needs: nursing facility, skilled     Barriers to discharge: Barriers to Discharge: None    Equipment recommendations: Equipment Needed After Discharge: none     GOALS:    Physical Therapy Goals        Problem: Physical Therapy Goal    Goal Priority Disciplines Outcome Goal Variances Interventions   Physical  Therapy Goal     PT/OT, PT Ongoing (interventions implemented as appropriate)     Description:  Goals to be met by: 17    Patient will increase functional independence with mobility by performin. Supine to sit with Contact Guard Assistance - not met  2. Sit to stand transfer with Contact Guard Assistance with RW.- not met  3. Gait  x 150 feet with Contact Guard Assistance using Rolling Walker. - not met  4. Lower Extremity exercises x10-15 reps with assistance as needed for strengthening and endurance with functional mobility.                        PLAN:    Patient to be seen 5 x/week  to address the above listed problems via gait training, therapeutic activities, therapeutic exercises, neuromuscular re-education  Plan of Care expires: 17  Plan of Care reviewed with: patient, pk         Adela Baca, PT  2017

## 2017-06-29 NOTE — PLAN OF CARE
Problem: Patient Care Overview  Goal: Plan of Care Review  Outcome: Ongoing (interventions implemented as appropriate)  Reviewed plan of care with patient.  Patient will be continuously monitored by telemetry and VISI.  FITBIT and IPAD will be issued during admit and returned at discharge.  Labs will be drawn and results will be monitored.   Blood glucose monitoring will be completed 4 times daily before meals and at bedtime.  Today's procedures/assessments/consults:  Discharge planning will continue (to SNF). Patient will report:  Bleeding, SOB, pain, dizziness, and swelling.  Patient will immediately report:  inflammation, leaking, bruising or dislodged PIV.  Patient will remain free of fall/trauma/injury by using appropriate lighting, nonskid socks, and by keeping area free of debris.  Patient will call for assistance when needed. Patient and family verbalized understanding of all instructions.

## 2017-06-29 NOTE — ASSESSMENT & PLAN NOTE
Noted on imaging since 3/2017. Possible chf related vs. Malignancy vs. Other. No evidence of infection/ Respiratory status seems close to baseline. Pt satting well on RA.    - Re-assess after diuresis. May need chest CT and/or thoracentesis to further evaluate - Likley outpt given normal respiratory status. Discussed with family - conservative management for now with diuresis and re-assessment as an outpt.

## 2017-06-29 NOTE — PLAN OF CARE
Problem: Patient Care Overview  Goal: Plan of Care Review  Outcome: Ongoing (interventions implemented as appropriate)  Plan of care discussed with pt. Plan for SNF placement.CXR=right side pleural effusion. No compliants of SOB during shift. Purewick in place. On CPAP at night. Potassium 3.3-replaced with PO. INR trending down(3.1) Pt turned Q2H w/ wedge used to prevent any skin breakdown.right heel wound wrapped-wound care orders to change weekly. Blood sugar ACHS. VSS & NADN. Pt denies CP, SOB, or pain/discomfort at this time.Pt free of injuries this shift.  All questions addressed.  Will continue to monitor.

## 2017-06-29 NOTE — ASSESSMENT & PLAN NOTE
Better controlled today.   - home Coreg 25 BID  - home Amlodipine 10 mg  - home Doxazosin 4 mg  - cont home Isordil to 40mg tid  - cont hydralazine to 100mg q8h

## 2017-06-29 NOTE — NURSING
Cleaned patient, applied barrier cream to buttocks, and replaced Pure Wic and tubing.  Cannister was replaced earlier.  Patient is able to assist by turning to either side.

## 2017-06-29 NOTE — SUBJECTIVE & OBJECTIVE
Interval History: NAEON. No complaints this am.    Review of Systems   Constitutional: Negative for activity change, appetite change, chills, fever and unexpected weight change.   HENT: Negative for rhinorrhea and sore throat.    Eyes: Negative for pain and visual disturbance.   Respiratory: Positive for shortness of breath. Negative for cough.    Cardiovascular: Negative for chest pain and palpitations.   Gastrointestinal: Negative for abdominal pain, diarrhea and nausea.   Genitourinary: Negative for dysuria, hematuria and urgency.   Musculoskeletal: Negative for gait problem and neck stiffness.   Skin: Negative for rash.   Neurological: Negative for syncope, light-headedness and headaches.     Objective:     Vital Signs (Most Recent):  Temp: 98.2 °F (36.8 °C) (06/28/17 2030)  Pulse: 65 (06/28/17 2030)  Resp: 20 (06/28/17 2030)  BP: (!) 144/60 (06/28/17 2030)  SpO2: 98 % (06/28/17 2030) Vital Signs (24h Range):  Temp:  [98.2 °F (36.8 °C)-99.8 °F (37.7 °C)] 98.2 °F (36.8 °C)  Pulse:  [64-67] 65  Resp:  [14-20] 20  SpO2:  [90 %-99 %] 98 %  BP: (132-184)/(60-77) 144/60     Weight: 88.5 kg (195 lb)  Body mass index is 25.04 kg/m².    Intake/Output Summary (Last 24 hours) at 06/28/17 2108  Last data filed at 06/28/17 1500   Gross per 24 hour   Intake             1020 ml   Output              350 ml   Net              670 ml      Physical Exam   Constitutional: She is oriented to person, place, and time. She appears well-developed and well-nourished.   Neck: JVD present.   Cardiovascular: Normal rate, regular rhythm and normal heart sounds.  Exam reveals no gallop and no friction rub.    No murmur heard.  Pulmonary/Chest: Effort normal and breath sounds normal. No respiratory distress. She has no wheezes. She has no rales.   Abdominal: Soft. Bowel sounds are normal. She exhibits no distension. There is no tenderness.   Musculoskeletal: Normal range of motion.   Neurological: She is alert and oriented to person, place,  and time. She displays normal reflexes. No cranial nerve deficit. Coordination normal.   Skin: Skin is warm and dry.       Significant Labs:   BMP:     Recent Labs  Lab 06/28/17  0644   GLU 93      K 3.3*      CO2 31*   BUN 69*   CREATININE 2.9*   CALCIUM 9.0   MG 1.7     CBC:     Recent Labs  Lab 06/27/17  0638 06/28/17  0644   WBC 2.91* 3.14*   HGB 9.1* 8.4*   HCT 28.1* 26.4*   PLT 97* 103*       Significant Imaging: I have reviewed and interpreted all pertinent imaging results/findings within the past 24 hours.

## 2017-06-29 NOTE — ASSESSMENT & PLAN NOTE
- insulin regimen   - SSI-  5u Long acting q am & 4 un AC  - f/u Wound Care consult for diabetic heel ulcer  - Follow with Podiatry

## 2017-06-29 NOTE — ASSESSMENT & PLAN NOTE
Patient has PPM for SSS & had high RV pacing burden. EP consulted to  determine atrial rhythm and perform RFA if needed. RA flutter but LA Fib s/p DCCV to  NSR -- no RFA done. - CHADsVASC 7 (age, F, HTN, CHF, DM).   - cont warfarin. INR 3.2   - cont coreg and amio

## 2017-06-30 VITALS
HEIGHT: 72 IN | HEART RATE: 65 BPM | BODY MASS INDEX: 26.41 KG/M2 | RESPIRATION RATE: 18 BRPM | OXYGEN SATURATION: 99 % | DIASTOLIC BLOOD PRESSURE: 69 MMHG | SYSTOLIC BLOOD PRESSURE: 142 MMHG | WEIGHT: 195 LBS | TEMPERATURE: 98 F

## 2017-06-30 PROBLEM — I50.43 HEART FAILURE, ACUTE ON CHRONIC, SYSTOLIC AND DIASTOLIC: Status: RESOLVED | Noted: 2017-06-18 | Resolved: 2017-06-30

## 2017-06-30 PROBLEM — R06.02 SOB (SHORTNESS OF BREATH): Status: RESOLVED | Noted: 2017-06-18 | Resolved: 2017-06-30

## 2017-06-30 PROBLEM — I48.92 ATRIAL FLUTTER: Status: RESOLVED | Noted: 2017-06-25 | Resolved: 2017-06-30

## 2017-06-30 LAB
ANION GAP SERPL CALC-SCNC: 6 MMOL/L
BASOPHILS # BLD AUTO: 0.01 K/UL
BASOPHILS NFR BLD: 0.3 %
BUN SERPL-MCNC: 65 MG/DL
CALCIUM SERPL-MCNC: 9.2 MG/DL
CHLORIDE SERPL-SCNC: 106 MMOL/L
CO2 SERPL-SCNC: 30 MMOL/L
CREAT SERPL-MCNC: 2.7 MG/DL
DIFFERENTIAL METHOD: ABNORMAL
EOSINOPHIL # BLD AUTO: 0.1 K/UL
EOSINOPHIL NFR BLD: 1.7 %
ERYTHROCYTE [DISTWIDTH] IN BLOOD BY AUTOMATED COUNT: 15.9 %
EST. GFR  (AFRICAN AMERICAN): 18.2 ML/MIN/1.73 M^2
EST. GFR  (NON AFRICAN AMERICAN): 15.8 ML/MIN/1.73 M^2
GLUCOSE SERPL-MCNC: 108 MG/DL
HCT VFR BLD AUTO: 27.3 %
HGB BLD-MCNC: 8.6 G/DL
INR PPP: 2.7
LYMPHOCYTES # BLD AUTO: 0.9 K/UL
LYMPHOCYTES NFR BLD: 30.7 %
MAGNESIUM SERPL-MCNC: 1.6 MG/DL
MCH RBC QN AUTO: 29.5 PG
MCHC RBC AUTO-ENTMCNC: 31.5 %
MCV RBC AUTO: 94 FL
MONOCYTES # BLD AUTO: 0.3 K/UL
MONOCYTES NFR BLD: 10 %
NEUTROPHILS # BLD AUTO: 1.7 K/UL
NEUTROPHILS NFR BLD: 57 %
PHOSPHATE SERPL-MCNC: 2.6 MG/DL
PLATELET # BLD AUTO: 92 K/UL
PMV BLD AUTO: 10.5 FL
POCT GLUCOSE: 106 MG/DL (ref 70–110)
POCT GLUCOSE: 125 MG/DL (ref 70–110)
POCT GLUCOSE: 139 MG/DL (ref 70–110)
POTASSIUM SERPL-SCNC: 3.6 MMOL/L
PROTHROMBIN TIME: 27.3 SEC
RBC # BLD AUTO: 2.92 M/UL
SODIUM SERPL-SCNC: 142 MMOL/L
WBC # BLD AUTO: 2.9 K/UL

## 2017-06-30 PROCEDURE — 25000003 PHARM REV CODE 250: Performed by: HOSPITALIST

## 2017-06-30 PROCEDURE — 99239 HOSP IP/OBS DSCHRG MGMT >30: CPT | Mod: ,,, | Performed by: HOSPITALIST

## 2017-06-30 PROCEDURE — 27000221 HC OXYGEN, UP TO 24 HOURS

## 2017-06-30 PROCEDURE — 25000003 PHARM REV CODE 250: Performed by: NURSE PRACTITIONER

## 2017-06-30 PROCEDURE — 83735 ASSAY OF MAGNESIUM: CPT

## 2017-06-30 PROCEDURE — 99900035 HC TECH TIME PER 15 MIN (STAT)

## 2017-06-30 PROCEDURE — 36415 COLL VENOUS BLD VENIPUNCTURE: CPT

## 2017-06-30 PROCEDURE — 85610 PROTHROMBIN TIME: CPT

## 2017-06-30 PROCEDURE — 25000003 PHARM REV CODE 250: Performed by: INTERNAL MEDICINE

## 2017-06-30 PROCEDURE — 97116 GAIT TRAINING THERAPY: CPT | Mod: 59

## 2017-06-30 PROCEDURE — 97530 THERAPEUTIC ACTIVITIES: CPT

## 2017-06-30 PROCEDURE — 85025 COMPLETE CBC W/AUTO DIFF WBC: CPT

## 2017-06-30 PROCEDURE — 84100 ASSAY OF PHOSPHORUS: CPT

## 2017-06-30 PROCEDURE — 80048 BASIC METABOLIC PNL TOTAL CA: CPT

## 2017-06-30 RX ADMIN — WARFARIN SODIUM 2.5 MG: 2.5 TABLET ORAL at 06:06

## 2017-06-30 RX ADMIN — TORSEMIDE 20 MG: 20 TABLET ORAL at 08:06

## 2017-06-30 RX ADMIN — AMLODIPINE BESYLATE 10 MG: 10 TABLET ORAL at 08:06

## 2017-06-30 RX ADMIN — TORSEMIDE 20 MG: 20 TABLET ORAL at 06:06

## 2017-06-30 RX ADMIN — INSULIN ASPART 4 UNITS: 100 INJECTION, SOLUTION INTRAVENOUS; SUBCUTANEOUS at 12:06

## 2017-06-30 RX ADMIN — ASPIRIN 81 MG CHEWABLE TABLET 81 MG: 81 TABLET CHEWABLE at 08:06

## 2017-06-30 RX ADMIN — CARVEDILOL 25 MG: 12.5 TABLET, FILM COATED ORAL at 08:06

## 2017-06-30 RX ADMIN — INSULIN ASPART 4 UNITS: 100 INJECTION, SOLUTION INTRAVENOUS; SUBCUTANEOUS at 04:06

## 2017-06-30 RX ADMIN — ISOSORBIDE DINITRATE 40 MG: 20 TABLET ORAL at 01:06

## 2017-06-30 RX ADMIN — HYDRALAZINE HYDROCHLORIDE 100 MG: 50 TABLET ORAL at 06:06

## 2017-06-30 RX ADMIN — HYDRALAZINE HYDROCHLORIDE 100 MG: 50 TABLET ORAL at 01:06

## 2017-06-30 RX ADMIN — AMIODARONE HYDROCHLORIDE 200 MG: 200 TABLET ORAL at 08:06

## 2017-06-30 RX ADMIN — ESCITALOPRAM 5 MG: 5 TABLET, FILM COATED ORAL at 08:06

## 2017-06-30 RX ADMIN — PRAVASTATIN SODIUM 20 MG: 20 TABLET ORAL at 08:06

## 2017-06-30 RX ADMIN — DOXAZOSIN 4 MG: 4 TABLET ORAL at 08:06

## 2017-06-30 RX ADMIN — Medication 3 ML: at 06:06

## 2017-06-30 RX ADMIN — ISOSORBIDE DINITRATE 40 MG: 20 TABLET ORAL at 06:06

## 2017-06-30 RX ADMIN — INSULIN ASPART 4 UNITS: 100 INJECTION, SOLUTION INTRAVENOUS; SUBCUTANEOUS at 07:06

## 2017-06-30 RX ADMIN — Medication 3 ML: at 01:06

## 2017-06-30 NOTE — NURSING
Pure Wic is not draining urine.  Patient urinated in absorbent garment x 2.  Family is complaining of dryness and request lubricant for perineal area.  Changed tubing and Pure Wic - x 3.

## 2017-06-30 NOTE — NURSING
Anita, , called to inform patient/family that [facility] will accept patient.  Notified son, who is at bedside.  Patient transport is scheduled for 1730 hours.

## 2017-06-30 NOTE — PLAN OF CARE
Problem: Physical Therapy Goal  Goal: Physical Therapy Goal  Goals to be met by: 17    Patient will increase functional independence with mobility by performin. Supine to sit with Contact Guard Assistance - not met  2. Sit to stand transfer with Contact Guard Assistance with RW.- not met  3. Gait  x 150 feet with Contact Guard Assistance using Rolling Walker. - not met  4. Lower Extremity exercises x10-15 reps with assistance as needed for strengthening and endurance with functional mobility. - not met        Patient is progressing towards goals.    Jett Trujillo, SUHASA

## 2017-06-30 NOTE — PLAN OF CARE
6:39 PM. On call SW received page concerning pts transportation with [facility]. SW spoke with Drea at [phone], informed they are running behind today however pt is still in line for her transport back to nursing home.      HERON Meza, DEE  [id number]

## 2017-06-30 NOTE — PLAN OF CARE
Problem: Patient Care Overview  Goal: Plan of Care Review  Reviewed plan of care with patient.  Patient will be continuously monitored by telemetry and VISI.  FITBIT and IPAD will be issued during admit and returned at discharge.  Labs will be drawn and results will be monitored.   Blood glucose monitoring will be completed 4 times daily before meals and at bedtime.  Today's procedures/assessments/consults:  Discharge planning will continue.   Patient will report:  Bleeding, SOB, pain, dizziness, and swelling.  Patient will immediately report:  inflammation, leaking, bruising or dislodged PIV.  Patient will remain free of fall/trauma/injury by using appropriate lighting, nonskid socks, and by keeping area free of debris.  Patient will call for assistance when needed. Patient and family verbalized understanding of all instructions.

## 2017-06-30 NOTE — PLAN OF CARE
contacted [facility] regarding acceptance.  They are still waiting insurance auth.  sw will notify  to reach out to insurance company.    Nursing home orders uploaded to [facility]

## 2017-06-30 NOTE — NURSING
Received and acknowledge discharge instructions ordered at 1524 hours.  Provided a copy of AVS to patient.  Discharge instructions explained to patient.   Discontinued PIV in  left forearm.  Tip intact.  Removed telemetry.  Informed patient of future follow-up appointments.  Administered all ordered medications up to and including 1700 hours.  Explained medication regime to include new, continued, and discontinued medications.  Patient informed when next dose is due for all medications. Completed MIS.  Discussed when to call the doctor. Discussed heart failure tips, diagnosing heart failure, treatment plan, diet, procedures, tracking weight, signs of a flare-up to include:  swelling, shortness of breath dizziness, chest pain and cough.  Diabetes/Coumadin/Chronic Kidney Disease education was completed. Patient/family acknowledged understanding of all instructions. Transport  Requested from Anita .  Will continue to monitor patient until off unit.

## 2017-06-30 NOTE — PT/OT/SLP PROGRESS
Physical Therapy  Treatment    Chey Trujillo   MRN: [id number]   Admitting Diagnosis: Acute on chronic systolic congestive heart failure    PT Received On: 06/30/17  PT Start Time: 1302     PT Stop Time: 1337    PT Total Time (min): 35 min       Billable Minutes:  Gait [id number] and Therapeutic Activity 20    Treatment Type: Treatment  PT/PTA: PTA     PTA Visit Number: 1       General Precautions: Standard, fall  Orthopedic Precautions: N/A   Braces: N/A    Do you have any cultural, spiritual, Jew conflicts, given your current situation?: none    Subjective:  Communicated with RN prior to session.  Patient was agreeable to therapy.    Pain/Comfort  Pain Rating 1: 0/10    Objective:   Patient found with: pressure relief boots, peripheral IV, telemetry (Visi)    Functional Mobility:  Bed Mobility:   Rolling/Turning to Left: Minimum assistance  Scooting/Bridging: Minimum Assistance  Supine to Sit: Moderate Assistance    Transfers:  Sit <> Stand Assistance: Moderate Assistance  Sit <> Stand Assistive Device: Rolling Walker    Gait:   Gait Distance: ~12 feet x 1; ~8 feet x 1 to advance from bed to room door (One seated rest break. Verbal and tactile cues for sequencing and RW management.)  Assistance 1: Minimum assistance  Gait Assistive Device: Rolling walker  Gait Pattern: 3-point gait  Gait Deviation(s): decreased cristina, increased time in double stance, decreased velocity of limb motion, decreased step length, decreased stride length, decreased weight-shifting ability    Balance:   Static Sit: POOR+: Needs MINIMAL assist to maintain    Therapeutic Activities and Exercises:  Patient sat EOB for ~5 minutes. Patient attempted to fling herself back on the bed multiple times and required verbal and tactile cues to remain seated upright on EOB.     AM-PAC 6 CLICK MOBILITY  How much help from another person does this patient currently need?   1 = Unable, Total/Dependent Assistance  2 = A lot, Maximum/Moderate  Assistance  3 = A little, Minimum/Contact Guard/Supervision  4 = None, Modified [place]/Independent    Turning over in bed (including adjusting bedclothes, sheets and blankets)?: 3  Sitting down on and standing up from a chair with arms (e.g., wheelchair, bedside commode, etc.): 2  Moving from lying on back to sitting on the side of the bed?: 2  Moving to and from a bed to a chair (including a wheelchair)?: 3  Need to walk in hospital room?: 2  Climbing 3-5 steps with a railing?: 1  Total Score: 13    AM-PAC Raw Score CMS G-Code Modifier Level of Impairment Assistance   6 % Total / Unable   7 - 9 CM 80 - 100% Maximal Assist   10 - 14 CL 60 - 80% Moderate Assist   15 - 19 CK 40 - 60% Moderate Assist   20 - 22 CJ 20 - 40% Minimal Assist   23 CI 1-20% SBA / CGA   24 CH 0% Independent/ Mod I     Patient left up in chair with all lines intact, call button in reach, nurse notified and son present.    Assessment:  Chey Trujillo is a 82 y.o. female with a medical diagnosis of Acute on chronic systolic congestive heart failure and presents with decreased functional mobility and impairments as listed below. Patient required max verbal and tactile cues for gait sequencing and with advancing RW.    Rehab identified problem list/impairments: Rehab identified problem list/impairments: weakness, impaired endurance, impaired self care skills, impaired functional mobilty, gait instability, impaired balance, impaired cognition, decreased coordination, decreased lower extremity function, decreased upper extremity function, decreased safety awareness, impaired coordination, edema, impaired cardiopulmonary response to activity    Rehab potential is good.    Activity tolerance: Fair    Discharge recommendations: Discharge Facility/Level Of Care Needs: nursing facility, skilled     Barriers to discharge: Barriers to Discharge: None    Equipment recommendations: Equipment Needed After Discharge: none     GOALS:    Physical  Therapy Goals        Problem: Physical Therapy Goal    Goal Priority Disciplines Outcome Goal Variances Interventions   Physical Therapy Goal     PT/OT, PT Ongoing (interventions implemented as appropriate)     Description:  Goals to be met by: 17    Patient will increase functional independence with mobility by performin. Supine to sit with Contact Guard Assistance - not met  2. Sit to stand transfer with Contact Guard Assistance with RW.- not met  3. Gait  x 150 feet with Contact Guard Assistance using Rolling Walker. - not met  4. Lower Extremity exercises x10-15 reps with assistance as needed for strengthening and endurance with functional mobility. - not met                          PLAN:    Patient to be seen 5 x/week  to address the above listed problems via gait training, therapeutic activities, therapeutic exercises, neuromuscular re-education  Plan of Care expires: 17  Plan of Care reviewed with: patient, pk    Jett Ronnie, TIFFANY  2017

## 2017-06-30 NOTE — NURSING
Patient is seated in bedside recliner.  No complaints of pain, SOB or discomfort noted.  Patient is more social.  GI Symptoms noted:  Belching.

## 2017-06-30 NOTE — PLAN OF CARE
Problem: Patient Care Overview  Goal: Plan of Care Review  Outcome: Ongoing (interventions implemented as appropriate)  Plan of care discussed with pt. Pt diuresing with Torsemide BID , purewick working properly during shift. Will monitor output. Pt on CPAP at night. Plan for SNF placement. BS checked ACHS. VSS & NADN. Pt denies CP, SOB, or pain/discomfort at this time.Pt free of injuries this shift.  All questions addressed.  Will continue to monitor.

## 2017-06-30 NOTE — NURSING
Called on-call , Tammy, to inquire about [facility] transport to [place].  She will follow-up and call back.  Family is at bedside with patient.

## 2017-06-30 NOTE — NURSING
Son is at beside with patient. Patient is more alert and oriented today.  Pure Wic is operational and draining into suction cannister.

## 2017-07-01 PROBLEM — R06.02 SOB (SHORTNESS OF BREATH): Status: RESOLVED | Noted: 2017-07-01 | Resolved: 2017-06-30

## 2017-07-02 PROBLEM — I21.4 NSTEMI, INITIAL EPISODE OF CARE: Status: ACTIVE | Noted: 2017-07-02

## 2017-07-03 ENCOUNTER — PATIENT OUTREACH (OUTPATIENT)
Dept: ADMINISTRATIVE | Facility: CLINIC | Age: 82
End: 2017-07-03

## 2017-07-03 PROBLEM — I21.4 NSTEMI, INITIAL EPISODE OF CARE: Status: RESOLVED | Noted: 2017-07-02 | Resolved: 2017-07-03

## 2017-07-03 PROBLEM — R60.0 ARM EDEMA: Status: ACTIVE | Noted: 2017-07-03

## 2017-07-03 PROBLEM — I50.43 ACUTE ON CHRONIC COMBINED SYSTOLIC AND DIASTOLIC CHF (CONGESTIVE HEART FAILURE): Status: ACTIVE | Noted: 2017-07-03

## 2017-07-03 NOTE — PT/OT/SLP DISCHARGE
Occupational Therapy Discharge Summary    Chey Trujillo  MRN: [id number]   Acute on chronic systolic congestive heart failure   Patient Discharged from acute Occupational Therapy on 6/30/2017.  Please refer to prior OT note dated on 6/29/2017 for functional status.     Assessment:   Patient was discharge unexpectedly.  Information required to complete and accurate discharge summary is unknown.  Refer to therapy initial evaluation and last progress note for initial and most recent functional status and goal achievement.  Recommendations made may be found in medical record.  GOALS:    Occupational Therapy Goals     Not on file          Multidisciplinary Problems (Resolved)        Problem: Occupational Therapy Goal    Goal Priority Disciplines Outcome Interventions   Occupational Therapy Goal   (Resolved)     OT, PT/OT Outcome(s) achieved    Description:  Goals to be met by: 7/10/2017    Patient will increase functional independence with ADLs by performing:    UE Dressing with Moderate Assistance.  LE Dressing with Moderate Assistance.  Grooming while standing with Stand-by Assistance.  Toileting from toilet with Maximum Assistance for hygiene and clothing management.   Stand pivot transfers with Stand-by Assistance.  Toilet transfer to toilet with min A with use of RW.                   Reasons for Discontinuation of Therapy Services  Transfer to alternate level of care.      Plan:  Patient Discharged to: Skilled Nursing Facility     GALINA Tobar 7/3/2017

## 2017-07-03 NOTE — DISCHARGE SUMMARY
[facility] Hospital Medicine  Discharge Summary      Patient Name: Chey Trujillo  MRN: [id number]  Admission Date: 6/23/2017  Hospital Length of Stay: 7 days  Discharge Date and Time: 6/30/2017  8:30 PM  Attending Physician: No att. providers found   Discharging Provider: Pebbles Hunter MD  Primary Care Provider: Viviana Nguyen MD    [facility] Medicine Team: [facility] HOSP MED  Pebbles Hunter MD       Principal Problem:Acute on chronic systolic congestive heart failure     HPI:  Ms Chey Trujillo is an 82 y.o. lady presenting to the ED with progressive SOB, SCHWAB, orthopnea, and diffuse non-specific chest/back pain last night. Per pt pain was 6/10 did not radiate and was dull, aggravated with deep breathing and alleviated to 1/10 with nitro. EKG on arrival to ED without ST changes, Troponin 0.031, . Patient is a poor historian s/t dementia and history was obtained from daughter at bedside. Daughter states patient was recently discharged from [facility] 4 days ago (6/19/2017) following obs hospitalization for SOB and CHF. Echo last admission (5/17/2017) revealed reduction in EF to 45% (from 65% 7 mo prior), mild MR, mild TR, grade III diastolic dysfunction. Following diuresis pt was discharged to home with resolution of symptoms. Daughter states after discharge her mother was able to ambulate with walker ~1 block before onset of SOB. Over 3 days symptoms progressed despite adherence to home med regimen (with exception of intermittent use of home CPAP) until yesterday her mother was unable to walk 10ft to toilet without SOB and with audible wheezing. Per daughter the patient's diuretic dose was decreased per Nephrologist at last visit from torsemide 20mg BID to torsemide 20mg qam and 10mg for afternoon dose. She has PMH of dementia, chronic Afib on coumadin, SSS s/p PPM, CAD s/p PCI (2002, 2005), CHF, Hep C, DM, CKD stage IV, HLD, PND (pt uses 3 pillows at home), HTN, VEE on home CPAP. CXR in ED  revealed mod R pleural effusion (large R pl effusion noted last hospitalization). Mild JVD, no LE edema, no c/o SOB in bed @ 30 degrees and O2 sats 96% on RA     Procedure(s) (LRB):  ABLATION (Left)      Indwelling Lines/Drains at time of discharge:   Lines/Drains/Airways          No matching active lines, drains, or airways        Hospital Course:     Atrial flutter  Acute on chronic systolic congestive heart failure  Pt was admitted to [facility]. Started on Lasix 80 IV TID with additional dose of metolazone 5mg PO x 1. On 6/25, pt looked and felt well,  thus IV diuresis stopped given concern for possible over-diuresis. Pt was seen and evaluated by EP to determine atrial rhythm and assess need for possible RFA ablation. During EP evaluation, pt's RA was found to be in flutter but LA in Fib. EP performed DCCV and pt was converted to  NSR -- no RFA done. Pt was continued on medical therapy for afib/aflutter ( CHADsVASC 7 -age, F, HTN, CHF, DM) with warfarin. INR 3.2,  ,coreg and amio. Medication regimen converted to PO diuretics (torsemide 20 bid) and BP regimen titrated for better BP control. Pt discharged to SNF for further management.       * Acute on chronic systolic congestive heart failure     Likely Multifactorial due to decreased EF 2/2 RV pacing with subsequent functional LBBB, recent decreased dose of diuretic at home, and possible contribution from R pleural effusion   - cont torsemide 20 BID, coreg 25 Bid   - Strict I/O ; daily standing weights   - Seems euvolemic at this time          Atrial flutter     Patient has PPM for SSS & had high RV pacing burden. EP consulted to  determine atrial rhythm and perform RFA if needed. RA flutter but LA Fib s/p DCCV to  NSR -- no RFA done. - CHADsVASC 7 (age, F, HTN, CHF, DM).   - cont warfarin. INR 3.2   - cont coreg and amio              Pleural effusion     Noted on imaging since 3/2017. Possible chf related vs. Malignancy vs. Other. No evidence of infection/ Respiratory  status seems close to baseline. Pt satting well on RA.    - Re-assess after diuresis. May need chest CT and/or thoracentesis to further evaluate - Nic outpt pulm eval given normal respiratory status. Discussed with family - conservative management for now with diuresis and re-assessment as an outpt.       Essential hypertension     Better controlled.  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), managed as type 1     - insulin regimen   - SSI-  5u Long acting q am & 4 un AC  - f/u Wound Care consult for diabetic heel ulcer  - Follow with Podiatry        Pancytopenia     Heme/onc eval outpt                        Consults:   Consults         Status Ordering Provider     Inpatient consult to Cardiology  Once     Provider:  (Not yet assigned)    Completed BRUNO BALL     Inpatient consult to Dietary  Once     Provider:  (Not yet assigned)    Completed ORLANDO FOY     Inpatient consult to Electrophysiology  Once     Provider:  (Not yet assigned)    Completed YAEL JARRELL     Inpatient consult to Pulmonology  Once     Provider:  (Not yet assigned)    Completed ORLANDO FOY     Inpatient consult to SNF [facility]  Once     Provider:  (Not yet assigned)    Completed NAUN JO          Significant Diagnostic Studies: Cardiac Graphics: Echocardiogram:   2D echo with color flow doppler:   Results for orders placed or performed during the hospital encounter of 05/17/17   2D Echo w/ Color Flow Doppler   Result Value Ref Range    EF 45 55 - 65    Mitral Valve Regurgitation MILD     Diastolic Dysfunction Yes (A)     Est. 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), 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] 11/25/2016 Yes    Dementia [F03.90] 06/23/2017 Yes     Chronic    SSS (sick sinus syndrome) [I49.5] 06/23/2017 Yes     Chronic    Pancytopenia [D61.818] 06/18/2017 Yes    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 due to underlying condition, with fat layer exposed [E08.621, L97.412] 04/17/2017 Yes    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 [E11.621, L97.409] 02/21/2017 Yes    Diabetes mellitus with stage 4 chronic kidney disease GFR 15-29 [E11.22, N18.4] 08/16/2016 Yes     Chronic    Bilateral carotid artery disease: 40-49% 2016 Bilateral [I77.9] 01/11/2016 Yes    Chronic hepatitis C without hepatic coma [B18.2] 01/11/2016 Yes    Thrombocytopenia [D69.6] 01/11/2016 Yes    Mixed hyperlipidemia [E78.2] 10/22/2013 Yes    Peripheral vascular disease [I73.9] 09/20/2013 Yes    Secondary pulmonary hypertension [I27.2] 08/13/2013 Yes    CAD S/P percutaneous coronary angioplasty [I25.10, Z98.61] 09/27/2012 Not Applicable     Chronic    Chronic diastolic heart failure [I50.32] 09/27/2012 Yes     Chronic    Obstructive sleep apnea on CPAP - setting = 5  [G47.33, Z99.89] 09/12/2012 Not Applicable     Chronic    Chronic atrial fibrillation [I48.2] 06/29/2012 Yes      Problems Resolved During this Admission:    Diagnosis Date Noted Date Resolved POA    Atrial flutter [I48.92] 06/25/2017 06/30/2017 Yes    SOB (shortness of breath) [R06.02] 07/01/2017 06/30/2017 No    Heart failure, acute on chronic, systolic and diastolic [I50.43] 06/18/2017 06/30/2017 Yes      Discharged Condition: good    Disposition: Skilled Nursing Facility    Follow Up:    Patient Instructions:     Ambulatory referral to Cardiology   Referral Priority: Urgent Referral Type: Consultation    Referral Reason: Specialty Services Required    Requested Specialty: Cardiology    Number of Visits Requested: 1      Diet diabetic   Scheduling Instructions: cardiac     Vital signs per facility protocol     Skin assessment every shift      Activity as tolerated     Full code       Medications:  Reconciled Home Medications:   Discharge Medication List as of 6/30/2017  4:03 PM      START taking these medications    Details   isosorbide dinitrate (ISOCHRON) 40 mg TbSR Take 1 tablet (40 mg total) by mouth every 8 (eight) hours., Starting Thu 6/29/2017, Until Fri 6/29/2018, No Print         CONTINUE these medications which have CHANGED    Details   hydrALAZINE (APRESOLINE) 100 MG tablet Take 1 tablet (100 mg total) by mouth every 8 (eight) hours., Starting Thu 6/29/2017, No Print      insulin glargine (LANTUS) 100 unit/mL injection INJECT 5 units nightly., No Print      insulin lispro (HUMALOG) 100 unit/mL injection Inject 4 units w/ meals., No Print      insulin syringe-needle U-100 (EASY TOUCH INSULIN SYRINGE) 0.5 mL 31 gauge x 5/16 Syrg To use 4 times daily with insulin injections, No Print      torsemide (DEMADEX) 20 MG Tab Take 1 tablet (20 mg total) by mouth 2 (two) times daily. ., Starting Thu 6/29/2017, No Print         CONTINUE these medications which have NOT CHANGED    Details   ACCU-CHEK SMARTVIEW TEST STRIP Strp test FOUR TIMES A DAY, Normal      amiodarone (PACERONE) 200 MG Tab Take 1 tablet (200 mg total) by mouth once daily., Starting 1/26/2017, Until Discontinued, Normal      amlodipine (NORVASC) 10 MG tablet Take 1 tablet (10 mg total) by mouth once daily., Starting 6/14/2016, Until Discontinued, Print      aspirin 81 MG chewable tablet Take 1 tablet by mouth Every morning., Until Discontinued, Historical Med      blood-glucose meter Misc Dispense Accu-Chek Natalia meter, Normal      carvedilol (COREG) 25 MG tablet Take 1 tablet (25 mg total) by mouth 2 (two) times daily with meals., Starting  4/27/2017, Until Discontinued, Normal      cyanocobalamin, vitamin B-12, 1,000 mcg TbSR Take 1,000 mcg by mouth once daily., Starting 9/28/2016, Until Discontinued, OTC      dorzolamide (TRUSOPT) 2 % ophthalmic solution INSTILL ONE DROP INTO EACH EYE TWICE DAILY, Normal      doxazosin (CARDURA) 4 MG tablet Take 1 tablet (4 mg total) by mouth once daily., Starting 5/12/2017, Until Discontinued, Print      escitalopram oxalate (LEXAPRO) 5 MG Tab Take 1 tablet (5 mg total) by mouth once daily., Starting 4/21/2017, Until Wed 10/18/17, Normal      ferrous sulfate 324 mg (65 mg iron) TbEC TAKE ONE TABLET BY MOUTH TWICE DAILY, Normal      KLOR-CON M10 10 mEq tablet TAKE ONE TABLET BY MOUTH TWICE DAILY, Normal      lancets Misc 1 lancet by Misc.(Non-Drug; Combo Route) route 4 (four) times daily., Starting 6/24/2016, Until Discontinued, Normal      latanoprost 0.005 % ophthalmic solution INSTILL ONE DROP INTO EACH EYE IN THE EVENING, Normal      memantine (NAMENDA) 10 MG Tab Take 1 tablet (10 mg total) by mouth 2 (two) times daily., Starting 4/21/2017, Until Wed 10/18/17, Normal      nitroGLYCERIN 0.4 MG/DOSE TL SPRY (NITROLINGUAL) 400 mcg/spray spray PLACE ONE SPRAY UNDER THE TONGUE EVERY 5 MINUTES AS NEEDED FOR CHEST PAIN., Normal      NITROSTAT 0.3 mg SL tablet DISSOLVE ONE TABLET UNDER THE TONGUE EVERY 5 MINUTES AS NEEDED FOR CHEST PAIN.  DO NOT EXCEED A TOTAL OF 3 DOSES IN 15 MINUTES, Normal      pravastatin (PRAVACHOL) 20 MG tablet TAKE 1 TABLET BY MOUTH once DAILY, Normal      warfarin (COUMADIN) 5 MG tablet Take 0.5-1 tablets (2.5-5 mg total) by mouth Daily. As directed by [facility], Starting 2/8/2017, Until Discontinued, Normal         STOP taking these medications       collagenase ointment Comments:   Reason for Stopping:         isosorbide dinitrate (ISORDIL) 40 MG Tab Comments:   Reason for Stopping:         lactulose (CEPHULAC) 20 gram Pack Comments:   Reason for Stopping:             Time spent on the  discharge of patient: >30 minutes    Pebbles Hunter MD  Department of Hospital Medicine  [facility]

## 2017-07-03 NOTE — PLAN OF CARE
07/03/17 0710   Final Note   Assessment Type Final Discharge Note   Discharge Disposition SNF   Hospital Follow Up  Appt(s) scheduled? Yes

## 2017-07-05 ENCOUNTER — TELEPHONE (OUTPATIENT)
Dept: INTERNAL MEDICINE | Facility: CLINIC | Age: 82
End: 2017-07-05

## 2017-07-11 NOTE — PROGRESS NOTES
Spoke with patient son whom stated patient started rehab 7/5 at [facility] [phone]  . Could not provide me with no information..

## 2017-07-11 NOTE — PROGRESS NOTES
The pt was recently admitted from 7/2 through 7/5 for chest pain.  She was discharged to [facility] ([phone]).  She is currently admitted and we will check status for her discharge.

## 2017-07-19 NOTE — PROGRESS NOTES
Per Farideh with [facility], the pt is still currently admitted to their facility with no plans for discharge.  We will continue to check status.

## 2017-07-21 ENCOUNTER — OFFICE VISIT (OUTPATIENT)
Dept: NEUROLOGY | Facility: CLINIC | Age: 82
End: 2017-07-21
Payer: COMMERCIAL

## 2017-07-21 VITALS
HEART RATE: 64 BPM | WEIGHT: 196 LBS | SYSTOLIC BLOOD PRESSURE: 142 MMHG | DIASTOLIC BLOOD PRESSURE: 61 MMHG | BODY MASS INDEX: 26.55 KG/M2 | HEIGHT: 72 IN

## 2017-07-21 DIAGNOSIS — E11.22 DIABETES MELLITUS WITH STAGE 4 CHRONIC KIDNEY DISEASE GFR 15-29: Chronic | ICD-10-CM

## 2017-07-21 DIAGNOSIS — I48.20 CHRONIC ATRIAL FIBRILLATION: ICD-10-CM

## 2017-07-21 DIAGNOSIS — F01.50 VASCULAR DEMENTIA WITHOUT BEHAVIORAL DISTURBANCE: Primary | Chronic | ICD-10-CM

## 2017-07-21 DIAGNOSIS — I73.9 PERIPHERAL VASCULAR DISEASE: ICD-10-CM

## 2017-07-21 DIAGNOSIS — G47.33 OBSTRUCTIVE SLEEP APNEA ON CPAP: Chronic | ICD-10-CM

## 2017-07-21 DIAGNOSIS — N18.4 DIABETES MELLITUS WITH STAGE 4 CHRONIC KIDNEY DISEASE GFR 15-29: Chronic | ICD-10-CM

## 2017-07-21 DIAGNOSIS — I10 ESSENTIAL HYPERTENSION: ICD-10-CM

## 2017-07-21 DIAGNOSIS — I50.32 CHRONIC DIASTOLIC HEART FAILURE: Chronic | ICD-10-CM

## 2017-07-21 PROCEDURE — 99214 OFFICE O/P EST MOD 30 MIN: CPT | Mod: S$PBB,,, | Performed by: PSYCHIATRY & NEUROLOGY

## 2017-07-21 PROCEDURE — 1159F MED LIST DOCD IN RCRD: CPT | Mod: ,,, | Performed by: PSYCHIATRY & NEUROLOGY

## 2017-07-21 PROCEDURE — 1125F AMNT PAIN NOTED PAIN PRSNT: CPT | Mod: ,,, | Performed by: PSYCHIATRY & NEUROLOGY

## 2017-07-21 PROCEDURE — 99213 OFFICE O/P EST LOW 20 MIN: CPT | Mod: PBBFAC,PO | Performed by: PSYCHIATRY & NEUROLOGY

## 2017-07-21 PROCEDURE — 99999 PR PBB SHADOW E&M-EST. PATIENT-LVL III: CPT | Mod: PBBFAC,,, | Performed by: PSYCHIATRY & NEUROLOGY

## 2017-07-21 RX ORDER — HYDRALAZINE HYDROCHLORIDE 100 MG/1
100 TABLET, FILM COATED ORAL EVERY 8 HOURS
COMMUNITY
End: 2017-08-16 | Stop reason: SDUPTHER

## 2017-07-21 RX ORDER — CLONIDINE HYDROCHLORIDE 0.1 MG/1
0.1 TABLET ORAL 3 TIMES DAILY
COMMUNITY
End: 2017-08-16 | Stop reason: SDUPTHER

## 2017-07-21 NOTE — PROGRESS NOTES
Subjective:       Patient ID: Chey Trujillo is a 82 y.o. female.    Chief Complaint:  Memory Loss      History of Present Illness  HPI  This is an 82-year-old female who returns in follow-up of memory difficulties.  She was accompanied by her daughter who reported that she was fairly stable until September 2016 when she became quite distraught after the death of her niece suddenly.  It is reported that she used to look after her niece as if she were her daughter.  She became increasingly withdrawn and inattentive and at times is rambling incoherently.  She was admitted to the hospital when she became increasingly confused however it was noted that she was not taking her medications as prescribed and her diabetes was not well controlled and in addition it was suspected she may have had a reaction to the clonidine.  However a workup done during that hospitalization included a CT scan of the brain that was unremarkable and the patient is otherwise stable when discharged.      Subsequently since her last visit 3 months ago she has been in and out of the hospital with cardiac problems and following her last hospitalization she was transferred to the [facility] where she is a resident requiring supervised care.  She is essentially limited to a wheelchair as she has significant difficulty ambulating.  She is presently on Namenda for memory and Lexapro 5 mg daily which has calmed down however her family reports that she tends to get sedated after she takes medication in the morning.  The patient cannot give much history.  No significant behavior problems are reported since her last visit.  She has adjusted well to the nursing home.  She does get physical and occupational therapy there.       Review of Systems  Review of Systems   Constitutional: Negative.    HENT: Negative.  Negative for hearing loss.    Eyes: Negative.  Negative for visual disturbance.   Respiratory: Negative.  Negative for shortness of  breath.    Cardiovascular: Negative.  Negative for chest pain and palpitations.   Gastrointestinal: Negative.    Endocrine: Negative.    Genitourinary: Negative.    Musculoskeletal: Positive for arthralgias, back pain and gait problem. Negative for neck pain.   Skin: Negative.    Allergic/Immunologic: Negative.    Neurological: Negative for tremors, seizures, syncope, speech difficulty, weakness, numbness and headaches.   Hematological: Negative.    Psychiatric/Behavioral: Positive for decreased concentration and sleep disturbance. Negative for confusion. The patient is nervous/anxious.        Objective:      Neurologic Exam     Motor Exam     Strength   Strength 5/5 throughout.       Physical Exam   Constitutional: She appears well-developed and well-nourished.   HENT:   Head: Normocephalic.   Neck: Normal range of motion. Neck supple.   Neurological: She is alert. She has normal strength. She displays no atrophy, no tremor and normal reflexes (DTRs generally depressed to absent distally with plantars being flexor). A sensory deficit (Primary sensations intact though diminished distally) is present. No cranial nerve deficit (Cranial nerves are essentially intact and symmetrical.  Pupils equal and reactive with EOM full.  No facial asymmetry is noted.  Corneals and facial sensations intact.  Tongue and palate movements are normal.). She exhibits normal muscle tone. Gait (Walks stiffly with slight imbalance related to back and hip problems.) abnormal. Coordination normal.   Patient is awake and responsive and able to give an adequate recent history.  She does remember details of what happened to her niece and appears to be focused on that.  She is oriented to place person and time.  Immediate recall 2/3.  Spelling backwards 3/5.  She does take time to answer questions and there is a tendency to perseverate.  She is otherwise appropriate able to follow commands without any difficulty.  Speech was of normal flow and  content though rambling at times.   Vitals reviewed.        Assessment:        1. Vascular dementia without behavioral disturbance    2. Diabetes mellitus with stage 4 chronic kidney disease GFR 15-29    3. Obstructive sleep apnea on CPAP - setting = 5     4. Chronic diastolic heart failure    5. Chronic atrial fibrillation    6. Peripheral vascular disease    7. Essential hypertension            Plan:         Discussed with patient and family.  Continue Namenda 10 mg twice a day and continue with Lexapro 5 mg daily but given at bedtime.  Continue with present supervised care at the nursing home and continue follow-up with her treating physician at the nursing home.  She will be seen by me in follow-up only on an as needed basis.

## 2017-07-21 NOTE — PATIENT INSTRUCTIONS
Discussed with patient and family.  Continue Namenda 10 mg twice a day and continue with Lexapro 5 mg daily but given at bedtime.  Continue with present supervised care at the nursing home and continue follow-up with her treating physician at the nursing home.  She will be seen by me in follow-up only on an as needed basis.

## 2017-08-04 NOTE — PROGRESS NOTES
Per Camryn with [facility], the pt may discharge the week of 8/14.  We will continue to check status.

## 2017-08-07 ENCOUNTER — TELEPHONE (OUTPATIENT)
Dept: ELECTROPHYSIOLOGY | Facility: CLINIC | Age: 82
End: 2017-08-07

## 2017-08-07 DIAGNOSIS — I48.91 ATRIAL FIBRILLATION, UNSPECIFIED TYPE: Primary | ICD-10-CM

## 2017-08-16 ENCOUNTER — CLINICAL SUPPORT (OUTPATIENT)
Dept: ELECTROPHYSIOLOGY | Facility: CLINIC | Age: 82
End: 2017-08-16
Payer: COMMERCIAL

## 2017-08-16 ENCOUNTER — HOSPITAL ENCOUNTER (OUTPATIENT)
Dept: CARDIOLOGY | Facility: CLINIC | Age: 82
Discharge: HOME | End: 2017-08-16
Payer: COMMERCIAL

## 2017-08-16 ENCOUNTER — OFFICE VISIT (OUTPATIENT)
Dept: INTERNAL MEDICINE | Facility: CLINIC | Age: 82
End: 2017-08-16
Payer: COMMERCIAL

## 2017-08-16 ENCOUNTER — OFFICE VISIT (OUTPATIENT)
Dept: ELECTROPHYSIOLOGY | Facility: CLINIC | Age: 82
End: 2017-08-16
Payer: COMMERCIAL

## 2017-08-16 VITALS — SYSTOLIC BLOOD PRESSURE: 168 MMHG | DIASTOLIC BLOOD PRESSURE: 80 MMHG | HEART RATE: 68 BPM

## 2017-08-16 VITALS
DIASTOLIC BLOOD PRESSURE: 52 MMHG | HEART RATE: 65 BPM | WEIGHT: 170 LBS | BODY MASS INDEX: 23.03 KG/M2 | SYSTOLIC BLOOD PRESSURE: 128 MMHG | HEIGHT: 72 IN

## 2017-08-16 DIAGNOSIS — E78.2 MIXED HYPERLIPIDEMIA: ICD-10-CM

## 2017-08-16 DIAGNOSIS — F01.518 VASCULAR DEMENTIA WITH BEHAVIOR DISTURBANCE: ICD-10-CM

## 2017-08-16 DIAGNOSIS — F33.41 RECURRENT MAJOR DEPRESSIVE DISORDER, IN PARTIAL REMISSION: ICD-10-CM

## 2017-08-16 DIAGNOSIS — G47.33 OBSTRUCTIVE SLEEP APNEA ON CPAP: Chronic | ICD-10-CM

## 2017-08-16 DIAGNOSIS — E11.22 DIABETES MELLITUS WITH STAGE 4 CHRONIC KIDNEY DISEASE GFR 15-29: Chronic | ICD-10-CM

## 2017-08-16 DIAGNOSIS — S91.301D OPEN WOUND OF HEEL, RIGHT, SUBSEQUENT ENCOUNTER: ICD-10-CM

## 2017-08-16 DIAGNOSIS — I35.0 NONRHEUMATIC AORTIC VALVE STENOSIS: ICD-10-CM

## 2017-08-16 DIAGNOSIS — D69.6 THROMBOCYTOPENIA: ICD-10-CM

## 2017-08-16 DIAGNOSIS — I48.91 ATRIAL FIBRILLATION: ICD-10-CM

## 2017-08-16 DIAGNOSIS — I50.32 CHRONIC DIASTOLIC HEART FAILURE: Primary | Chronic | ICD-10-CM

## 2017-08-16 DIAGNOSIS — Z79.899 AT RISK FOR AMIODARONE TOXICITY WITH LONG TERM USE: ICD-10-CM

## 2017-08-16 DIAGNOSIS — I13.0: ICD-10-CM

## 2017-08-16 DIAGNOSIS — Z98.61 CAD S/P PERCUTANEOUS CORONARY ANGIOPLASTY: Chronic | ICD-10-CM

## 2017-08-16 DIAGNOSIS — N18.4 DIABETES MELLITUS WITH STAGE 4 CHRONIC KIDNEY DISEASE GFR 15-29: Chronic | ICD-10-CM

## 2017-08-16 DIAGNOSIS — I49.5 SSS (SICK SINUS SYNDROME): Chronic | ICD-10-CM

## 2017-08-16 DIAGNOSIS — N18.4 CHRONIC KIDNEY DISEASE, STAGE IV (SEVERE): Chronic | ICD-10-CM

## 2017-08-16 DIAGNOSIS — I25.10 CAD S/P PERCUTANEOUS CORONARY ANGIOPLASTY: Chronic | ICD-10-CM

## 2017-08-16 DIAGNOSIS — I10 ESSENTIAL HYPERTENSION: ICD-10-CM

## 2017-08-16 DIAGNOSIS — B18.2 CHRONIC HEPATITIS C WITHOUT HEPATIC COMA: ICD-10-CM

## 2017-08-16 DIAGNOSIS — I50.43 ACUTE ON CHRONIC COMBINED SYSTOLIC AND DIASTOLIC CHF (CONGESTIVE HEART FAILURE): Primary | ICD-10-CM

## 2017-08-16 DIAGNOSIS — Z91.148 NON COMPLIANCE W MEDICATION REGIMEN: ICD-10-CM

## 2017-08-16 DIAGNOSIS — Z95.0 PACEMAKER: Chronic | ICD-10-CM

## 2017-08-16 DIAGNOSIS — I48.91 ATRIAL FIBRILLATION, UNSPECIFIED TYPE: ICD-10-CM

## 2017-08-16 DIAGNOSIS — I48.20 CHRONIC ATRIAL FIBRILLATION: ICD-10-CM

## 2017-08-16 DIAGNOSIS — Z91.89 AT RISK FOR AMIODARONE TOXICITY WITH LONG TERM USE: ICD-10-CM

## 2017-08-16 DIAGNOSIS — Z95.0 CARDIAC PACEMAKER IN SITU: ICD-10-CM

## 2017-08-16 PROBLEM — I50.23 ACUTE ON CHRONIC SYSTOLIC CONGESTIVE HEART FAILURE: Status: RESOLVED | Noted: 2017-06-23 | Resolved: 2017-08-16

## 2017-08-16 PROBLEM — F03.90 DEMENTIA: Chronic | Status: RESOLVED | Noted: 2017-06-23 | Resolved: 2017-08-16

## 2017-08-16 PROCEDURE — 99999 PR PBB SHADOW E&M-EST. PATIENT-LVL III: CPT | Mod: PBBFAC,,, | Performed by: NURSE PRACTITIONER

## 2017-08-16 PROCEDURE — 3078F DIAST BP <80 MM HG: CPT | Mod: S$GLB,,, | Performed by: NURSE PRACTITIONER

## 2017-08-16 PROCEDURE — 99214 OFFICE O/P EST MOD 30 MIN: CPT | Mod: S$GLB,,, | Performed by: NURSE PRACTITIONER

## 2017-08-16 PROCEDURE — 1159F MED LIST DOCD IN RCRD: CPT | Mod: S$GLB,,, | Performed by: INTERNAL MEDICINE

## 2017-08-16 PROCEDURE — 3079F DIAST BP 80-89 MM HG: CPT | Mod: S$GLB,,, | Performed by: INTERNAL MEDICINE

## 2017-08-16 PROCEDURE — 99215 OFFICE O/P EST HI 40 MIN: CPT | Mod: S$GLB,,, | Performed by: INTERNAL MEDICINE

## 2017-08-16 PROCEDURE — 3074F SYST BP LT 130 MM HG: CPT | Mod: S$GLB,,, | Performed by: NURSE PRACTITIONER

## 2017-08-16 PROCEDURE — 93280 PM DEVICE PROGR EVAL DUAL: CPT | Mod: S$GLB,,, | Performed by: INTERNAL MEDICINE

## 2017-08-16 PROCEDURE — 1126F AMNT PAIN NOTED NONE PRSNT: CPT | Mod: S$GLB,,, | Performed by: INTERNAL MEDICINE

## 2017-08-16 PROCEDURE — 99999 PR PBB SHADOW E&M-EST. PATIENT-LVL V: CPT | Mod: PBBFAC,,, | Performed by: INTERNAL MEDICINE

## 2017-08-16 PROCEDURE — 3077F SYST BP >= 140 MM HG: CPT | Mod: S$GLB,,, | Performed by: INTERNAL MEDICINE

## 2017-08-16 PROCEDURE — 3008F BODY MASS INDEX DOCD: CPT | Mod: S$GLB,,, | Performed by: INTERNAL MEDICINE

## 2017-08-16 PROCEDURE — 1159F MED LIST DOCD IN RCRD: CPT | Mod: S$GLB,,, | Performed by: NURSE PRACTITIONER

## 2017-08-16 PROCEDURE — 3008F BODY MASS INDEX DOCD: CPT | Mod: S$GLB,,, | Performed by: NURSE PRACTITIONER

## 2017-08-16 RX ORDER — ACETAMINOPHEN, DEXTROMETHORPHAN HBR, DOXYLAMINE SUCCINATE, PHENYLEPHRINE HCL 650; 20; 12.5; 1 MG/30ML; MG/30ML; MG/30ML; MG/30ML
1000 SOLUTION ORAL DAILY
Qty: 90 EACH | Refills: 3 | Status: SHIPPED | OUTPATIENT
Start: 2017-08-16

## 2017-08-16 RX ORDER — TORSEMIDE 20 MG/1
20 TABLET ORAL 2 TIMES DAILY
Qty: 180 TABLET | Refills: 3 | Status: ON HOLD | OUTPATIENT
Start: 2017-08-16 | End: 2017-08-25

## 2017-08-16 RX ORDER — INSULIN LISPRO 100 [IU]/ML
4 INJECTION, SOLUTION INTRAVENOUS; SUBCUTANEOUS
Qty: 3.6 ML | Refills: 11 | Status: SHIPPED | OUTPATIENT
Start: 2017-08-16 | End: 2017-09-07

## 2017-08-16 RX ORDER — PRAVASTATIN SODIUM 20 MG/1
20 TABLET ORAL DAILY
Qty: 90 TABLET | Refills: 3 | Status: SHIPPED | OUTPATIENT
Start: 2017-08-16

## 2017-08-16 RX ORDER — MEMANTINE HYDROCHLORIDE 10 MG/1
10 TABLET ORAL 2 TIMES DAILY
Qty: 180 TABLET | Refills: 3 | Status: SHIPPED | OUTPATIENT
Start: 2017-08-16 | End: 2018-02-12

## 2017-08-16 RX ORDER — ESCITALOPRAM OXALATE 5 MG/1
5 TABLET ORAL DAILY
Qty: 90 TABLET | Refills: 3 | Status: SHIPPED | OUTPATIENT
Start: 2017-08-16 | End: 2018-03-30

## 2017-08-16 RX ORDER — CARVEDILOL 25 MG/1
25 TABLET ORAL 2 TIMES DAILY WITH MEALS
Qty: 180 TABLET | Refills: 3 | Status: SHIPPED | OUTPATIENT
Start: 2017-08-16

## 2017-08-16 RX ORDER — INSULIN GLARGINE 100 [IU]/ML
10 INJECTION, SOLUTION SUBCUTANEOUS DAILY
Qty: 30 ML | Refills: 12 | Status: ON HOLD | OUTPATIENT
Start: 2017-08-16 | End: 2017-08-25

## 2017-08-16 RX ORDER — DOXAZOSIN 8 MG/1
8 TABLET ORAL DAILY
Qty: 90 TABLET | Refills: 3 | Status: SHIPPED | OUTPATIENT
Start: 2017-08-16 | End: 2018-08-16

## 2017-08-16 RX ORDER — HYDRALAZINE HYDROCHLORIDE 100 MG/1
100 TABLET, FILM COATED ORAL EVERY 8 HOURS
Qty: 270 TABLET | Refills: 3 | Status: SHIPPED | OUTPATIENT
Start: 2017-08-16

## 2017-08-16 RX ORDER — POTASSIUM CHLORIDE 750 MG/1
10 TABLET, EXTENDED RELEASE ORAL 2 TIMES DAILY
Qty: 180 TABLET | Refills: 0 | Status: SHIPPED | OUTPATIENT
Start: 2017-08-16

## 2017-08-16 RX ORDER — AMIODARONE HYDROCHLORIDE 200 MG/1
200 TABLET ORAL DAILY
Qty: 90 TABLET | Refills: 3 | Status: SHIPPED | OUTPATIENT
Start: 2017-08-16 | End: 2017-10-24

## 2017-08-16 RX ORDER — CLONIDINE HYDROCHLORIDE 0.1 MG/1
0.1 TABLET ORAL 3 TIMES DAILY
Qty: 270 TABLET | Refills: 3 | Status: ON HOLD | OUTPATIENT
Start: 2017-08-16 | End: 2017-08-25

## 2017-08-16 RX ORDER — NAPROXEN SODIUM 220 MG/1
81 TABLET, FILM COATED ORAL DAILY
Qty: 90 TABLET | Refills: 3 | Status: SHIPPED | OUTPATIENT
Start: 2017-08-16

## 2017-08-16 NOTE — PATIENT INSTRUCTIONS
Discharge Instructions for Aortic Valve Stenosis  You have been diagnosed with aortic valve stenosis. This means the aortic valve in your heart is stiff or remains in a near-closed position and has trouble opening. Because of this, your heart has to work harder to push the blood through the valve. In some cases, this extra work makes the muscle of the heart thicken. The extra work can tire the heart and cause its muscle to weaken over time. This condition often changes very slowly and doesn't need to be treated. But in some people, it may get worse faster and need to be treated. Some people can control their stenosis with medicines. In some severe cases, surgery is needed.  Home care  · Check with your doctor before taking any over-the-counter medicines, herbal products, or vitamins.  · Take your medicines exactly as directed. Dont skip doses.  · Keep all follow-up appointments. Some people with aortic stenosis dont have symptoms. Others need close follow-up and surgery.  Lifestyle changes  · Maintain a healthy weight. Get help to lose any extra pounds.  · Ask your doctor if an exercise program is right for you. Some people with aortic stenosis need to be very careful about exercise as it can result in fainting.  · Break the smoking habit. Enroll in a stop-smoking program to improve your chances of success.  Follow-up care  Make a follow-up appointment with your healthcare provider, or as directed.  When to call 911  Call 911 or go to the emergency room right away if you have any of the following:  · Chest pain or shortness of breath  · Weakness in the muscles of your face, arms, or legs  · Trouble speaking  · Rapid pulse or pounding heartbeat  · Fainting or dizziness  · Sudden vertigo   Date Last Reviewed: 6/1/2016  © 9332-4203 [employer]. [address] [place] [address], [place] [address]. All rights reserved. This information is not intended as a substitute for professional medical care. Always follow  your healthcare professional's instructions.

## 2017-08-16 NOTE — PROGRESS NOTES
Transitional Care Note  Subjective:       Patient ID: Chey Trujilol is a 82 y.o. female.  Chief Complaint: Follow-up    Family and/or Caretaker present at visit?  Yes.   Daughters Stephanie and Jamee  Diagnostic tests reviewed/disposition: I have reviewed all completed as well as pending diagnostic tests at the time of discharge.  Disease/illness education: yes  Home health/community services discussion/referrals: Patient does not have home health established from hospital visit.  They do need home health.  If needed, we will set up home health for the patient.   Establishment or re-establishment of referral orders for community resources: [facility].   Discussion with other health care providers: No discussion with other health care providers necessary.     Multiple hospitalizations for heart failure, June 18, June 23 and recently early July.    She presented to ER from [facility] for SNF for CP. Pt reported chest pain associated with presyncope, nausea , shortness of breath.  Recall she was recently admitted on 6/28/2017 and 6/19/2017 for CHF for IV diuresis. Pt evaluated by EP for possible RFA ablation- pt found to be in a flutter with DCCV done with conversion to SR.  Pt currently on coumadin for afib.   On arrival, trop elevated @ 0.041. Pt admitted for IV diuresis and further eval.     She does not seem to be getting cardiology care here at [facility] anymore.  No recent outpatient cardiology assessment.    She was sent out to a nursing home.  However, daughters want her to go home tomorrow with home health.  Long discussion regarding additional needs and limitations of home health.    Mental status and memory problems- see Neurology assessment April 2017, then again in July.    Neuropsych assessment 2016 revealed mild to moderate dementia and depression.   It was recommended she continue Namenda 10 mg twice a day and continue with Lexapro 5 mg daily but given at bedtime.  Continue with present supervised care at the nursing  home and continue follow-up with her treating physician at the nursing home.  She will be seen by Neurology in follow-up only on an as needed basis.    Nephrology saw her June 2017.    Some ongoing Endo follow up.  No appointment until next month.  For some reason, when she was sent out of the hospital, she did not have any standing orders for lispro with meals, only a sliding scale.  Sugars have been running high and she's been thirsty.    Has a small wound R heel and has been getting some wound care.    Gilberto     Stephanie     Patient Active Problem List   Diagnosis    Chronic atrial fibrillation    Anticoagulation monitoring by pharmacist    Obstructive sleep apnea on CPAP - setting = 5     CAD S/P percutaneous coronary angioplasty    Chronic diastolic heart failure    Secondary pulmonary hypertension    Peripheral vascular disease    Mixed hyperlipidemia    Chronic kidney disease, stage IV (severe)    Pacemaker 2/09/12    At risk for amiodarone toxicity with long term use    Chronic hepatitis C without hepatic coma    Thrombocytopenia    Bilateral carotid artery disease: 40-49% 2016 Bilateral    Anemia of chronic disease    Non compliance w medication regimen    Diabetes mellitus with stage 4 chronic kidney disease GFR 15-29    Osteoporosis: see DEXA 1/16    Thyroid nodule    Nonrheumatic aortic valve stenosis    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), managed as type 1    Vitamin D deficiency    Vascular dementia with behavior disturbance    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    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 due to underlying condition, with fat layer exposed    Essential hypertension    Recurrent major depressive disorder, in partial remission    Diabetic ulcer of right heel associated with type 1 diabetes mellitus, limited to breakdown of skin    Pancytopenia    Protein calorie malnutrition    SSS (sick sinus syndrome)     Pleural effusion    Acute on chronic combined systolic and diastolic CHF (congestive heart failure)    Arm edema           HPI  Review of Systems   Constitutional: Negative for chills, fatigue and fever.   HENT: Positive for postnasal drip. Negative for congestion and ear pain.    Eyes: Negative.    Respiratory: Positive for apnea. Negative for cough, chest tightness, shortness of breath and wheezing.         On CPAP   Cardiovascular: Negative.  Negative for chest pain and leg swelling.   Gastrointestinal: Negative.  Negative for abdominal pain.   Endocrine: Positive for polydipsia.   Genitourinary: Negative for frequency and hematuria.   Musculoskeletal: Positive for arthralgias and back pain.   Neurological:        Stable sx- see Neurology assessment   Psychiatric/Behavioral: Negative for agitation and behavioral problems.       Objective:      Physical Exam   Constitutional: She appears well-developed and well-nourished.   HENT:   Head: Normocephalic and atraumatic.   Right Ear: External ear normal.   Left Ear: External ear normal.   Nose: Nose normal.   Mouth/Throat: Oropharynx is clear and moist. No oropharyngeal exudate.   Eyes: Conjunctivae and EOM are normal. No scleral icterus.   Neck: Normal range of motion. Neck supple. No JVD present. Thyromegaly present.   Cardiovascular: Normal rate, regular rhythm and intact distal pulses.  Exam reveals no gallop.    Murmur heard.  Pulmonary/Chest: Effort normal and breath sounds normal. No respiratory distress. She has no wheezes.   Abdominal: Soft. Bowel sounds are normal. She exhibits no distension and no mass. There is no tenderness. There is no rebound and no guarding.   Musculoskeletal: Normal range of motion. She exhibits no edema or tenderness.   Lymphadenopathy:     She has no cervical adenopathy.   Neurological: No cranial nerve deficit.   Sleepy but arousable   Skin: Skin is warm. No rash noted. No erythema.   Covered wound R heel- recently dressed    Psychiatric: She has a normal mood and affect. Her behavior is normal. Judgment and thought content normal.   Nursing note and vitals reviewed.      Assessment:       1. Chronic diastolic heart failure    2. Vascular dementia with behavior disturbance    3. Non compliance w medication regimen    4. Recurrent major depressive disorder, in partial remission    5. Nonrheumatic aortic valve stenosis    6. Essential hypertension    7. Chronic atrial fibrillation    8. Mixed hyperlipidemia    9. Chronic kidney disease, stage IV (severe)    10. Diabetes mellitus with stage 4 chronic kidney disease GFR 15-29    11. Thrombocytopenia    12. 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    Diagnoses and all orders for this visit:    Chronic diastolic heart failure: Diet cautions, signs and symptoms reviewed.  Compliance with medication urged.  Home health and outpatient case management  -     carvedilol (COREG) 25 MG tablet; Take 1 tablet (25 mg total) by mouth 2 (two) times daily with meals.  -     torsemide (DEMADEX) 20 MG Tab; Take 1 tablet (20 mg total) by mouth 2 (two) times daily. .  -     Ambulatory consult to Cardiology    Vascular dementia with behavior disturbance: Continue regimen, keep neurology follow-up  -     escitalopram oxalate (LEXAPRO) 5 MG Tab; Take 1 tablet (5 mg total) by mouth once daily.  -     memantine (NAMENDA) 10 MG Tab; Take 1 tablet (10 mg total) by mouth 2 (two) times daily.    Non compliance w medication regimen: Reviewed with family.  Home health.  Will need more support at home    Recurrent major depressive disorder, in partial remission: Stable on regimen    Nonrheumatic aortic valve stenosis: Stable, keep cardiology follow-up    Essential hypertension: Continue current regimen    Chronic atrial fibrillation: Keep arrhythmia follow-up    Mixed hyperlipidemia    Chronic kidney disease, stage IV  (severe): Gentle hydration, avoid nephrotoxins.  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    Other orders  -     insulin glargine (LANTUS) 100 unit/mL injection; Inject 10 Units into the skin once daily.  -     insulin lispro (HUMALOG) 100 unit/mL injection; Inject 4 Units into the skin 3 (three) times daily before meals.  -     cloNIDine (CATAPRES) 0.1 MG tablet; Take 1 tablet (0.1 mg total) by mouth 3 (three) times daily.  -     amiodarone (PACERONE) 200 MG Tab; Take 1 tablet (200 mg total) by mouth once daily.  -     aspirin 81 MG Chew; Take 1 tablet (81 mg total) by mouth once daily.  -     cyanocobalamin, vitamin B-12, 1,000 mcg TbSR; Take 1,000 mcg by mouth once daily.  -     doxazosin (CARDURA) 8 MG Tab; Take 1 tablet (8 mg total) by mouth once daily.  -     hydrALAZINE (APRESOLINE) 100 MG tablet; Take 1 tablet (100 mg total) by mouth every 8 (eight) hours.  -     potassium chloride SA (KLOR-CON M10) 10 MEQ tablet; Take 1 tablet (10 mEq total) by mouth 2 (two) times daily.  -     pravastatin (PRAVACHOL) 20 MG tablet; Take 1 tablet (20 mg total) by mouth once daily.  -     isosorbide dinitrate (ISOCHRON) 40 mg TbSR; Take 1 tablet (40 mg total) by mouth every 8 (eight) hours.    Meds reviewed and printed  Signs and symptoms of CHF reviewed  Home health for wound care, aide, social work, nursing, PT and OT  Outpatient case management    Return to see me within the next 2 months, sooner with problems prior

## 2017-08-16 NOTE — PROGRESS NOTES
Subjective:       Patient ID: Chey Trujillo is a 82 y.o. female.    Chief Complaint: Follow-up    Follow up multiple issues    Mental status and memory problems- see Neurology assessment April 2017, then again in July.    Neuropsych assessment 2016 revealed mild to moderate dementia and depression.   It was recommended she continue Namenda 10 mg twice a day and continue with Lexapro 5 mg daily but given at bedtime.  Continue with present supervised care at the nursing home and continue follow-up with her treating physician at the nursing home.  She will be seen by Neurology in follow-up only on an as needed basis.    presented to Er from [facility] for SNF for CP. Pt reports CP with  associated with presyncope, nausea , shortness of breath . Pt  recently admitted and dc from [facility] main campus on 6/28/2017 and 6/19/2017 for CHF for IV diuresis. Pt evaluated by EP for possible RFA ablation- pt found to be in a flutter with DCCV done with conversion to SR.  Pt currently on coumadin for afib.on arrival, trop elevated @ 0.041. Pt admitted for IV diuresis and further eval.              Cardiology    Nephrology          Review of Systems    Objective:      Physical Exam    Assessment:       No diagnosis found.    Plan:

## 2017-08-16 NOTE — PROGRESS NOTES
Per son, pt to be d/c from [facility] possibly 8/17. I asked him to have her call us 8/17 or 8/18 to get reestablished

## 2017-08-16 NOTE — PROGRESS NOTES
Subjective:    Patient ID:  Chey Trujillo is a 82 y.o. female who presents for follow-up of Atrial Fibrillation    Chey Trujillo is a patient of Dr. Yodit Caceres.    HPI     81 yo woman with HTN, AANBELA, DM, AF, diastolic HF/PH and now here for f/u-    Hx of SOB; persistent but not a lot worse. She ambulates around the house with a walker-  She has some swelling, wears compression stockings which does help.     She also has a long-standing AF hx; dating back to at least 2003; has been on amiodarone since then .   She had been on coumadin, plavix and ASA and with that she was spitting out blood >Plavix was discontinued w/resolution of her bleeding.  At her office visit in July of 2016, she was noted to be in and out of AFL.  INRs have been mostly around 2.0    She underwent an EPS (06/26/17); the study revealed right atrial flutter associated w/left atrial fibrillation. She underwent a successful cardioversion w/resumption of AR pacing. Had been admitted 07/02/17 for Acute HF; she was managed conservatively and discharged home; no recurrence.     Since the procedure, Ms. Trujillo reports that she feels well overall; she denies chest pain, SOB/SCHWAB, dizziness, palpitations, or syncope.     Recent cardiac studies:  Device Interrogation (08/16/) reveals an intrinsic SR with stable lead and device function. No arrhythmias since DCC.      I reviewed today's ECG which demonstrated SR w/short RP at 65 bpm; OR 80, QRS 98, and QTc 457.    Review of Systems   Constitution: Positive for malaise/fatigue. Negative for diaphoresis.   HENT: Negative for headaches and nosebleeds.    Eyes: Negative for double vision.   Cardiovascular: Negative for chest pain, dyspnea on exertion, irregular heartbeat, near-syncope, palpitations and syncope.   Respiratory: Negative for shortness of breath.    Skin: Negative.    Musculoskeletal: Positive for muscle weakness and stiffness.   Gastrointestinal: Negative for abdominal pain, hematemesis and  hematochezia.   Genitourinary: Negative for hematuria.   Neurological: Negative for dizziness and light-headedness.   Psychiatric/Behavioral: Negative for altered mental status.        Objective:    Physical Exam   Constitutional: She is oriented to person, place, and time. She appears well-developed and well-nourished.   HENT:   Head: Normocephalic and atraumatic.   Eyes: Pupils are equal, round, and reactive to light.   Cardiovascular: Normal rate, regular rhythm, normal heart sounds and intact distal pulses.    Pulmonary/Chest: Effort normal and breath sounds normal.   Musculoskeletal:   Ms. Trujillo is in a wheelchair today.    Neurological: She is alert and oriented to person, place, and time.   Skin: She is not diaphoretic.   Vitals reviewed.        Assessment:       1. Acute on chronic combined systolic and diastolic CHF (congestive heart failure)    2. Chronic atrial fibrillation    3. Secondary pulmonary hypertension    4. SSS (sick sinus syndrome)    5. Pacemaker 2/09/12    6. CAD S/P percutaneous coronary angioplasty    7. Essential hypertension    8. Mixed hyperlipidemia    9. Nonrheumatic aortic valve stenosis    10. At risk for amiodarone toxicity with long term use    11. Chronic kidney disease, stage IV (severe)    12. Chronic hepatitis C without hepatic coma    13. Diabetes mellitus with stage 4 chronic kidney disease GFR 15-29    14. Obstructive sleep apnea on CPAP - setting = 5     15. Vascular dementia with behavior disturbance         Plan:       Ms. Trujillo is doing well from a rhythm perspective with stable lead and device function without arrhythmia noted since recent DCCV (06/2017). She remains rhythm-controlled on amiodarone and anticoagulated on warfarin.     Ambulatory referral to Cardiology to establish care given CAD s/p PCI; and moderate MR and TR seen on recent Echo.   PFTs now given long term amiodarone use; recent TSH and LFTs WNL.   Continue current medication regimen and device  settings.   Follow up in device clinic as scheduled.   Follow up in EP clinic in 1 year, sooner as needed.     SHAHID Arreola, APRN, FNP-C        (A copy of today's note was sent to Dr. Yodti Caceres.)

## 2017-08-18 ENCOUNTER — TELEPHONE (OUTPATIENT)
Dept: INTERNAL MEDICINE | Facility: CLINIC | Age: 82
End: 2017-08-18

## 2017-08-18 ENCOUNTER — OUTPATIENT CASE MANAGEMENT (OUTPATIENT)
Dept: ADMINISTRATIVE | Facility: OTHER | Age: 82
End: 2017-08-18

## 2017-08-18 NOTE — TELEPHONE ENCOUNTER
----- Message from Silvia Harrell sent at 8/18/2017 12:35 PM CDT -----  Thank you for the referral.  Patient has been assigned to RE[facility] Linares  for low risk screening for Outpatient Case Management.      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

## 2017-08-18 NOTE — PROGRESS NOTES
Thank you for the referral.  Patient has been assigned to RE[facility] Linares  for low risk screening for Outpatient Case Management.     Reason for referral: Low risk    Vascular dementia with behavior disturbance  Chronic diastolic heart failure  Chronic kidney disease, stage IV (severe)  Diabetes mellitus with stage 4 chronic kidney disease GFR 15-29  Malignant essential hypertension with congestive heart failure with renal disease    Thank you,    Silvia Harrell, SSC

## 2017-08-21 ENCOUNTER — OUTPATIENT CASE MANAGEMENT (OUTPATIENT)
Dept: ADMINISTRATIVE | Facility: OTHER | Age: 82
End: 2017-08-21

## 2017-08-21 ENCOUNTER — TELEPHONE (OUTPATIENT)
Dept: INTERNAL MEDICINE | Facility: CLINIC | Age: 82
End: 2017-08-21

## 2017-08-21 PROBLEM — R55 SYNCOPE: Status: ACTIVE | Noted: 2017-08-21

## 2017-08-21 NOTE — PROGRESS NOTES
The pt was discharged from the living center on 8/17.  Neither her son nor her daughter (Stephanie - [phone]) could confirm her current dose of warfarin or any new meds.  We will contact Stephanie with further dosing instructions and confirm recent warfarin dosing with her INR due later today through home health.

## 2017-08-21 NOTE — TELEPHONE ENCOUNTER
----- Message from Lindsey Chisholm sent at 8/19/2017 12:31 PM CDT -----  Contact: Danya from [facility]  Danya would like to know if pt needs to continue taking the levothyroxine or not.  The family can be reached at [phone]    Or fax new orders for medication directions to  Fax [phone]

## 2017-08-21 NOTE — PROGRESS NOTES
Attempt #:  1  This LMSW attempted to reach patient/caregiver to provide resource and left a message requesting a return call.

## 2017-08-21 NOTE — TELEPHONE ENCOUNTER
Please ask them how long it has been since she has taken her thyroid medicine.    It is not on her list currently    Her last labs in June were normal, was she taking thyroid medicine then?

## 2017-08-22 ENCOUNTER — OUTPATIENT CASE MANAGEMENT (OUTPATIENT)
Dept: ADMINISTRATIVE | Facility: OTHER | Age: 82
End: 2017-08-22

## 2017-08-22 ENCOUNTER — ANTI-COAG VISIT (OUTPATIENT)
Dept: CARDIOLOGY | Facility: CLINIC | Age: 82
End: 2017-08-22

## 2017-08-22 DIAGNOSIS — I48.20 CHRONIC ATRIAL FIBRILLATION: ICD-10-CM

## 2017-08-22 DIAGNOSIS — Z79.01 ANTICOAGULATION MONITORING BY PHARMACIST: ICD-10-CM

## 2017-08-22 PROBLEM — F01.50 VASCULAR DEMENTIA WITHOUT BEHAVIORAL DISTURBANCE: Status: ACTIVE | Noted: 2017-01-13

## 2017-08-22 PROBLEM — R82.90 ABNORMAL URINALYSIS: Status: ACTIVE | Noted: 2017-08-22

## 2017-08-22 PROBLEM — J90 PLEURAL EFFUSION: Status: RESOLVED | Noted: 2017-06-23 | Resolved: 2017-08-22

## 2017-08-22 PROBLEM — E87.6 HYPOKALEMIA: Status: ACTIVE | Noted: 2017-08-22

## 2017-08-22 LAB — INR PPP: 2.2

## 2017-08-22 NOTE — PROGRESS NOTES
Per inpatient notes patient is currently admitted to acute care at this time. LMSW will follow up with patient/patient caregiver upon discharge.

## 2017-08-23 PROBLEM — N39.0 URINARY TRACT INFECTION WITHOUT HEMATURIA: Status: ACTIVE | Noted: 2017-08-22

## 2017-08-23 PROBLEM — R60.0 ARM EDEMA: Status: RESOLVED | Noted: 2017-07-03 | Resolved: 2017-08-23

## 2017-08-25 PROBLEM — R55 SYNCOPE: Status: RESOLVED | Noted: 2017-08-21 | Resolved: 2017-08-25

## 2017-08-26 ENCOUNTER — TELEPHONE (OUTPATIENT)
Dept: INTERNAL MEDICINE | Facility: CLINIC | Age: 82
End: 2017-08-26

## 2017-08-28 ENCOUNTER — ANTI-COAG VISIT (OUTPATIENT)
Dept: CARDIOLOGY | Facility: CLINIC | Age: 82
End: 2017-08-28

## 2017-08-28 ENCOUNTER — TELEPHONE (OUTPATIENT)
Dept: PRIMARY CARE CLINIC | Facility: CLINIC | Age: 82
End: 2017-08-28

## 2017-08-28 ENCOUNTER — TELEPHONE (OUTPATIENT)
Dept: INTERNAL MEDICINE | Facility: CLINIC | Age: 82
End: 2017-08-28

## 2017-08-28 DIAGNOSIS — I48.20 CHRONIC ATRIAL FIBRILLATION: ICD-10-CM

## 2017-08-28 DIAGNOSIS — Z79.01 ANTICOAGULATION MONITORING BY PHARMACIST: ICD-10-CM

## 2017-08-28 LAB — INR PPP: 1.3

## 2017-08-28 NOTE — TELEPHONE ENCOUNTER
----- Message from Bonnie Guan sent at 8/25/2017  2:11 PM CDT -----  Contact: self [phone]  .Type: Sooner appointment than  is able to schedule    When is the first available appointment?  10/02/17    What is the nature of the appointment? EP     What appointment type: follow up     Comments: please advise , Thanks !

## 2017-08-29 ENCOUNTER — OUTPATIENT CASE MANAGEMENT (OUTPATIENT)
Dept: ADMINISTRATIVE | Facility: OTHER | Age: 82
End: 2017-08-29

## 2017-08-29 ENCOUNTER — TELEPHONE (OUTPATIENT)
Dept: INTERNAL MEDICINE | Facility: CLINIC | Age: 82
End: 2017-08-29

## 2017-08-29 NOTE — TELEPHONE ENCOUNTER
----- Message from Becka Gore LMSW sent at 8/29/2017 10:18 AM CDT -----  This LMSW received a referral on the above patient. This LMSW contacted patient/caregiver regarding referral.  Patient/Caregiver stated there were no needs at this time. Patient's son reports patient currently receiving home health nursing services and no additional support needed at this time.     Thank you for the referral,    Becka Gore LMSW

## 2017-08-29 NOTE — PROGRESS NOTES
This LMSW contacted patient or caregiver regarding referral. Patient/Caregiver stated there were no needs at this time.     This LMSW mpleted assessment with patient's son Gilberto. Patient's son reports he is primary caregiver for patient and assist her with all of her daily activities. Pt's son reports that patient currently has home health coming to the home three times a week for nursing/wound care. Pt's son reports that  nurse has spoken to the family about planning for patient's decline and possible hospice care. SW offered to provide patient's son with information for hospice and he declined, stating he was still hopeful for patient's recovery. Patient's son did not identify any resources needed at this time however, he stated he was agreeable to [facility] sending him another senior resource guide, which was mailed during prior assessment by Robyn BENAVIDEZ. Referral source notified.

## 2017-08-29 NOTE — PROGRESS NOTES
Patient was d/c from NH then readmitted 8/21-8/25 due to syncope. She was found to have UTI and is being treated with cipro. She went back to ER 8/26 and was not admitted. She reports she resumed our old dose. Chart updated with doses from admit. We will watch closely.

## 2017-08-31 ENCOUNTER — PATIENT MESSAGE (OUTPATIENT)
Dept: CARDIOLOGY | Facility: CLINIC | Age: 82
End: 2017-08-31

## 2017-08-31 LAB — INR PPP: 1.4

## 2017-09-01 ENCOUNTER — ANTI-COAG VISIT (OUTPATIENT)
Dept: CARDIOLOGY | Facility: CLINIC | Age: 82
End: 2017-09-01

## 2017-09-01 DIAGNOSIS — I48.20 CHRONIC ATRIAL FIBRILLATION: ICD-10-CM

## 2017-09-01 DIAGNOSIS — Z79.01 ANTICOAGULATION MONITORING BY PHARMACIST: ICD-10-CM

## 2017-09-07 LAB — INR PPP: 1.5

## 2017-09-07 RX ORDER — INSULIN ASPART 100 [IU]/ML
8 INJECTION, SOLUTION INTRAVENOUS; SUBCUTANEOUS 3 TIMES DAILY
Qty: 20 ML | Refills: 6 | Status: SHIPPED | OUTPATIENT
Start: 2017-09-07 | End: 2017-10-18 | Stop reason: SDUPTHER

## 2017-09-07 RX ORDER — INSULIN ASPART 100 [IU]/ML
8 INJECTION, SOLUTION INTRAVENOUS; SUBCUTANEOUS 3 TIMES DAILY
Qty: 20 ML | Refills: 6 | Status: SHIPPED | OUTPATIENT
Start: 2017-09-07 | End: 2017-09-07 | Stop reason: SDUPTHER

## 2017-09-07 RX ORDER — INSULIN ASPART 100 [IU]/ML
INJECTION, SOLUTION INTRAVENOUS; SUBCUTANEOUS
COMMUNITY
End: 2017-09-07 | Stop reason: SDUPTHER

## 2017-09-07 NOTE — TELEPHONE ENCOUNTER
----- Message from Georgia Mccormick sent at 9/7/2017  9:36 AM CDT -----  Contact: [facility]  [phone]  Francisco Javier   -  Pharmacy calling to verify direction on a script  Novolog

## 2017-09-07 NOTE — TELEPHONE ENCOUNTER
----- Message from Qi Mederos sent at 9/7/2017 12:06 PM CDT -----  Contact: Parul goodson/ [facility]:  [phone]   Novalog sig:  Is it 8 units or 15 units?  Not sure about the sig.  Need you to clarify.   Has some questions.    Pls call to speak w/ Pharmacist as per caller.

## 2017-09-08 ENCOUNTER — ANTI-COAG VISIT (OUTPATIENT)
Dept: CARDIOLOGY | Facility: CLINIC | Age: 82
End: 2017-09-08

## 2017-09-08 ENCOUNTER — OFFICE VISIT (OUTPATIENT)
Dept: FAMILY MEDICINE | Facility: CLINIC | Age: 82
End: 2017-09-08
Payer: COMMERCIAL

## 2017-09-08 DIAGNOSIS — E08.621 DIABETIC ULCER OF RIGHT HEEL ASSOCIATED WITH DIABETES MELLITUS DUE TO UNDERLYING CONDITION, WITH FAT LAYER EXPOSED: ICD-10-CM

## 2017-09-08 DIAGNOSIS — R53.81 PHYSICAL DEBILITY: ICD-10-CM

## 2017-09-08 DIAGNOSIS — I10 ESSENTIAL HYPERTENSION: ICD-10-CM

## 2017-09-08 DIAGNOSIS — G47.33 OBSTRUCTIVE SLEEP APNEA ON CPAP: Chronic | ICD-10-CM

## 2017-09-08 DIAGNOSIS — N18.4 DIABETES MELLITUS WITH STAGE 4 CHRONIC KIDNEY DISEASE GFR 15-29: Primary | Chronic | ICD-10-CM

## 2017-09-08 DIAGNOSIS — L97.412 DIABETIC ULCER OF RIGHT HEEL ASSOCIATED WITH DIABETES MELLITUS DUE TO UNDERLYING CONDITION, WITH FAT LAYER EXPOSED: ICD-10-CM

## 2017-09-08 DIAGNOSIS — I48.20 CHRONIC ATRIAL FIBRILLATION: ICD-10-CM

## 2017-09-08 DIAGNOSIS — I50.32 CHRONIC DIASTOLIC HEART FAILURE: Chronic | ICD-10-CM

## 2017-09-08 DIAGNOSIS — E11.22 DIABETES MELLITUS WITH STAGE 4 CHRONIC KIDNEY DISEASE GFR 15-29: Primary | Chronic | ICD-10-CM

## 2017-09-08 DIAGNOSIS — Z74.1 REQUIRES ASSISTANCE WITH ACTIVITIES OF DAILY LIVING (ADL): ICD-10-CM

## 2017-09-08 DIAGNOSIS — Z79.01 ANTICOAGULATION MONITORING BY PHARMACIST: ICD-10-CM

## 2017-09-08 PROCEDURE — 3077F SYST BP >= 140 MM HG: CPT | Mod: S$GLB,,, | Performed by: FAMILY MEDICINE

## 2017-09-08 PROCEDURE — 99349 HOME/RES VST EST MOD MDM 40: CPT | Mod: S$GLB,,, | Performed by: FAMILY MEDICINE

## 2017-09-08 PROCEDURE — 99999 PR PBB SHADOW E&M-EST. PATIENT-LVL II: CPT | Mod: PBBFAC,,, | Performed by: FAMILY MEDICINE

## 2017-09-08 PROCEDURE — 3078F DIAST BP <80 MM HG: CPT | Mod: S$GLB,,, | Performed by: FAMILY MEDICINE

## 2017-09-09 VITALS — HEART RATE: 65 BPM | SYSTOLIC BLOOD PRESSURE: 145 MMHG | DIASTOLIC BLOOD PRESSURE: 73 MMHG | RESPIRATION RATE: 18 BRPM

## 2017-09-09 PROBLEM — R53.81 PHYSICAL DEBILITY: Status: ACTIVE | Noted: 2017-09-09

## 2017-09-09 PROBLEM — E10.621: Status: RESOLVED | Noted: 2017-05-22 | Resolved: 2017-09-09

## 2017-09-09 PROBLEM — Z74.1 REQUIRES ASSISTANCE WITH ACTIVITIES OF DAILY LIVING (ADL): Status: ACTIVE | Noted: 2017-09-09

## 2017-09-09 PROBLEM — L97.411: Status: RESOLVED | Noted: 2017-05-22 | Resolved: 2017-09-09

## 2017-09-10 NOTE — PROGRESS NOTES
"Subjective:       Patient ID: Chey Trujillo is a 82 y.o. female.    Chief Complaint: Establish Care    HPI 82 year old female seen at home to establish care and address worsening weakness. Patient lives at home with her son. She has a sitter throughout the day. She has not ambulated for the last 2-3 weeks. Patient's family states when she stands, her blood pressure can dorp to 70/30 and patient "passes out". Patient was admitted to [facility] for 4-6 weeks in 06/2017 and per family, patient was walking with a walker. Soon afterwards, patient became weak and hypertensive and was admitted for hypertensive urgency. In the last 3 months,patient has been to the hospital 5-6 times per daughter. She has had multiple syncopal events consistent with orthostatic hypotension.   In the last 4 weeks, patient has been bedbound. She is tolerating a diet. She is having bowel movements 1-2 times weekly with the assistance of lactulose. She is alert but not oriented to time. She has been sleeping more than she usually does.   Patient has t2dm and takes Lantus 10 units daily and humalog 4 units with meals. Per son, her BG has been ranging . Most of her readings show a range from 175-250.   She has chronic afib and is on coumadin.  She takes coreg, amiodarone for rate/rhythm control. She has CAD and HF. She has labile HTN and was recently added on aldactone. She takes clonidine, hydralazine, isosorbide. She has a pacemaker which was interrogated while admitted on 8/23/17.   Patient has dementia and takes namenda daily.   She has CKD stage 4. Per family, she has not followed with a nephrologist recently.   She has a right heel wound healing well per son. She had wound care but now son cleans wound daily with soap and water.    Review of Systems   Constitutional: Positive for activity change. Negative for appetite change, chills and fever.   HENT: Negative for congestion.    Respiratory: Negative for chest tightness and " shortness of breath.    Cardiovascular: Negative for chest pain and leg swelling.   Gastrointestinal: Negative for abdominal pain, blood in stool, nausea and vomiting.   Genitourinary: Negative for dysuria and hematuria.   Neurological: Positive for weakness.   Psychiatric/Behavioral: Positive for confusion.       Objective:      Vitals:    09/08/17 1600   BP: (!) 145/73   Pulse: 65   Resp: 18     Physical Exam   Constitutional: She appears well-developed and well-nourished. No distress.   Eyes: EOM are normal. Right eye exhibits no discharge. Left eye exhibits no discharge.   Cardiovascular: Normal rate and regular rhythm.    Murmur heard.  Pulmonary/Chest: Effort normal. No respiratory distress.   Abdominal: Soft. There is no tenderness. There is no guarding.   Neurological: She is alert.   Not oriented to time  Weak  Unable to follow commands       Skin: She is not diaphoretic.   Right heel: healing wound. Stage 2 with minimal fax exposed. No active drainage or TTP   Vitals reviewed.    patient was put in wheelchair and sat outside where she became more alert and was able to hold a conversation. She was found to have a difficult time holding her head up and complained of upper back pain. Her BP from laying in bed to sitting up ranges 140/70s to 130/70s and HR stayed in the 60s.   Assessment:       1. Diabetes mellitus with stage 4 chronic kidney disease GFR 15-29    2. 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 Essential hypertension    4. Chronic diastolic heart failure    5. Chronic atrial fibrillation    6. Obstructive sleep apnea on CPAP - setting = 5     7. Requires assistance with activities of daily living (ADL)    8. Physical debility        Plan:         1. t2dm with a1c of 7.1. Continue current regimen  2. Htn, labile: no evidnece of orthostaic hypotension today. Family has not been given aldactone as bottle was nowhere to be  found. I have advised on continuing to hold aldactone.   3. Chronic afib on coumadin: INR managed by coumadin clinic. Rate controlled today.   4. VEE: on cpap nightly  5. Worsening physical debility, weakness, needs full assistance; I have spoken with son and twin daughters about hospice as patient's condition has deteriorated signficantly in the last 6 weeks. They state they will think about it. I will call home health to resume PT/OT without having patient stand as she is too weak. They may do strengthening/ROM exercises in bed and have her sit up in bed if tolerated. Family to give me an update in 3 days. I will also order a urine culture.   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

## 2017-09-11 ENCOUNTER — TELEPHONE (OUTPATIENT)
Dept: FAMILY MEDICINE | Facility: CLINIC | Age: 82
End: 2017-09-11

## 2017-09-11 DIAGNOSIS — R39.89 ABNORMAL URINE COLOR: Primary | ICD-10-CM

## 2017-09-11 NOTE — TELEPHONE ENCOUNTER
I have spoken to PT/OT [facility]. I have orderd a urine culture and advised on PT/OT bed strengthening exercises but not to get out of  Bed due to weakness.

## 2017-09-12 ENCOUNTER — TELEPHONE (OUTPATIENT)
Dept: FAMILY MEDICINE | Facility: CLINIC | Age: 82
End: 2017-09-12

## 2017-09-12 ENCOUNTER — DOCUMENTATION ONLY (OUTPATIENT)
Dept: FAMILY MEDICINE | Facility: CLINIC | Age: 82
End: 2017-09-12

## 2017-09-12 NOTE — TELEPHONE ENCOUNTER
----- Message from Deyanira Mederos sent at 9/12/2017  9:40 AM CDT -----  Readings for Sunday and Monday   9-10  8:15 am  Blood sugar 220                 Blood pressure 86/42  P/65  6:30 pm  Blood sugar   336    9-

## 2017-09-12 NOTE — TELEPHONE ENCOUNTER
----- Message from Deyanira Mederos sent at 9/12/2017  9:48 AM CDT -----  Readings for Sunday and Monday  9-10  8:15 am    Blood sugar 220                   Blood pressure 86/42  p-65  6:30 pm    Blood sugar 336    [id number]  6 am   B;ppd sugar  226              Blood pressure 217/71   P= 66  With legs up in bed   Legs down no pressure  11 am  Blood sugar 142              Blood pressure  106/56    5:30 pm  Blood sugar 142         Blood pressure  133/50   P=65

## 2017-09-12 NOTE — PROGRESS NOTES
I have called [facility] and put a verbal order for an aide to assist with bathing, adls. Patient's daughter navi notified.   [phone]-[facility].

## 2017-09-13 NOTE — PROGRESS NOTES
Per amy [facility] stated no inr was drawn 9/12 and nurse went out today 9/13 but drawn no PT/INR .

## 2017-09-13 NOTE — PROGRESS NOTES
Per Gary Caal, they did not receive an order to re-check the pt's INR on 9/12.  They can send someone on 9/15.  Order sent - please check INR STAT in the AM on 9/15.

## 2017-09-15 ENCOUNTER — ANTI-COAG VISIT (OUTPATIENT)
Dept: CARDIOLOGY | Facility: CLINIC | Age: 82
End: 2017-09-15

## 2017-09-15 DIAGNOSIS — I48.20 CHRONIC ATRIAL FIBRILLATION: ICD-10-CM

## 2017-09-15 DIAGNOSIS — Z79.01 ANTICOAGULATION MONITORING BY PHARMACIST: ICD-10-CM

## 2017-09-15 LAB — INR PPP: 3.1

## 2017-09-18 ENCOUNTER — TELEPHONE (OUTPATIENT)
Dept: FAMILY MEDICINE | Facility: CLINIC | Age: 82
End: 2017-09-18

## 2017-09-18 NOTE — TELEPHONE ENCOUNTER
----- Message from Deyanira Mederos sent at 9/18/2017  4:41 PM CDT -----  No bowel movement since last Wednesday and lactulose 20 gr is in the home   States usually when given 2 packs she goes.   [facility] nurse states has good bowel sounds and belly nice and soft  And prune juice given today put no stool  Patient is passing gas    srinivas baxter [facility]    [phone]

## 2017-09-18 NOTE — TELEPHONE ENCOUNTER
I have spoken to Zaida. I have advised on giving the lactulose to assist with having a bowel movement. All questions answered.

## 2017-09-20 ENCOUNTER — ANTI-COAG VISIT (OUTPATIENT)
Dept: CARDIOLOGY | Facility: CLINIC | Age: 82
End: 2017-09-20

## 2017-09-20 DIAGNOSIS — I48.20 CHRONIC ATRIAL FIBRILLATION: ICD-10-CM

## 2017-09-20 DIAGNOSIS — Z79.01 ANTICOAGULATION MONITORING BY PHARMACIST: ICD-10-CM

## 2017-09-20 LAB — INR PPP: 2

## 2017-09-21 DIAGNOSIS — Z79.01 ANTICOAGULATION MONITORING BY PHARMACIST: ICD-10-CM

## 2017-09-21 DIAGNOSIS — I48.0 PAROXYSMAL ATRIAL FIBRILLATION: ICD-10-CM

## 2017-09-21 RX ORDER — WARFARIN SODIUM 5 MG/1
2.5-5 TABLET ORAL DAILY
Qty: 25 TABLET | Refills: 11 | Status: SHIPPED | OUTPATIENT
Start: 2017-09-21

## 2017-09-22 ENCOUNTER — TELEPHONE (OUTPATIENT)
Dept: FAMILY MEDICINE | Facility: CLINIC | Age: 82
End: 2017-09-22

## 2017-09-22 RX ORDER — POLYETHYLENE GLYCOL 3350 17 G/17G
17 POWDER, FOR SOLUTION ORAL DAILY
Qty: 238 G | Refills: 11 | Status: SHIPPED | OUTPATIENT
Start: 2017-09-22

## 2017-09-22 NOTE — TELEPHONE ENCOUNTER
----- Message from Deyanira Mederos sent at 9/22/2017  9:31 AM CDT -----  Family calling on patient to see if home health nurse can do an emenia on patient and what kind of stool softner can be used to help   Patient is constipated really bad  Reach son/french [phone] cell   Or home  [phone]

## 2017-09-25 ENCOUNTER — TELEPHONE (OUTPATIENT)
Dept: FAMILY MEDICINE | Facility: CLINIC | Age: 82
End: 2017-09-25

## 2017-09-25 RX ORDER — NITROFURANTOIN 25; 75 MG/1; MG/1
100 CAPSULE ORAL 2 TIMES DAILY
Qty: 14 CAPSULE | Refills: 0 | Status: ON HOLD | OUTPATIENT
Start: 2017-09-25 | End: 2018-03-30

## 2017-09-25 NOTE — TELEPHONE ENCOUNTER
----- Message from Kaci Quesada sent at 9/25/2017 10:23 AM CDT -----  Contact: Trena [phone]  Trena when [facility] is calling because pt has not had a bowel movement in 11 days. Please call her at [phone].

## 2017-09-25 NOTE — TELEPHONE ENCOUNTER
----- Message from Deyanira Mederos sent at 9/25/2017  3:36 PM CDT -----  Trena with [facility]   [phone]  Trena called about orders  lia was done  What to do if this doesn't work   Disempact her or use [employer]  Or what          Please call

## 2017-09-26 ENCOUNTER — TELEPHONE (OUTPATIENT)
Dept: FAMILY MEDICINE | Facility: CLINIC | Age: 82
End: 2017-09-26

## 2017-09-26 NOTE — TELEPHONE ENCOUNTER
----- Message from Deyanira Mederos sent at 9/26/2017  2:56 PM CDT -----  Family is trying to get meals on wheels for the patient      Can we get a letter stating it is okay for patient to get meals from meals on wheels

## 2017-09-27 ENCOUNTER — PATIENT MESSAGE (OUTPATIENT)
Dept: FAMILY MEDICINE | Facility: CLINIC | Age: 82
End: 2017-09-27

## 2017-09-27 ENCOUNTER — ANTI-COAG VISIT (OUTPATIENT)
Dept: CARDIOLOGY | Facility: CLINIC | Age: 82
End: 2017-09-27

## 2017-09-27 DIAGNOSIS — Z79.01 ANTICOAGULATION MONITORING BY PHARMACIST: ICD-10-CM

## 2017-09-27 DIAGNOSIS — I48.20 CHRONIC ATRIAL FIBRILLATION: ICD-10-CM

## 2017-09-27 LAB — INR PPP: 2.7

## 2017-09-27 NOTE — TELEPHONE ENCOUNTER
----- Message from Deyanira Mederos sent at 9/27/2017  7:11 AM CDT -----  Patient has not had coumadin medicine. [facility] will not fill stating dr hayes gave script and there is a counter-reaction with this medicine   Family needs to know what to do with coumadin and these reactions      Vignesh/french [phone]     Dgcolby/Stephanie   [phone]   madi in [place]

## 2017-09-28 ENCOUNTER — TELEPHONE (OUTPATIENT)
Dept: INTERNAL MEDICINE | Facility: CLINIC | Age: 82
End: 2017-09-28

## 2017-09-28 NOTE — TELEPHONE ENCOUNTER
Hi, just an FYI.  I am no longer Mrs. Trujillo's PCP.  The new doctor who should be signing her INR readings is Ada garza

## 2017-09-29 ENCOUNTER — TELEPHONE (OUTPATIENT)
Dept: FAMILY MEDICINE | Facility: CLINIC | Age: 82
End: 2017-09-29

## 2017-09-29 NOTE — TELEPHONE ENCOUNTER
Patient has not taken ANY antibiotic. She is not swallowing pills due to the dementia. Family wants to know if there is a liquid antibiotic that can be given

## 2017-09-29 NOTE — TELEPHONE ENCOUNTER
----- Message from Deyanira Mederos sent at 9/29/2017 10:46 AM CDT -----  Doctor is there an antibiotic that can be given for the bladder infection the patient has. She is no longer swallowing pills    The one medicine at home patient spits out    Please advise   [phone]   Son/french fox

## 2017-10-02 ENCOUNTER — TELEPHONE (OUTPATIENT)
Dept: FAMILY MEDICINE | Facility: CLINIC | Age: 82
End: 2017-10-02

## 2017-10-02 NOTE — TELEPHONE ENCOUNTER
Noemy with [facility] called to report that Pt has not had another bowel since last Monday after she was given an enema, she is also only getting about half of the mirlax bc she does not care for the taste please advise call back [phone]

## 2017-10-04 ENCOUNTER — ANTI-COAG VISIT (OUTPATIENT)
Dept: CARDIOLOGY | Facility: CLINIC | Age: 82
End: 2017-10-04

## 2017-10-04 ENCOUNTER — TELEPHONE (OUTPATIENT)
Dept: FAMILY MEDICINE | Facility: CLINIC | Age: 82
End: 2017-10-04

## 2017-10-04 DIAGNOSIS — Z79.01 ANTICOAGULATION MONITORING BY PHARMACIST: ICD-10-CM

## 2017-10-04 DIAGNOSIS — I48.20 CHRONIC ATRIAL FIBRILLATION: ICD-10-CM

## 2017-10-04 LAB — INR PPP: 2.2

## 2017-10-04 NOTE — TELEPHONE ENCOUNTER
[facility] calling to inform dr that pt is not taking her medication she is spitting everything out, just JASPAL

## 2017-10-06 RX ORDER — SYRINGE-NEEDLE,INSULIN,0.5 ML 31 GX5/16"
SYRINGE, EMPTY DISPOSABLE MISCELLANEOUS
Qty: 150 EACH | Refills: 11 | Status: SHIPPED | OUTPATIENT
Start: 2017-10-06

## 2017-10-11 ENCOUNTER — ANTI-COAG VISIT (OUTPATIENT)
Dept: CARDIOLOGY | Facility: CLINIC | Age: 82
End: 2017-10-11

## 2017-10-11 DIAGNOSIS — Z79.01 ANTICOAGULATION MONITORING BY PHARMACIST: ICD-10-CM

## 2017-10-11 DIAGNOSIS — I48.20 CHRONIC ATRIAL FIBRILLATION: ICD-10-CM

## 2017-10-11 LAB — INR PPP: 1.9

## 2017-10-12 NOTE — PROGRESS NOTES
Per pt son she had trouble getting her refill and she was between doctors. So she didn't have any Coumadin on Friday. And he was a yellow card with the dose change.

## 2017-10-13 ENCOUNTER — TELEPHONE (OUTPATIENT)
Dept: FAMILY MEDICINE | Facility: CLINIC | Age: 82
End: 2017-10-13

## 2017-10-13 NOTE — TELEPHONE ENCOUNTER
----- Message from Deyanira Mederos sent at 10/13/2017 11:09 AM CDT -----  Patient and family is in need of a letter stating it is time to place patient in Hospice  Thank you

## 2017-10-16 ENCOUNTER — TELEPHONE (OUTPATIENT)
Dept: INTERNAL MEDICINE | Facility: CLINIC | Age: 82
End: 2017-10-16

## 2017-10-16 NOTE — TELEPHONE ENCOUNTER
[facility] sent a fax a PA is needed for pt's Humalog or MD can do a med change to Novolog or Novolin. Please advise

## 2017-10-18 ENCOUNTER — ANTI-COAG VISIT (OUTPATIENT)
Dept: CARDIOLOGY | Facility: CLINIC | Age: 82
End: 2017-10-18

## 2017-10-18 DIAGNOSIS — I48.20 CHRONIC ATRIAL FIBRILLATION: ICD-10-CM

## 2017-10-18 DIAGNOSIS — Z79.01 ANTICOAGULATION MONITORING BY PHARMACIST: ICD-10-CM

## 2017-10-18 LAB — INR PPP: 2.1

## 2017-10-18 RX ORDER — INSULIN ASPART 100 [IU]/ML
8 INJECTION, SOLUTION INTRAVENOUS; SUBCUTANEOUS 3 TIMES DAILY
Qty: 20 ML | Refills: 6 | Status: SHIPPED | OUTPATIENT
Start: 2017-10-18 | End: 2017-10-19 | Stop reason: SDUPTHER

## 2017-10-19 ENCOUNTER — TELEPHONE (OUTPATIENT)
Dept: FAMILY MEDICINE | Facility: CLINIC | Age: 82
End: 2017-10-19

## 2017-10-19 DIAGNOSIS — F01.50 VASCULAR DEMENTIA WITHOUT BEHAVIORAL DISTURBANCE: ICD-10-CM

## 2017-10-19 DIAGNOSIS — Z74.1 REQUIRES ASSISTANCE WITH ACTIVITIES OF DAILY LIVING (ADL): Primary | ICD-10-CM

## 2017-10-19 RX ORDER — SODIUM BICARBONATE 650 MG/1
TABLET ORAL
Qty: 60 TABLET | Refills: 0 | Status: SHIPPED | OUTPATIENT
Start: 2017-10-19 | End: 2017-12-07 | Stop reason: SDUPTHER

## 2017-10-19 RX ORDER — INSULIN ASPART 100 [IU]/ML
INJECTION, SOLUTION INTRAVENOUS; SUBCUTANEOUS
Qty: 20 ML | Refills: 6 | Status: SHIPPED | OUTPATIENT
Start: 2017-10-19

## 2017-10-19 NOTE — TELEPHONE ENCOUNTER
----- Message from Deyanira Mederos sent at 10/19/2017 11:11 AM CDT -----  The hospice we have chosen  Is [facility]

## 2017-10-19 NOTE — TELEPHONE ENCOUNTER
----- Message from Deyanira Mederos sent at 10/18/2017  4:00 PM CDT -----  Radha with [facility]   [phone]  Radha called  Patient is requesting a flu vaccine  But home health would have to come to office to get it and do they would administor it to patient   Please call

## 2017-10-19 NOTE — TELEPHONE ENCOUNTER
Notified Radha to contact pharmacy if it is available for  and then can be given. She verbalized understanding and will do.

## 2017-10-19 NOTE — TELEPHONE ENCOUNTER
----- Message from Georgia Mccormick sent at 10/18/2017  3:32 PM CDT -----  Contact: [facility]   [phone]  Pharmacy   Calling to the direction on  The pt  medication  insulin aspart (NOVOLOG   Call the pharmacy back to verify   Call back number [phone]  Thanks,

## 2017-10-19 NOTE — TELEPHONE ENCOUNTER
Spoke with the pharmacy to verified a Rx and pharmacy need a verification for novolog. They have 2 different directions in Rx which is 8 units and 15 units. Please clear directions for pt and sent a new [facility] to [facility].

## 2017-10-23 ENCOUNTER — PATIENT MESSAGE (OUTPATIENT)
Dept: FAMILY MEDICINE | Facility: CLINIC | Age: 82
End: 2017-10-23

## 2017-10-24 ENCOUNTER — OFFICE VISIT (OUTPATIENT)
Dept: INTERNAL MEDICINE | Facility: CLINIC | Age: 82
End: 2017-10-24
Payer: COMMERCIAL

## 2017-10-24 VITALS
DIASTOLIC BLOOD PRESSURE: 80 MMHG | RESPIRATION RATE: 16 BRPM | OXYGEN SATURATION: 98 % | HEART RATE: 65 BPM | TEMPERATURE: 99 F | HEIGHT: 72 IN | SYSTOLIC BLOOD PRESSURE: 130 MMHG

## 2017-10-24 DIAGNOSIS — R22.2 MASS OF CHEST WALL, LEFT: Primary | ICD-10-CM

## 2017-10-24 PROCEDURE — 99213 OFFICE O/P EST LOW 20 MIN: CPT | Mod: S$GLB,,, | Performed by: INTERNAL MEDICINE

## 2017-10-24 NOTE — PROGRESS NOTES
"Subjective:      Patient ID: Chey Trujillo is a 82 y.o. female.    Chief Complaint: Breast Mass (lump in left breast)    HPI: 82 y.o. Black or  female , brought in because her children ( caretakers), noticed a lump  On her left breast. She does not c/o pain, but is mostly aphasic.       Review of Systems   Reason unable to perform ROS: pt aphasic.   Constitutional: Negative for activity change and appetite change.   Respiratory: Negative for shortness of breath.    Cardiovascular: Negative for chest pain.       Objective:   /80 (BP Location: Right arm, Patient Position: Sitting, BP Method: Small (Manual))   Pulse 65   Temp 99.4 °F (37.4 °C) (Oral)   Resp 16   Ht 6' 2" (1.88 m)   LMP  (LMP Unknown)   SpO2 98%     Physical Exam   Constitutional: She appears well-developed and well-nourished.   HENT:   Head: Normocephalic and atraumatic.   Eyes: Conjunctivae and EOM are normal.   Neck: No JVD present. Carotid bruit is not present.   Cardiovascular: Normal rate, regular rhythm and normal heart sounds.    Pulmonary/Chest: Effort normal and breath sounds normal.       Nursing note and vitals reviewed.      Assessment:     1. Mass of chest wall, left     This is her defibrillator.  Plan:     Mass of chest wall, left    No further work up necessary.    "

## 2017-11-03 ENCOUNTER — ANTI-COAG VISIT (OUTPATIENT)
Dept: CARDIOLOGY | Facility: CLINIC | Age: 82
End: 2017-11-03

## 2017-11-03 DIAGNOSIS — Z79.01 ANTICOAGULATION MONITORING BY PHARMACIST: ICD-10-CM

## 2017-11-03 DIAGNOSIS — I48.20 CHRONIC ATRIAL FIBRILLATION: ICD-10-CM

## 2017-11-03 NOTE — PROGRESS NOTES
Per notes in her chart, the pt is now in the care of [facility].  Discharge from the [facility] at this time.

## 2017-11-03 NOTE — PROGRESS NOTES
Called regarding pt/inr 11/1.  Per quentin from [facility] reports patient was discharged on 10/26 and then admitted into hospice.

## 2017-11-28 ENCOUNTER — CLINICAL SUPPORT (OUTPATIENT)
Dept: ELECTROPHYSIOLOGY | Facility: CLINIC | Age: 82
End: 2017-11-28
Payer: COMMERCIAL

## 2017-11-28 DIAGNOSIS — Z95.0 CARDIAC PACEMAKER IN SITU: Primary | ICD-10-CM

## 2017-11-28 DIAGNOSIS — I49.5 SSS (SICK SINUS SYNDROME): Chronic | ICD-10-CM

## 2017-11-28 PROCEDURE — 93293 PM PHONE R-STRIP DEVICE EVAL: CPT | Mod: S$GLB,,, | Performed by: INTERNAL MEDICINE

## 2017-12-02 DIAGNOSIS — E11.22 TYPE 2 DIABETES MELLITUS WITH CHRONIC KIDNEY DISEASE: ICD-10-CM

## 2017-12-04 DIAGNOSIS — E11.22 TYPE 2 DIABETES MELLITUS WITH CHRONIC KIDNEY DISEASE: ICD-10-CM

## 2017-12-04 RX ORDER — BLOOD SUGAR DIAGNOSTIC
STRIP MISCELLANEOUS
Qty: 150 STRIP | Refills: 11 | Status: SHIPPED | OUTPATIENT
Start: 2017-12-04

## 2017-12-04 RX ORDER — LANCETS
EACH MISCELLANEOUS
Qty: 204 EACH | Refills: 11 | Status: SHIPPED | OUTPATIENT
Start: 2017-12-04

## 2017-12-08 RX ORDER — SODIUM BICARBONATE 650 MG/1
TABLET ORAL
Qty: 60 TABLET | Refills: 0 | Status: SHIPPED | OUTPATIENT
Start: 2017-12-08 | End: 2018-02-26 | Stop reason: SDUPTHER

## 2017-12-08 RX ORDER — POTASSIUM CHLORIDE 750 MG/1
TABLET, EXTENDED RELEASE ORAL
Qty: 180 TABLET | Refills: 0 | Status: SHIPPED | OUTPATIENT
Start: 2017-12-08

## 2017-12-08 NOTE — TELEPHONE ENCOUNTER
Meds ok'd; patient needs EP appt in next 3 months, please call patient to schedule; please let me know thanks

## 2017-12-29 ENCOUNTER — TELEPHONE (OUTPATIENT)
Dept: FAMILY MEDICINE | Facility: CLINIC | Age: 82
End: 2017-12-29

## 2017-12-29 NOTE — TELEPHONE ENCOUNTER
----- Message from Deyanira Mederos sent at 12/29/2017  8:54 AM CST -----  Can you please fill this script   NITROFURANTOIN MONO  100mg  Take one capsule by mouth  Twice daily     Pharmacy  [facility] in [place]

## 2018-02-26 RX ORDER — SODIUM BICARBONATE 650 MG/1
TABLET ORAL
Qty: 60 TABLET | Refills: 2 | Status: SHIPPED | OUTPATIENT
Start: 2018-02-26

## 2018-03-07 DIAGNOSIS — E11.22 TYPE 2 DIABETES MELLITUS WITH CHRONIC KIDNEY DISEASE: ICD-10-CM

## 2018-03-07 RX ORDER — BLOOD SUGAR DIAGNOSTIC
STRIP MISCELLANEOUS
Qty: 150 STRIP | Refills: 11 | Status: SHIPPED | OUTPATIENT
Start: 2018-03-07

## 2018-03-12 RX ORDER — POTASSIUM CHLORIDE 750 MG/1
TABLET, EXTENDED RELEASE ORAL
Qty: 180 TABLET | Refills: 0 | OUTPATIENT
Start: 2018-03-12

## 2018-03-30 PROBLEM — I16.0 HYPERTENSIVE URGENCY: Status: ACTIVE | Noted: 2018-03-30

## 2018-03-31 PROBLEM — I48.0 PAROXYSMAL A-FIB: Status: ACTIVE | Noted: 2018-03-31

## 2018-04-01 ENCOUNTER — HOSPITAL ENCOUNTER (INPATIENT)
Facility: HOSPITAL | Age: 83
LOS: 4 days | Discharge: HOSPICE | DRG: 637 | End: 2018-04-05
Attending: HOSPITALIST | Admitting: HOSPITALIST
Payer: COMMERCIAL

## 2018-04-01 DIAGNOSIS — G93.41 METABOLIC ENCEPHALOPATHY: ICD-10-CM

## 2018-04-01 PROBLEM — N39.0 ENTEROCOCCUS UTI: Status: ACTIVE | Noted: 2018-04-01

## 2018-04-01 PROBLEM — E15 HYPOGLYCEMIA, COMA: Status: ACTIVE | Noted: 2018-04-01

## 2018-04-01 PROBLEM — B95.2 ENTEROCOCCUS UTI: Status: ACTIVE | Noted: 2018-04-01

## 2018-04-01 LAB
ALBUMIN SERPL BCP-MCNC: 1.9 G/DL
AMMONIA PLAS-SCNC: 31 UMOL/L
ANION GAP SERPL CALC-SCNC: 6 MMOL/L
BUN SERPL-MCNC: 54 MG/DL
CALCIUM SERPL-MCNC: 8.8 MG/DL
CHLORIDE SERPL-SCNC: 107 MMOL/L
CO2 SERPL-SCNC: 28 MMOL/L
CREAT SERPL-MCNC: 2 MG/DL
EST. GFR  (AFRICAN AMERICAN): 26 ML/MIN/1.73 M^2
EST. GFR  (NON AFRICAN AMERICAN): 23 ML/MIN/1.73 M^2
GLUCOSE SERPL-MCNC: 110 MG/DL
MAGNESIUM SERPL-MCNC: 1.7 MG/DL
PHOSPHATE SERPL-MCNC: 2.9 MG/DL
POCT GLUCOSE: 106 MG/DL (ref 70–110)
POCT GLUCOSE: 158 MG/DL (ref 70–110)
POCT GLUCOSE: 60 MG/DL (ref 70–110)
POCT GLUCOSE: 68 MG/DL (ref 70–110)
POCT GLUCOSE: 83 MG/DL (ref 70–110)
POTASSIUM SERPL-SCNC: 3.6 MMOL/L
SODIUM SERPL-SCNC: 141 MMOL/L
VANCOMYCIN SERPL-MCNC: 9.2 UG/ML

## 2018-04-01 PROCEDURE — 25000003 PHARM REV CODE 250: Performed by: HOSPITALIST

## 2018-04-01 PROCEDURE — 80202 ASSAY OF VANCOMYCIN: CPT

## 2018-04-01 PROCEDURE — 36415 COLL VENOUS BLD VENIPUNCTURE: CPT

## 2018-04-01 PROCEDURE — 87185 SC STD ENZYME DETCJ PER NZM: CPT

## 2018-04-01 PROCEDURE — 5A1945Z RESPIRATORY VENTILATION, 24-96 CONSECUTIVE HOURS: ICD-10-PCS | Performed by: INTERNAL MEDICINE

## 2018-04-01 PROCEDURE — 63600175 PHARM REV CODE 636 W HCPCS: Performed by: HOSPITALIST

## 2018-04-01 PROCEDURE — 80069 RENAL FUNCTION PANEL: CPT

## 2018-04-01 PROCEDURE — 83735 ASSAY OF MAGNESIUM: CPT

## 2018-04-01 PROCEDURE — 25000003 PHARM REV CODE 250: Performed by: STUDENT IN AN ORGANIZED HEALTH CARE EDUCATION/TRAINING PROGRAM

## 2018-04-01 PROCEDURE — 82330 ASSAY OF CALCIUM: CPT

## 2018-04-01 PROCEDURE — 82140 ASSAY OF AMMONIA: CPT

## 2018-04-01 PROCEDURE — 20000000 HC ICU ROOM

## 2018-04-01 PROCEDURE — 94002 VENT MGMT INPAT INIT DAY: CPT

## 2018-04-01 PROCEDURE — 87205 SMEAR GRAM STAIN: CPT

## 2018-04-01 PROCEDURE — 99900026 HC AIRWAY MAINTENANCE (STAT)

## 2018-04-01 PROCEDURE — 87070 CULTURE OTHR SPECIMN AEROBIC: CPT

## 2018-04-01 PROCEDURE — 87077 CULTURE AEROBIC IDENTIFY: CPT

## 2018-04-01 PROCEDURE — C9113 INJ PANTOPRAZOLE SODIUM, VIA: HCPCS | Performed by: HOSPITALIST

## 2018-04-01 RX ORDER — LATANOPROST 50 UG/ML
1 SOLUTION/ DROPS OPHTHALMIC NIGHTLY
Status: DISCONTINUED | OUTPATIENT
Start: 2018-04-01 | End: 2018-04-04

## 2018-04-01 RX ORDER — WARFARIN SODIUM 5 MG/1
5 TABLET ORAL EVERY OTHER DAY
Status: DISCONTINUED | OUTPATIENT
Start: 2018-04-02 | End: 2018-04-03

## 2018-04-01 RX ORDER — CARVEDILOL 6.25 MG/1
25 TABLET ORAL 2 TIMES DAILY WITH MEALS
Status: DISCONTINUED | OUTPATIENT
Start: 2018-04-01 | End: 2018-04-04

## 2018-04-01 RX ORDER — IBUPROFEN 200 MG
16 TABLET ORAL
Status: DISCONTINUED | OUTPATIENT
Start: 2018-04-01 | End: 2018-04-04

## 2018-04-01 RX ORDER — GLUCAGON 1 MG
1 KIT INJECTION
Status: DISCONTINUED | OUTPATIENT
Start: 2018-04-01 | End: 2018-04-04

## 2018-04-01 RX ORDER — FUROSEMIDE 10 MG/ML
40 INJECTION INTRAMUSCULAR; INTRAVENOUS 2 TIMES DAILY
Status: DISCONTINUED | OUTPATIENT
Start: 2018-04-01 | End: 2018-04-01

## 2018-04-01 RX ORDER — CLONIDINE HYDROCHLORIDE 0.1 MG/1
0.2 TABLET ORAL 3 TIMES DAILY
Status: DISCONTINUED | OUTPATIENT
Start: 2018-04-01 | End: 2018-04-04

## 2018-04-01 RX ORDER — DORZOLAMIDE HCL 20 MG/ML
1 SOLUTION/ DROPS OPHTHALMIC 2 TIMES DAILY
Status: DISCONTINUED | OUTPATIENT
Start: 2018-04-01 | End: 2018-04-04

## 2018-04-01 RX ORDER — NAPROXEN SODIUM 220 MG/1
81 TABLET, FILM COATED ORAL DAILY
Status: DISCONTINUED | OUTPATIENT
Start: 2018-04-02 | End: 2018-04-04

## 2018-04-01 RX ORDER — HYDRALAZINE HYDROCHLORIDE 20 MG/ML
10 INJECTION INTRAMUSCULAR; INTRAVENOUS EVERY 4 HOURS PRN
Status: DISCONTINUED | OUTPATIENT
Start: 2018-04-01 | End: 2018-04-04

## 2018-04-01 RX ORDER — VANCOMYCIN HCL IN 5 % DEXTROSE 1G/250ML
1000 PLASTIC BAG, INJECTION (ML) INTRAVENOUS
Status: DISCONTINUED | OUTPATIENT
Start: 2018-04-01 | End: 2018-04-04

## 2018-04-01 RX ORDER — DEXTROSE MONOHYDRATE 50 MG/ML
INJECTION, SOLUTION INTRAVENOUS CONTINUOUS
Status: DISCONTINUED | OUTPATIENT
Start: 2018-04-01 | End: 2018-04-04

## 2018-04-01 RX ORDER — IBUPROFEN 200 MG
24 TABLET ORAL
Status: DISCONTINUED | OUTPATIENT
Start: 2018-04-01 | End: 2018-04-04

## 2018-04-01 RX ORDER — LEVETIRACETAM 5 MG/ML
500 INJECTION INTRAVASCULAR EVERY 12 HOURS
Status: DISCONTINUED | OUTPATIENT
Start: 2018-04-02 | End: 2018-04-03

## 2018-04-01 RX ORDER — DOXAZOSIN 4 MG/1
8 TABLET ORAL DAILY
Status: DISCONTINUED | OUTPATIENT
Start: 2018-04-02 | End: 2018-04-04

## 2018-04-01 RX ORDER — HYDRALAZINE HYDROCHLORIDE 25 MG/1
100 TABLET, FILM COATED ORAL EVERY 8 HOURS
Status: DISCONTINUED | OUTPATIENT
Start: 2018-04-01 | End: 2018-04-04

## 2018-04-01 RX ORDER — POTASSIUM CHLORIDE 20 MEQ/15ML
40 SOLUTION ORAL ONCE
Status: COMPLETED | OUTPATIENT
Start: 2018-04-01 | End: 2018-04-01

## 2018-04-01 RX ORDER — WARFARIN 2.5 MG/1
2.5 TABLET ORAL EVERY OTHER DAY
Status: DISCONTINUED | OUTPATIENT
Start: 2018-04-01 | End: 2018-04-03

## 2018-04-01 RX ORDER — FUROSEMIDE 10 MG/ML
20 INJECTION INTRAMUSCULAR; INTRAVENOUS 2 TIMES DAILY
Status: DISCONTINUED | OUTPATIENT
Start: 2018-04-01 | End: 2018-04-02

## 2018-04-01 RX ORDER — PRAVASTATIN SODIUM 10 MG/1
20 TABLET ORAL DAILY
Status: DISCONTINUED | OUTPATIENT
Start: 2018-04-02 | End: 2018-04-04

## 2018-04-01 RX ORDER — HYDRALAZINE HYDROCHLORIDE 20 MG/ML
10 INJECTION INTRAMUSCULAR; INTRAVENOUS EVERY 6 HOURS PRN
Status: DISCONTINUED | OUTPATIENT
Start: 2018-04-01 | End: 2018-04-01

## 2018-04-01 RX ORDER — PROPOFOL 10 MG/ML
5 INJECTION, EMULSION INTRAVENOUS CONTINUOUS
Status: DISCONTINUED | OUTPATIENT
Start: 2018-04-01 | End: 2018-04-03

## 2018-04-01 RX ADMIN — DEXTROSE: 5 SOLUTION INTRAVENOUS at 03:04

## 2018-04-01 RX ADMIN — DEXTROSE MONOHYDRATE 12.5 G: 25 INJECTION, SOLUTION INTRAVENOUS at 10:04

## 2018-04-01 RX ADMIN — HYDRALAZINE HYDROCHLORIDE 10 MG: 20 INJECTION INTRAMUSCULAR; INTRAVENOUS at 04:04

## 2018-04-01 RX ADMIN — PROPOFOL 5 MCG/KG/MIN: 10 INJECTION, EMULSION INTRAVENOUS at 06:04

## 2018-04-01 RX ADMIN — POTASSIUM CHLORIDE 40 MEQ: 20 SOLUTION ORAL at 10:04

## 2018-04-01 RX ADMIN — HYDRALAZINE HYDROCHLORIDE 100 MG: 25 TABLET ORAL at 10:04

## 2018-04-01 RX ADMIN — CLONIDINE HYDROCHLORIDE 0.2 MG: 0.1 TABLET ORAL at 04:04

## 2018-04-01 RX ADMIN — DORZOLAMIDE HYDROCHLORIDE 1 DROP: 20 SOLUTION/ DROPS OPHTHALMIC at 10:04

## 2018-04-01 RX ADMIN — FUROSEMIDE 20 MG: 10 INJECTION, SOLUTION INTRAMUSCULAR; INTRAVENOUS at 05:04

## 2018-04-01 RX ADMIN — LATANOPROST 1 DROP: 50 SOLUTION OPHTHALMIC at 10:04

## 2018-04-01 RX ADMIN — WARFARIN SODIUM 2.5 MG: 2.5 TABLET ORAL at 04:04

## 2018-04-01 RX ADMIN — CLONIDINE HYDROCHLORIDE 0.2 MG: 0.1 TABLET ORAL at 10:04

## 2018-04-01 RX ADMIN — DEXTROSE MONOHYDRATE 12.5 G: 25 INJECTION, SOLUTION INTRAVENOUS at 03:04

## 2018-04-01 RX ADMIN — CARVEDILOL 25 MG: 6.25 TABLET, FILM COATED ORAL at 05:04

## 2018-04-01 RX ADMIN — DEXTROSE 40 MG: 50 INJECTION, SOLUTION INTRAVENOUS at 04:04

## 2018-04-01 NOTE — SUBJECTIVE & OBJECTIVE
Past Medical History:   Diagnosis Date    Anticoagulation monitoring by pharmacist 6/29/2012    At risk for amiodarone toxicity with long term use 1/27/2014    Atrial fibrillation     Bilateral carotid artery disease 1/11/2016    Blood transfusion     CAD S/P percutaneous coronary angioplasty 9/27/2012    Chronic diastolic heart failure 9/27/2012    Echo 3-: 1 - Concentric hypertrophy.  2 - Normal left ventricular systolic function (EF 60-65%).  3 - Right ventricular enlargement with hypertrophy, with normal systolic function.  4 - Left ventricular diastolic dysfunction.  5 - Biatrial enlargement.  6 - Mild tricuspid regurgitation.  7 - Pulmonary hypertension. The estimated PA systolic pressure is 55 mmHg.  8 - Increased central venous pressure (IVC is greater than 3cm in diameter).     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  ROWAN placed just proximal to stent in 2005 for UA.  Cath 12/2011: normal LMCA, 40% mid LAD, 50% distal LCx    EYE SURGERY      LAMINECTOMY      TOTAL HIP ARTHROPLASTY Right     x's r hip       Review of patient's allergies indicates:   Allergen Reactions    Losartan Other (See Comments)     Hyperkalemia    0.225 % sodium chloride      Other reaction(s): Eye irritation    Amitriptyline      Other reaction(s): Vomiting  Other reaction(s): Vomiting    Azopt  [brinzolamide]      Other reaction(s): Eye irritation    Cantil  [mepenzolate bromide]      Other reaction(s): Unknown  Other reaction(s): Unknown    Ciprofloxacin Nausea And Vomiting     Other reaction(s): Stomach upset    Codeine      Other reaction(s): Unknown  Other reaction(s): Unknown    Duragal-s      Other reaction(s): Vomiting    Erythromycin      Other reaction(s): Unknown  Other reaction(s): Unknown    Fentanyl      Other reaction(s): Vomiting    Hydrocodone-acetaminophen      Other reaction(s): Unknown  Other reaction(s): Unknown    Indomethacin      Other reaction(s): Unknown  Other reaction(s): Unknown    Meperidine      Other reaction(s): Unknown  Other reaction(s): Unknown    Minoxidil      Other reaction(s): druged  Other reaction(s): nausea and vomiting  Other reaction(s): nausea and vomiting  Other reaction(s): druged    Morphine      Other reaction(s): Unknown  Other reaction(s): Unknown    Neuromuscular blockers, steroidal      Other reaction(s): Unknown    Nifedipine      Other reaction(s): Swelling  Other reaction(s): Swelling    Oxycodone hcl-oxycodone-asa      Other reaction(s): Unknown  Other reaction(s): Unknown    Penicillins Hives     Other reaction(s): Unknown    Propoxyphene      Other reaction(s): Unknown    Talwin  [pentazocine lactate]       Other reaction(s): Unknown    Talwin compound      Other reaction(s): Unknown    Sensipar [cinacalcet] Nausea Only    Valsartan Rash and Hives       Current Facility-Administered Medications on File Prior to Encounter   Medication    [COMPLETED] cloNIDine tablet 0.1 mg    [COMPLETED] levETIRAcetam (KEPPRA) 3,000 mg in dextrose 5 % 100 mL IVPB    [COMPLETED] potassium chloride 10 mEq in 100 mL IVPB    [DISCONTINUED] aspirin chewable tablet 81 mg    [DISCONTINUED] carvedilol tablet 25 mg    [DISCONTINUED] cloNIDine tablet 0.1 mg    [DISCONTINUED] cloNIDine tablet 0.1 mg    [DISCONTINUED] cloNIDine tablet 0.2 mg    [DISCONTINUED] dextrose 5 % infusion    [DISCONTINUED] dextrose 50% injection 12.5 g    [DISCONTINUED] dextrose 50% injection 25 g    [DISCONTINUED] dextrose 50% injection    [DISCONTINUED] docusate sodium capsule 100 mg    [DISCONTINUED] dorzolamide 2 % ophthalmic solution 1 drop    [DISCONTINUED] doxazosin tablet 8 mg    [DISCONTINUED] escitalopram oxalate tablet 5 mg    [DISCONTINUED] etomidate injection    [DISCONTINUED] ferrous sulfate EC tablet 325 mg    [DISCONTINUED] furosemide injection 40 mg    [DISCONTINUED] glucagon (human recombinant) injection 1 mg    [DISCONTINUED] glucose chewable tablet 16 g    [DISCONTINUED] glucose chewable tablet 24 g    [DISCONTINUED] hydrALAZINE injection 10 mg    [DISCONTINUED] hydrALAZINE tablet 100 mg    [DISCONTINUED] insulin aspart U-100 pen 1-10 Units    [DISCONTINUED] insulin detemir U-100 pen 10 Units    [DISCONTINUED] latanoprost 0.005 % ophthalmic solution 1 drop    [DISCONTINUED] levetiracetam 500 mg in dextrose 5 % 100 mL IVPB (ready to mix system)    [DISCONTINUED] memantine tablet 10 mg    [DISCONTINUED] multivitamin tablet 1 tablet    [DISCONTINUED] potassium chloride CR capsule 10 mEq    [DISCONTINUED] pravastatin tablet 20 mg    [DISCONTINUED] propofol (DIPRIVAN) 10 mg/mL infusion    [DISCONTINUED] propofol  (DIPRIVAN) 10 mg/mL infusion    [DISCONTINUED] propofol (DIPRIVAN) 10 mg/mL infusion    [DISCONTINUED] sodium bicarbonate tablet 650 mg    [DISCONTINUED] succinylcholine injection    [DISCONTINUED] vancomycin 750 mg in dextrose 5 % 250 mL IVPB (ready to mix system)    [DISCONTINUED] warfarin (COUMADIN) tablet 2.5 mg    [DISCONTINUED] warfarin (COUMADIN) tablet 5 mg     Current Outpatient Prescriptions on File Prior to Encounter   Medication Sig    ACCU-CHEK SMARTVIEW TEST STRIP Strp USE ONE STRIP TO CHECK GLUCOSE 4 TIMES DAILY    ACCU-CHEK SMARTVIEW TEST STRIP Strp TEST FOUR TIMES A DAY    aspirin 81 MG Chew Take 1 tablet (81 mg total) by mouth once daily.    blood-glucose meter Misc Dispense Accu-Chek Natalia meter    carvedilol (COREG) 25 MG tablet Take 1 tablet (25 mg total) by mouth 2 (two) times daily with meals.    cloNIDine (CATAPRES) 0.1 MG tablet Take 2 tablets (0.2 mg total) by mouth 3 (three) times daily.    cyanocobalamin, vitamin B-12, 1,000 mcg TbSR Take 1,000 mcg by mouth once daily.    docusate sodium (COLACE) 100 MG capsule Take 1 capsule (100 mg total) by mouth 2 (two) times daily.    dorzolamide (TRUSOPT) 2 % ophthalmic solution INSTILL ONE DROP INTO EACH EYE TWICE DAILY    doxazosin (CARDURA) 8 MG Tab Take 1 tablet (8 mg total) by mouth once daily.    EASY TOUCH INSULIN SYRINGE 0.5 mL 31 gauge x 5/16 Syrg To use 4 times daily with insulin injections    escitalopram oxalate (LEXAPRO) 5 MG Tab Take 1 tablet (5 mg total) by mouth once daily.    ferrous sulfate 324 mg (65 mg iron) TbEC TAKE ONE TABLET BY MOUTH TWICE DAILY    hydrALAZINE (APRESOLINE) 100 MG tablet Take 1 tablet (100 mg total) by mouth every 8 (eight) hours.    insulin aspart (NOVOLOG) 100 unit/mL injection Inject 8 units w/ small meal, 15 units w/ large meal plus scale, max daily 54 units.    insulin glargine (LANTUS) 100 unit/mL injection Inject 12 Units into the skin 2 (two) times daily.    insulin syringe-needle  U-100 (EASY TOUCH INSULIN SYRINGE) 0.5 mL 31 gauge x 5/16 Syrg To use 4 times daily with insulin injections    isosorbide dinitrate (ISOCHRON) 40 mg TbSR Take 1 tablet (40 mg total) by mouth every 8 (eight) hours.    lancets Misc USE ONE  TO CHECK GLUCOSE 4 TIMES DAILY    latanoprost 0.005 % ophthalmic solution INSTILL ONE DROP INTO EACH EYE IN THE EVENING    memantine (NAMENDA) 10 MG Tab Take 1 tablet (10 mg total) by mouth 2 (two) times daily.    multivitamin (THERAGRAN) tablet Take 1 tablet by mouth once daily.    nitroGLYCERIN 0.4 MG/DOSE TL SPRY (NITROLINGUAL) 400 mcg/spray spray PLACE ONE SPRAY UNDER THE TONGUE EVERY 5 MINUTES AS NEEDED FOR CHEST PAIN.    polyethylene glycol (GLYCOLAX) 17 gram/dose powder Take 17 g by mouth once daily.    potassium chloride (KLOR-CON) 10 MEQ TbSR TAKE ONE TABLET BY MOUTH TWICE DAILY    potassium chloride SA (KLOR-CON M10) 10 MEQ tablet Take 1 tablet (10 mEq total) by mouth 2 (two) times daily.    pravastatin (PRAVACHOL) 20 MG tablet Take 1 tablet (20 mg total) by mouth once daily.    sodium bicarbonate 650 MG tablet TAKE ONE TABLET BY MOUTH ONCE DAILY    torsemide (DEMADEX) 20 MG Tab Take 1 tablet (20 mg total) by mouth once daily. .    warfarin (COUMADIN) 5 MG tablet Take 0.5-1 tablets (2.5-5 mg total) by mouth Daily. As directed by [facility]     Family History     Problem Relation (Age of Onset)    Cancer Mother, Brother, Daughter, Maternal Aunt    Diabetes Mother, Sister, Sister, Son        Social History Main Topics    Smoking status: Never Smoker    Smokeless tobacco: Never Used    Alcohol use No    Drug use: No    Sexual activity: No     Review of Systems,not able be done,patient is unresponsive.  Objective:     Vital Signs (Most Recent):  Temp: (!) 94.5 °F (34.7 °C) (04/01/18 1515)  Pulse: 64 (04/01/18 1515)  Resp: 14 (04/01/18 1515)  BP: 138/63 (04/01/18 1515)  SpO2: 100 % (04/01/18 1515) Vital Signs (24h Range):  Temp:  [94.5 °F (34.7 °C)-98.7  °F (37.1 °C)] 94.5 °F (34.7 °C)  Pulse:  [63-69] 64  Resp:  [0-20] 14  SpO2:  [99 %-100 %] 100 %  BP: (118-226)/(60-92) 138/63        There is no height or weight on file to calculate BMI.    Physical Exam   Constitutional: No distress.   HENT:   Head: Atraumatic.   Eyes: EOM are normal.   Neck: Neck supple.   Cardiovascular: Normal rate and regular rhythm.    Pulmonary/Chest: Effort normal and breath sounds normal.   Abdominal: Bowel sounds are normal.   Musculoskeletal: She exhibits edema.   Neurological:   pupils are sluggish to light,   Skin:   Cold and  calmy.         CRANIAL NERVES     CN III, IV, VI   Extraocular motions are normal.        Significant Labs:   BMP:   Recent Labs  Lab 04/01/18  0618   GLU <30*      K 3.4*      CO2 35*   BUN 55*   CREATININE 1.96*   CALCIUM 9.1     CBC:   Recent Labs  Lab 03/31/18  0612 03/31/18  0917 04/01/18  0618   WBC 1.83* 2.57* 3.99   HGB 8.1* 8.7* 7.5*   HCT 26.0* 27.8* 24.2*   PLT 66* 64* 64*       Significant Imaging: reviewed.

## 2018-04-01 NOTE — ASSESSMENT & PLAN NOTE
Apparently received levemir last night,will continue with D5 IVF, and monitor blod sugar closely,HbA1C is normal.

## 2018-04-01 NOTE — PLAN OF CARE
Problem: Patient Care Overview  Goal: Plan of Care Review  Outcome: Ongoing (interventions implemented as appropriate)  Patient admitted to ICU intubated, D5 infusion to maintain blood glucose, VSS. Patient grimaces to deep nailbed stimulation, but is otherwise unresponsive. Accuchecks q2hrs. Bed is locked and in lowest position with side rails up x2. Pt on continuous cardiac monitoring, pulse ox, and BP cuff. Weight shift assistance provided q2. No new injury or falls. Family updated as to POC, will continue to monitor. -- Propofol started as per neuro for witnessed seizure, approx 45 seconds in duration.

## 2018-04-01 NOTE — H&P
[facility] Medicine  History & Physical    Patient Name: Chey Trujillo  MRN: [id number]  Admission Date: 4/1/2018  Attending Physician: Selena Castaneda MD   Primary Care Provider: Ada Flores MD (Inactive)         Patient information was obtained from past medical records.     Subjective:     Principal Problem:Metabolic encephalopathy    Chief Complaint: No chief complaint on file.       HPI: 83 yo F w/ pmhx of HTN, HLD, CAD s/p PCI, DM2, PAF on coumadin, diastolic CHF, chronic Hep C, CKD4, dementia, multiple admits for chf, bedbound, now under hospice care was brought in by EMS to [facility] on 3.31.18  for sob and weakness,Pt admitted to hospital medicine in [place] for CHF exacerbation and anasarca.has been  diuresed well with IV lasix and anasarca starting to improve. However pt became unresponsive w/ agonal breathing on 4/1/18 found to have reading of low blood sugar of <30 on bmp. Given amp of dextrose and Rapid response was called and ER physician presented to the bedside and noted her to be unresponsive to sternal rub. Pupils were equal, midrange rhythmic eye movements medial-lateral. She was intubated for airway protection. Repeat accucheck 81. Given additional amp of D50. CT head, CXR and ekg was done. CT head  was negative for acute abnormality. CXR showed increase in pulmonary edema. Remains intubated on propofol w/ dextrose infusion and q1h accuchecks. Urine cx +enterococcus and was on IV vancomycin. Pt remains with minimal response; intermittently grimaces from painful stimuli. Concern for possible seizure activity given rhythmic eye movement and case was discussed with neurology at [facility]. Recommended load with keppra 3000 mg IV x 1 and start 500 mg q12h the following day. Also EEG, MD in [facility]  discussed poor prognosis with family, however family stated that they wished to proceed with aggressive measures and pt to remain full  code per family request,patient has been transferred to ICU [facility] for neurologic evaluation,patient at this time is totally unresponsive,rectal temp. Is 34,she has sluggish pupils reaction,neurology has been consulted,no sign of seizure activity at this time.not able do ROS,information has been gathered from [facility] record.    Past Medical History:   Diagnosis Date    Anticoagulation monitoring by pharmacist 6/29/2012    At risk for amiodarone toxicity with long term use 1/27/2014    Atrial fibrillation     Bilateral carotid artery disease 1/11/2016    Blood transfusion     CAD S/P percutaneous coronary angioplasty 9/27/2012    Chronic diastolic heart failure 9/27/2012    Echo 3-: 1 - Concentric hypertrophy.  2 - Normal left ventricular systolic function (EF 60-65%).  3 - Right ventricular enlargement with hypertrophy, with normal systolic function.  4 - Left ventricular diastolic dysfunction.  5 - Biatrial enlargement.  6 - Mild tricuspid regurgitation.  7 - Pulmonary hypertension. The estimated PA systolic pressure is 55 mmHg.  8 - Increased central venous pressure (IVC is greater than 3cm in diameter).     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  ANABELA u/s 4/14 wnl 9/12/2012    Secondary pulmonary hypertension 8/13/2013    Spinal stenosis     Thyroid nodule: per ENDO repeat in 2017 and see ENDO then (note 1/29/16) 1/2/2013    Tricuspid  regurgitation 1/11/2016    Tubular adenoma x 3 6/13 5/19/2014    Type 2 DM with CKD stage 4 and hypertension 1/16/2015    Urinary, incontinence, stress female        Past Surgical History:   Procedure Laterality Date    CARDIAC CATHETERIZATION      CARDIAC PACEMAKER PLACEMENT  2/9/2012    Quinn Jerome DR, serial #[id number]    CATARACT EXTRACTION      Status post cataract extraction and insertion of intraocular lens of right eye    CORONARY ANGIOPLASTY      ROWAN to prox RCA 2002 for UA/+stress test.  ROWAN placed just proximal to stent in 2005 for UA.  Cath 12/2011: normal LMCA, 40% mid LAD, 50% distal LCx    EYE SURGERY      LAMINECTOMY      TOTAL HIP ARTHROPLASTY Right     x's r hip       Review of patient's allergies indicates:   Allergen Reactions    Losartan Other (See Comments)     Hyperkalemia    0.225 % sodium chloride      Other reaction(s): Eye irritation    Amitriptyline      Other reaction(s): Vomiting  Other reaction(s): Vomiting    Azopt  [brinzolamide]      Other reaction(s): Eye irritation    Cantil  [mepenzolate bromide]      Other reaction(s): Unknown  Other reaction(s): Unknown    Ciprofloxacin Nausea And Vomiting     Other reaction(s): Stomach upset    Codeine      Other reaction(s): Unknown  Other reaction(s): Unknown    Duragal-s      Other reaction(s): Vomiting    Erythromycin      Other reaction(s): Unknown  Other reaction(s): Unknown    Fentanyl      Other reaction(s): Vomiting    Hydrocodone-acetaminophen      Other reaction(s): Unknown  Other reaction(s): Unknown    Indomethacin      Other reaction(s): Unknown  Other reaction(s): Unknown    Meperidine      Other reaction(s): Unknown  Other reaction(s): Unknown    Minoxidil      Other reaction(s): druged  Other reaction(s): nausea and vomiting  Other reaction(s): nausea and vomiting  Other reaction(s): druged    Morphine      Other reaction(s): Unknown  Other reaction(s): Unknown    Neuromuscular blockers, steroidal       Other reaction(s): Unknown    Nifedipine      Other reaction(s): Swelling  Other reaction(s): Swelling    Oxycodone hcl-oxycodone-asa      Other reaction(s): Unknown  Other reaction(s): Unknown    Penicillins Hives     Other reaction(s): Unknown    Propoxyphene      Other reaction(s): Unknown    Talwin  [pentazocine lactate]      Other reaction(s): Unknown    Talwin compound      Other reaction(s): Unknown    Sensipar [cinacalcet] Nausea Only    Valsartan Rash and Hives       Current Facility-Administered Medications on File Prior to Encounter   Medication    [COMPLETED] cloNIDine tablet 0.1 mg    [COMPLETED] levETIRAcetam (KEPPRA) 3,000 mg in dextrose 5 % 100 mL IVPB    [COMPLETED] potassium chloride 10 mEq in 100 mL IVPB    [DISCONTINUED] aspirin chewable tablet 81 mg    [DISCONTINUED] carvedilol tablet 25 mg    [DISCONTINUED] cloNIDine tablet 0.1 mg    [DISCONTINUED] cloNIDine tablet 0.1 mg    [DISCONTINUED] cloNIDine tablet 0.2 mg    [DISCONTINUED] dextrose 5 % infusion    [DISCONTINUED] dextrose 50% injection 12.5 g    [DISCONTINUED] dextrose 50% injection 25 g    [DISCONTINUED] dextrose 50% injection    [DISCONTINUED] docusate sodium capsule 100 mg    [DISCONTINUED] dorzolamide 2 % ophthalmic solution 1 drop    [DISCONTINUED] doxazosin tablet 8 mg    [DISCONTINUED] escitalopram oxalate tablet 5 mg    [DISCONTINUED] etomidate injection    [DISCONTINUED] ferrous sulfate EC tablet 325 mg    [DISCONTINUED] furosemide injection 40 mg    [DISCONTINUED] glucagon (human recombinant) injection 1 mg    [DISCONTINUED] glucose chewable tablet 16 g    [DISCONTINUED] glucose chewable tablet 24 g    [DISCONTINUED] hydrALAZINE injection 10 mg    [DISCONTINUED] hydrALAZINE tablet 100 mg    [DISCONTINUED] insulin aspart U-100 pen 1-10 Units    [DISCONTINUED] insulin detemir U-100 pen 10 Units    [DISCONTINUED] latanoprost 0.005 % ophthalmic solution 1 drop    [DISCONTINUED] levetiracetam 500  mg in dextrose 5 % 100 mL IVPB (ready to mix system)    [DISCONTINUED] memantine tablet 10 mg    [DISCONTINUED] multivitamin tablet 1 tablet    [DISCONTINUED] potassium chloride CR capsule 10 mEq    [DISCONTINUED] pravastatin tablet 20 mg    [DISCONTINUED] propofol (DIPRIVAN) 10 mg/mL infusion    [DISCONTINUED] propofol (DIPRIVAN) 10 mg/mL infusion    [DISCONTINUED] propofol (DIPRIVAN) 10 mg/mL infusion    [DISCONTINUED] sodium bicarbonate tablet 650 mg    [DISCONTINUED] succinylcholine injection    [DISCONTINUED] vancomycin 750 mg in dextrose 5 % 250 mL IVPB (ready to mix system)    [DISCONTINUED] warfarin (COUMADIN) tablet 2.5 mg    [DISCONTINUED] warfarin (COUMADIN) tablet 5 mg     Current Outpatient Prescriptions on File Prior to Encounter   Medication Sig    ACCU-CHEK SMARTVIEW TEST STRIP Strp USE ONE STRIP TO CHECK GLUCOSE 4 TIMES DAILY    ACCU-CHEK SMARTVIEW TEST STRIP Strp TEST FOUR TIMES A DAY    aspirin 81 MG Chew Take 1 tablet (81 mg total) by mouth once daily.    blood-glucose meter Misc Dispense Accu-Chek Natalia meter    carvedilol (COREG) 25 MG tablet Take 1 tablet (25 mg total) by mouth 2 (two) times daily with meals.    cloNIDine (CATAPRES) 0.1 MG tablet Take 2 tablets (0.2 mg total) by mouth 3 (three) times daily.    cyanocobalamin, vitamin B-12, 1,000 mcg TbSR Take 1,000 mcg by mouth once daily.    docusate sodium (COLACE) 100 MG capsule Take 1 capsule (100 mg total) by mouth 2 (two) times daily.    dorzolamide (TRUSOPT) 2 % ophthalmic solution INSTILL ONE DROP INTO EACH EYE TWICE DAILY    doxazosin (CARDURA) 8 MG Tab Take 1 tablet (8 mg total) by mouth once daily.    EASY TOUCH INSULIN SYRINGE 0.5 mL 31 gauge x 5/16 Syrg To use 4 times daily with insulin injections    escitalopram oxalate (LEXAPRO) 5 MG Tab Take 1 tablet (5 mg total) by mouth once daily.    ferrous sulfate 324 mg (65 mg iron) TbEC TAKE ONE TABLET BY MOUTH TWICE DAILY    hydrALAZINE (APRESOLINE) 100 MG  tablet Take 1 tablet (100 mg total) by mouth every 8 (eight) hours.    insulin aspart (NOVOLOG) 100 unit/mL injection Inject 8 units w/ small meal, 15 units w/ large meal plus scale, max daily 54 units.    insulin glargine (LANTUS) 100 unit/mL injection Inject 12 Units into the skin 2 (two) times daily.    insulin syringe-needle U-100 (EASY TOUCH INSULIN SYRINGE) 0.5 mL 31 gauge x 5/16 Syrg To use 4 times daily with insulin injections    isosorbide dinitrate (ISOCHRON) 40 mg TbSR Take 1 tablet (40 mg total) by mouth every 8 (eight) hours.    lancets Misc USE ONE  TO CHECK GLUCOSE 4 TIMES DAILY    latanoprost 0.005 % ophthalmic solution INSTILL ONE DROP INTO EACH EYE IN THE EVENING    memantine (NAMENDA) 10 MG Tab Take 1 tablet (10 mg total) by mouth 2 (two) times daily.    multivitamin (THERAGRAN) tablet Take 1 tablet by mouth once daily.    nitroGLYCERIN 0.4 MG/DOSE TL SPRY (NITROLINGUAL) 400 mcg/spray spray PLACE ONE SPRAY UNDER THE TONGUE EVERY 5 MINUTES AS NEEDED FOR CHEST PAIN.    polyethylene glycol (GLYCOLAX) 17 gram/dose powder Take 17 g by mouth once daily.    potassium chloride (KLOR-CON) 10 MEQ TbSR TAKE ONE TABLET BY MOUTH TWICE DAILY    potassium chloride SA (KLOR-CON M10) 10 MEQ tablet Take 1 tablet (10 mEq total) by mouth 2 (two) times daily.    pravastatin (PRAVACHOL) 20 MG tablet Take 1 tablet (20 mg total) by mouth once daily.    sodium bicarbonate 650 MG tablet TAKE ONE TABLET BY MOUTH ONCE DAILY    torsemide (DEMADEX) 20 MG Tab Take 1 tablet (20 mg total) by mouth once daily. .    warfarin (COUMADIN) 5 MG tablet Take 0.5-1 tablets (2.5-5 mg total) by mouth Daily. As directed by [facility]     Family History     Problem Relation (Age of Onset)    Cancer Mother, Brother, Daughter, Maternal Aunt    Diabetes Mother, Sister, Sister, Son        Social History Main Topics    Smoking status: Never Smoker    Smokeless tobacco: Never Used    Alcohol use No    Drug use: No     Sexual activity: No     Review of Systems,not able be done,patient is unresponsive.  Objective:     Vital Signs (Most Recent):  Temp: (!) 94.5 °F (34.7 °C) (04/01/18 1515)  Pulse: 64 (04/01/18 1515)  Resp: 14 (04/01/18 1515)  BP: 138/63 (04/01/18 1515)  SpO2: 100 % (04/01/18 1515) Vital Signs (24h Range):  Temp:  [94.5 °F (34.7 °C)-98.7 °F (37.1 °C)] 94.5 °F (34.7 °C)  Pulse:  [63-69] 64  Resp:  [0-20] 14  SpO2:  [99 %-100 %] 100 %  BP: (118-226)/(60-92) 138/63        There is no height or weight on file to calculate BMI.    Physical Exam   Constitutional: No distress.   HENT:   Head: Atraumatic.   Eyes: EOM are normal.   Neck: Neck supple.   Cardiovascular: Normal rate and regular rhythm.    Pulmonary/Chest: Effort normal and breath sounds normal.   Abdominal: Bowel sounds are normal.   Musculoskeletal: She exhibits edema.   Neurological:   pupils are sluggish to light,   Skin:   Cold and  calmy.         CRANIAL NERVES     CN III, IV, VI   Extraocular motions are normal.        Significant Labs:   BMP:   Recent Labs  Lab 04/01/18  0618   GLU <30*      K 3.4*      CO2 35*   BUN 55*   CREATININE 1.96*   CALCIUM 9.1     CBC:   Recent Labs  Lab 03/31/18  0612 03/31/18  0917 04/01/18  0618   WBC 1.83* 2.57* 3.99   HGB 8.1* 8.7* 7.5*   HCT 26.0* 27.8* 24.2*   PLT 66* 64* 64*       Significant Imaging: reviewed.    Assessment/Plan:     * Metabolic encephalopathy      Was hypoglycemic,but improved at this time,guerrero also check ammonia level,neurology has been  consulted for possible seizure activity,no sign of seizure at this time,recieved loading dose of IV Keprra,will have EEG in AM.        Hypoglycemia, coma    Apparently received levemir last night,will continue with D5 IVF, and monitor blod sugar closely,HbA1C is normal.          Enterococcus UTI      On Vanc per random  Level.she is allergic to penicillin.        Physical debility    Need PT,OT after extunbation,was in hospice.          Urinary tract  infection without hematuria    On IV Abx,          Primary open-angle glaucoma, severe stage      On home eye drop,        Hyperlipidemia      On statin.        Hypokalemia      Replaced.        SSS (sick sinus syndrome)    S/p PPM placement,monitir in Telemetry.          Protein calorie malnutrition      Supplement after extubation.        Recurrent major depressive disorder, in partial remission      will monitor at this time,        Essential hypertension    Will continue with home medication and prn IV Hydralazine.          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        Vascular dementia without behavioral disturbance    supportive care.          Vitamin D deficiency    Need supplement after extubation.          Nonrheumatic aortic valve stenosis      On medical treatment,        Elevated troponin      Duo to CHF excerebration with underlying CKD 4,        Diabetes mellitus with stage 4 chronic kidney disease GFR 15-29    Will continue with SSI,avoid scheduled insulin.          Anemia of chronic disease    Stable,will monitor.          Bilateral carotid artery disease: 40-49% 2016 Bilateral      Supportive dare,medical treatment.        Thrombocytopenia      likely duo to hep C,.will monitor.        Chronic hepatitis C without hepatic coma    Will check ammonia level.        Peripheral vascular disease      On medical treatment.        Acute on chronic diastolic heart failure    Will continue with IV lasix,not on ACE or ARB duo to CKD 4,will monitor urine out put.          CAD S/P percutaneous coronary angioplasty      On medical treatment.        Anticoagulation monitoring by pharmacist    Will continue with coumadin,monitor iNR.          Chronic atrial fibrillation    Rate controlled,on coumadin.            VTE Risk Mitigation         Ordered     warfarin (COUMADIN) tablet 5 mg  Every other  day     Route:  Per OG tube        04/01/18 1508     warfarin (COUMADIN) tablet 2.5 mg  Every other day     Route:  Per OG tube        04/01/18 1508        Critical care time spent on the evaluation and treatment of severe organ dysfunction, review of pertinent labs and imaging studies, discussions with consulting providers and discussions with patient/family: 45  minutes.     Selena Castaneda MD  Department of Hospital Medicine   [facility]

## 2018-04-01 NOTE — ASSESSMENT & PLAN NOTE
Was hypoglycemic,but improved at this time,guerrero also check ammonia level,neurology has been  consulted for possible seizure activity,no sign of seizure at this time,recieved loading dose of IV Keprra,will have EEG in AM.

## 2018-04-02 PROBLEM — J98.8 AIRWAY COMPROMISE: Status: ACTIVE | Noted: 2018-04-02

## 2018-04-02 PROBLEM — J96.01 ACUTE RESPIRATORY FAILURE WITH HYPOXIA: Status: ACTIVE | Noted: 2018-04-02

## 2018-04-02 LAB
ALBUMIN SERPL BCP-MCNC: 1.9 G/DL
ALLENS TEST: ABNORMAL
ALP SERPL-CCNC: 44 U/L
ALT SERPL W/O P-5'-P-CCNC: 11 U/L
ANION GAP SERPL CALC-SCNC: 5 MMOL/L
AST SERPL-CCNC: 28 U/L
BASOPHILS # BLD AUTO: 0 K/UL
BASOPHILS NFR BLD: 0 %
BILIRUB SERPL-MCNC: 0.6 MG/DL
BUN SERPL-MCNC: 53 MG/DL
CA-I BLDV-SCNC: 1.28 MMOL/L
CALCIUM SERPL-MCNC: 8.8 MG/DL
CHLORIDE SERPL-SCNC: 105 MMOL/L
CO2 SERPL-SCNC: 29 MMOL/L
CREAT SERPL-MCNC: 2 MG/DL
DELSYS: ABNORMAL
DIFFERENTIAL METHOD: ABNORMAL
EOSINOPHIL # BLD AUTO: 0 K/UL
EOSINOPHIL NFR BLD: 0.3 %
ERYTHROCYTE [DISTWIDTH] IN BLOOD BY AUTOMATED COUNT: 16.4 %
ERYTHROCYTE [SEDIMENTATION RATE] IN BLOOD BY WESTERGREN METHOD: 14 MM/H
EST. GFR  (AFRICAN AMERICAN): 26 ML/MIN/1.73 M^2
EST. GFR  (NON AFRICAN AMERICAN): 23 ML/MIN/1.73 M^2
FIO2: 60
GLUCOSE SERPL-MCNC: 98 MG/DL
HCO3 UR-SCNC: 28.4 MMOL/L (ref 24–28)
HCT VFR BLD AUTO: 23.8 %
HGB BLD-MCNC: 7.8 G/DL
INR PPP: 2.4
LYMPHOCYTES # BLD AUTO: 0.8 K/UL
LYMPHOCYTES NFR BLD: 24.6 %
MCH RBC QN AUTO: 32.4 PG
MCHC RBC AUTO-ENTMCNC: 32.8 G/DL
MCV RBC AUTO: 99 FL
MODE: ABNORMAL
MONOCYTES # BLD AUTO: 0.2 K/UL
MONOCYTES NFR BLD: 5.3 %
NEUTROPHILS # BLD AUTO: 2.4 K/UL
NEUTROPHILS NFR BLD: 69.5 %
PCO2 BLDA: 27.3 MMHG (ref 35–45)
PEEP: 5
PH SMN: 7.62 [PH] (ref 7.35–7.45)
PLATELET # BLD AUTO: 64 K/UL
PMV BLD AUTO: 12.3 FL
PO2 BLDA: 133 MMHG (ref 80–100)
POC BE: 7 MMOL/L
POC SATURATED O2: 100 % (ref 95–100)
POC TCO2: 29 MMOL/L (ref 23–27)
POCT GLUCOSE: 106 MG/DL (ref 70–110)
POCT GLUCOSE: 113 MG/DL (ref 70–110)
POCT GLUCOSE: 114 MG/DL (ref 70–110)
POCT GLUCOSE: 152 MG/DL (ref 70–110)
POCT GLUCOSE: 161 MG/DL (ref 70–110)
POCT GLUCOSE: 214 MG/DL (ref 70–110)
POCT GLUCOSE: 220 MG/DL (ref 70–110)
POCT GLUCOSE: 92 MG/DL (ref 70–110)
POCT GLUCOSE: 94 MG/DL (ref 70–110)
POTASSIUM SERPL-SCNC: 3.7 MMOL/L
PROT SERPL-MCNC: 4.8 G/DL
PROTHROMBIN TIME: 25.5 SEC
RBC # BLD AUTO: 2.41 M/UL
SAMPLE: ABNORMAL
SITE: ABNORMAL
SODIUM SERPL-SCNC: 139 MMOL/L
SP02: 100
VANCOMYCIN SERPL-MCNC: 6.4 UG/ML
VT: 450
WBC # BLD AUTO: 3.41 K/UL

## 2018-04-02 PROCEDURE — 36415 COLL VENOUS BLD VENIPUNCTURE: CPT

## 2018-04-02 PROCEDURE — 95822 EEG COMA OR SLEEP ONLY: CPT

## 2018-04-02 PROCEDURE — 20000000 HC ICU ROOM

## 2018-04-02 PROCEDURE — 80053 COMPREHEN METABOLIC PANEL: CPT

## 2018-04-02 PROCEDURE — 82803 BLOOD GASES ANY COMBINATION: CPT

## 2018-04-02 PROCEDURE — 99900026 HC AIRWAY MAINTENANCE (STAT)

## 2018-04-02 PROCEDURE — 95822 EEG COMA OR SLEEP ONLY: CPT | Mod: 26,,, | Performed by: PSYCHIATRY & NEUROLOGY

## 2018-04-02 PROCEDURE — 63600175 PHARM REV CODE 636 W HCPCS: Performed by: INTERNAL MEDICINE

## 2018-04-02 PROCEDURE — 25000003 PHARM REV CODE 250: Performed by: STUDENT IN AN ORGANIZED HEALTH CARE EDUCATION/TRAINING PROGRAM

## 2018-04-02 PROCEDURE — 25000003 PHARM REV CODE 250: Performed by: HOSPITALIST

## 2018-04-02 PROCEDURE — 85610 PROTHROMBIN TIME: CPT

## 2018-04-02 PROCEDURE — 63600175 PHARM REV CODE 636 W HCPCS: Performed by: HOSPITALIST

## 2018-04-02 PROCEDURE — 99222 1ST HOSP IP/OBS MODERATE 55: CPT | Mod: ,,, | Performed by: PSYCHIATRY & NEUROLOGY

## 2018-04-02 PROCEDURE — 99291 CRITICAL CARE FIRST HOUR: CPT | Mod: ,,, | Performed by: INTERNAL MEDICINE

## 2018-04-02 PROCEDURE — 85025 COMPLETE CBC W/AUTO DIFF WBC: CPT

## 2018-04-02 PROCEDURE — C9113 INJ PANTOPRAZOLE SODIUM, VIA: HCPCS | Performed by: HOSPITALIST

## 2018-04-02 PROCEDURE — 94003 VENT MGMT INPAT SUBQ DAY: CPT

## 2018-04-02 PROCEDURE — 80202 ASSAY OF VANCOMYCIN: CPT

## 2018-04-02 RX ORDER — ISOSORBIDE DINITRATE 20 MG/1
40 TABLET ORAL 3 TIMES DAILY
Status: DISCONTINUED | OUTPATIENT
Start: 2018-04-02 | End: 2018-04-03

## 2018-04-02 RX ORDER — FUROSEMIDE 10 MG/ML
40 INJECTION INTRAMUSCULAR; INTRAVENOUS 2 TIMES DAILY
Status: DISCONTINUED | OUTPATIENT
Start: 2018-04-02 | End: 2018-04-03

## 2018-04-02 RX ORDER — CHLORHEXIDINE GLUCONATE ORAL RINSE 1.2 MG/ML
15 SOLUTION DENTAL 2 TIMES DAILY
Status: DISCONTINUED | OUTPATIENT
Start: 2018-04-02 | End: 2018-04-05 | Stop reason: HOSPADM

## 2018-04-02 RX ADMIN — LEVETIRACETAM 500 MG: 5 INJECTION INTRAVENOUS at 09:04

## 2018-04-02 RX ADMIN — FUROSEMIDE 40 MG: 10 INJECTION, SOLUTION INTRAMUSCULAR; INTRAVENOUS at 10:04

## 2018-04-02 RX ADMIN — CLONIDINE HYDROCHLORIDE 0.2 MG: 0.1 TABLET ORAL at 09:04

## 2018-04-02 RX ADMIN — CARVEDILOL 25 MG: 6.25 TABLET, FILM COATED ORAL at 11:04

## 2018-04-02 RX ADMIN — PROPOFOL 5 MCG/KG/MIN: 10 INJECTION, EMULSION INTRAVENOUS at 05:04

## 2018-04-02 RX ADMIN — CHLORHEXIDINE GLUCONATE 15 ML: 1.2 RINSE ORAL at 10:04

## 2018-04-02 RX ADMIN — FUROSEMIDE 20 MG: 10 INJECTION, SOLUTION INTRAMUSCULAR; INTRAVENOUS at 09:04

## 2018-04-02 RX ADMIN — CARVEDILOL 25 MG: 6.25 TABLET, FILM COATED ORAL at 10:04

## 2018-04-02 RX ADMIN — HYDRALAZINE HYDROCHLORIDE 10 MG: 20 INJECTION INTRAMUSCULAR; INTRAVENOUS at 12:04

## 2018-04-02 RX ADMIN — ISOSORBIDE DINITRATE 40 MG: 20 TABLET ORAL at 03:04

## 2018-04-02 RX ADMIN — CLONIDINE HYDROCHLORIDE 0.2 MG: 0.1 TABLET ORAL at 02:04

## 2018-04-02 RX ADMIN — DORZOLAMIDE HYDROCHLORIDE 1 DROP: 20 SOLUTION/ DROPS OPHTHALMIC at 09:04

## 2018-04-02 RX ADMIN — HYDRALAZINE HYDROCHLORIDE 100 MG: 25 TABLET ORAL at 05:04

## 2018-04-02 RX ADMIN — DOXAZOSIN 8 MG: 4 TABLET ORAL at 09:04

## 2018-04-02 RX ADMIN — ASPIRIN 81 MG 81 MG: 81 TABLET ORAL at 09:04

## 2018-04-02 RX ADMIN — PRAVASTATIN SODIUM 20 MG: 10 TABLET ORAL at 09:04

## 2018-04-02 RX ADMIN — VANCOMYCIN HYDROCHLORIDE 1000 MG: 1 INJECTION, POWDER, LYOPHILIZED, FOR SOLUTION INTRAVENOUS at 02:04

## 2018-04-02 RX ADMIN — DEXTROSE 40 MG: 50 INJECTION, SOLUTION INTRAVENOUS at 09:04

## 2018-04-02 RX ADMIN — LEVETIRACETAM 500 MG: 5 INJECTION INTRAVENOUS at 10:04

## 2018-04-02 RX ADMIN — CHLORHEXIDINE GLUCONATE 15 ML: 1.2 RINSE ORAL at 11:04

## 2018-04-02 RX ADMIN — LATANOPROST 1 DROP: 50 SOLUTION OPHTHALMIC at 10:04

## 2018-04-02 RX ADMIN — WARFARIN SODIUM 5 MG: 5 TABLET ORAL at 05:04

## 2018-04-02 RX ADMIN — DORZOLAMIDE HYDROCHLORIDE 1 DROP: 20 SOLUTION/ DROPS OPHTHALMIC at 10:04

## 2018-04-02 RX ADMIN — ISOSORBIDE DINITRATE 40 MG: 20 TABLET ORAL at 09:04

## 2018-04-02 RX ADMIN — HYDRALAZINE HYDROCHLORIDE 100 MG: 25 TABLET ORAL at 02:04

## 2018-04-02 NOTE — CONSULTS
[facility] - [place], [place]  Neurology  Consult Note    Patient Name: Chey Trujillo  MRN: [id number]  Admission Date: 4/1/2018  Hospital Length of Stay: 1 days  Code Status: Partial Code   Attending Provider: Sharita Castaneda MD   Consulting Provider: Vladislav Tejada MD  Primary Care Physician: Ada Flores MD (Inactive)  Principal Problem:Metabolic encephalopathy    Inpatient consult to Neurology  Consult performed by: VLADISLAV TEJADA  Consult ordered by: SHARITA CASTANEDA        Subjective:     Chief Complaint: Seizures?    HPI: 82 y/o female with medical Hx as listed below presented to ED for increase swelling in extremities and shortness of breath. Pt found to be in CHF exacerbation, decompensated and required intubation; her BG was < 30. While in hospital in outside facility it was noted that pt was having rhythmic movement of eyes thought to be seizures. Transfer to this facility was requested for EEG and neurological evaluation. Pt has Hx of dementia but according to family although bed bound she is alert able to communicate with them. She apparently has been in hospice but family wanted Rx to be done as well as pt being Partial CODE.    Past Medical History:   Diagnosis Date    Anticoagulation monitoring by pharmacist 6/29/2012    At risk for amiodarone toxicity with long term use 1/27/2014    Atrial fibrillation     Bilateral carotid artery disease 1/11/2016    Blood transfusion     CAD S/P percutaneous coronary angioplasty 9/27/2012    Chronic diastolic heart failure 9/27/2012    Echo 3-: 1 - Concentric hypertrophy.  2 - Normal left ventricular systolic function (EF 60-65%).  3 - Right ventricular enlargement with hypertrophy, with normal systolic function.  4 - Left ventricular diastolic dysfunction.  5 - Biatrial enlargement.  6 - Mild tricuspid regurgitation.  7 - Pulmonary hypertension. The estimated PA systolic pressure is 55 mmHg.  8 - Increased central venous pressure  (IVC is greater than 3cm in diameter).     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  ROWAN placed just proximal to stent in 2005 for UA.  Cath 12/2011: normal LMCA, 40% mid LAD, 50% distal LCx    EYE SURGERY      LAMINECTOMY      TOTAL HIP ARTHROPLASTY Right     x's r hip       Review of patient's allergies indicates:   Allergen Reactions    Losartan Other (See Comments)     Hyperkalemia    0.225 % sodium chloride      Other reaction(s): Eye irritation    Amitriptyline      Other reaction(s): Vomiting  Other reaction(s): Vomiting    Azopt  [brinzolamide]      Other reaction(s): Eye irritation    Cantil  [mepenzolate bromide]      Other reaction(s): Unknown  Other reaction(s):  Unknown    Ciprofloxacin Nausea And Vomiting     Other reaction(s): Stomach upset    Codeine      Other reaction(s): Unknown  Other reaction(s): Unknown    Duragal-s      Other reaction(s): Vomiting    Erythromycin      Other reaction(s): Unknown  Other reaction(s): Unknown    Fentanyl      Other reaction(s): Vomiting    Hydrocodone-acetaminophen      Other reaction(s): Unknown  Other reaction(s): Unknown    Indomethacin      Other reaction(s): Unknown  Other reaction(s): Unknown    Meperidine      Other reaction(s): Unknown  Other reaction(s): Unknown    Minoxidil      Other reaction(s): druged  Other reaction(s): nausea and vomiting  Other reaction(s): nausea and vomiting  Other reaction(s): druged    Morphine      Other reaction(s): Unknown  Other reaction(s): Unknown    Neuromuscular blockers, steroidal      Other reaction(s): Unknown    Nifedipine      Other reaction(s): Swelling  Other reaction(s): Swelling    Oxycodone hcl-oxycodone-asa      Other reaction(s): Unknown  Other reaction(s): Unknown    Penicillins Hives     Other reaction(s): Unknown    Propoxyphene      Other reaction(s): Unknown    Talwin  [pentazocine lactate]      Other reaction(s): Unknown    Talwin compound      Other reaction(s): Unknown    Sensipar [cinacalcet] Nausea Only    Valsartan Rash and Hives       Current Neurological Medications:     Current Facility-Administered Medications on File Prior to Encounter   Medication    [COMPLETED] cloNIDine tablet 0.1 mg    [COMPLETED] levETIRAcetam (KEPPRA) 3,000 mg in dextrose 5 % 100 mL IVPB    [COMPLETED] potassium chloride 10 mEq in 100 mL IVPB    [DISCONTINUED] aspirin chewable tablet 81 mg    [DISCONTINUED] carvedilol tablet 25 mg    [DISCONTINUED] cloNIDine tablet 0.1 mg    [DISCONTINUED] cloNIDine tablet 0.1 mg    [DISCONTINUED] cloNIDine tablet 0.2 mg    [DISCONTINUED] dextrose 5 % infusion    [DISCONTINUED] dextrose 50% injection 12.5 g    [DISCONTINUED]  dextrose 50% injection 25 g    [DISCONTINUED] dextrose 50% injection    [DISCONTINUED] dorzolamide 2 % ophthalmic solution 1 drop    [DISCONTINUED] doxazosin tablet 8 mg    [DISCONTINUED] etomidate injection    [DISCONTINUED] furosemide injection 40 mg    [DISCONTINUED] glucagon (human recombinant) injection 1 mg    [DISCONTINUED] glucose chewable tablet 16 g    [DISCONTINUED] glucose chewable tablet 24 g    [DISCONTINUED] hydrALAZINE injection 10 mg    [DISCONTINUED] hydrALAZINE tablet 100 mg    [DISCONTINUED] latanoprost 0.005 % ophthalmic solution 1 drop    [DISCONTINUED] levetiracetam 500 mg in dextrose 5 % 100 mL IVPB (ready to mix system)    [DISCONTINUED] pravastatin tablet 20 mg    [DISCONTINUED] propofol (DIPRIVAN) 10 mg/mL infusion    [DISCONTINUED] propofol (DIPRIVAN) 10 mg/mL infusion    [DISCONTINUED] succinylcholine injection    [DISCONTINUED] vancomycin 750 mg in dextrose 5 % 250 mL IVPB (ready to mix system)    [DISCONTINUED] warfarin (COUMADIN) tablet 2.5 mg    [DISCONTINUED] warfarin (COUMADIN) tablet 5 mg     Current Outpatient Prescriptions on File Prior to Encounter   Medication Sig    ACCU-CHEK SMARTVIEW TEST STRIP Strp USE ONE STRIP TO CHECK GLUCOSE 4 TIMES DAILY    ACCU-CHEK SMARTVIEW TEST STRIP Strp TEST FOUR TIMES A DAY    aspirin 81 MG Chew Take 1 tablet (81 mg total) by mouth once daily.    blood-glucose meter Misc Dispense Accu-Chek Natalia meter    carvedilol (COREG) 25 MG tablet Take 1 tablet (25 mg total) by mouth 2 (two) times daily with meals.    cloNIDine (CATAPRES) 0.1 MG tablet Take 2 tablets (0.2 mg total) by mouth 3 (three) times daily.    cyanocobalamin, vitamin B-12, 1,000 mcg TbSR Take 1,000 mcg by mouth once daily.    docusate sodium (COLACE) 100 MG capsule Take 1 capsule (100 mg total) by mouth 2 (two) times daily.    dorzolamide (TRUSOPT) 2 % ophthalmic solution INSTILL ONE DROP INTO EACH EYE TWICE DAILY    doxazosin (CARDURA) 8 MG Tab Take 1  tablet (8 mg total) by mouth once daily.    EASY TOUCH INSULIN SYRINGE 0.5 mL 31 gauge x 5/16 Syrg To use 4 times daily with insulin injections    escitalopram oxalate (LEXAPRO) 5 MG Tab Take 1 tablet (5 mg total) by mouth once daily.    ferrous sulfate 324 mg (65 mg iron) TbEC TAKE ONE TABLET BY MOUTH TWICE DAILY    hydrALAZINE (APRESOLINE) 100 MG tablet Take 1 tablet (100 mg total) by mouth every 8 (eight) hours.    insulin aspart (NOVOLOG) 100 unit/mL injection Inject 8 units w/ small meal, 15 units w/ large meal plus scale, max daily 54 units.    insulin glargine (LANTUS) 100 unit/mL injection Inject 12 Units into the skin 2 (two) times daily.    insulin syringe-needle U-100 (EASY TOUCH INSULIN SYRINGE) 0.5 mL 31 gauge x 5/16 Syrg To use 4 times daily with insulin injections    isosorbide dinitrate (ISOCHRON) 40 mg TbSR Take 1 tablet (40 mg total) by mouth every 8 (eight) hours.    lancets Misc USE ONE  TO CHECK GLUCOSE 4 TIMES DAILY    latanoprost 0.005 % ophthalmic solution INSTILL ONE DROP INTO EACH EYE IN THE EVENING    memantine (NAMENDA) 10 MG Tab Take 1 tablet (10 mg total) by mouth 2 (two) times daily.    multivitamin (THERAGRAN) tablet Take 1 tablet by mouth once daily.    nitroGLYCERIN 0.4 MG/DOSE TL SPRY (NITROLINGUAL) 400 mcg/spray spray PLACE ONE SPRAY UNDER THE TONGUE EVERY 5 MINUTES AS NEEDED FOR CHEST PAIN.    polyethylene glycol (GLYCOLAX) 17 gram/dose powder Take 17 g by mouth once daily.    potassium chloride (KLOR-CON) 10 MEQ TbSR TAKE ONE TABLET BY MOUTH TWICE DAILY    potassium chloride SA (KLOR-CON M10) 10 MEQ tablet Take 1 tablet (10 mEq total) by mouth 2 (two) times daily.    pravastatin (PRAVACHOL) 20 MG tablet Take 1 tablet (20 mg total) by mouth once daily.    sodium bicarbonate 650 MG tablet TAKE ONE TABLET BY MOUTH ONCE DAILY    torsemide (DEMADEX) 20 MG Tab Take 1 tablet (20 mg total) by mouth once daily. .    warfarin (COUMADIN) 5 MG tablet Take 0.5-1 tablets  (2.5-5 mg total) by mouth Daily. As directed by [facility]      Family History     Problem Relation (Age of Onset)    Cancer Mother, Brother, Daughter, Maternal Aunt    Diabetes Mother, Sister, Sister, Son        Social History Main Topics    Smoking status: Never Smoker    Smokeless tobacco: Never Used    Alcohol use No    Drug use: No    Sexual activity: No     Review of Systems   Unable to obtain due to pt being intubated    Objective:     Vital Signs (Most Recent):  Temp: 97.4 °F (36.3 °C) (04/02/18 0430)  Pulse: 64 (04/02/18 0800)  Resp: (!) 8 (04/02/18 0800)  BP: 137/66 (04/02/18 0800)  SpO2: 100 % (04/02/18 0800) Vital Signs (24h Range):  Temp:  [94.2 °F (34.6 °C)-98.5 °F (36.9 °C)] 97.4 °F (36.3 °C)  Pulse:  [64-66] 64  Resp:  [0-27] 8  SpO2:  [99 %-100 %] 100 %  BP: ()/(42-92) 137/66     Weight: 78 kg (171 lb 15.3 oz)  Body mass index is 22.69 kg/m².    Physical Exam   Constitutional: No distress.   HENT:   Head: Normocephalic.   Eyes: Right eye exhibits no discharge. Left eye exhibits no discharge.   Neck: Normal range of motion.   Cardiovascular: Normal rate.    Pulmonary/Chest:   Intubated; symmetrical expansion   Abdominal: Bowel sounds are normal.   Musculoskeletal: She exhibits edema.   Skin: She is not diaphoretic.       NEUROLOGICAL EXAMINATION:     MENTAL STATUS        Intubated; unresponsive to verbal stimulation     CRANIAL NERVES     CN III, IV, VI   Right pupil: Size: 1 mm. Shape: regular.   Left pupil: Size: 1 mm. Shape: regular.   Nystagmus: none     MOTOR EXAM        Slight withdrawal to noxious stimulation of LE's     SENSORY EXAM        Grimacing to noxious stimulation     GAIT AND COORDINATION     Tremor   Resting tremor: absent      Significant Labs:   CBC:   Recent Labs  Lab 03/31/18  0917 04/01/18  0618 04/02/18  0320   WBC 2.57* 3.99 3.41*   HGB 8.7* 7.5* 7.8*   HCT 27.8* 24.2* 23.8*   PLT 64* 64* 64*     CMP:   Recent Labs  Lab 04/01/18  0618 04/01/18  1554  04/01/18  2102 04/02/18  0320   GLU <30* 110  --  98    141  --  139   K 3.4* 3.6  --  3.7    107  --  105   CO2 35* 28  --  29   BUN 55* 54*  --  53*   CREATININE 1.96* 2.0*  --  2.0*   CALCIUM 9.1 8.8  --  8.8   MG  --   --  1.7  --    PROT  --   --   --  4.8*   ALBUMIN  --  1.9*  --  1.9*   BILITOT  --   --   --  0.6   ALKPHOS  --   --   --  44*   AST  --   --   --  28   ALT  --   --   --  11   ANIONGAP 3* 6*  --  5*   EGFRNONAA 23.1* 23*  --  23*       Significant Imaging:   Head CT  No acute abnormality.    Findings consistent with chronic microvascular ischemic changes.    The preliminary and final reports are concordant.      Electronically signed by: Erickson Vargas MD  Date: 04/01/2018  Time: 11:59    Assessment and Plan:     84 y/o female consulted for possible seizures    1. Seizures: given the degree of hypoglycemia seizures are likely to happen. On evaluation pt with no rhythmic activity suggesting clinical seizures.   -Levetiracetam 3000 mg IV x 1 then levetiracetam 500 mg twice daily.   -EEG.  Prognosis seems poor in this debilitated pt with complicated medical Hx and possible anoxic injury from profound hypoglycemia.        UPDATE:  EEG: No seizures. Generalized slowing.   Update daughter about findings and likely poor prognosis. They are OK with consulting palliative care.    Active Diagnoses:    Diagnosis Date Noted POA    PRINCIPAL PROBLEM:  Metabolic encephalopathy [G93.41] 04/01/2018 Yes    Hypoglycemia, coma [E15] 04/01/2018 Yes    Enterococcus UTI [N39.0, B95.2] 04/01/2018 Yes    Physical debility [R53.81] 09/09/2017 Yes    Urinary tract infection without hematuria [N39.0] 08/22/2017 Yes    Hypokalemia [E87.6] 08/22/2017 Yes    Hyperlipidemia [E78.5]  Yes    SSS (sick sinus syndrome) [I49.5] 06/23/2017 Yes     Chronic    Protein calorie malnutrition [E46] 06/18/2017 Yes    Recurrent major depressive disorder, in partial remission [F33.41] 05/13/2017 Yes    Essential  hypertension [I10] 05/12/2017 Yes    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 due to underlying condition, with fat layer exposed [E08.621, L97.412] 04/17/2017 Yes    Vascular dementia without behavioral disturbance [F01.50] 01/13/2017 Yes    Vitamin D deficiency [E55.9] 11/25/2016 Yes    Nonrheumatic aortic valve stenosis [I35.0] 10/24/2016 Yes    Elevated troponin [R74.8] 09/28/2016 Yes    Diabetes mellitus with stage 4 chronic kidney disease GFR 15-29 [E11.22, N18.4] 08/16/2016 Yes     Chronic    Anemia of chronic disease [D63.8] 03/24/2016 Yes     Chronic    Chronic hepatitis C without hepatic coma [B18.2] 01/11/2016 Yes    Bilateral carotid artery disease: 40-49% 2016 Bilateral [I77.9] 01/11/2016 Yes    Thrombocytopenia [D69.6] 01/11/2016 Yes    Primary open-angle glaucoma, severe stage [H40.1193] 08/10/2015 Yes    Peripheral vascular disease [I73.9] 09/20/2013 Yes    CAD S/P percutaneous coronary angioplasty [I25.10, Z98.61] 09/27/2012 Not Applicable     Chronic    Acute on chronic diastolic heart failure [I50.33] 09/27/2012 Yes    Chronic atrial fibrillation [I48.2] 06/29/2012 Yes    Anticoagulation monitoring by pharmacist [Z79.01] 06/29/2012 Not Applicable     Chronic      Problems Resolved During this Admission:    Diagnosis Date Noted Date Resolved POA       VTE Risk Mitigation         Ordered     warfarin (COUMADIN) tablet 5 mg  Every other day     Route:  Per OG tube        04/01/18 1508     warfarin (COUMADIN) tablet 2.5 mg  Every other day     Route:  Per OG tube        04/01/18 1508          Thank you for your consult. I will follow-up with patient. Please contact us if you have any additional questions.    Vladislav Murphy MD  Neurology  [facility]

## 2018-04-02 NOTE — PLAN OF CARE
Problem: Patient Care Overview  Goal: Plan of Care Review  Outcome: Ongoing (interventions implemented as appropriate)  Pt continues in the ICU.  Pt is intubated.  Pt is unresponsive.  D5 at 50 ml/hr.  Last blood glucose is 113.  Family bedside. [place] with clear yellow urine.Turned q2 hours.No falls/injuries this shift.

## 2018-04-02 NOTE — PROGRESS NOTES
[facility] Medicine  Progress Note    Patient Name: Chey Trujillo  MRN: [id number]  Patient Class: IP- Inpatient   Admission Date: 4/1/2018  Length of Stay: 1 days  Attending Physician: Selena Castaneda MD  Primary Care Provider: Ada Flores MD (Inactive)        Subjective:     Principal Problem:Metabolic encephalopathy    HPI:  83 yo F w/ pmhx of HTN, HLD, CAD s/p PCI, DM2, PAF on coumadin, diastolic CHF, chronic Hep C, CKD4, dementia, multiple admits for chf, bedbound, now under hospice care was brought in by EMS to [facility] on 3.31.18  for sob and weakness,Pt admitted to hospital medicine in [place] for CHF exacerbation and anasarca.has been  diuresed well with IV lasix and anasarca starting to improve. However pt became unresponsive w/ agonal breathing on 4/1/18 found to have reading of low blood sugar of <30 on bmp. Given amp of dextrose and Rapid response was called and ER physician presented to the bedside and noted her to be unresponsive to sternal rub. Pupils were equal, midrange rhythmic eye movements medial-lateral. She was intubated for airway protection. Repeat accucheck 81. Given additional amp of D50. CT head, CXR and ekg was done. CT head  was negative for acute abnormality. CXR showed increase in pulmonary edema. Remains intubated on propofol w/ dextrose infusion and q1h accuchecks. Urine cx +enterococcus and was on IV vancomycin. Pt remains with minimal response; intermittently grimaces from painful stimuli. Concern for possible seizure activity given rhythmic eye movement and case was discussed with neurology at [facility]. Recommended load with keppra 3000 mg IV x 1 and start 500 mg q12h the following day. Also EEG, MD in [facility]  discussed poor prognosis with family, however family stated that they wished to proceed with aggressive measures and pt to remain full code per family request,patient has been transferred to ICU [facility]  for neurologic evaluation,patient at this time is totally unresponsive,rectal temp. Is 34,she has sluggish pupils reaction,neurology has been consulted,no sign of seizure activity at this time.not able do ROS,information has been gathered from [facility] record.    Hospital Course:  83 yo F w/ pmhx of HTN, HLD, CAD s/p PCI, DM2, PAF on coumadin, diastolic CHF, chronic Hep C, CKD4, dementia, multiple admits for chf, bedbound, now under hospice care was brought in by EMS to [facility] on 3.31.18  for sob and weakness,Pt admitted to hospital medicine in [place] for CHF exacerbation and anasarca.has been  diuresed well with IV lasix and anasarca starting to improve. However pt became unresponsive w/ agonal breathing on 4/1/18 found to have reading of low blood sugar of <30 on bmp. Given amp of dextrose and Rapid response was called and ER physician presented to the bedside and noted her to be unresponsive to sternal rub. Pupils were equal, midrange rhythmic eye movements medial-lateral. She was intubated for airway protection. Repeat accucheck 81. Given additional amp of D50. CT head, CXR and ekg was done. CT head  was negative for acute abnormality. CXR showed increase in pulmonary edema. Remains intubated on propofol w/ dextrose infusion and q1h accuchecks. Urine cx +enterococcus and was on IV vancomycin. Pt remains with minimal response; intermittently grimaces from painful stimuli. Concern for possible seizure activity given rhythmic eye movement and case was discussed with neurology at [facility]. Recommended load with keppra 3000 mg IV x 1 and start 500 mg q12h the following day. Also EEG, MD in [facility]  discussed poor prognosis with family, however family stated that they wished to proceed with aggressive measures and pt to remain full code per family request,patient has been [place] [facility] [place] [place],patient at this time is totally unresponsive,rectal temp. Is  34,she has sluggish pupils reaction,neurology has been consulted,no sign of seizure activity at this time.not able do ROS,information has been gathered from [facility].  Patient is seizure free so far,on empiric IV Keppra.willhave EEG and neurology is following.  Per family wish patient is partial code,  Still is on D5 IVF for hypoglycemia,controlled at this time.  Prn IV Vanc. Per random level For enterococcus UTI.  Started her on feeding,per ntrition rec.      Past Medical History:   Diagnosis Date    Anticoagulation monitoring by pharmacist 6/29/2012    At risk for amiodarone toxicity with long term use 1/27/2014    Atrial fibrillation     Bilateral carotid artery disease 1/11/2016    Blood transfusion     CAD S/P percutaneous coronary angioplasty 9/27/2012    Chronic diastolic heart failure 9/27/2012    Echo 3-: 1 - Concentric hypertrophy.  2 - Normal left ventricular systolic function (EF 60-65%).  3 - Right ventricular enlargement with hypertrophy, with normal systolic function.  4 - Left ventricular diastolic dysfunction.  5 - Biatrial enlargement.  6 - Mild tricuspid regurgitation.  7 - Pulmonary hypertension. The estimated PA systolic pressure is 55 mmHg.  8 - Increased central venous pressure (IVC is greater than 3cm in diameter).     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  ANABELA u/s 4/14 wnl 9/12/2012    Secondary pulmonary hypertension  8/13/2013    Spinal stenosis     Thyroid nodule: per ENDO repeat in 2017 and see ENDO then (note 1/29/16) 1/2/2013    Tricuspid regurgitation 1/11/2016    Tubular adenoma x 3 6/13 5/19/2014    Type 2 DM with CKD stage 4 and hypertension 1/16/2015    Urinary, incontinence, stress female        Past Surgical History:   Procedure Laterality Date    CARDIAC CATHETERIZATION      CARDIAC PACEMAKER PLACEMENT  2/9/2012    Quinn Jerome DR, serial #[id number]    CATARACT EXTRACTION      Status post cataract extraction and insertion of intraocular lens of right eye    CORONARY ANGIOPLASTY      ROWAN to prox RCA 2002 for UA/+stress test.  ROWAN placed just proximal to stent in 2005 for UA.  Cath 12/2011: normal LMCA, 40% mid LAD, 50% distal LCx    EYE SURGERY      LAMINECTOMY      TOTAL HIP ARTHROPLASTY Right     x's r hip       Review of patient's allergies indicates:   Allergen Reactions    Losartan Other (See Comments)     Hyperkalemia    0.225 % sodium chloride      Other reaction(s): Eye irritation    Amitriptyline      Other reaction(s): Vomiting  Other reaction(s): Vomiting    Azopt  [brinzolamide]      Other reaction(s): Eye irritation    Cantil  [mepenzolate bromide]      Other reaction(s): Unknown  Other reaction(s): Unknown    Ciprofloxacin Nausea And Vomiting     Other reaction(s): Stomach upset    Codeine      Other reaction(s): Unknown  Other reaction(s): Unknown    Duragal-s      Other reaction(s): Vomiting    Erythromycin      Other reaction(s): Unknown  Other reaction(s): Unknown    Fentanyl      Other reaction(s): Vomiting    Hydrocodone-acetaminophen      Other reaction(s): Unknown  Other reaction(s): Unknown    Indomethacin      Other reaction(s): Unknown  Other reaction(s): Unknown    Meperidine      Other reaction(s): Unknown  Other reaction(s): Unknown    Minoxidil      Other reaction(s): druged  Other reaction(s): nausea and vomiting  Other reaction(s): nausea and vomiting  Other  reaction(s): druged    Morphine      Other reaction(s): Unknown  Other reaction(s): Unknown    Neuromuscular blockers, steroidal      Other reaction(s): Unknown    Nifedipine      Other reaction(s): Swelling  Other reaction(s): Swelling    Oxycodone hcl-oxycodone-asa      Other reaction(s): Unknown  Other reaction(s): Unknown    Penicillins Hives     Other reaction(s): Unknown    Propoxyphene      Other reaction(s): Unknown    Talwin  [pentazocine lactate]      Other reaction(s): Unknown    Talwin compound      Other reaction(s): Unknown    Sensipar [cinacalcet] Nausea Only    Valsartan Rash and Hives       Current Facility-Administered Medications on File Prior to Encounter   Medication    [COMPLETED] cloNIDine tablet 0.1 mg    [COMPLETED] levETIRAcetam (KEPPRA) 3,000 mg in dextrose 5 % 100 mL IVPB    [COMPLETED] potassium chloride 10 mEq in 100 mL IVPB    [DISCONTINUED] aspirin chewable tablet 81 mg    [DISCONTINUED] carvedilol tablet 25 mg    [DISCONTINUED] cloNIDine tablet 0.1 mg    [DISCONTINUED] cloNIDine tablet 0.1 mg    [DISCONTINUED] cloNIDine tablet 0.2 mg    [DISCONTINUED] dextrose 5 % infusion    [DISCONTINUED] dextrose 50% injection 12.5 g    [DISCONTINUED] dextrose 50% injection 25 g    [DISCONTINUED] dextrose 50% injection    [DISCONTINUED] dorzolamide 2 % ophthalmic solution 1 drop    [DISCONTINUED] doxazosin tablet 8 mg    [DISCONTINUED] etomidate injection    [DISCONTINUED] furosemide injection 40 mg    [DISCONTINUED] glucagon (human recombinant) injection 1 mg    [DISCONTINUED] glucose chewable tablet 16 g    [DISCONTINUED] glucose chewable tablet 24 g    [DISCONTINUED] hydrALAZINE injection 10 mg    [DISCONTINUED] hydrALAZINE tablet 100 mg    [DISCONTINUED] latanoprost 0.005 % ophthalmic solution 1 drop    [DISCONTINUED] levetiracetam 500 mg in dextrose 5 % 100 mL IVPB (ready to mix system)    [DISCONTINUED] pravastatin tablet 20 mg    [DISCONTINUED] propofol  (DIPRIVAN) 10 mg/mL infusion    [DISCONTINUED] propofol (DIPRIVAN) 10 mg/mL infusion    [DISCONTINUED] succinylcholine injection    [DISCONTINUED] vancomycin 750 mg in dextrose 5 % 250 mL IVPB (ready to mix system)    [DISCONTINUED] warfarin (COUMADIN) tablet 2.5 mg    [DISCONTINUED] warfarin (COUMADIN) tablet 5 mg     Current Outpatient Prescriptions on File Prior to Encounter   Medication Sig    ACCU-CHEK SMARTVIEW TEST STRIP Strp USE ONE STRIP TO CHECK GLUCOSE 4 TIMES DAILY    ACCU-CHEK SMARTVIEW TEST STRIP Strp TEST FOUR TIMES A DAY    aspirin 81 MG Chew Take 1 tablet (81 mg total) by mouth once daily.    blood-glucose meter Misc Dispense Accu-Chek Natalia meter    carvedilol (COREG) 25 MG tablet Take 1 tablet (25 mg total) by mouth 2 (two) times daily with meals.    cloNIDine (CATAPRES) 0.1 MG tablet Take 2 tablets (0.2 mg total) by mouth 3 (three) times daily.    cyanocobalamin, vitamin B-12, 1,000 mcg TbSR Take 1,000 mcg by mouth once daily.    docusate sodium (COLACE) 100 MG capsule Take 1 capsule (100 mg total) by mouth 2 (two) times daily.    dorzolamide (TRUSOPT) 2 % ophthalmic solution INSTILL ONE DROP INTO EACH EYE TWICE DAILY    doxazosin (CARDURA) 8 MG Tab Take 1 tablet (8 mg total) by mouth once daily.    EASY TOUCH INSULIN SYRINGE 0.5 mL 31 gauge x 5/16 Syrg To use 4 times daily with insulin injections    escitalopram oxalate (LEXAPRO) 5 MG Tab Take 1 tablet (5 mg total) by mouth once daily.    ferrous sulfate 324 mg (65 mg iron) TbEC TAKE ONE TABLET BY MOUTH TWICE DAILY    hydrALAZINE (APRESOLINE) 100 MG tablet Take 1 tablet (100 mg total) by mouth every 8 (eight) hours.    insulin aspart (NOVOLOG) 100 unit/mL injection Inject 8 units w/ small meal, 15 units w/ large meal plus scale, max daily 54 units.    insulin glargine (LANTUS) 100 unit/mL injection Inject 12 Units into the skin 2 (two) times daily.    insulin syringe-needle U-100 (EASY TOUCH INSULIN SYRINGE) 0.5 mL 31 gauge  x 5/16 Syrg To use 4 times daily with insulin injections    isosorbide dinitrate (ISOCHRON) 40 mg TbSR Take 1 tablet (40 mg total) by mouth every 8 (eight) hours.    lancets Misc USE ONE  TO CHECK GLUCOSE 4 TIMES DAILY    latanoprost 0.005 % ophthalmic solution INSTILL ONE DROP INTO EACH EYE IN THE EVENING    memantine (NAMENDA) 10 MG Tab Take 1 tablet (10 mg total) by mouth 2 (two) times daily.    multivitamin (THERAGRAN) tablet Take 1 tablet by mouth once daily.    nitroGLYCERIN 0.4 MG/DOSE TL SPRY (NITROLINGUAL) 400 mcg/spray spray PLACE ONE SPRAY UNDER THE TONGUE EVERY 5 MINUTES AS NEEDED FOR CHEST PAIN.    polyethylene glycol (GLYCOLAX) 17 gram/dose powder Take 17 g by mouth once daily.    potassium chloride (KLOR-CON) 10 MEQ TbSR TAKE ONE TABLET BY MOUTH TWICE DAILY    potassium chloride SA (KLOR-CON M10) 10 MEQ tablet Take 1 tablet (10 mEq total) by mouth 2 (two) times daily.    pravastatin (PRAVACHOL) 20 MG tablet Take 1 tablet (20 mg total) by mouth once daily.    sodium bicarbonate 650 MG tablet TAKE ONE TABLET BY MOUTH ONCE DAILY    torsemide (DEMADEX) 20 MG Tab Take 1 tablet (20 mg total) by mouth once daily. .    warfarin (COUMADIN) 5 MG tablet Take 0.5-1 tablets (2.5-5 mg total) by mouth Daily. As directed by [facility]     Family History     Problem Relation (Age of Onset)    Cancer Mother, Brother, Daughter, Maternal Aunt    Diabetes Mother, Sister, Sister, Son        Social History Main Topics    Smoking status: Never Smoker    Smokeless tobacco: Never Used    Alcohol use No    Drug use: No    Sexual activity: No     Review of Systems,not able be done,patient is unresponsive.  Objective:     Vital Signs (Most Recent):  Temp: 97.4 °F (36.3 °C) (04/02/18 0430)  Pulse: 64 (04/02/18 0900)  Resp: 15 (04/02/18 0900)  BP: (!) 155/72 (04/02/18 0900)  SpO2: 100 % (04/02/18 0900) Vital Signs (24h Range):  Temp:  [94.2 °F (34.6 °C)-98.5 °F (36.9 °C)] 97.4 °F (36.3 °C)  Pulse:  [64-66]  64  Resp:  [0-27] 15  SpO2:  [99 %-100 %] 100 %  BP: ()/(42-92) 155/72     Weight: 78 kg (171 lb 15.3 oz)  Body mass index is 22.69 kg/m².    Physical Exam   Constitutional: No distress.   HENT:   Head: Atraumatic.   Eyes: EOM are normal.   Neck: Neck supple.   Cardiovascular: Normal rate and regular rhythm.    Pulmonary/Chest: Effort normal and breath sounds normal.   Abdominal: Bowel sounds are normal.   Musculoskeletal: She exhibits edema.   Neurological:   pupils are sluggish to light,   Skin:   Cold and  calmy.         CRANIAL NERVES     CN III, IV, VI   Extraocular motions are normal.        Significant Labs:   BMP:     Recent Labs  Lab 04/01/18  2102 04/02/18  0320   GLU  --  98   NA  --  139   K  --  3.7   CL  --  105   CO2  --  29   BUN  --  53*   CREATININE  --  2.0*   CALCIUM  --  8.8   MG 1.7  --      CBC:     Recent Labs  Lab 04/01/18  0618 04/02/18  0320   WBC 3.99 3.41*   HGB 7.5* 7.8*   HCT 24.2* 23.8*   PLT 64* 64*       Significant Imaging: reviewed.    Assessment/Plan:      * Metabolic encephalopathy      Was hypoglycemic,but improved at this time,guerrero also checked ammonia is WNL.,neurology has been  consulted for possible seizure activity,no sign of seizure at this time,recieved loading dose of IV Keprra,on empiric daily keppra,will have EEG today..neurology is following.nos seizure has been seen.        Hypoglycemia, coma    Apparently received levemir last night,will continue with D5 IVF, and monitor blod sugar closely,HbA1C is normal.still is on IVF D 5.          Airway compromise    Intubated to protect the airway,pulmonology is on case with vent management,she is partial code at this time,family want see EEG results and go from their.          Enterococcus UTI      On Vanc per random  Level.she is allergic to penicillin.        Physical debility    Need PT,OT after extunbation,was in hospice.          Urinary tract infection without hematuria    On IV Abx,          Primary open-angle  glaucoma, severe stage      On home eye drop,        Hyperlipidemia      On statin.        Hypokalemia      Replaced.        SSS (sick sinus syndrome)    S/p PPM placement,monitir in Telemetry.          Protein calorie malnutrition      On supplement with NG tube.        Recurrent major depressive disorder, in partial remission      will monitor at this time,        Essential hypertension    Will continue with home medication and prn IV Hydralazine.          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        Vascular dementia without behavioral disturbance    supportive care.          Vitamin D deficiency    Need supplement after extubation.          Nonrheumatic aortic valve stenosis      On medical treatment,        Elevated troponin      Duo to CHF excerebration with underlying CKD 4,        Diabetes mellitus with stage 4 chronic kidney disease GFR 15-29    Will continue with SSI,avoid scheduled insulin.          Anemia of chronic disease    Stable,will monitor.          Bilateral carotid artery disease: 40-49% 2016 Bilateral      Supportive dare,medical treatment.        Thrombocytopenia      likely duo to hep C,.will monitor.        Chronic hepatitis C without hepatic coma    Will checked ammonia level is WNL.        Peripheral vascular disease      On medical treatment.        Acute on chronic diastolic heart failure    Will continue with IV lasix,not on ACE or ARB duo to CKD 4,will monitor urine out put.chestz x ray show still some pleural effusion.          CAD S/P percutaneous coronary angioplasty      On medical treatment.        Anticoagulation monitoring by pharmacist    Will continue with coumadin,monitor iNR.          Chronic atrial fibrillation    Rate controlled,on coumadin.            VTE Risk Mitigation         Ordered     warfarin (COUMADIN) tablet 5 mg  Every other day     Route:   Per OG tube        04/01/18 1508     warfarin (COUMADIN) tablet 2.5 mg  Every other day     Route:  Per OG tube        04/01/18 1508          Critical care time spent on the evaluation and treatment of severe organ dysfunction, review of pertinent labs and imaging studies, discussions with consulting providers and discussions with patient/family: 45  minutes.    Selena Castaneda MD  Department of Hospital Medicine   [facility]

## 2018-04-02 NOTE — ASSESSMENT & PLAN NOTE
Was hypoglycemic,but improved at this time,guerrero also checked ammonia is WNL.,neurology has been  consulted for possible seizure activity,no sign of seizure at this time,recieved loading dose of IV Keprra,on empiric daily keppra,will have EEG today..neurology is following.nos seizure has been seen.

## 2018-04-02 NOTE — CONSULTS
"    [facility]  Adult Nutrition  Consult Note    SUMMARY     Recommendations    Recommendation/Intervention: 1) Novasource @ 30 cc/hr with Beneprotein packet once daily, provides 1465 calories (110% of EEN with propofol), 71 g protein, 516 cc free water. 2) Initiate at 10 cc/hr and advance by 10 cc Q4 hours or as tolerated until goal rate is achieved 3) Flush OGT with 150 cc free water Q4 hours or per MD for hydration and patency 4) Check residuals Q4 hours. Hold x4 hours > 250 cc or sx of feeding intolerance occur. Use Reglan if consistently elevated and no contraindications  Goals: 1) patient to receive nutrition support with tolerance with 48 hours with tolerance  Nutrition Goal Status: new  Communication of RD Recs: reviewed with physician    Reason for Assessment    Reason for Assessment: consult, new tube feeding  Diagnosis: other (see comments) (metabolic encephalopathy)  Relevant Medical History: CKD4, dm, CHF, A-Fib  Interdisciplinary Rounds: attended  General Information Comments: Patient intubated, sedated. IVF insuling. Propofol infusing. Partial code.  Nutrition Discharge Planning: Too soon to determine. Will monitor/follow-up.    Nutrition Risk Screen        Nutrition/Diet History    Food Preferences: cori  Do you have any cultural, spiritual, Adventist conflicts, given your current situation?:  (CORI)  Factors Affecting Nutritional Intake: NPO, on mechanical ventilation    Anthropometrics    Temp: 97.4 °F (36.3 °C)  Height Method: Estimated  Height: 6' 1" (185.4 cm)  Height (inches): 73 in  Weight Method: Bed Scale  Weight: 78 kg (171 lb 15.3 oz)  Weight (lb): 171.96 lb  Ideal Body Weight (IBW), Female: 165 lb  % Ideal Body Weight, Female (lb): 104.22 lb  BMI (Calculated): 22.7  BMI Grade: 18.5-24.9 - normal    Anthropometrics Special Consideration         Lab/Procedures/Meds    Pertinent Labs Reviewed: reviewed  BMP  Lab Results   Component Value Date     04/02/2018    K 3.7 " "04/02/2018     04/02/2018    CO2 29 04/02/2018    BUN 53 (H) 04/02/2018    CREATININE 2.0 (H) 04/02/2018    CALCIUM 8.8 04/02/2018    ANIONGAP 5 (L) 04/02/2018    ESTGFRAFRICA 26 (A) 04/02/2018    EGFRNONAA 23 (A) 04/02/2018     Lab Results   Component Value Date    CALCIUM 8.8 04/02/2018    PHOS 2.9 04/01/2018     Lab Results   Component Value Date    ALBUMIN 1.9 (L) 04/02/2018       Recent Labs  Lab 04/02/18  0820   POCTGLUCOSE 114*       Pertinent Medications Reviewed: reviewed  Pertinent Medications Comments: propofol, IVF, statin, lasix    Physical Findings/Assessment    Overall Physical Appearance: on ventilator support  Tubes: orogastric tube  Skin: intact (Popeye Score 9)    Estimated/Assessed Needs    Weight Used For Calorie Calculations: 78 kg (171 lb 15.3 oz)  Height: 6' 1" (185.4 cm)  Energy Need Method: [facility] (1384)  RMR (Screven-St. Jeor Equation): 1362.88  Protein Requirements: 70 - 75 g  Weight Used For Protein Calculations: 78 kg (171 lb 15.3 oz)  Fluid Need Method: RDA Method, other (see comments) (or per MD)  CHO Requirement: 150 g       Nutrition Prescription Ordered    Current Diet Order: NPO    Evaluation of Received Nutrient/Fluid Intake    Other Calories (kcal): 69 (propofol)  I/O: 812/1350  Energy Calories Required: not meeting needs  Protein Required: not meeting needs  Fluid Required: meeting needs  % Intake of Estimated Energy Needs: 0 - 25 %  % Meal Intake: NPO    Nutrition Risk    Level of Risk/Frequency of Follow-up:  (F/U 2x weekly)     Assessment and Plan    Nutrition Diagnosis:  Problem: Inadequate energy intake  Etiology: no nutrition support during intubation  Signs/Symptoms: patient meeting 0 - 25% of EEN  Status:  new       Monitor and Evaluation    Food and Nutrient Intake: enteral nutrition intake  Food and Nutrient Adminstration: enteral and parenteral nutrition administration, other (specify) (Beneprotein supplement)  Anthropometric Measurements: weight, weight " change, body mass index  Biochemical Data, Medical Tests and Procedures: electrolyte and renal panel, gastrointestinal profile, glucose/endocrine profile, lipid profile  Nutrition-Focused Physical Findings: overall appearance     Nutrition Follow-Up    RD Follow-up?: Yes

## 2018-04-02 NOTE — SUBJECTIVE & OBJECTIVE
Past Medical History:   Diagnosis Date    Anticoagulation monitoring by pharmacist 6/29/2012    At risk for amiodarone toxicity with long term use 1/27/2014    Atrial fibrillation     Bilateral carotid artery disease 1/11/2016    Blood transfusion     CAD S/P percutaneous coronary angioplasty 9/27/2012    Chronic diastolic heart failure 9/27/2012    Echo 3-: 1 - Concentric hypertrophy.  2 - Normal left ventricular systolic function (EF 60-65%).  3 - Right ventricular enlargement with hypertrophy, with normal systolic function.  4 - Left ventricular diastolic dysfunction.  5 - Biatrial enlargement.  6 - Mild tricuspid regurgitation.  7 - Pulmonary hypertension. The estimated PA systolic pressure is 55 mmHg.  8 - Increased central venous pressure (IVC is greater than 3cm in diameter).     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  ROWAN placed just proximal to stent in 2005 for UA.  Cath 12/2011: normal LMCA, 40% mid LAD, 50% distal LCx    EYE SURGERY      LAMINECTOMY      TOTAL HIP ARTHROPLASTY Right     x's r hip       Review of patient's allergies indicates:   Allergen Reactions    Losartan Other (See Comments)     Hyperkalemia    0.225 % sodium chloride      Other reaction(s): Eye irritation    Amitriptyline      Other reaction(s): Vomiting  Other reaction(s): Vomiting    Azopt  [brinzolamide]      Other reaction(s): Eye irritation    Cantil  [mepenzolate bromide]      Other reaction(s): Unknown  Other reaction(s): Unknown    Ciprofloxacin Nausea And Vomiting     Other reaction(s): Stomach upset    Codeine      Other reaction(s): Unknown  Other reaction(s): Unknown    Duragal-s      Other reaction(s): Vomiting    Erythromycin      Other reaction(s): Unknown  Other reaction(s): Unknown    Fentanyl      Other reaction(s): Vomiting    Hydrocodone-acetaminophen      Other reaction(s): Unknown  Other reaction(s): Unknown    Indomethacin      Other reaction(s): Unknown  Other reaction(s): Unknown    Meperidine      Other reaction(s): Unknown  Other reaction(s): Unknown    Minoxidil      Other reaction(s): druged  Other reaction(s): nausea and vomiting  Other reaction(s): nausea and vomiting  Other reaction(s): druged    Morphine      Other reaction(s): Unknown  Other reaction(s): Unknown    Neuromuscular blockers, steroidal      Other reaction(s): Unknown    Nifedipine      Other reaction(s): Swelling  Other reaction(s): Swelling    Oxycodone hcl-oxycodone-asa      Other reaction(s): Unknown  Other reaction(s): Unknown    Penicillins Hives     Other reaction(s): Unknown    Propoxyphene      Other reaction(s): Unknown    Talwin  [pentazocine lactate]       Other reaction(s): Unknown    Talwin compound      Other reaction(s): Unknown    Sensipar [cinacalcet] Nausea Only    Valsartan Rash and Hives       Current Facility-Administered Medications on File Prior to Encounter   Medication    [COMPLETED] cloNIDine tablet 0.1 mg    [COMPLETED] levETIRAcetam (KEPPRA) 3,000 mg in dextrose 5 % 100 mL IVPB    [COMPLETED] potassium chloride 10 mEq in 100 mL IVPB    [DISCONTINUED] aspirin chewable tablet 81 mg    [DISCONTINUED] carvedilol tablet 25 mg    [DISCONTINUED] cloNIDine tablet 0.1 mg    [DISCONTINUED] cloNIDine tablet 0.1 mg    [DISCONTINUED] cloNIDine tablet 0.2 mg    [DISCONTINUED] dextrose 5 % infusion    [DISCONTINUED] dextrose 50% injection 12.5 g    [DISCONTINUED] dextrose 50% injection 25 g    [DISCONTINUED] dextrose 50% injection    [DISCONTINUED] dorzolamide 2 % ophthalmic solution 1 drop    [DISCONTINUED] doxazosin tablet 8 mg    [DISCONTINUED] etomidate injection    [DISCONTINUED] furosemide injection 40 mg    [DISCONTINUED] glucagon (human recombinant) injection 1 mg    [DISCONTINUED] glucose chewable tablet 16 g    [DISCONTINUED] glucose chewable tablet 24 g    [DISCONTINUED] hydrALAZINE injection 10 mg    [DISCONTINUED] hydrALAZINE tablet 100 mg    [DISCONTINUED] latanoprost 0.005 % ophthalmic solution 1 drop    [DISCONTINUED] levetiracetam 500 mg in dextrose 5 % 100 mL IVPB (ready to mix system)    [DISCONTINUED] pravastatin tablet 20 mg    [DISCONTINUED] propofol (DIPRIVAN) 10 mg/mL infusion    [DISCONTINUED] propofol (DIPRIVAN) 10 mg/mL infusion    [DISCONTINUED] succinylcholine injection    [DISCONTINUED] vancomycin 750 mg in dextrose 5 % 250 mL IVPB (ready to mix system)    [DISCONTINUED] warfarin (COUMADIN) tablet 2.5 mg    [DISCONTINUED] warfarin (COUMADIN) tablet 5 mg     Current Outpatient Prescriptions on File Prior to Encounter   Medication Sig    ACCU-CHEK SMARTVIEW TEST STRIP Strp USE ONE STRIP TO CHECK GLUCOSE 4  TIMES DAILY    ACCU-CHEK SMARTVIEW TEST STRIP Strp TEST FOUR TIMES A DAY    aspirin 81 MG Chew Take 1 tablet (81 mg total) by mouth once daily.    blood-glucose meter Misc Dispense Accu-Chek Natalia meter    carvedilol (COREG) 25 MG tablet Take 1 tablet (25 mg total) by mouth 2 (two) times daily with meals.    cloNIDine (CATAPRES) 0.1 MG tablet Take 2 tablets (0.2 mg total) by mouth 3 (three) times daily.    cyanocobalamin, vitamin B-12, 1,000 mcg TbSR Take 1,000 mcg by mouth once daily.    docusate sodium (COLACE) 100 MG capsule Take 1 capsule (100 mg total) by mouth 2 (two) times daily.    dorzolamide (TRUSOPT) 2 % ophthalmic solution INSTILL ONE DROP INTO EACH EYE TWICE DAILY    doxazosin (CARDURA) 8 MG Tab Take 1 tablet (8 mg total) by mouth once daily.    EASY TOUCH INSULIN SYRINGE 0.5 mL 31 gauge x 5/16 Syrg To use 4 times daily with insulin injections    escitalopram oxalate (LEXAPRO) 5 MG Tab Take 1 tablet (5 mg total) by mouth once daily.    ferrous sulfate 324 mg (65 mg iron) TbEC TAKE ONE TABLET BY MOUTH TWICE DAILY    hydrALAZINE (APRESOLINE) 100 MG tablet Take 1 tablet (100 mg total) by mouth every 8 (eight) hours.    insulin aspart (NOVOLOG) 100 unit/mL injection Inject 8 units w/ small meal, 15 units w/ large meal plus scale, max daily 54 units.    insulin glargine (LANTUS) 100 unit/mL injection Inject 12 Units into the skin 2 (two) times daily.    insulin syringe-needle U-100 (EASY TOUCH INSULIN SYRINGE) 0.5 mL 31 gauge x 5/16 Syrg To use 4 times daily with insulin injections    isosorbide dinitrate (ISOCHRON) 40 mg TbSR Take 1 tablet (40 mg total) by mouth every 8 (eight) hours.    lancets Misc USE ONE  TO CHECK GLUCOSE 4 TIMES DAILY    latanoprost 0.005 % ophthalmic solution INSTILL ONE DROP INTO EACH EYE IN THE EVENING    memantine (NAMENDA) 10 MG Tab Take 1 tablet (10 mg total) by mouth 2 (two) times daily.    multivitamin (THERAGRAN) tablet Take 1 tablet by mouth once daily.     nitroGLYCERIN 0.4 MG/DOSE TL SPRY (NITROLINGUAL) 400 mcg/spray spray PLACE ONE SPRAY UNDER THE TONGUE EVERY 5 MINUTES AS NEEDED FOR CHEST PAIN.    polyethylene glycol (GLYCOLAX) 17 gram/dose powder Take 17 g by mouth once daily.    potassium chloride (KLOR-CON) 10 MEQ TbSR TAKE ONE TABLET BY MOUTH TWICE DAILY    potassium chloride SA (KLOR-CON M10) 10 MEQ tablet Take 1 tablet (10 mEq total) by mouth 2 (two) times daily.    pravastatin (PRAVACHOL) 20 MG tablet Take 1 tablet (20 mg total) by mouth once daily.    sodium bicarbonate 650 MG tablet TAKE ONE TABLET BY MOUTH ONCE DAILY    torsemide (DEMADEX) 20 MG Tab Take 1 tablet (20 mg total) by mouth once daily. .    warfarin (COUMADIN) 5 MG tablet Take 0.5-1 tablets (2.5-5 mg total) by mouth Daily. As directed by [facility]     Family History     Problem Relation (Age of Onset)    Cancer Mother, Brother, Daughter, Maternal Aunt    Diabetes Mother, Sister, Sister, Son        Social History Main Topics    Smoking status: Never Smoker    Smokeless tobacco: Never Used    Alcohol use No    Drug use: No    Sexual activity: No     Review of Systems,not able be done,patient is unresponsive.  Objective:     Vital Signs (Most Recent):  Temp: 97.4 °F (36.3 °C) (04/02/18 0430)  Pulse: 64 (04/02/18 0900)  Resp: 15 (04/02/18 0900)  BP: (!) 155/72 (04/02/18 0900)  SpO2: 100 % (04/02/18 0900) Vital Signs (24h Range):  Temp:  [94.2 °F (34.6 °C)-98.5 °F (36.9 °C)] 97.4 °F (36.3 °C)  Pulse:  [64-66] 64  Resp:  [0-27] 15  SpO2:  [99 %-100 %] 100 %  BP: ()/(42-92) 155/72     Weight: 78 kg (171 lb 15.3 oz)  Body mass index is 22.69 kg/m².    Physical Exam   Constitutional: No distress.   HENT:   Head: Atraumatic.   Eyes: EOM are normal.   Neck: Neck supple.   Cardiovascular: Normal rate and regular rhythm.    Pulmonary/Chest: Effort normal and breath sounds normal.   Abdominal: Bowel sounds are normal.   Musculoskeletal: She exhibits edema.   Neurological:    pupils are sluggish to light,   Skin:   Cold and  calmy.         CRANIAL NERVES     CN III, IV, VI   Extraocular motions are normal.        Significant Labs:   BMP:     Recent Labs  Lab 04/01/18  2102 04/02/18  0320   GLU  --  98   NA  --  139   K  --  3.7   CL  --  105   CO2  --  29   BUN  --  53*   CREATININE  --  2.0*   CALCIUM  --  8.8   MG 1.7  --      CBC:     Recent Labs  Lab 04/01/18  0618 04/02/18  0320   WBC 3.99 3.41*   HGB 7.5* 7.8*   HCT 24.2* 23.8*   PLT 64* 64*       Significant Imaging: reviewed.

## 2018-04-02 NOTE — EICU
New admit, notified of runs of Vtach non-sustained    82 y/o F CAD s/p PCI, CKD, paroxysmal afib on chronic anticoagulation, chronic Hep C, heart failure EF 60-65%, Grade 1 diastolic dysfunction presented with dyspnea, orthopnea and anasarca.  Bilateral effusion of cxr and CT  Ongoing diuresis furosemide 20 mg IV BID, negative 750 cc    K 3.4 this morning, pro-BNP 46,500    · Ordered KCl 40 meqs per OG x1 now  · Ordered to check electrolytes to include magnesium, phos and ionized calcium as with ongoing losses while on diuretics        Spoke with daughters Deyanira Mederos (POMORGAN) and Stephanie Trujillo with granddaughter Shikha Maldonado and bedside nurse present. They would not want chest compressions or defibrillation/shocking for patient if she were to go into persistent arrhythmia or cardiac arrest.  They are waiting for neurology assessment after EEG before making further decisions regarding goals of care.  They voiced their wishes to maintain ventilator and pacemaker until such time that decision is made.    · Ordered partial DNR       4/2/2018 04:28   POC PH 7.625 (HH)   POC PCO2 27.3 (LL)   POC PO2 133 (H)   POC BE 7   POC HCO3 28.4 (H)   POC SATURATED O2 100   POC TCO2 29 (H)   FiO2 60   Vt 450   PEEP 5     Patient not breathing over set rate of 14  BP elevated at 206/ 95    · Decrease , rate to 10, FiO2 to 40%  · Resumed Isosorbide dinitrate

## 2018-04-02 NOTE — ASSESSMENT & PLAN NOTE
Intubated to protect the airway,pulmonology is on case with vent management,she is partial code at this time,family want see EEG results and go from their.

## 2018-04-02 NOTE — SUBJECTIVE & OBJECTIVE
Past Medical History:   Diagnosis Date    Anticoagulation monitoring by pharmacist 6/29/2012    At risk for amiodarone toxicity with long term use 1/27/2014    Atrial fibrillation     Bilateral carotid artery disease 1/11/2016    Blood transfusion     CAD S/P percutaneous coronary angioplasty 9/27/2012    Chronic diastolic heart failure 9/27/2012    Echo 3-: 1 - Concentric hypertrophy.  2 - Normal left ventricular systolic function (EF 60-65%).  3 - Right ventricular enlargement with hypertrophy, with normal systolic function.  4 - Left ventricular diastolic dysfunction.  5 - Biatrial enlargement.  6 - Mild tricuspid regurgitation.  7 - Pulmonary hypertension. The estimated PA systolic pressure is 55 mmHg.  8 - Increased central venous pressure (IVC is greater than 3cm in diameter).     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  ROWAN placed just proximal to stent in 2005 for UA.  Cath 12/2011: normal LMCA, 40% mid LAD, 50% distal LCx    EYE SURGERY      LAMINECTOMY      TOTAL HIP ARTHROPLASTY Right     x's r hip       Review of patient's allergies indicates:   Allergen Reactions    Losartan Other (See Comments)     Hyperkalemia    0.225 % sodium chloride      Other reaction(s): Eye irritation    Amitriptyline      Other reaction(s): Vomiting  Other reaction(s): Vomiting    Azopt  [brinzolamide]      Other reaction(s): Eye irritation    Cantil  [mepenzolate bromide]      Other reaction(s): Unknown  Other reaction(s): Unknown    Ciprofloxacin Nausea And Vomiting     Other reaction(s): Stomach upset    Codeine      Other reaction(s): Unknown  Other reaction(s): Unknown    Duragal-s      Other reaction(s): Vomiting    Erythromycin      Other reaction(s): Unknown  Other reaction(s): Unknown    Fentanyl      Other reaction(s): Vomiting    Hydrocodone-acetaminophen      Other reaction(s): Unknown  Other reaction(s): Unknown    Indomethacin      Other reaction(s): Unknown  Other reaction(s): Unknown    Meperidine      Other reaction(s): Unknown  Other reaction(s): Unknown    Minoxidil      Other reaction(s): druged  Other reaction(s): nausea and vomiting  Other reaction(s): nausea and vomiting  Other reaction(s): druged    Morphine      Other reaction(s): Unknown  Other reaction(s): Unknown    Neuromuscular blockers, steroidal      Other reaction(s): Unknown    Nifedipine      Other reaction(s): Swelling  Other reaction(s): Swelling    Oxycodone hcl-oxycodone-asa      Other reaction(s): Unknown  Other reaction(s): Unknown    Penicillins Hives     Other reaction(s): Unknown    Propoxyphene      Other reaction(s): Unknown    Talwin  [pentazocine lactate]       Other reaction(s): Unknown    Talwin compound      Other reaction(s): Unknown    Sensipar [cinacalcet] Nausea Only    Valsartan Rash and Hives       Family History     Problem Relation (Age of Onset)    Cancer Mother, Brother, Daughter, Maternal Aunt    Diabetes Mother, Sister, Sister, Son        Social History Main Topics    Smoking status: Never Smoker    Smokeless tobacco: Never Used    Alcohol use No    Drug use: No    Sexual activity: No         Review of Systems   Unable to perform ROS: Intubated     Objective:     Vital Signs (Most Recent):  Temp: 97.4 °F (36.3 °C) (04/02/18 0430)  Pulse: 64 (04/02/18 0900)  Resp: 15 (04/02/18 0900)  BP: (!) 155/72 (04/02/18 0900)  SpO2: 100 % (04/02/18 0900) Vital Signs (24h Range):  Temp:  [94.2 °F (34.6 °C)-98.5 °F (36.9 °C)] 97.4 °F (36.3 °C)  Pulse:  [64-66] 64  Resp:  [0-27] 15  SpO2:  [99 %-100 %] 100 %  BP: ()/(42-92) 155/72     Weight: 78 kg (171 lb 15.3 oz)  Body mass index is 22.69 kg/m².      Intake/Output Summary (Last 24 hours) at 04/02/18 0952  Last data filed at 04/02/18 0900   Gross per 24 hour   Intake           916.57 ml   Output             1375 ml   Net          -458.43 ml       Physical Exam   Constitutional: No distress.   HENT:   Head: Normocephalic and atraumatic.   ett in place   Eyes:   Pupils not responsive   Neck: Neck supple.   Cardiovascular: Normal rate and regular rhythm.    Pulmonary/Chest: Effort normal and breath sounds normal. No stridor.   Abdominal: Soft.   Musculoskeletal: She exhibits edema.   Neurological:   Unresponsive on vent       Vents:  Vent Mode: PRVC (04/02/18 0833)  Ventilator Initiated: Yes (04/01/18 1511)  Set Rate: 10 bmp (04/02/18 0500)  Vt Set: 400 mL (04/02/18 0500)  PEEP/CPAP: 5 cmH20 (04/02/18 0500)  Oxygen Concentration (%): 40 (04/02/18 0900)  Peak Airway Pressure: 16.2 cmH2O (04/02/18 0500)  Total Ve: 4 mL (04/02/18 0500)  F/VT Ratio<105 (RSBI): (!) 24.33 (04/02/18 0500)    Lines/Drains/Airways      Drain                 Urethral Catheter 04/01/18 1731 Double-lumen less than 1 day          Airway                 Airway - Non-Surgical 04/01/18 0709 1 day          Peripheral Intravenous Line                 Peripheral IV - Single Lumen 04/01/18 0140 Right Hand 1 day         Peripheral IV - Single Lumen 04/01/18 0640 Left Antecubital 1 day                Significant Labs:    CBC/Anemia Profile:    Recent Labs  Lab 04/01/18  0618 04/02/18  0320   WBC 3.99 3.41*   HGB 7.5* 7.8*   HCT 24.2* 23.8*   PLT 64* 64*   * 99*   RDW 17.0* 16.4*        Chemistries:    Recent Labs  Lab 04/01/18  0618 04/01/18  1555 04/01/18  2102 04/02/18  0320    141  --  139   K 3.4* 3.6  --  3.7    107  --  105   CO2 35* 28  --  29   BUN 55* 54*  --  53*   CREATININE 1.96* 2.0*  --  2.0*   CALCIUM 9.1 8.8  --  8.8   ALBUMIN  --  1.9*  --  1.9*   PROT  --   --   --  4.8*   BILITOT  --   --   --  0.6   ALKPHOS  --   --   --  44*   ALT  --   --   --  11   AST  --   --   --  28   MG  --   --  1.7  --    PHOS  --  2.9  --   --      Echo 8/22/17  CONCLUSIONS     1 - Concentric hypertrophy.     2 - Normal left ventricular systolic function (EF 60-65%).     3 - Impaired LV relaxation, normal LAP (grade 1 diastolic dysfunction).     4 - Normal right ventricular systolic function .     5 - Mild aortic stenosis, HAWA = 1.82 cm2.     6 - Mild aortic regurgitation.     7 - Mild mitral regurgitation.     8 - Mild tricuspid regurgitation.     9 - Increased central venous pressure.     10 - The estimated RV systolic pressure is 46 mmHg.     ABGs:   Recent Labs  Lab 04/02/18  0428   PH 7.625*   PCO2 27.3*   HCO3 28.4*   POCSATURATED 100   BE 7     probnp 46,500    Significant Imaging:   CT: I have reviewed all pertinent results/findings within the past 24 hours and my personal findings are:  3/30/18 bilateral effusion; no consolidation  CXR: I have reviewed all pertinent results/findings within the past 24 hours and my personal findings  are:  4/2/18 silhouetting of left base

## 2018-04-02 NOTE — PLAN OF CARE
Problem: Patient Care Overview  Goal: Plan of Care Review  Pt remains nonresponsive today. Family at bedside and Neuro consult. EEG done and has resulted. Blood glucose between 150-220. Pt without falls/ injuries this shift.

## 2018-04-02 NOTE — HOSPITAL COURSE
Ms Trujillo presented to [facility] for acute on chronic heart failure resulting in pulmonary edema. Diuresed with IV lasix to which she had shown improvement. Developed hypoglycemia (< 30) resulting in unresponsive state. A rapid response called. Had to be intubated for ventilatory failure, and initiated on IV glucose supplementation. Physician who evaluated patient noted midrange rhythmic eye movements medial-lateral. Remained minimally responsive despite correction of hypoglycemia. Transferred to [facility] for neurology evaluation. Loaded with keppra IV 3000 mg x 1. EEG with no signs of seizure activity. CT brain without acute pathology. No severe anemia. Electrolyte abnormalities corrected. Enterococcus UTI treated with vancomycin. Remained in comatose state despite no sedation x > 48 hours. Palliative care consulted. Family agreed with terminal extubation and enrollment in inpatient hospice. Code status changed to DNR. Has LaPOST signed. Patient remained very comfortable post extubation. Vitals remained stable therefore transferred to inpatient hospice.

## 2018-04-02 NOTE — ASSESSMENT & PLAN NOTE
?seizure.  EEG pending.  Currently with levetiracetam per neuro.  There is also the concern for shea.  Will monitor and assess off propofol.

## 2018-04-02 NOTE — CONSULTS
[facility]  Pulmonology  Consult Note    Patient Name: Chey Trujillo  MRN: [id number]  Admission Date: 4/1/2018  Hospital Length of Stay: 1 days  Code Status: Partial Code  Attending Physician: Sharita Castaneda MD  Primary Care Provider: Ada Flores MD (Inactive)   Principal Problem: Metabolic encephalopathy    Inpatient consult to Pulmonology  Consult performed by: JUAN MANUEL MADRIGAL  Consult ordered by: SHARITA CASTANEDA        Subjective:     HPI:  Patient is 83 y.o. 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 presented to [facility] on 4/1/18 after found unresponsive by staff ath [facility].  Per record, patient was admitted at [place] for CHF.  Record also indicated BG of <30.  Patient was  transferred to [facility] for neurology evaluation of possible seizure.  Pulmonary was consulted for vent management.        Past Medical History:   Diagnosis Date    Anticoagulation monitoring by pharmacist 6/29/2012    At risk for amiodarone toxicity with long term use 1/27/2014    Atrial fibrillation     Bilateral carotid artery disease 1/11/2016    Blood transfusion     CAD S/P percutaneous coronary angioplasty 9/27/2012    Chronic diastolic heart failure 9/27/2012    Echo 3-: 1 - Concentric hypertrophy.  2 - Normal left ventricular systolic function (EF 60-65%).  3 - Right ventricular enlargement with hypertrophy, with normal systolic function.  4 - Left ventricular diastolic dysfunction.  5 - Biatrial enlargement.  6 - Mild tricuspid regurgitation.  7 - Pulmonary hypertension. The estimated PA systolic pressure is 55 mmHg.  8 - Increased central venous pressure (IVC is greater than 3cm in diameter).     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  ROWAN placed just proximal to stent in 2005 for UA.  Cath 12/2011: normal LMCA, 40% mid LAD, 50% distal LCx    EYE SURGERY      LAMINECTOMY      TOTAL HIP ARTHROPLASTY Right     x's r hip       Review of patient's allergies indicates:   Allergen Reactions    Losartan Other (See Comments)     Hyperkalemia    0.225 % sodium chloride      Other reaction(s): Eye irritation    Amitriptyline      Other reaction(s): Vomiting  Other reaction(s): Vomiting    Azopt  [brinzolamide]      Other reaction(s): Eye irritation    Cantil  [mepenzolate bromide]      Other reaction(s): Unknown  Other reaction(s): Unknown    Ciprofloxacin Nausea And Vomiting     Other reaction(s): Stomach upset    Codeine      Other reaction(s): Unknown  Other reaction(s): Unknown    Duragal-s      Other reaction(s): Vomiting    Erythromycin      Other reaction(s): Unknown  Other reaction(s): Unknown    Fentanyl      Other reaction(s): Vomiting    Hydrocodone-acetaminophen      Other reaction(s): Unknown  Other reaction(s): Unknown    Indomethacin      Other reaction(s): Unknown  Other reaction(s): Unknown    Meperidine      Other reaction(s): Unknown  Other reaction(s): Unknown    Minoxidil      Other reaction(s): druged  Other reaction(s): nausea and vomiting  Other reaction(s): nausea and vomiting  Other reaction(s): druged    Morphine      Other reaction(s): Unknown  Other reaction(s): Unknown    Neuromuscular blockers, steroidal      Other reaction(s): Unknown    Nifedipine      Other reaction(s): Swelling  Other reaction(s): Swelling    Oxycodone hcl-oxycodone-asa      Other reaction(s): Unknown  Other reaction(s): Unknown    Penicillins  Hives     Other reaction(s): Unknown    Propoxyphene      Other reaction(s): Unknown    Talwin  [pentazocine lactate]      Other reaction(s): Unknown    Talwin compound      Other reaction(s): Unknown    Sensipar [cinacalcet] Nausea Only    Valsartan Rash and Hives       Family History     Problem Relation (Age of Onset)    Cancer Mother, Brother, Daughter, Maternal Aunt    Diabetes Mother, Sister, Sister, Son        Social History Main Topics    Smoking status: Never Smoker    Smokeless tobacco: Never Used    Alcohol use No    Drug use: No    Sexual activity: No         Review of Systems   Unable to perform ROS: Intubated     Objective:     Vital Signs (Most Recent):  Temp: 97.4 °F (36.3 °C) (04/02/18 0430)  Pulse: 64 (04/02/18 0900)  Resp: 15 (04/02/18 0900)  BP: (!) 155/72 (04/02/18 0900)  SpO2: 100 % (04/02/18 0900) Vital Signs (24h Range):  Temp:  [94.2 °F (34.6 °C)-98.5 °F (36.9 °C)] 97.4 °F (36.3 °C)  Pulse:  [64-66] 64  Resp:  [0-27] 15  SpO2:  [99 %-100 %] 100 %  BP: ()/(42-92) 155/72     Weight: 78 kg (171 lb 15.3 oz)  Body mass index is 22.69 kg/m².      Intake/Output Summary (Last 24 hours) at 04/02/18 0952  Last data filed at 04/02/18 0900   Gross per 24 hour   Intake           916.57 ml   Output             1375 ml   Net          -458.43 ml       Physical Exam   Constitutional: No distress.   HENT:   Head: Normocephalic and atraumatic.   ett in place   Eyes:   Pupils not responsive   Neck: Neck supple.   Cardiovascular: Normal rate and regular rhythm.    Pulmonary/Chest: Effort normal and breath sounds normal. No stridor.   Abdominal: Soft.   Musculoskeletal: She exhibits edema.   Neurological:   Unresponsive on vent       Vents:  Vent Mode: PRVC (04/02/18 0833)  Ventilator Initiated: Yes (04/01/18 1511)  Set Rate: 10 bmp (04/02/18 0500)  Vt Set: 400 mL (04/02/18 0500)  PEEP/CPAP: 5 cmH20 (04/02/18 0500)  Oxygen Concentration (%): 40 (04/02/18 0900)  Peak Airway Pressure: 16.2 cmH2O  (04/02/18 0500)  Total Ve: 4 mL (04/02/18 0500)  F/VT Ratio<105 (RSBI): (!) 24.33 (04/02/18 0500)    Lines/Drains/Airways     Drain                 Urethral Catheter 04/01/18 1731 Double-lumen less than 1 day          Airway                 Airway - Non-Surgical 04/01/18 0709 1 day          Peripheral Intravenous Line                 Peripheral IV - Single Lumen 04/01/18 0140 Right Hand 1 day         Peripheral IV - Single Lumen 04/01/18 0640 Left Antecubital 1 day                Significant Labs:    CBC/Anemia Profile:    Recent Labs  Lab 04/01/18 0618 04/02/18  0320   WBC 3.99 3.41*   HGB 7.5* 7.8*   HCT 24.2* 23.8*   PLT 64* 64*   * 99*   RDW 17.0* 16.4*        Chemistries:    Recent Labs  Lab 04/01/18  0618 04/01/18  1555 04/01/18  2102 04/02/18  0320    141  --  139   K 3.4* 3.6  --  3.7    107  --  105   CO2 35* 28  --  29   BUN 55* 54*  --  53*   CREATININE 1.96* 2.0*  --  2.0*   CALCIUM 9.1 8.8  --  8.8   ALBUMIN  --  1.9*  --  1.9*   PROT  --   --   --  4.8*   BILITOT  --   --   --  0.6   ALKPHOS  --   --   --  44*   ALT  --   --   --  11   AST  --   --   --  28   MG  --   --  1.7  --    PHOS  --  2.9  --   --      Echo 8/22/17  CONCLUSIONS     1 - Concentric hypertrophy.     2 - Normal left ventricular systolic function (EF 60-65%).     3 - Impaired LV relaxation, normal LAP (grade 1 diastolic dysfunction).     4 - Normal right ventricular systolic function .     5 - Mild aortic stenosis, HAWA = 1.82 cm2.     6 - Mild aortic regurgitation.     7 - Mild mitral regurgitation.     8 - Mild tricuspid regurgitation.     9 - Increased central venous pressure.     10 - The estimated RV systolic pressure is 46 mmHg.     ABGs:   Recent Labs  Lab 04/02/18  0428   PH 7.625*   PCO2 27.3*   HCO3 28.4*   POCSATURATED 100   BE 7     probnp 46,500    Significant Imaging:   CT: I have reviewed all pertinent results/findings within the past 24 hours and my personal findings are:  3/30/18 bilateral  effusion; no consolidation  CXR: I have reviewed all pertinent results/findings within the past 24 hours and my personal findings are:  4/2/18 silhouetting of left base    Assessment/Plan:     * Metabolic encephalopathy    ?seizure.  EEG pending.  Currently with levetiracetam per neuro.  There is also the concern for shea.  Will monitor and assess off propofol.          Acute respiratory failure with hypoxia    Intubated for airway protection.  Currently on 40% Fio2 with good oxgygenation.  Cxr/ct demonstrated bilateral effusion.  Clinical exam supportive of volume overload.  Will need to escalate effusion.  Will hold propofol and reassess.          Acute on chronic diastolic heart failure    elelvated bnp, bilateral effusion, edema c/w volume overload.  In addition, bp is not well control.  Will increase lasix.              CKD - will monitor with escalation of diuretic.      Thank you for your consult. I will follow-up with patient. Please contact us if you have any additional questions.     João Gudino MD  Pulmonology  [facility]  Critical Care Time: 45  minutes  Critical secondary to respirator failure     Critical care was time spent personally by me on the following activities: development of treatment plan with patient or surrogate and bedside caregivers, discussions with consultants, evaluation of patient's response to treatment, examination of patient, ordering and performing treatments and interventions, ordering and review of laboratory studies, ordering and review of radiographic studies, pulse oximetry, re-evaluation of patient's condition.  This critical care time did not overlap with that of any other provider or involve time for any procedures.

## 2018-04-02 NOTE — ASSESSMENT & PLAN NOTE
Will continue with IV lasix,not on ACE or ARB duo to CKD 4,will monitor urine out put.chestz x ray show still some pleural effusion.

## 2018-04-02 NOTE — ASSESSMENT & PLAN NOTE
elelvated bnp, bilateral effusion, edema c/w volume overload.  In addition, bp is not well control.  Will increase lasix.

## 2018-04-02 NOTE — PROCEDURES
ROUTINE ELECTROENCEPHALOGRAM REPORT      Chey Trujillo  [id number]  1935    DATE OF SERVICE: 4/2/18    REQUESTING PROVIDER: Selena Castaneda MD    REASON FOR CONSULT: 82yo F with multiple co-morbidities, admitted with CHF exacerbation, hypoglycemia and possible seizures    PRIOR EEG: none    MEDICATIONS:   Current Facility-Administered Medications   Medication    aspirin chewable tablet 81 mg    carvedilol tablet 25 mg    chlorhexidine 0.12 % solution 15 mL    cloNIDine tablet 0.2 mg    dextrose 5 % infusion    dextrose 50% injection 12.5 g    dextrose 50% injection 25 g    dorzolamide 2 % ophthalmic solution 1 drop    doxazosin tablet 8 mg    furosemide injection 40 mg    glucagon (human recombinant) injection 1 mg    glucose chewable tablet 16 g    glucose chewable tablet 24 g    hydrALAZINE injection 10 mg    hydrALAZINE tablet 100 mg    isosorbide dinitrate tablet 40 mg    latanoprost 0.005 % ophthalmic solution 1 drop    levETIRAcetam in NaCl (iso-os) IVPB 500 mg    pantoprazole 40 mg in dextrose 5 % 100 mL infusion (ready to mix system)    pravastatin tablet 20 mg    propofol (DIPRIVAN) 10 mg/mL infusion    vancomycin in dextrose 5 % 1 gram/250 mL IVPB 1,000 mg    warfarin (COUMADIN) tablet 2.5 mg    warfarin (COUMADIN) tablet 5 mg     METHODOLOGY   Electroencephalographic (EEG) recording is with electrodes placed according to the International 10-20 placement system.  Thirty two (32) channels of digital signal (sampling rate of 512/sec) including T1 and T2 was simultaneously recorded from the scalp and may include  EKG, EMG, and/or eye monitors.  Recording band pass was 0.1 to 512 hz.  Digital video recording of the patient is simultaneously recorded with the EEG.  The patient is instructed report clinical symptoms which may occur during the recording session.  EEG and video recording is stored and archived in digital format. Activation procedures which include photic  stimulation, hyperventilation and instructing patients to perform simple task are done in selected patients.    The EEG is displayed on a monitor screen and can be reviewed using different montages.  Computer assisted analysis is employed to detect spike and electrographic seizure activity.   The entire record is submitted for computer analysis.  The entire recording is visually reviewed and the times identified by computer analysis as being spikes or seizures are reviewed again.  Compresses spectral analysis (CSA) is also performed on the activity recorded from each individual channel.  This is displayed as a power display of frequencies from 0 to 30 Hz over time.   The CSA is reviewed looking for asymmetries in power between homologous areas of the scalp and then compared with the original EEG recording.     [employer] software was also utilized in the review of this study.  This software suite analyzes the EEG recording in multiple domains.  Coherence and rhythmicity is computed to identify EEG sections which may contain organized seizures.  Each channel undergoes analysis to detect presence of spike and sharp waves which have special and morphological characteristic of epileptic activity.  The routine EEG recording is converted from spacial into frequency domain.  This is then displayed comparing homologous areas to identify areas of significant asymmetry.  Algorithm to identify non-cortically generated artifact is used to separate eye movement, EMG and other artifact from the EEG    EEG FINGINGS  Background activity:   The background rhythm was characterized by low amplitude (<10uV) delta (1-4 Hz) activity   No posterior dominant rhythm seen.   Symmetry and continuity: The background was continuous and symmetric    Sleep:   Not seen.    Activation procedures:   Minimal response only to noxious stimuli    Abnormal activity:   No epileptiform discharges, periodic discharges, lateralized rhythmic delta activity or  electrographic seizures were seen.  Irregular heart rate was noted on EKG.    IMPRESSION:   This is an abnormal routine EEG due to the severe generalized slowing seen, suggestive of severe diffuse or multifocal cerebral dysfunction.    No epileptiform activity or electrographic seizures seen.  Irregular heart rate was noted on EKG.    CLINICAL CORRELATION IS RECOMMENDED.    Ny Maxwell MD, MAINE(), JM NEWSOME.  Neurology-Epilepsy.  [facility].

## 2018-04-02 NOTE — PLAN OF CARE
Problem: Patient Care Overview  Goal: Plan of Care Review  04/02/2018    Recommendations    Recommendation/Intervention: 1) Novasource @ 30 cc/hr with Beneprotein packet once daily, provides 1465 calories (110% of EEN with propofol), 71 g protein, 516 cc free water. 2) Initiate at 10 cc/hr and advance by 10 cc Q4 hours or as tolerated until goal rate is achieved 3) Flush OGT with 150 cc free water Q4 hours or per MD for hydration and patency 4) Check residuals Q4 hours. Hold x4 hours > 250 cc or sx of feeding intolerance occur. Use Reglan if consistently elevated and no contraindications  Goals: 1) patient to receive nutrition support with tolerance with 48 hours with tolerance  Nutrition Goal Status: new  Communication of ABILIO Recs: reviewed with physician    Isatu Nails, MPH, RD, LDN

## 2018-04-02 NOTE — HPI
Patient is 83 y.o. 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 presented to [facility] on 4/1/18 after found unresponsive by staff ath [facility].  Per record, patient was admitted at [place] for CHF.  Record also indicated BG of <30.  Patient was transferred to [facility] for neurology evaluation of possible seizure.  Pulmonary was consulted for vent management.

## 2018-04-02 NOTE — ASSESSMENT & PLAN NOTE
Intubated for airway protection.  Currently on 40% Fio2 with good oxgygenation.  Cxr/ct demonstrated bilateral effusion.  Clinical exam supportive of volume overload.  Will need to escalate effusion.  Will hold propofol and reassess.

## 2018-04-03 LAB
ALBUMIN SERPL BCP-MCNC: 1.7 G/DL
ALLENS TEST: ABNORMAL
ALLENS TEST: ABNORMAL
ALP SERPL-CCNC: 45 U/L
ALT SERPL W/O P-5'-P-CCNC: 11 U/L
ANION GAP SERPL CALC-SCNC: 4 MMOL/L
AST SERPL-CCNC: 25 U/L
BASOPHILS # BLD AUTO: 0 K/UL
BASOPHILS NFR BLD: 0 %
BILIRUB SERPL-MCNC: 0.5 MG/DL
BUN SERPL-MCNC: 55 MG/DL
CALCIUM SERPL-MCNC: 8.3 MG/DL
CHLORIDE SERPL-SCNC: 103 MMOL/L
CO2 SERPL-SCNC: 27 MMOL/L
CREAT SERPL-MCNC: 2.1 MG/DL
DELSYS: ABNORMAL
DELSYS: ABNORMAL
DIFFERENTIAL METHOD: ABNORMAL
EOSINOPHIL # BLD AUTO: 0 K/UL
EOSINOPHIL NFR BLD: 0 %
ERYTHROCYTE [DISTWIDTH] IN BLOOD BY AUTOMATED COUNT: 17 %
ERYTHROCYTE [SEDIMENTATION RATE] IN BLOOD BY WESTERGREN METHOD: 10 MM/H
ERYTHROCYTE [SEDIMENTATION RATE] IN BLOOD BY WESTERGREN METHOD: 8 MM/H
EST. GFR  (AFRICAN AMERICAN): 25 ML/MIN/1.73 M^2
EST. GFR  (NON AFRICAN AMERICAN): 21 ML/MIN/1.73 M^2
FIO2: 40
FIO2: 40
GLUCOSE SERPL-MCNC: 200 MG/DL
HCO3 UR-SCNC: 27 MMOL/L (ref 24–28)
HCO3 UR-SCNC: 27.7 MMOL/L (ref 24–28)
HCT VFR BLD AUTO: 25.3 %
HGB BLD-MCNC: 8.1 G/DL
INR PPP: 3.2
LYMPHOCYTES # BLD AUTO: 0.6 K/UL
LYMPHOCYTES NFR BLD: 20.8 %
MCH RBC QN AUTO: 32 PG
MCHC RBC AUTO-ENTMCNC: 32 G/DL
MCV RBC AUTO: 100 FL
MIN VOL: 5.9
MODE: ABNORMAL
MODE: ABNORMAL
MONOCYTES # BLD AUTO: 0.1 K/UL
MONOCYTES NFR BLD: 4.9 %
NEUTROPHILS # BLD AUTO: 2.1 K/UL
NEUTROPHILS NFR BLD: 74 %
PCO2 BLDA: 24.9 MMHG (ref 35–45)
PCO2 BLDA: 35.5 MMHG (ref 35–45)
PEEP: 5
PEEP: 5
PH SMN: 7.49 [PH] (ref 7.35–7.45)
PH SMN: 7.65 [PH] (ref 7.35–7.45)
PIP: 15
PIP: 16
PLATELET # BLD AUTO: 66 K/UL
PMV BLD AUTO: 12.1 FL
PO2 BLDA: 129 MMHG (ref 80–100)
PO2 BLDA: 80 MMHG (ref 80–100)
POC BE: 4 MMOL/L
POC BE: 5 MMOL/L
POC SATURATED O2: 98 % (ref 95–100)
POC SATURATED O2: 99 % (ref 95–100)
POC TCO2: 28 MMOL/L (ref 23–27)
POC TCO2: 28 MMOL/L (ref 23–27)
POCT GLUCOSE: 207 MG/DL (ref 70–110)
POCT GLUCOSE: 208 MG/DL (ref 70–110)
POCT GLUCOSE: 209 MG/DL (ref 70–110)
POCT GLUCOSE: 210 MG/DL (ref 70–110)
POCT GLUCOSE: 212 MG/DL (ref 70–110)
POCT GLUCOSE: 230 MG/DL (ref 70–110)
POCT GLUCOSE: 231 MG/DL (ref 70–110)
POCT GLUCOSE: 249 MG/DL (ref 70–110)
POCT GLUCOSE: 262 MG/DL (ref 70–110)
POTASSIUM SERPL-SCNC: 4 MMOL/L
PROT SERPL-MCNC: 4.5 G/DL
PROTHROMBIN TIME: 33.2 SEC
RBC # BLD AUTO: 2.53 M/UL
SAMPLE: ABNORMAL
SAMPLE: ABNORMAL
SITE: ABNORMAL
SITE: ABNORMAL
SODIUM SERPL-SCNC: 134 MMOL/L
SP02: 100
VANCOMYCIN SERPL-MCNC: 13 UG/ML
VT: 400
VT: 400
WBC # BLD AUTO: 2.88 K/UL

## 2018-04-03 PROCEDURE — 85610 PROTHROMBIN TIME: CPT

## 2018-04-03 PROCEDURE — 80053 COMPREHEN METABOLIC PANEL: CPT

## 2018-04-03 PROCEDURE — 25000003 PHARM REV CODE 250: Performed by: STUDENT IN AN ORGANIZED HEALTH CARE EDUCATION/TRAINING PROGRAM

## 2018-04-03 PROCEDURE — 63600175 PHARM REV CODE 636 W HCPCS: Performed by: INTERNAL MEDICINE

## 2018-04-03 PROCEDURE — 99232 SBSQ HOSP IP/OBS MODERATE 35: CPT | Mod: ,,, | Performed by: PSYCHIATRY & NEUROLOGY

## 2018-04-03 PROCEDURE — 94003 VENT MGMT INPAT SUBQ DAY: CPT

## 2018-04-03 PROCEDURE — 20000000 HC ICU ROOM

## 2018-04-03 PROCEDURE — 80202 ASSAY OF VANCOMYCIN: CPT

## 2018-04-03 PROCEDURE — 99900026 HC AIRWAY MAINTENANCE (STAT)

## 2018-04-03 PROCEDURE — 82803 BLOOD GASES ANY COMBINATION: CPT

## 2018-04-03 PROCEDURE — 25000003 PHARM REV CODE 250: Performed by: HOSPITALIST

## 2018-04-03 PROCEDURE — 99291 CRITICAL CARE FIRST HOUR: CPT | Mod: ,,, | Performed by: INTERNAL MEDICINE

## 2018-04-03 PROCEDURE — 63600175 PHARM REV CODE 636 W HCPCS: Performed by: HOSPITALIST

## 2018-04-03 PROCEDURE — 36600 WITHDRAWAL OF ARTERIAL BLOOD: CPT

## 2018-04-03 PROCEDURE — C9113 INJ PANTOPRAZOLE SODIUM, VIA: HCPCS | Performed by: HOSPITALIST

## 2018-04-03 PROCEDURE — 85025 COMPLETE CBC W/AUTO DIFF WBC: CPT

## 2018-04-03 PROCEDURE — 99900035 HC TECH TIME PER 15 MIN (STAT)

## 2018-04-03 RX ADMIN — DEXTROSE 40 MG: 50 INJECTION, SOLUTION INTRAVENOUS at 10:04

## 2018-04-03 RX ADMIN — HYDRALAZINE HYDROCHLORIDE 100 MG: 25 TABLET ORAL at 02:04

## 2018-04-03 RX ADMIN — DOXAZOSIN 8 MG: 4 TABLET ORAL at 10:04

## 2018-04-03 RX ADMIN — PRAVASTATIN SODIUM 20 MG: 10 TABLET ORAL at 10:04

## 2018-04-03 RX ADMIN — CLONIDINE HYDROCHLORIDE 0.2 MG: 0.1 TABLET ORAL at 02:04

## 2018-04-03 RX ADMIN — FUROSEMIDE 40 MG: 10 INJECTION, SOLUTION INTRAMUSCULAR; INTRAVENOUS at 10:04

## 2018-04-03 RX ADMIN — LATANOPROST 1 DROP: 50 SOLUTION OPHTHALMIC at 08:04

## 2018-04-03 RX ADMIN — VANCOMYCIN HYDROCHLORIDE 1000 MG: 1 INJECTION, POWDER, LYOPHILIZED, FOR SOLUTION INTRAVENOUS at 12:04

## 2018-04-03 RX ADMIN — CHLORHEXIDINE GLUCONATE 15 ML: 1.2 RINSE ORAL at 10:04

## 2018-04-03 RX ADMIN — ASPIRIN 81 MG 81 MG: 81 TABLET ORAL at 10:04

## 2018-04-03 RX ADMIN — DORZOLAMIDE HYDROCHLORIDE 1 DROP: 20 SOLUTION/ DROPS OPHTHALMIC at 10:04

## 2018-04-03 RX ADMIN — CLONIDINE HYDROCHLORIDE 0.2 MG: 0.1 TABLET ORAL at 08:04

## 2018-04-03 RX ADMIN — CLONIDINE HYDROCHLORIDE 0.2 MG: 0.1 TABLET ORAL at 10:04

## 2018-04-03 RX ADMIN — CHLORHEXIDINE GLUCONATE 15 ML: 1.2 RINSE ORAL at 08:04

## 2018-04-03 RX ADMIN — ISOSORBIDE DINITRATE 40 MG: 20 TABLET ORAL at 01:04

## 2018-04-03 RX ADMIN — HYDRALAZINE HYDROCHLORIDE 100 MG: 25 TABLET ORAL at 10:04

## 2018-04-03 RX ADMIN — DORZOLAMIDE HYDROCHLORIDE 1 DROP: 20 SOLUTION/ DROPS OPHTHALMIC at 08:04

## 2018-04-03 RX ADMIN — CARVEDILOL 25 MG: 6.25 TABLET, FILM COATED ORAL at 05:04

## 2018-04-03 RX ADMIN — CARVEDILOL 25 MG: 6.25 TABLET, FILM COATED ORAL at 10:04

## 2018-04-03 NOTE — SUBJECTIVE & OBJECTIVE
HPI:  Patient is 83 y.o. 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 presented to [facility] on 4/1/18 after found unresponsive by staff ath [facility].  Per record, patient was admitted at [facility] for CHF.  Record also indicated BG of <30.  Patient was transferred to [facility] for neurology evaluation of possible seizure.  Pulmonary was consulted for vent management.      Interval History: no acute issue.  Remained unresponsive off sedation.      Objective:     Vital Signs (Most Recent):  Temp: 98.3 °F (36.8 °C) (04/03/18 0752)  Pulse: 72 (04/03/18 0800)  Resp: (!) 0 (04/03/18 0800)  BP: (!) 140/66 (04/03/18 0800)  SpO2: 100 % (04/03/18 0800) Vital Signs (24h Range):  Temp:  [94.6 °F (34.8 °C)-98.3 °F (36.8 °C)] 98.3 °F  (36.8 °C)  Pulse:  [64-73] 72  Resp:  [0-13] 0  SpO2:  [77 %-100 %] 100 %  BP: ()/(46-82) 140/66     Weight: 78 kg (171 lb 15.3 oz)  Body mass index is 22.69 kg/m².      Intake/Output Summary (Last 24 hours) at 04/03/18 0910  Last data filed at 04/03/18 0800   Gross per 24 hour   Intake           1702.3 ml   Output              230 ml   Net           1472.3 ml       Physical Exam   Constitutional: No distress.   HENT:   Head: Normocephalic and atraumatic.   ett in place   Eyes:   Pupils not responsive   Neck: Neck supple.   Cardiovascular: Normal rate and regular rhythm.    Pulmonary/Chest: Effort normal and breath sounds normal. No stridor.   Abdominal: Soft.   Musculoskeletal: She exhibits edema.   Neurological:   Unresponsive on vent       Vents:  Vent Mode: PRVC (04/03/18 0747)  Ventilator Initiated: Yes (04/01/18 1511)  Set Rate: 8 bmp (04/03/18 0747)  Vt Set: 400 mL (04/03/18 0747)  PEEP/CPAP: 5 cmH20 (04/03/18 0747)  Oxygen Concentration (%): 40 (04/03/18 0800)  Peak Airway Pressure: 15.7 cmH2O (04/03/18 0747)  Total Ve: 7.7 mL (04/03/18 0747)  F/VT Ratio<105 (RSBI): (!) 0 (04/03/18 0500)    Lines/Drains/Airways     Drain                 Urethral Catheter 04/01/18 1731 Double-lumen 1 day          Airway                 Airway - Non-Surgical 04/01/18 0709 2 days          Peripheral Intravenous Line                 Peripheral IV - Single Lumen 04/01/18 0140 Right Hand 2 days         Peripheral IV - Single Lumen 04/01/18 0640 Left Antecubital 2 days                Significant Labs:    CBC/Anemia Profile:    Recent Labs  Lab 04/02/18  0320 04/03/18  0314   WBC 3.41* 2.88*   HGB 7.8* 8.1*   HCT 23.8* 25.3*   PLT 64* 66*   MCV 99* 100*   RDW 16.4* 17.0*        Chemistries:    Recent Labs  Lab 04/01/18  1555 04/01/18  2102 04/02/18  0320 04/03/18  0314     --  139 134*   K 3.6  --  3.7 4.0     --  105 103   CO2 28  --  29 27   BUN 54*  --  53* 55*   CREATININE 2.0*  --  2.0* 2.1*   CALCIUM 8.8  --   8.8 8.3*   ALBUMIN 1.9*  --  1.9* 1.7*   PROT  --   --  4.8* 4.5*   BILITOT  --   --  0.6 0.5   ALKPHOS  --   --  44* 45*   ALT  --   --  11 11   AST  --   --  28 25   MG  --  1.7  --   --    PHOS 2.9  --   --   --        ABGs:   Recent Labs  Lab 04/03/18  0417   PH 7.654*   PCO2 24.9*   HCO3 27.7   POCSATURATED 98   BE 5       Echo 8/22/17  CONCLUSIONS     1 - Concentric hypertrophy.     2 - Normal left ventricular systolic function (EF 60-65%).     3 - Impaired LV relaxation, normal LAP (grade 1 diastolic dysfunction).     4 - Normal right ventricular systolic function .     5 - Mild aortic stenosis, HAWA = 1.82 cm2.     6 - Mild aortic regurgitation.     7 - Mild mitral regurgitation.     8 - Mild tricuspid regurgitation.     9 - Increased central venous pressure.     10 - The estimated RV systolic pressure is 46 mmHg.     Significant Imaging:   CT: I have reviewed all pertinent results/findings within the past 24 hours and my personal findings are:  3/30/18 bilateral effusion; no consolidation  CXR: I have reviewed all pertinent results/findings within the past 24 hours and my personal findings are:  4/2/18 silhouetting of left base

## 2018-04-03 NOTE — ASSESSMENT & PLAN NOTE
Severe hypoglycemic event for unknown period of time. Patient became unresponsive and intubated for ventilatory support. Questionable seizure due to rhythmic eye movement. EGG is negative for this but rather severe slowing. Off sedation since 11AM on 4/2 and unfortunately no brain activity appreciated as of yet. Hypoglycemia and alkalemia addressed. Neurology on board for co-management. Keppra discontinued. On Dextrose infusion to maintain -200.

## 2018-04-03 NOTE — PLAN OF CARE
Problem: Patient Care Overview  Goal: Plan of Care Review  Outcome: Ongoing (interventions implemented as appropriate)  Patient received on servoi ventilator with charted settings. All ventilator alarms on, set and functioning. ambu bag and mask at bedside. Patient has 7.5 ETT secured and moved to center at 25cm at lip. Will continue with ventilator management.

## 2018-04-03 NOTE — ASSESSMENT & PLAN NOTE
This is a chronic issue. MCV elevated but B12 and folate ok. Marrow failure? Evidence of pancytopenia since 2015

## 2018-04-03 NOTE — PLAN OF CARE
Problem: Patient Care Overview  Goal: Plan of Care Review  Outcome: Ongoing (interventions implemented as appropriate)    Pt remains intubated and unresponsive to painful stimuli. No corneal reflex.. Family at bedside in afternoon. Dr Velasquez met with family and this Rn. Family requests terminal extubation tomorrow vs Thursday, as out of town family will arriving earlier. Provided emotional for family. No falls or injury to patient today.

## 2018-04-03 NOTE — PROGRESS NOTES
[facility] [place]  [place]    Patient Name: Chey Trujillo  MRN: [id number]  Admission Date: 4/1/2018  Hospital Length of Stay: 2 days  Code Status: Partial Code   Attending Provider: Ambika Reyes MD  Primary Care Physician: Ada Flores MD (Inactive)   Principal Problem:Hypoglycemia, coma    Subjective:     Interval History: 82 y/o female with medical Hx as listed below presented to ED for increase swelling in extremities and shortness of breath. Pt found to be in CHF exacerbation, decompensated and required intubation; her BG was < 30. While in hospital in outside facility it was noted that pt was having rhythmic movement of eyes thought to be seizures. Transfer to this facility was requested for EEG and neurological evaluation. Pt has Hx of dementia but according to family although bed bound she is alert able to communicate with them. She apparently has been in hospice but family wanted Rx to be done as well as pt being Partial CODE.    -4/3/18: No improvement. No sedation; pt is unresponsive.    Current Neurological Medications:    Current Facility-Administered Medications   Medication Dose Route Frequency Provider Last Rate Last Dose    aspirin chewable tablet 81 mg  81 mg Per OG tube Daily Anitha Noble MD   81 mg at 04/03/18 1003    carvedilol tablet 25 mg  25 mg Per OG tube BID WM Anitha Noble MD   25 mg at 04/03/18 1011    chlorhexidine 0.12 % solution 15 mL  15 mL Mouth/Throat BID Selena Castaneda MD   15 mL at 04/03/18 1003    cloNIDine tablet 0.2 mg  0.2 mg Per OG tube TID Anitha Noble MD   0.2 mg at 04/03/18 1002    dextrose 5 % infusion   Intravenous Continuous Anitha Noble MD 50 mL/hr at 04/03/18 0800      dextrose 50% injection 12.5 g  12.5 g Intravenous PRN Anitha Noble MD   12.5 g at 04/01/18 2202    dextrose 50% injection 25 g  25 g Intravenous PRN Anitha Noble MD        dorzolamide 2 % ophthalmic solution 1 drop  1  drop Both Eyes BID Anitha Noble MD   1 drop at 04/03/18 1011    doxazosin tablet 8 mg  8 mg Per OG tube Daily Anitha Noble MD   8 mg at 04/03/18 1003    furosemide injection 40 mg  40 mg Intravenous BID João Gudino MD   40 mg at 04/03/18 1002    glucagon (human recombinant) injection 1 mg  1 mg Intramuscular PRN Anitha Noble MD        glucose chewable tablet 16 g  16 g Oral PRN Anitha Noble MD        glucose chewable tablet 24 g  24 g Oral PRN Anitha Noble MD        hydrALAZINE injection 10 mg  10 mg Intravenous Q4H PRN Selena Castaneda MD   10 mg at 04/02/18 0039    hydrALAZINE tablet 100 mg  100 mg Per OG tube Q8H Anitha Noble MD   100 mg at 04/03/18 1011    isosorbide dinitrate tablet 40 mg  40 mg Oral TID Marisa Wells MD   Stopped at 04/03/18 0900    latanoprost 0.005 % ophthalmic solution 1 drop  1 drop Both Eyes QHS Anitha Noble MD   1 drop at 04/02/18 2208    levETIRAcetam in NaCl (iso-os) IVPB 500 mg  500 mg Intravenous Q12H Anitha Noble MD   Stopped at 04/03/18 0900    pantoprazole 40 mg in dextrose 5 % 100 mL infusion (ready to mix system)  40 mg Intravenous Daily Anitha Noble MD   40 mg at 04/03/18 1001    pravastatin tablet 20 mg  20 mg Per G Tube Daily Anitha Noble MD   20 mg at 04/03/18 1003    vancomycin in dextrose 5 % 1 gram/250 mL IVPB 1,000 mg  1,000 mg Intravenous PRN Anitha Nolbe MD   1,000 mg at 04/02/18 1448       Review of Systems   Unresponsive/intubated    Objective:     Vital Signs (Most Recent):  Temp: 98.3 °F (36.8 °C) (04/03/18 0752)  Pulse: 72 (04/03/18 0800)  Resp: (!) 0 (04/03/18 0800)  BP: (!) 140/66 (04/03/18 0800)  SpO2: 100 % (04/03/18 0800) Vital Signs (24h Range):  Temp:  [94.6 °F (34.8 °C)-98.3 °F (36.8 °C)] 98.3 °F (36.8 °C)  Pulse:  [64-73] 72  Resp:  [0-13] 0  SpO2:  [77 %-100 %] 100 %  BP: ()/(46-82) 140/66     Weight: 78 kg (171 lb 15.3 oz)  Body mass index is 22.69 kg/m².    Physical  Exam  Constitutional: No distress.   HENT:   Head: Normocephalic.   Eyes: Right eye exhibits no discharge. Left eye exhibits no discharge.   Neck: Normal range of motion.   Cardiovascular: Normal rate.    Pulmonary/Chest:   Intubated; symmetrical expansion   Abdominal: Bowel sounds are normal.   Musculoskeletal: She exhibits edema.   Skin: She is not diaphoretic.         NEUROLOGICAL EXAMINATION:      MENTAL STATUS        Intubated; unresponsive to verbal stimulation      CRANIAL NERVES      CN III, IV, VI   Right pupil: Size: 1 mm. Shape: regular.   Left pupil: Size: 1 mm. Shape: regular.   Nystagmus: none      MOTOR EXAM        Slight withdrawal to noxious stimulation of LE's      SENSORY EXAM        Grimacing to noxious stimulation      GAIT AND COORDINATION      Tremor   Resting tremor: absent          Significant Labs:   CBC:   Recent Labs  Lab 04/02/18  0320 04/03/18  0314   WBC 3.41* 2.88*   HGB 7.8* 8.1*   HCT 23.8* 25.3*   PLT 64* 66*     CMP:   Recent Labs  Lab 04/01/18  1555 04/01/18  2102 04/02/18  0320 04/03/18  0314     --  98 200*     --  139 134*   K 3.6  --  3.7 4.0     --  105 103   CO2 28  --  29 27   BUN 54*  --  53* 55*   CREATININE 2.0*  --  2.0* 2.1*   CALCIUM 8.8  --  8.8 8.3*   MG  --  1.7  --   --    PROT  --   --  4.8* 4.5*   ALBUMIN 1.9*  --  1.9* 1.7*   BILITOT  --   --  0.6 0.5   ALKPHOS  --   --  44* 45*   AST  --   --  28 25   ALT  --   --  11 11   ANIONGAP 6*  --  5* 4*   EGFRNONAA 23*  --  23* 21*           Assessment and Plan:     82 y/o female consulted for possible seizures     1. Seizures: given the degree of hypoglycemia seizures are likely to happen. On evaluation pt with no rhythmic activity suggesting clinical seizures.           -D/C levetiracetam.           -EEG shows no seizures. Severe slowing.    Prognosis is poor in this debilitated pt with complicated medical Hx and possible anoxic injury from profound hypoglycemia. Palliative Care  consulted.       Active Diagnoses:    Diagnosis Date Noted POA    PRINCIPAL PROBLEM:  Hypoglycemia, coma [E15] 04/01/2018 Yes    Airway compromise [J98.8] 04/02/2018 Yes    Acute respiratory failure with hypoxia [J96.01] 04/02/2018 No    Metabolic encephalopathy [G93.41] 04/01/2018 Yes    Enterococcus UTI [N39.0, B95.2] 04/01/2018 Yes    Physical debility [R53.81] 09/09/2017 Yes    Urinary tract infection without hematuria [N39.0] 08/22/2017 Yes    Hypokalemia [E87.6] 08/22/2017 Yes    Hyperlipidemia [E78.5]  Yes    SSS (sick sinus syndrome) [I49.5] 06/23/2017 Yes     Chronic    Protein calorie malnutrition [E46] 06/18/2017 Yes    Recurrent major depressive disorder, in partial remission [F33.41] 05/13/2017 Yes    Essential hypertension [I10] 05/12/2017 Yes    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 due to underlying condition, with fat layer exposed [E08.621, L97.412] 04/17/2017 Yes    Vascular dementia without behavioral disturbance [F01.50] 01/13/2017 Yes    Vitamin D deficiency [E55.9] 11/25/2016 Yes    Nonrheumatic aortic valve stenosis [I35.0] 10/24/2016 Yes    Elevated troponin [R74.8] 09/28/2016 Yes    Diabetes mellitus with stage 4 chronic kidney disease GFR 15-29 [E11.22, N18.4] 08/16/2016 Yes     Chronic    Anemia of chronic disease [D63.8] 03/24/2016 Yes     Chronic    Chronic hepatitis C without hepatic coma [B18.2] 01/11/2016 Yes    Bilateral carotid artery disease: 40-49% 2016 Bilateral [I77.9] 01/11/2016 Yes    Thrombocytopenia [D69.6] 01/11/2016 Yes    Primary open-angle glaucoma, severe stage [H40.1193] 08/10/2015 Yes    Peripheral vascular disease [I73.9] 09/20/2013 Yes    CAD S/P percutaneous coronary angioplasty [I25.10, Z98.61] 09/27/2012 Not Applicable     Chronic    Acute on chronic diastolic heart failure [I50.33] 09/27/2012 Yes    Chronic atrial fibrillation [I48.2] 06/29/2012 Yes    Anticoagulation monitoring by pharmacist [Z79.01]  06/29/2012 Not Applicable     Chronic      Problems Resolved During this Admission:    Diagnosis Date Noted Date Resolved POA       VTE Risk Mitigation     None          Vladislav Murphy MD  Neurology  [facility]

## 2018-04-03 NOTE — ASSESSMENT & PLAN NOTE
Flat pattern in the 0.070s. No ST elevation/depression, new BBB or T wave inversion. Doubt ACS. On medical management. Will need to stop ASA if platelets drop further

## 2018-04-03 NOTE — EICU
Vent Day # 2    84 y/o F CAD s/p PCI, CKD, paroxysmal afib on chronic anticoagulation, chronic Hep C, heart failure EF 60-65%, Grade 1 diastolic dysfunction presented with dyspnea, orthopnea and anasarca. Bilateral effusion of cxr and CT.  Ongoing diuresis.    Positive 1 liter since admit    CXR relatively unchanged, tubes and lines in place    Camera assessment: unresponsive     4/3/2018 04:17   POC PH 7.654 (HH)   POC PCO2 24.9 (LL)   POC PO2 80   POC BE 5   POC HCO3 27.7   POC SATURATED O2 98   POC TCO2 28 (H)   FiO2 40   Vt 400   PiP 15   PEEP 5     · Decrease RR to 8  · Fluid restrict  · Ongoing neuro evaluation

## 2018-04-03 NOTE — PLAN OF CARE
Problem: Patient Care Overview  Goal: Plan of Care Review  Outcome: Ongoing (interventions implemented as appropriate)  Pt continues in the ICU.  Pt is intubated.  Pt is unresponsive.  D5 at 50 ml/hr.  Last blood glucose is 208.  Family bedside. Solares with desi urine.Turned q2 hours.No falls/injuries this shift

## 2018-04-03 NOTE — ASSESSMENT & PLAN NOTE
Off sedation and remained unresponsive.  Currently with levetiracetam per neuro.  There is also the concern for shea.  Prognosis is guarded.  Main barrier to extubation.

## 2018-04-03 NOTE — PLAN OF CARE
04/03/18 1321   Discharge Assessment   Assessment Type Discharge Planning Assessment   Confirmed/corrected address and phone number on facesheet? Yes   Assessment information obtained from? Medical Record   Communicated expected length of stay with patient/caregiver no   Prior to hospitilization cognitive status: Unable to Assess  (patient transferred from [facility])   Prior to hospitalization functional status: Completely Dependent   Current cognitive status: Coma/Sedated/Intubated   Current Functional Status: Completely Dependent   Facility Arrived From: home to [facility] to here   Lives With child(kirill), adult   Able to Return to Prior Arrangements unable to determine at this time (comments)   Is patient able to care for self after discharge? No   Who are your caregiver(s) and their phone number(s)? Gilberto Trujillo [phone], son   Readmission Within The Last 30 Days planned readmission  (transfer from outside hospital)   If yes, most recent facility name: [facility]   Patient currently being followed by outpatient case management? No   Equipment Currently Used at Home other (see comments)  (unable to assess at this time)   Do you have any problems affording any of your prescribed medications? No   Is the patient taking medications as prescribed? yes   Does the patient have transportation home? (to be determined)   Does the patient receive services at the [facility]? No   Discharge Plan A Hospice/home   Discharge Plan B Inpatient Hospice   Patient/Family In Agreement With Plan other (see comments)   patient in ICU on vent support, unresponsive even without sedation. She transferred from [facility] for services unavailable there. Family is not currently at the hospital. Dr Velasquez is phoning daughter at this time. RAMÍREZ reviewed chart, obtained above information from initial assessment done at [facility]. RAMÍREZ will update with family when available,  continue to follow in ICU and assist as needed.

## 2018-04-03 NOTE — ASSESSMENT & PLAN NOTE
Intubated for airway protection.  Currently on 40% Fio2 with good oxgygenation.  Cxr/ct demonstrated bilateral effusion.  Clinical exam supportive of volume overload.  May consider switching to bumex due to low albumin.

## 2018-04-03 NOTE — SUBJECTIVE & OBJECTIVE
Interval History: off sedation since yesterday. No response to verbal or painful stimuli. No withdrawal or reflexes present. No rhythmic eye movement. Babinski positive. BG at goal. BP better on antihypertensives. Alkalemia improved with decreasing rate. INR 3.2    Review of Systems   Unable to perform ROS: Intubated     Objective:     Vital Signs (Most Recent):  Temp: 98.3 °F (36.8 °C) (04/03/18 0752)  Pulse: 72 (04/03/18 0800)  Resp: 8 (04/03/18 0800)  BP: (!) 140/66 (04/03/18 0800)  SpO2: 100 % (04/03/18 0800) Vital Signs (24h Range):  Temp:  [94.6 °F (34.8 °C)-98.3 °F (36.8 °C)] 98.3 °F (36.8 °C)  Pulse:  [64-73] 72  Resp:  [0-13] 0  SpO2:  [77 %-100 %] 100 %  BP: ()/(46-82) 140/66     Weight: 78 kg (171 lb 15.3 oz)  Body mass index is 22.69 kg/m².    Intake/Output Summary (Last 24 hours) at 04/03/18 1038  Last data filed at 04/03/18 0800   Gross per 24 hour   Intake             1450 ml   Output              225 ml   Net             1225 ml      Physical Exam   Constitutional: She appears well-developed.   HENT:   Head: Normocephalic and atraumatic.   ett in place   Eyes:   Pupils equal but not responsive to light   Neck: Neck supple.   Cardiovascular: Normal rate and regular rhythm.    Pulmonary/Chest: Breath sounds normal. No stridor. She has no wheezes. She has no rales.   Abdominal: Soft. Bowel sounds are normal.   Musculoskeletal: She exhibits edema.   Neurological:   Off sedation. No withdrawal to pain or verbal stimuli. Patellar reflexes absent. Pupils facing downward, not reactive to light. No rhythmic eye movement. Babinski positive.    Skin: Skin is warm and dry. Capillary refill takes less than 2 seconds. She is not diaphoretic.   Nursing note and vitals reviewed.      Significant Labs: All pertinent labs within the past 24 hours have been reviewed.    Significant Imaging: I have reviewed all pertinent imaging results/findings within the past 24 hours.  I have reviewed and interpreted all  pertinent imaging results/findings within the past 24 hours.

## 2018-04-03 NOTE — ASSESSMENT & PLAN NOTE
elelvated bnp, bilateral effusion, edema c/w volume overload.  bp better control.  Will defer to primary for diuresis.

## 2018-04-03 NOTE — ASSESSMENT & PLAN NOTE
S/p diuresis with IV lasix. Currently on minimal O2 requirements. Lungs clear. Hold off on diuresis for now.

## 2018-04-03 NOTE — PROGRESS NOTES
[facility]  Pulmonology  Progress Note    Patient Name: Chey Trujillo  MRN: [id number]  Admission Date: 4/1/2018  Hospital Length of Stay: 2 days  Code Status: Partial Code  Attending Provider: Ambika Reyes MD  Primary Care Provider: Ada Flores MD (Inactive)   Principal Problem: Metabolic encephalopathy    Subjective:     HPI:  Patient is 83 y.o. 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 presented to [facility] on 4/1/18 after found unresponsive by staff ath [facility].  Per record, patient was admitted at [place] for CHF.  Record also indicated BG of <30.  Patient was transferred to [facility] for neurology evaluation of possible seizure.  Pulmonary was consulted for vent management.       Interval History: no acute issue.  Remained unresponsive off sedation.      Objective:     Vital Signs (Most Recent):  Temp: 98.3 °F (36.8 °C) (04/03/18 0752)  Pulse: 72 (04/03/18 0800)  Resp: (!) 0 (04/03/18 0800)  BP: (!) 140/66 (04/03/18 0800)  SpO2: 100 % (04/03/18 0800) Vital Signs (24h Range):  Temp:  [94.6 °F (34.8 °C)-98.3 °F (36.8 °C)] 98.3 °F (36.8 °C)  Pulse:  [64-73] 72  Resp:  [0-13] 0  SpO2:  [77 %-100 %] 100 %  BP: ()/(46-82) 140/66     Weight: 78 kg (171 lb 15.3 oz)  Body mass index is 22.69 kg/m².      Intake/Output Summary (Last 24 hours) at 04/03/18 0910  Last data filed at 04/03/18 0800   Gross per 24 hour   Intake           1702.3 ml   Output              230 ml   Net           1472.3 ml       Physical Exam   Constitutional: No distress.   HENT:   Head: Normocephalic and atraumatic.   ett in place   Eyes:   Pupils not responsive   Neck: Neck supple.   Cardiovascular: Normal rate and regular rhythm.    Pulmonary/Chest: Effort normal and breath sounds normal. No stridor.   Abdominal: Soft.   Musculoskeletal: She exhibits edema.   Neurological:   Unresponsive on vent       Vents:  Vent Mode: PRVC (04/03/18 0747)  Ventilator Initiated: Yes (04/01/18 1511)  Set Rate: 8 bmp (04/03/18 0747)  Vt Set: 400 mL (04/03/18 0747)  PEEP/CPAP: 5 cmH20 (04/03/18 0747)  Oxygen Concentration (%): 40 (04/03/18 0800)  Peak Airway Pressure: 15.7 cmH2O (04/03/18 0747)  Total Ve: 7.7 mL (04/03/18 0747)  F/VT Ratio<105 (RSBI): (!) 0 (04/03/18 0500)    Lines/Drains/Airways     Drain                 Urethral Catheter 04/01/18 1731 Double-lumen 1 day          Airway                 Airway - Non-Surgical 04/01/18 0709 2 days          Peripheral Intravenous Line                 Peripheral IV - Single Lumen 04/01/18 0140 Right Hand 2 days         Peripheral IV - Single Lumen 04/01/18 0640 Left Antecubital 2 days                Significant Labs:    CBC/Anemia Profile:    Recent Labs  Lab 04/02/18  0320 04/03/18  0314    WBC 3.41* 2.88*   HGB 7.8* 8.1*   HCT 23.8* 25.3*   PLT 64* 66*   MCV 99* 100*   RDW 16.4* 17.0*        Chemistries:    Recent Labs  Lab 04/01/18  1555 04/01/18  2102 04/02/18  0320 04/03/18  0314     --  139 134*   K 3.6  --  3.7 4.0     --  105 103   CO2 28  --  29 27   BUN 54*  --  53* 55*   CREATININE 2.0*  --  2.0* 2.1*   CALCIUM 8.8  --  8.8 8.3*   ALBUMIN 1.9*  --  1.9* 1.7*   PROT  --   --  4.8* 4.5*   BILITOT  --   --  0.6 0.5   ALKPHOS  --   --  44* 45*   ALT  --   --  11 11   AST  --   --  28 25   MG  --  1.7  --   --    PHOS 2.9  --   --   --        ABGs:   Recent Labs  Lab 04/03/18  0417   PH 7.654*   PCO2 24.9*   HCO3 27.7   POCSATURATED 98   BE 5       Echo 8/22/17  CONCLUSIONS     1 - Concentric hypertrophy.     2 - Normal left ventricular systolic function (EF 60-65%).     3 - Impaired LV relaxation, normal LAP (grade 1 diastolic dysfunction).     4 - Normal right ventricular systolic function .     5 - Mild aortic stenosis, HAWA = 1.82 cm2.     6 - Mild aortic regurgitation.     7 - Mild mitral regurgitation.     8 - Mild tricuspid regurgitation.     9 - Increased central venous pressure.     10 - The estimated RV systolic pressure is 46 mmHg.     Significant Imaging:   CT: I have reviewed all pertinent results/findings within the past 24 hours and my personal findings are:  3/30/18 bilateral effusion; no consolidation  CXR: I have reviewed all pertinent results/findings within the past 24 hours and my personal findings are:  4/2/18 silhouetting of left base    Assessment/Plan:     * Metabolic encephalopathy    Off sedation and remained unresponsive.  Currently with levetiracetam per neuro.  There is also the concern for shea.  Prognosis is guarded.  Main barrier to extubation.          Acute respiratory failure with hypoxia    Intubated for airway protection.  Currently on 40% Fio2 with good oxgygenation.  Cxr/ct demonstrated bilateral effusion.  Clinical exam supportive of volume  overload.  May consider switching to bumex due to low albumin.          Diabetes mellitus with stage 4 chronic kidney disease GFR 15-29    Stable with minimal urine output.          Acute on chronic diastolic heart failure    elelvated bnp, bilateral effusion, edema c/w volume overload.  bp better control.  Will defer to primary for diuresis.            -main barrier to extubation is patient's mental status.  Await family decision in regards to goals of care.       João Gudino MD  Pulmonology  [facility]  Critical Care Time: 35  minutes  Critical secondary to respiratory failure     Critical care was time spent personally by me on the following activities: development of treatment plan with patient or surrogate and bedside caregivers, discussions with consultants, evaluation of patient's response to treatment, examination of patient, ordering and performing treatments and interventions, ordering and review of laboratory studies, ordering and review of radiographic studies, pulse oximetry, re-evaluation of patient's condition.  This critical care time did not overlap with that of any other provider or involve time for any procedures.

## 2018-04-03 NOTE — PROGRESS NOTES
[facility] [place] Medicine  Progress Note    Patient Name: Chey Trujillo  MRN: [id number]  Patient Class: IP- Inpatient   Admission Date: 4/1/2018  Length of Stay: 2 days  Attending Physician: Ambika Reyes MD  Primary Care Provider: Ada Flores MD (Inactive)        Subjective:     Principal Problem:Hypoglycemia, coma    HPI:  81 yo F w/ pmhx of HTN, HLD, CAD s/p PCI, DM2, PAF on coumadin, diastolic CHF, chronic Hep C, CKD4, dementia, multiple admits for chf, bedbound, now under hospice care was brought in by EMS to [facility] on 3.31.18  for sob and weakness,Pt admitted to hospital medicine in [place] for CHF exacerbation and anasarca.has been  diuresed well with IV lasix and anasarca starting to improve. However pt became unresponsive w/ agonal breathing on 4/1/18 found to have reading of low blood sugar of <30 on bmp. Given amp of dextrose and Rapid response was called and ER physician presented to the bedside and noted her to be unresponsive to sternal rub. Pupils were equal, midrange rhythmic eye movements medial-lateral. She was intubated for airway protection. Repeat accucheck 81. Given additional amp of D50. CT head, CXR and ekg was done. CT head  was negative for acute abnormality. CXR showed increase in pulmonary edema. Remains intubated on propofol w/ dextrose infusion and q1h accuchecks. Urine cx +enterococcus and was on IV vancomycin. Pt remains with minimal response; intermittently grimaces from painful stimuli. Concern for possible seizure activity given rhythmic eye movement and case was discussed with neurology at [facility]. Recommended load with keppra 3000 mg IV x 1 and start 500 mg q12h the following day. Also EEG, MD in [facility]  discussed poor prognosis with family, however family stated that they wished to proceed with aggressive measures and pt to remain full code per family request,patient has been transferred to ICU [facility] for  neurologic evaluation,patient at this time is totally unresponsive,rectal temp. Is 34,she has sluggish pupils reaction,neurology has been consulted,no sign of seizure activity at this time.not able do ROS,information has been gathered from [facility] record.    Hospital Course:  83 yo F w/ pmhx of HTN, HLD, CAD s/p PCI, DM2, PAF on coumadin, diastolic CHF, chronic Hep C, CKD4, dementia, multiple admits for chf, bedbound, now under hospice care was brought in by EMS to [facility] on 3.31.18  for sob and weakness,Pt admitted to hospital medicine in [place] for CHF exacerbation and anasarca.has been  diuresed well with IV lasix and anasarca starting to improve. However pt became unresponsive w/ agonal breathing on 4/1/18 found to have reading of low blood sugar of <30 on bmp. Given amp of dextrose and Rapid response was called and ER physician presented to the bedside and noted her to be unresponsive to sternal rub. Pupils were equal, midrange rhythmic eye movements medial-lateral. She was intubated for airway protection. Repeat accucheck 81. Given additional amp of D50. CT head, CXR and ekg was done. CT head  was negative for acute abnormality. CXR showed increase in pulmonary edema. Remains intubated on propofol w/ dextrose infusion and q1h accuchecks. Urine cx +enterococcus and was on IV vancomycin. Pt remains with minimal response; intermittently grimaces from painful stimuli. Concern for possible seizure activity given rhythmic eye movement and case was discussed with neurology at [facility]. Recommended load with keppra 3000 mg IV x 1 and start 500 mg q12h the following day. Also EEG, MD in [facility]  discussed poor prognosis with family, however family stated that they wished to proceed with aggressive measures and pt to remain full code per family request,patient has been transferred to ICU [facility] for neurologic evaluation,patient at this time is totally unresponsive,rectal temp. Is  34,she has sluggish pupils reaction,neurology has been consulted,no sign of seizure activity at this time.not able do ROS,information has been gathered from [facility] record.  Patient is seizure free so far,on empiric IV Keppra.willhave EEG and neurology is following.  Per family wish patient is partial code,  Still is on D5 IVF for hypoglycemia,controlled at this time.  Prn IV Vanc. Per random level For enterococcus UTI.        Interval History: off sedation since yesterday. No response to verbal or painful stimuli. No withdrawal or reflexes present. No rhythmic eye movement. Babinski positive. BG at goal. BP better on antihypertensives. Alkalemia improved with decreasing rate. INR 3.2    Review of Systems   Unable to perform ROS: Intubated     Objective:     Vital Signs (Most Recent):  Temp: 98.3 °F (36.8 °C) (04/03/18 0752)  Pulse: 72 (04/03/18 0800)  Resp: 8 (04/03/18 0800)  BP: (!) 140/66 (04/03/18 0800)  SpO2: 100 % (04/03/18 0800) Vital Signs (24h Range):  Temp:  [94.6 °F (34.8 °C)-98.3 °F (36.8 °C)] 98.3 °F (36.8 °C)  Pulse:  [64-73] 72  Resp:  [0-13] 0  SpO2:  [77 %-100 %] 100 %  BP: ()/(46-82) 140/66     Weight: 78 kg (171 lb 15.3 oz)  Body mass index is 22.69 kg/m².    Intake/Output Summary (Last 24 hours) at 04/03/18 1038  Last data filed at 04/03/18 0800   Gross per 24 hour   Intake             1450 ml   Output              225 ml   Net             1225 ml      Physical Exam   Constitutional: She appears well-developed.   HENT:   Head: Normocephalic and atraumatic.   ett in place   Eyes:   Pupils equal but not responsive to light   Neck: Neck supple.   Cardiovascular: Normal rate and regular rhythm.    Pulmonary/Chest: Breath sounds normal. No stridor. She has no wheezes. She has no rales.   Abdominal: Soft. Bowel sounds are normal.   Musculoskeletal: She exhibits edema.   Neurological:   Off sedation. No withdrawal to pain or verbal stimuli. Patellar reflexes absent. Pupils facing downward, not  reactive to light. No rhythmic eye movement. Babinski positive.    Skin: Skin is warm and dry. Capillary refill takes less than 2 seconds. She is not diaphoretic.   Nursing note and vitals reviewed.      Significant Labs: All pertinent labs within the past 24 hours have been reviewed.    Significant Imaging: I have reviewed all pertinent imaging results/findings within the past 24 hours.  I have reviewed and interpreted all pertinent imaging results/findings within the past 24 hours.    Assessment/Plan:      * Hypoglycemia, coma    Severe hypoglycemic event for unknown period of time. Patient became unresponsive and intubated for ventilatory support. Questionable seizure due to rhythmic eye movement. EGG is negative for this but rather severe slowing. Off sedation since 11AM on 4/2 and unfortunately no brain activity appreciated as of yet. Hypoglycemia and alkalemia addressed. Neurology on board for co-management. Keppra discontinued. On Dextrose infusion to maintain -200.           Metabolic encephalopathy    As above        Acute respiratory failure with hypoxia    Ventilatory failure 2/2 severe hypoglycemia          Enterococcus UTI    On vancomycin. Vanc goal 15-20        Airway compromise    As above. Cannot safely extubate without brain activity.           Physical debility    Need PT,OT after extunbation,was in hospice.          Urinary tract infection without hematuria    On IV Abx,          Primary open-angle glaucoma, severe stage      On home eye drop,        Hyperlipidemia      On statin.        Hypokalemia      Replaced.        SSS (sick sinus syndrome)    S/p PPM placement. No issues. Currently on cardiac monitoring.           Protein calorie malnutrition      On supplement with NG tube.        Recurrent major depressive disorder, in partial remission      will monitor at this time,        Essential hypertension    Will continue with home medication and prn IV Hydralazine.          Diabetic ulcer  of right heel associated with diabetes mellitus due to underlying condition, with fat layer exposed    Local Wound care ,consulted wound care.        Vascular dementia without behavioral disturbance    supportive care.          Vitamin D deficiency    Need supplement after extubation.          Nonrheumatic aortic valve stenosis    Mild         Elevated troponin    Flat pattern in the 0.070s. No ST elevation/depression, new BBB or T wave inversion. Doubt ACS. On medical management. Will need to stop ASA if platelets drop further        Diabetes mellitus with stage 4 chronic kidney disease GFR 15-29    With hypoglycemic event. Currently on D5% infusion. Hold off on hypoglycemic agents.           Anemia of chronic disease    Stable. Monitor for bleeding while on coumadin.          Bilateral carotid artery disease: 40-49% 2016 Bilateral    Supportive care,medical treatment.        Thrombocytopenia    This is a chronic issue. MCV elevated but B12 and folate ok. Marrow failure? Evidence of pancytopenia since 2015        Chronic hepatitis C without hepatic coma    Ammonia only 31. No evidence of hepatic dysfunction or injury at this time.         Peripheral vascular disease    No acute issues. On medical treatment.        Acute on chronic diastolic heart failure    S/p diuresis with IV lasix. Currently on minimal O2 requirements. Lungs clear. Hold off on diuresis for now.           CAD S/P percutaneous coronary angioplasty    On medical treatment.        Anticoagulation monitoring by pharmacist    INR 3.2 today. No evidence of bleeding. Will hold coumadin today. INR and CBC in AM          Chronic atrial fibrillation    Rate controlled. Hold coumadin            VTE Risk Mitigation     None        I discussed assessment and plan with patient's daughter and POA, Ms Deyanira Rosa M. Is aware of current status and poor prognosis given no improvement of brain function despite correction of hypoglycemia. Is in agreement with  withdrawal of life saving interventions once sisters present to meet with palliative care service. Stated Thursday is best day for all to meet and proceed with withdrawal if no improvement by then. Currently partial code (no chest compressions, no defib/cardioversion or medications in the event of cardiac arrest). This was confirmed with daughter. Per her advanced directives, we are not to initiate artificial nutrition if diagnosis is terminal. I will respect that wish.     Critical care time spent on the evaluation and treatment of severe organ dysfunction, review of pertinent labs and imaging studies, discussions with consulting providers and discussions with patient/family: > 45 minutes.    Ambika Velasquez MD  Department of Hospital Medicine   [facility]

## 2018-04-04 LAB
ALBUMIN SERPL BCP-MCNC: 1.6 G/DL
ALLENS TEST: ABNORMAL
ALP SERPL-CCNC: 46 U/L
ALT SERPL W/O P-5'-P-CCNC: 11 U/L
ANION GAP SERPL CALC-SCNC: 7 MMOL/L
AST SERPL-CCNC: 23 U/L
BASOPHILS # BLD AUTO: 0 K/UL
BASOPHILS NFR BLD: 0 %
BILIRUB SERPL-MCNC: 0.5 MG/DL
BUN SERPL-MCNC: 56 MG/DL
CALCIUM SERPL-MCNC: 8.1 MG/DL
CHLORIDE SERPL-SCNC: 100 MMOL/L
CO2 SERPL-SCNC: 26 MMOL/L
CREAT SERPL-MCNC: 2.5 MG/DL
DELSYS: ABNORMAL
DIFFERENTIAL METHOD: ABNORMAL
EOSINOPHIL # BLD AUTO: 0 K/UL
EOSINOPHIL NFR BLD: 0.4 %
ERYTHROCYTE [DISTWIDTH] IN BLOOD BY AUTOMATED COUNT: 16.5 %
ERYTHROCYTE [SEDIMENTATION RATE] IN BLOOD BY WESTERGREN METHOD: 8 MM/H
EST. GFR  (AFRICAN AMERICAN): 20 ML/MIN/1.73 M^2
EST. GFR  (NON AFRICAN AMERICAN): 17 ML/MIN/1.73 M^2
FIO2: 40
GLUCOSE SERPL-MCNC: 246 MG/DL
HCO3 UR-SCNC: 26.1 MMOL/L (ref 24–28)
HCT VFR BLD AUTO: 26 %
HGB BLD-MCNC: 8.3 G/DL
INR PPP: 4
LYMPHOCYTES # BLD AUTO: 0.5 K/UL
LYMPHOCYTES NFR BLD: 18.9 %
MCH RBC QN AUTO: 31.7 PG
MCHC RBC AUTO-ENTMCNC: 31.9 G/DL
MCV RBC AUTO: 99 FL
MIN VOL: 5
MODE: ABNORMAL
MONOCYTES # BLD AUTO: 0.2 K/UL
MONOCYTES NFR BLD: 7.8 %
NEUTROPHILS # BLD AUTO: 1.8 K/UL
NEUTROPHILS NFR BLD: 72.5 %
PCO2 BLDA: 36 MMHG (ref 35–45)
PEEP: 5
PH SMN: 7.47 [PH] (ref 7.35–7.45)
PIP: 24
PLATELET # BLD AUTO: 73 K/UL
PMV BLD AUTO: 11.7 FL
PO2 BLDA: 125 MMHG (ref 80–100)
POC BE: 2 MMOL/L
POC SATURATED O2: 99 % (ref 95–100)
POC TCO2: 27 MMOL/L (ref 23–27)
POCT GLUCOSE: 272 MG/DL (ref 70–110)
POCT GLUCOSE: 293 MG/DL (ref 70–110)
POTASSIUM SERPL-SCNC: 4.3 MMOL/L
PROT SERPL-MCNC: 4.3 G/DL
PROTHROMBIN TIME: 42.2 SEC
RBC # BLD AUTO: 2.62 M/UL
SAMPLE: ABNORMAL
SITE: ABNORMAL
SODIUM SERPL-SCNC: 133 MMOL/L
SP02: 99
VANCOMYCIN SERPL-MCNC: 17.8 UG/ML
VT: 400
WBC # BLD AUTO: 2.44 K/UL

## 2018-04-04 PROCEDURE — 82803 BLOOD GASES ANY COMBINATION: CPT

## 2018-04-04 PROCEDURE — 63600175 PHARM REV CODE 636 W HCPCS: Performed by: HOSPITALIST

## 2018-04-04 PROCEDURE — 80053 COMPREHEN METABOLIC PANEL: CPT

## 2018-04-04 PROCEDURE — 25000003 PHARM REV CODE 250: Performed by: HOSPITALIST

## 2018-04-04 PROCEDURE — 94003 VENT MGMT INPAT SUBQ DAY: CPT

## 2018-04-04 PROCEDURE — 85610 PROTHROMBIN TIME: CPT

## 2018-04-04 PROCEDURE — 63600175 PHARM REV CODE 636 W HCPCS: Performed by: INTERNAL MEDICINE

## 2018-04-04 PROCEDURE — 36415 COLL VENOUS BLD VENIPUNCTURE: CPT

## 2018-04-04 PROCEDURE — 36600 WITHDRAWAL OF ARTERIAL BLOOD: CPT

## 2018-04-04 PROCEDURE — 99233 SBSQ HOSP IP/OBS HIGH 50: CPT | Mod: ,,, | Performed by: INTERNAL MEDICINE

## 2018-04-04 PROCEDURE — C9113 INJ PANTOPRAZOLE SODIUM, VIA: HCPCS | Performed by: HOSPITALIST

## 2018-04-04 PROCEDURE — 85025 COMPLETE CBC W/AUTO DIFF WBC: CPT

## 2018-04-04 PROCEDURE — 20000000 HC ICU ROOM

## 2018-04-04 PROCEDURE — 80202 ASSAY OF VANCOMYCIN: CPT

## 2018-04-04 PROCEDURE — 99900035 HC TECH TIME PER 15 MIN (STAT)

## 2018-04-04 RX ORDER — GLYCOPYRROLATE 0.2 MG/ML
0.4 INJECTION INTRAMUSCULAR; INTRAVENOUS 3 TIMES DAILY PRN
Status: DISCONTINUED | OUTPATIENT
Start: 2018-04-04 | End: 2018-04-05 | Stop reason: HOSPADM

## 2018-04-04 RX ORDER — FENTANYL CITRATE 50 UG/ML
25 INJECTION, SOLUTION INTRAMUSCULAR; INTRAVENOUS
Status: ACTIVE | OUTPATIENT
Start: 2018-04-04 | End: 2018-04-04

## 2018-04-04 RX ORDER — LORAZEPAM 2 MG/ML
1 INJECTION INTRAMUSCULAR ONCE
Status: COMPLETED | OUTPATIENT
Start: 2018-04-04 | End: 2018-04-04

## 2018-04-04 RX ORDER — FENTANYL CITRATE 50 UG/ML
25 INJECTION, SOLUTION INTRAMUSCULAR; INTRAVENOUS ONCE
Status: DISCONTINUED | OUTPATIENT
Start: 2018-04-04 | End: 2018-04-05 | Stop reason: HOSPADM

## 2018-04-04 RX ORDER — LORAZEPAM 2 MG/ML
1 INJECTION INTRAMUSCULAR EVERY 30 MIN PRN
Status: DISCONTINUED | OUTPATIENT
Start: 2018-04-04 | End: 2018-04-05 | Stop reason: HOSPADM

## 2018-04-04 RX ADMIN — LORAZEPAM 1 MG: 2 INJECTION INTRAMUSCULAR; INTRAVENOUS at 01:04

## 2018-04-04 RX ADMIN — FENTANYL CITRATE 25 MCG: 50 INJECTION, SOLUTION INTRAMUSCULAR; INTRAVENOUS at 01:04

## 2018-04-04 RX ADMIN — PRAVASTATIN SODIUM 20 MG: 10 TABLET ORAL at 08:04

## 2018-04-04 RX ADMIN — ASPIRIN 81 MG 81 MG: 81 TABLET ORAL at 08:04

## 2018-04-04 RX ADMIN — DEXTROSE 40 MG: 50 INJECTION, SOLUTION INTRAVENOUS at 08:04

## 2018-04-04 RX ADMIN — HYDRALAZINE HYDROCHLORIDE 100 MG: 25 TABLET ORAL at 12:04

## 2018-04-04 RX ADMIN — CHLORHEXIDINE GLUCONATE 15 ML: 1.2 RINSE ORAL at 08:04

## 2018-04-04 NOTE — ASSESSMENT & PLAN NOTE
Flat pattern in the 0.070s. No ST elevation/depression, new BBB or T wave inversion. Doubt ACS. On full comfort measures

## 2018-04-04 NOTE — SUBJECTIVE & OBJECTIVE
HPI:  Patient is 83 y.o. 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 presented to [facility] on 4/1/18 after found unresponsive by staff ath [facility].  Per record, patient was admitted at [place] for CHF.  Record also indicated BG of <30.  Patient was transferred to [facility] for neurology evaluation of possible seizure.  Pulmonary was consulted for vent management.      Interval History: no acute issue.  Remained unresponsive off sedation.  Per Dr. Velasquez, family has agreed to comfort care.        Objective:     Vital Signs (Most Recent):  Temp: (!) 94 °F (34.4 °C) (Bear hugger device in use) (04/04/18 0710)  Pulse: 65 (04/04/18 0810)  Resp: 10 (04/04/18 0810)  BP: (!) 105/56 (04/04/18 0800)  SpO2: 100 % (04/04/18 0810)  Vital Signs (24h Range):  Temp:  [94 °F (34.4 °C)-97.7 °F (36.5 °C)] 94 °F (34.4 °C)  Pulse:  [64-72] 65  Resp:  [0-12] 10  SpO2:  [100 %] 100 %  BP: (103-176)/(52-89) 105/56     Weight: 78 kg (171 lb 15.3 oz)  Body mass index is 22.69 kg/m².      Intake/Output Summary (Last 24 hours) at 04/04/18 0829  Last data filed at 04/04/18 0710   Gross per 24 hour   Intake          1362.92 ml   Output              240 ml   Net          1122.92 ml       Physical Exam   Constitutional: No distress.   HENT:   Head: Normocephalic and atraumatic.   ett in place   Eyes:   Pupils not responsive   Neck: Neck supple.   Cardiovascular: Normal rate and regular rhythm.    Pulmonary/Chest: Effort normal and breath sounds normal. No stridor.   Abdominal: Soft.   Musculoskeletal: She exhibits edema.   Neurological:   Unresponsive on vent       Vents:  Vent Mode: PRVC (04/04/18 0454)  Ventilator Initiated: Yes (04/01/18 1511)  Set Rate: 8 bmp (04/04/18 0454)  Vt Set: 400 mL (04/04/18 0454)  PEEP/CPAP: 5 cmH20 (04/04/18 0454)  Oxygen Concentration (%): 35 (04/04/18 0810)  Peak Airway Pressure: 15.9 cmH2O (04/04/18 0454)  Total Ve: 3.7 mL (04/04/18 0454)  F/VT Ratio<105 (RSBI): (!) 22.61 (04/04/18 0454)    Lines/Drains/Airways     Drain                 NG/OG Tube 04/01/18 0200 3 days         Urethral Catheter 04/01/18 1731 Double-lumen 2 days          Airway                 Airway - Non-Surgical 04/01/18 0709 3 days          Peripheral Intravenous Line                 Peripheral IV - Single Lumen 04/01/18 0140 Right Hand 3 days         Peripheral IV - Single Lumen 04/01/18 0640 Left Antecubital 3 days                Significant Labs:    CBC/Anemia Profile:    Recent Labs  Lab 04/03/18  0314 04/04/18  0156   WBC 2.88* 2.44*   HGB 8.1* 8.3*   HCT 25.3* 26.0*   PLT 66* 73*   * 99*   RDW 17.0* 16.5*        Chemistries:    Recent Labs  Lab 04/03/18 0314 04/04/18  0156   * 133*   K 4.0 4.3    100   CO2 27 26   BUN 55* 56*    CREATININE 2.1* 2.5*   CALCIUM 8.3* 8.1*   ALBUMIN 1.7* 1.6*   PROT 4.5* 4.3*   BILITOT 0.5 0.5   ALKPHOS 45* 46*   ALT 11 11   AST 25 23       ABGs:     Recent Labs  Lab 04/04/18  0347   PH 7.468*   PCO2 36.0   HCO3 26.1   POCSATURATED 99   BE 2       Echo 8/22/17  CONCLUSIONS     1 - Concentric hypertrophy.     2 - Normal left ventricular systolic function (EF 60-65%).     3 - Impaired LV relaxation, normal LAP (grade 1 diastolic dysfunction).     4 - Normal right ventricular systolic function .     5 - Mild aortic stenosis, HAWA = 1.82 cm2.     6 - Mild aortic regurgitation.     7 - Mild mitral regurgitation.     8 - Mild tricuspid regurgitation.     9 - Increased central venous pressure.     10 - The estimated RV systolic pressure is 46 mmHg.     Significant Imaging:   CT: I have reviewed all pertinent results/findings within the past 24 hours and my personal findings are:  3/30/18 bilateral effusion; no consolidation  CXR: I have reviewed all pertinent results/findings within the past 24 hours and my personal findings are:  4/2/18 silhouetting of left base

## 2018-04-04 NOTE — CONSULTS
Consult Note  Palliative Care      Consult Requested By: Ambika Reyes MD  Reason for Consult:      Severe hypoglycemia, history of dementia, CHF.    SUBJECTIVE:     History of Present Illness:     Principal Problem:Metabolic encephalopathy     Chief Complaint: No chief complaint on file.        HPI: 83 yo F w/ pmhx of HTN, HLD, CAD s/p PCI, DM2, PAF on coumadin, diastolic CHF, chronic Hep C, CKD4, dementia, multiple admits for chf, bedbound, now under hospice care was brought in by EMS to [facility] on 3.31.18  for sob and weakness,Pt admitted to hospital medicine in [place] for CHF exacerbation and anasarca.has been  diuresed well with IV lasix and anasarca starting to improve. However pt became unresponsive w/ agonal breathing on 4/1/18 found to have reading of low blood sugar of <30 on bmp. Given amp of dextrose and Rapid response was called and ER physician presented to the bedside and noted her to be unresponsive to sternal rub. Pupils were equal, midrange rhythmic eye movements medial-lateral. She was intubated for airway protection. Repeat accucheck 81. Given additional amp of D50. CT head, CXR and ekg was done. CT head  was negative for acute abnormality. CXR showed increase in pulmonary edema. Remains intubated on propofol w/ dextrose infusion and q1h accuchecks. Urine cx +enterococcus and was on IV vancomycin. Pt remains with minimal response; intermittently grimaces from painful stimuli. Concern for possible seizure activity given rhythmic eye movement and case was discussed with neurology at [facility]. Recommended load with keppra 3000 mg IV x 1 and start 500 mg q12h the following day. Also EEG, MD in [facility]  discussed poor prognosis with family, however family stated that they wished to proceed with aggressive measures and pt to remain full code per family request,patient has been transferred to [facility] for neurologic evaluation,patient at this time is totally  unresponsive,rectal temp. Is 34,she has sluggish pupils reaction,neurology has been consulted,no sign of seizure activity at this time.not able do ROS,information has been gathered from [facility] record.    Hospital Course:  81 yo F w/ pmhx of HTN, HLD, CAD s/p PCI, DM2, PAF on coumadin, diastolic CHF, chronic Hep C, CKD4, dementia, multiple admits for chf, bedbound, now under hospice care was brought in by EMS to [facility] on 3.31.18  for sob and weakness,Pt admitted to hospital medicine in [place] for CHF exacerbation and anasarca.has been  diuresed well with IV lasix and anasarca starting to improve. However pt became unresponsive w/ agonal breathing on 4/1/18 found to have reading of low blood sugar of <30 on bmp. Given amp of dextrose and Rapid response was called and ER physician presented to the bedside and noted her to be unresponsive to sternal rub. Pupils were equal, midrange rhythmic eye movements medial-lateral. She was intubated for airway protection. Repeat accucheck 81. Given additional amp of D50. CT head, CXR and ekg was done. CT head  was negative for acute abnormality. CXR showed increase in pulmonary edema. Remains intubated on propofol w/ dextrose infusion and q1h accuchecks. Urine cx +enterococcus and was on IV vancomycin. Pt remains with minimal response; intermittently grimaces from painful stimuli. Concern for possible seizure activity given rhythmic eye movement and case was discussed with neurology at [facility]. Recommended load with keppra 3000 mg IV x 1 and start 500 mg q12h the following day. Also EEG, MD in [facility]  discussed poor prognosis with family, however family stated that they wished to proceed with aggressive measures and pt to remain full code per family request,patient has been transferred to ICU [facility] for neurologic evaluation,patient at this time is totally unresponsive,rectal temp. Is 34,she has sluggish pupils reaction,neurology has been  consulted,no sign of seizure activity at this time.not able do ROS,information has been gathered from [facility].  Patient is seizure free so far,on empiric IV Keppra.willhave EEG and neurology is following.  Per family wish patient is partial code,  Still is on D5 IVF for hypoglycemia,controlled at this time.  Prn IV Vanc. Per random level For enterococcus UTI.        nterval History: off sedation since yesterday. No response to verbal or painful stimuli. No withdrawal or reflexes present. No rhythmic eye movement. Babinski positive. BG at goal. BP better on antihypertensives. Alkalemia improved with decreasing rate. INR 3.2      Past Medical History:   Diagnosis Date    Anticoagulation monitoring by pharmacist 6/29/2012    At risk for amiodarone toxicity with long term use 1/27/2014    Atrial fibrillation     Bilateral carotid artery disease 1/11/2016    Blood transfusion     CAD S/P percutaneous coronary angioplasty 9/27/2012    Chronic diastolic heart failure 9/27/2012    Echo 3-: 1 - Concentric hypertrophy.  2 - Normal left ventricular systolic function (EF 60-65%).  3 - Right ventricular enlargement with hypertrophy, with normal systolic function.  4 - Left ventricular diastolic dysfunction.  5 - Biatrial enlargement.  6 - Mild tricuspid regurgitation.  7 - Pulmonary hypertension. The estimated PA systolic pressure is 55 mmHg.  8 - Increased central venous pressure (IVC is greater than 3cm in diameter).      Chronic hepatitis C without hepatic coma 1/11/2016    Degenerative joint disease (DJD) of lumbar spine 5/21/2015    Depression     Diabetes mellitus type I     Gallstones     without symptoms    Goiter     Hyperlipidemia     Malignant essential hypertension with congestive heart failure with renal disease 3/10/2016    Nonrheumatic aortic valve stenosis 10/24/2016    Obstructive sleep apnea on CPAP - setting = 5  9/12/2012    Primary hyperparathyroidism 4/10/2013    Primary  open-angle glaucoma, severe stage 08/10/2015    Renal artery stenosis: see CTA 2012- no intervention.  ANABELA u/s 4/14 wnl 9/12/2012    Secondary pulmonary hypertension 8/13/2013    Spinal stenosis     Thyroid nodule: per ENDO repeat in 2017 and see ENDO then (note 1/29/16) 1/2/2013    Tricuspid regurgitation 1/11/2016    Tubular adenoma x 3 6/13 5/19/2014    Type 2 DM with CKD stage 4 and hypertension 1/16/2015    Urinary, incontinence, stress female      Past Surgical History:   Procedure Laterality Date    CARDIAC CATHETERIZATION      CARDIAC PACEMAKER PLACEMENT  2/9/2012    Quinn Reply , serial #[id number]    CATARACT EXTRACTION      Status post cataract extraction and insertion of intraocular lens of right eye    CORONARY ANGIOPLASTY      ROWAN to prox RCA 2002 for UA/+stress test.  ROWAN placed just proximal to stent in 2005 for UA.  Cath 12/2011: normal LMCA, 40% mid LAD, 50% distal LCx    EYE SURGERY      LAMINECTOMY      TOTAL HIP ARTHROPLASTY Right     x's r hip       Mental Status:   + cough with ETT suction, opens eyes with suctioning and has upward gaze some nystagmus (worse on right), midline initially, but then somewhat disconjugate, pupils small, ? Eye twitching on the right when lids held open, but not on left.   Reactive, does not open eyes spontaneously or to pain., has chewing motions with stimuli .  Flexion at knees and feet with nailbed pressure, only trace flexion LUE (barely visible). No response to nailbed pressure RUE.  Difficult to assess gag because she clenches teeth.       Palliative Performance Score:  20       OBJECTIVE:     Pain Assessment/symptom management:  Intubated, unresponsive.  See MAR for medication regimen.      Decision-Making Capacity:   Patient is unable to speak on her own behalf.      Advanced Directives:  Living Will:   YES dated 5/3/10.    Do Not Resuscitate Status:  PARTIAL CODE (patient arrived intubated already).  Medical Power of :  YES, dated  5/3/18, naming daughter Deyanira has first agent.      Living Arrangements:    Patient was living at home with family and reportedly on hospice care.      Psychosocial, Spiritual, Cultural:  Patient unable to participate; she is intubated, unresponsive.      Patient's most important priorities:  Patient's biggest concerns/fears:  Previous death/end of life care history:  Patient's goals/hopes:      ASSESSMENT/PLAN:     Patient lying supine, eyes closed, appears stated age and acutely ill.  She is intubated on vent settings:  PRVC 8/400/5/35%, tolerating well with sats 100%.  She does not respond to verbal stimuli.  She has a + cough with ETT suction and then opens eyes - pupils are small ? Responsive, upward midline gaze that gradually becomes somewhat disconjugate, has nystagmus more notable on the right, some lid twitching on the right when eye is held open. She has some chewing motions on the ETT after suctioning.   No response to nailbed pressure RUE, trace/barely perceptible flexion LUE; has flexion at feet and knees with toenail pressure.        Respiratory effort even and unlabored, intermittently breathing a little over the vent (8-12).  Lungs slightly coarse.  Heart tones audible but muffled, irregular, frequent multifocal PVCs noted on monitor.  Abdomen is obese, soft, + bowel sounds, has OGT.  1-2+ edema BLE, 4+ BUE.  VS:  Temp 94 axillary on Audra Hugger, HR 65, /49, RR 8-12 on vent, sat 100%.  IVF:  D5 @ 25 ml/h.     Family not at bedside.  Discussed with Dr. Velasquez who states family wants to withdraw vent support today and resume hospice care if she survives.  I will meet with them when they arrive at the bedside.    Plan:  Educate.  Literature.  Goals of care and code status discussion.  Support.  Consult .        Recommendations:   Family meeting today re vent withdrawal (their request) and resume hospice if needed.   Consult Spiritual Care.   Needs LaPOST.   Palliative Care will  follow.    Thank you for this consult and the opportunity to participate in Ms. Trujillo's care.      Jolene Guan, BSN, RN, CCRN, PN   Palliative Care Nurse Coordinator   [facility]  [phone]      Time Spent:  15 minutes bedside assessment, 30 minutes record review and charting.  10 minutes team discussion.

## 2018-04-04 NOTE — ASSESSMENT & PLAN NOTE
Intubated for airway protection.  Currently on 40% Fio2 with good oxgygenation.  Cxr/ct demonstrated bilateral effusion.  Clinical exam supportive of volume overload.  Main barrier to extubation is mentation.  Per Dr. Velasquez, family is accepting of patient's outcome and ready for withdrawal of care once all family member arrive.

## 2018-04-04 NOTE — PROGRESS NOTES
[facility]  Pulmonology  Progress Note    Patient Name: Chey Trujillo  MRN: [id number]  Admission Date: 4/1/2018  Hospital Length of Stay: 3 days  Code Status: Partial Code  Attending Provider: Ambika Reyes MD  Primary Care Provider: Ada Floers MD (Inactive)   Principal Problem: Hypoglycemia, coma    Subjective:     HPI:  Patient is 83 y.o. 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 presented to [facility] on 4/1/18 after found unresponsive by staff ath [facility].  Per record, patient was admitted at [place] for CHF.  Record also indicated BG of <30.  Patient was transferred to [facility] for neurology evaluation of possible seizure.  Pulmonary was consulted for vent management.      Interval  History: no acute issue.  Remained unresponsive off sedation.  Per Dr. Velasquez, family has agreed to comfort care.        Objective:     Vital Signs (Most Recent):  Temp: (!) 94 °F (34.4 °C) (Bear hugger device in use) (04/04/18 0710)  Pulse: 65 (04/04/18 0810)  Resp: 10 (04/04/18 0810)  BP: (!) 105/56 (04/04/18 0800)  SpO2: 100 % (04/04/18 0810) Vital Signs (24h Range):  Temp:  [94 °F (34.4 °C)-97.7 °F (36.5 °C)] 94 °F (34.4 °C)  Pulse:  [64-72] 65  Resp:  [0-12] 10  SpO2:  [100 %] 100 %  BP: (103-176)/(52-89) 105/56     Weight: 78 kg (171 lb 15.3 oz)  Body mass index is 22.69 kg/m².      Intake/Output Summary (Last 24 hours) at 04/04/18 0829  Last data filed at 04/04/18 0710   Gross per 24 hour   Intake          1362.92 ml   Output              240 ml   Net          1122.92 ml       Physical Exam   Constitutional: No distress.   HENT:   Head: Normocephalic and atraumatic.   ett in place   Eyes:   Pupils not responsive   Neck: Neck supple.   Cardiovascular: Normal rate and regular rhythm.    Pulmonary/Chest: Effort normal and breath sounds normal. No stridor.   Abdominal: Soft.   Musculoskeletal: She exhibits edema.   Neurological:   Unresponsive on vent       Vents:  Vent Mode: PRVC (04/04/18 0454)  Ventilator Initiated: Yes (04/01/18 1511)  Set Rate: 8 bmp (04/04/18 0454)  Vt Set: 400 mL (04/04/18 0454)  PEEP/CPAP: 5 cmH20 (04/04/18 0454)  Oxygen Concentration (%): 35 (04/04/18 0810)  Peak Airway Pressure: 15.9 cmH2O (04/04/18 0454)  Total Ve: 3.7 mL (04/04/18 0454)  F/VT Ratio<105 (RSBI): (!) 22.61 (04/04/18 0454)    Lines/Drains/Airways     Drain                 NG/OG Tube 04/01/18 0200 3 days         Urethral Catheter 04/01/18 1731 Double-lumen 2 days          Airway                 Airway - Non-Surgical 04/01/18 0709 3 days          Peripheral Intravenous Line                 Peripheral IV - Single Lumen 04/01/18 0140 Right Hand 3 days         Peripheral IV - Single Lumen 04/01/18 0640 Left Antecubital 3 days                 Significant Labs:    CBC/Anemia Profile:    Recent Labs  Lab 04/03/18  0314 04/04/18  0156   WBC 2.88* 2.44*   HGB 8.1* 8.3*   HCT 25.3* 26.0*   PLT 66* 73*   * 99*   RDW 17.0* 16.5*        Chemistries:    Recent Labs  Lab 04/03/18  0314 04/04/18  0156   * 133*   K 4.0 4.3    100   CO2 27 26   BUN 55* 56*   CREATININE 2.1* 2.5*   CALCIUM 8.3* 8.1*   ALBUMIN 1.7* 1.6*   PROT 4.5* 4.3*   BILITOT 0.5 0.5   ALKPHOS 45* 46*   ALT 11 11   AST 25 23       ABGs:     Recent Labs  Lab 04/04/18  0347   PH 7.468*   PCO2 36.0   HCO3 26.1   POCSATURATED 99   BE 2       Echo 8/22/17  CONCLUSIONS     1 - Concentric hypertrophy.     2 - Normal left ventricular systolic function (EF 60-65%).     3 - Impaired LV relaxation, normal LAP (grade 1 diastolic dysfunction).     4 - Normal right ventricular systolic function .     5 - Mild aortic stenosis, HAWA = 1.82 cm2.     6 - Mild aortic regurgitation.     7 - Mild mitral regurgitation.     8 - Mild tricuspid regurgitation.     9 - Increased central venous pressure.     10 - The estimated RV systolic pressure is 46 mmHg.     Significant Imaging:   CT: I have reviewed all pertinent results/findings within the past 24 hours and my personal findings are:  3/30/18 bilateral effusion; no consolidation  CXR: I have reviewed all pertinent results/findings within the past 24 hours and my personal findings are:  4/2/18 silhouetting of left base    Assessment/Plan:     Acute respiratory failure with hypoxia    Intubated for airway protection.  Currently on 40% Fio2 with good oxgygenation.  Cxr/ct demonstrated bilateral effusion.  Clinical exam supportive of volume overload.  Main barrier to extubation is mentation.  Per Dr. Velasquez, family is accepting of patient's outcome and ready for withdrawal of care once all family member arrive.          Metabolic encephalopathy    Off sedation >24 hours and remained unresponsive.  Currently with levetiracetam per neuro.  There is  also the concern for shea.  Prognosis is guarded.                 João Gudino MD  Pulmonology  [facility]

## 2018-04-04 NOTE — ASSESSMENT & PLAN NOTE
Severe hypoglycemic event for unknown period of time. Patient became unresponsive and intubated for ventilatory support. Questionable seizure due to rhythmic eye movement. EGG is negative for this but rather severe slowing. Off sedation since 11AM on 4/2 and unfortunately no brain activity appreciated as of yet. Hypoglycemia and alkalemia addressed. Neurology on board for co-management. Keppra discontinued. Was on Dextrose infusion to maintain -200.     Transitioned to full comfort today. Extubated and is current comfortable. Will only focus on medications that provide comfort. Stop dextrose and labs. Intermittent use of BP cuff and rest of vitals to ensure patient is safe for transfer to inpatient hospice. Palliative care and  on board. Hospice representative already met with family, but hospice could not be arranged today. Hopefully to inpatient hospice tomorrow if stable for transfer.

## 2018-04-04 NOTE — PROGRESS NOTES
Pt recieved intubated on Servo i ventilator with the following settings; PRVC , +5 Peep, 35% FI02, fRR 8. Alarms are set and functioning, Ambu bag at bedside, Pt without distress RT will continue to monitor and wean as tolerated.       Pt not eligiable for breathing trail per RN withdraw of care maybe today.

## 2018-04-04 NOTE — PLAN OF CARE
Problem: Patient Care Overview  Goal: Plan of Care Review  Pt continues in the ICU.  Pt is intubated.  Pt is unresponsive.  D5 at 25 ml/hr.  Last blood glucose is 293.  Family bedside. Solares with clear, yellow urine.Turned q2 hours.No falls/injuries this shift

## 2018-04-04 NOTE — PROGRESS NOTES
[facility] Medicine  Progress Note    Patient Name: Chey Trujillo  MRN: [id number]  Patient Class: IP- Inpatient   Admission Date: 4/1/2018  Length of Stay: 3 days  Attending Physician: Ambika Reyes MD  Primary Care Provider: Ada Flores MD (Inactive)        Subjective:     Principal Problem:Hypoglycemia, coma    HPI:  83 yo F w/ pmhx of HTN, HLD, CAD s/p PCI, DM2, PAF on coumadin, diastolic CHF, chronic Hep C, CKD4, dementia, multiple admits for chf, bedbound, now under hospice care was brought in by EMS to [facility] on 3.31.18  for sob and weakness,Pt admitted to hospital medicine in [place] for CHF exacerbation and anasarca.has been  diuresed well with IV lasix and anasarca starting to improve. However pt became unresponsive w/ agonal breathing on 4/1/18 found to have reading of low blood sugar of <30 on bmp. Given amp of dextrose and Rapid response was called and ER physician presented to the bedside and noted her to be unresponsive to sternal rub. Pupils were equal, midrange rhythmic eye movements medial-lateral. She was intubated for airway protection. Repeat accucheck 81. Given additional amp of D50. CT head, CXR and ekg was done. CT head  was negative for acute abnormality. CXR showed increase in pulmonary edema. Remains intubated on propofol w/ dextrose infusion and q1h accuchecks. Urine cx +enterococcus and was on IV vancomycin. Pt remains with minimal response; intermittently grimaces from painful stimuli. Concern for possible seizure activity given rhythmic eye movement and case was discussed with neurology at [facility]. Recommended load with keppra 3000 mg IV x 1 and start 500 mg q12h the following day. Also EEG, MD in [facility]  discussed poor prognosis with family, however family stated that they wished to proceed with aggressive measures and pt to remain full code per family request,patient has been transferred to ICU [facility] for  neurologic evaluation,patient at this time is totally unresponsive,rectal temp. Is 34,she has sluggish pupils reaction,neurology has been consulted,no sign of seizure activity at this time.not able do ROS,information has been gathered from [facility] record.    Hospital Course:          Interval History: transition for withdrawal of life saving interventions today. Family at bedside. Patient very comfortable. Vitals stable    Review of Systems   Unable to perform ROS: Acuity of condition     Objective:     Vital Signs (Most Recent):  Temp: 98.3 °F (36.8 °C) (04/03/18 0752)  Pulse: 72 (04/03/18 0800)  Resp: 8 (04/03/18 0800)  BP: (!) 140/66 (04/03/18 0800)  SpO2: 100 % (04/03/18 0800) Vital Signs (24h Range):  Temp:  [94 °F (34.4 °C)-98.2 °F (36.8 °C)] 98 °F (36.7 °C)  Pulse:  [64-67] 66  Resp:  [0-21] 12  SpO2:  [100 %] 100 %  BP: (101-176)/(49-86) 168/65     Weight: 78 kg (171 lb 15.3 oz)  Body mass index is 22.69 kg/m².    Intake/Output Summary (Last 24 hours) at 04/04/18 1651  Last data filed at 04/04/18 0710   Gross per 24 hour   Intake           422.92 ml   Output              205 ml   Net           217.92 ml      Physical Exam   Constitutional: She appears well-developed. No distress.   HENT:   Head: Normocephalic and atraumatic.   Eyes:   Pupils equal but not responsive to light   Neck: Neck supple.   Cardiovascular: Normal rate and regular rhythm.    Pulmonary/Chest: Breath sounds normal. No stridor. She has no wheezes. She has no rales.   On NC   Abdominal: Soft. Bowel sounds are normal.   Musculoskeletal: She exhibits edema.   Neurological:   Off sedation. No withdrawal to pain or verbal stimuli. Patellar reflexes absent. Pupils facing downward, not reactive to light. No rhythmic eye movement. Babinski positive.    Skin: Skin is warm and dry. Capillary refill takes less than 2 seconds. She is not diaphoretic.   Nursing note and vitals reviewed.      Significant Labs: All pertinent labs within the past 24  hours have been reviewed.    Significant Imaging: I have reviewed all pertinent imaging results/findings within the past 24 hours.  I have reviewed and interpreted all pertinent imaging results/findings within the past 24 hours.    Assessment/Plan:      * Hypoglycemia, coma    Severe hypoglycemic event for unknown period of time. Patient became unresponsive and intubated for ventilatory support. Questionable seizure due to rhythmic eye movement. EGG is negative for this but rather severe slowing. Off sedation since 11AM on 4/2 and unfortunately no brain activity appreciated as of yet. Hypoglycemia and alkalemia addressed. Neurology on board for co-management. Keppra discontinued. Was on Dextrose infusion to maintain -200.     Transitioned to full comfort today. Extubated and is current comfortable. Will only focus on medications that provide comfort. Stop dextrose and labs. Intermittent use of BP cuff and rest of vitals to ensure patient is safe for transfer to inpatient hospice. Palliative care and  on board. Hospice representative already met with family, but hospice could not be arranged today. Hopefully to inpatient hospice tomorrow if stable for transfer.           Metabolic encephalopathy    On full comfort measures        Acute respiratory failure with hypoxia    Ventilatory failure 2/2 severe hypoglycemia. Underwent terminal extubation today.           Enterococcus UTI    On full comfort measures        Airway compromise    On full comfort measures          Physical debility    On full comfort measures          Urinary tract infection without hematuria    On full comfort measures          Primary open-angle glaucoma, severe stage    On full comfort measures        Hyperlipidemia    On full comfort measures        Hypokalemia    On full comfort measures        SSS (sick sinus syndrome)    S/p PPM placement. No issues. Currently on cardiac monitoring.           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 No ST elevation/depression, new BBB or T wave inversion. Doubt ACS. On full comfort measures        Diabetes mellitus with stage 4 chronic kidney disease GFR 15-29    On full comfort measures          Anemia of chronic disease    On full comfort measures          Bilateral carotid artery disease: 40-49% 2016 Bilateral    On full comfort measures        Thrombocytopenia    This is a chronic issue. MCV elevated but B12 and folate ok. Marrow failure? Evidence of pancytopenia since 2015        Chronic hepatitis C without hepatic coma    On full comfort measures        Peripheral vascular disease    On full comfort measures        Acute on chronic diastolic heart failure    S/p diuresis with IV lasix. Lungs clear. Hold off on diuresis for now. Will consider if needed to achieve comfort          CAD S/P percutaneous coronary angioplasty    On full comfort measures        Anticoagulation monitoring by pharmacist    On full comfort measures          Chronic atrial fibrillation    On full comfort measures            VTE Risk Mitigation     None        Discussed with family at bedside    Critical care time spent on the evaluation and treatment of severe organ dysfunction, review of pertinent labs and imaging studies, discussions with consulting providers and discussions with patient/family: > 35 minutes.    Ambika Velasquez MD  Department of Hospital Medicine    [facility]

## 2018-04-04 NOTE — PLAN OF CARE
[facility]     Department of Hospital Medicine     [address] [place] [address]     [phone] after hours  [phone] fax                                   HOSPICE  ORDERS     Patient Name: Chey Trujillo  YOB: 1935    04/04/2018    Admit to Hospice:  Inpatient Service  Diagnoses:  Active Hospital Problems    Diagnosis  POA    *Hypoglycemia, coma [E15]  Yes     Priority: 1 - High    Metabolic encephalopathy [G93.41]  Yes     Priority: 2     Acute respiratory failure with hypoxia [J96.01]  No     Priority: 3     Enterococcus UTI [N39.0, B95.2]  Yes     Priority: 4     Airway compromise [J98.8]  Yes     Priority: 5     Physical debility [R53.81]  Yes    Urinary tract infection without hematuria [N39.0]  Yes    Hypokalemia [E87.6]  Yes    Hyperlipidemia [E78.5]  Yes    SSS (sick sinus syndrome) [I49.5]  Yes     Chronic     S/p PPM       Protein calorie malnutrition [E46]  Yes    Recurrent major depressive disorder, in partial remission [F33.41]  Yes    Essential hypertension [I10]  Yes    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 due to underlying condition, with fat layer exposed [E08.621, L97.412]  Yes    Vascular dementia without behavioral disturbance [F01.50]  Yes    Vitamin D deficiency [E55.9]  Yes    Nonrheumatic aortic valve stenosis [I35.0]  Yes    Elevated troponin [R74.8]  Yes    Diabetes mellitus with stage 4 chronic kidney disease GFR 15-29 [E11.22, N18.4]  Yes     Chronic    Anemia of chronic disease [D63.8]  Yes     Chronic    Chronic hepatitis C without hepatic coma [B18.2]  Yes    Bilateral carotid artery disease: 40-49% 2016 Bilateral [I77.9]  Yes    Thrombocytopenia [D69.6]  Yes    Primary open-angle glaucoma, severe stage [H40.1193]  Yes    Peripheral vascular disease [I73.9]  Yes    CAD S/P percutaneous coronary angioplasty [I25.10, Z98.61]  Not Applicable     Chronic     Acute on chronic diastolic heart failure [I50.33]  Yes     Echo 3-:  1 - Concentric hypertrophy.   2 - Normal left ventricular systolic function (EF 60-65%).   3 - Right ventricular enlargement with hypertrophy, with normal systolic function.   4 - Left ventricular diastolic dysfunction.   5 - Biatrial enlargement.   6 - Mild tricuspid regurgitation.   7 - Pulmonary hypertension. The estimated PA systolic pressure is 55 mmHg.   8 - Increased central venous pressure (IVC is greater than 3cm in diameter).       Chronic atrial fibrillation [I48.2]  Yes    Anticoagulation monitoring by pharmacist [Z79.01]  Not Applicable     Chronic      Resolved Hospital Problems    Diagnosis Date Resolved POA   No resolved problems to display.       Hospice Qualifying Diagnoses: hypoglycemic coma       Patient has a life expectancy < 6 months due to these conditions.    Vital Signs: Routine per Hospice Protocol.    Allergies:  Review of patient's allergies indicates:   Allergen Reactions    Losartan Other (See Comments)     Hyperkalemia    0.225 % sodium chloride      Other reaction(s): Eye irritation    Amitriptyline      Other reaction(s): Vomiting  Other reaction(s): Vomiting    Azopt  [brinzolamide]      Other reaction(s): Eye irritation    Cantil  [mepenzolate bromide]      Other reaction(s): Unknown  Other reaction(s): Unknown    Ciprofloxacin Nausea And Vomiting     Other reaction(s): Stomach upset    Codeine      Other reaction(s): Unknown  Other reaction(s): Unknown    Duragal-s      Other reaction(s): Vomiting    Erythromycin      Other reaction(s): Unknown  Other reaction(s): Unknown    Fentanyl      Other reaction(s): Vomiting    Hydrocodone-acetaminophen      Other reaction(s): Unknown  Other reaction(s): Unknown    Indomethacin      Other reaction(s): Unknown  Other reaction(s): Unknown    Meperidine      Other reaction(s): Unknown  Other reaction(s): Unknown    Minoxidil      Other reaction(s):  druged  Other reaction(s): nausea and vomiting  Other reaction(s): nausea and vomiting  Other reaction(s): druged    Morphine      Other reaction(s): Unknown  Other reaction(s): Unknown    Neuromuscular blockers, steroidal      Other reaction(s): Unknown    Nifedipine      Other reaction(s): Swelling  Other reaction(s): Swelling    Oxycodone hcl-oxycodone-asa      Other reaction(s): Unknown  Other reaction(s): Unknown    Penicillins Hives     Other reaction(s): Unknown    Propoxyphene      Other reaction(s): Unknown    Talwin  [pentazocine lactate]      Other reaction(s): Unknown    Talwin compound      Other reaction(s): Unknown    Sensipar [cinacalcet] Nausea Only    Valsartan Rash and Hives       Diet: NPO     Activities: bedrest    Nursing: Per Hospice Routine    Future Orders:  Hospice Medical Director may dictate new orders for comfortable care measures & sign death certificate.    Medications:         Comfort Care Medications Only      Electronically signed    _________________________________  Ambika Velasquez MD  04/04/2018

## 2018-04-04 NOTE — PROGRESS NOTES
PALLIATIVE CARE PROGRESS NOTE:    Repeat bedside visit.  Patient remains very comfortable after withdrawal of vent support; non labored, sats 100% on NC, no grimace or other nonverbal indicator of pain.  VSS.  Family states they have not seen patient this restful and comfortable in a very long time and they are very satisfied with comfort level.    We discussed transferring patient to inpatient hospice and they are agreeable to [facility] transfer today.      Discussed above with representative Sol from [facility], and she states patient will require a face-to-face assessment by NP prior to transfer and it is too late in the day.  Requested she have early evaluation tomorrow morning and our goal  is for transfer at 0900 to [facility].  Sol will discuss with [facility] and then confirm with us if that time is ok.  Family is from [place] and travel to [facility] is burdensome.    Discussed above with RAMÍREZ Espinoza and Dr. Velasquez.    Plan:   Transfer to inpatient hospice tomorrow.   Continue comfort measures as ordered.    Jolene Guan, TUN, RN, CCRN, CHPN   Palliative Care Nurse Coordinator   [facility]  [phone]

## 2018-04-04 NOTE — ASSESSMENT & PLAN NOTE
Off sedation >24 hours and remained unresponsive.  Currently with levetiracetam per neuro.  There is also the concern for shea.  Prognosis is guarded.

## 2018-04-04 NOTE — PROGRESS NOTES
"PALLIATIVE CARE PROGRESS NOTE:    Had extensive meeting with family, patient's daughters Quyen and Stephanie (briefly spoke to daughter Deyanira by face time).      Family has good understanding of patient's condition and poor prognosis, they state she has a long PMH of multiple hospitalizations and illnesses.  They have good insight into quality of life issues.    We discussed goals of care and they all reaffirm comfort should be the main goal. Of note, patient has been under hospice care with [facility] for about 6 months.    Family states patient would not want to be on life support and this is verified by her Living Will dated 5/3/10.  We discussed comfort measures and withdrawal of life support.  We discussed protocol and process for this, explained patient may pass within minutes or it could be hours or even days.  They verbalized understanding.  They want to proceed today as soon as possible.  We also discussed potential need for inpatient hospice, and representative Sol from [place] [facility] came to meet with family.     We discussed code status and they reaffirm DNR.  They do NOT WANT CPR/RESUSCITATION or RE-INTUBATION.  They want patient to have a natural and peaceful passing when her time comes.  Patient has a [place] document from [place] [facility] dated 10/25/17 indicating DNR.      Provided literature, "Hard Choices For [place] People", fact sheets on cpr and nutrition and beneficial dehydration at the end of life.  Ample time was allowed for discussion of concerns and feelings.  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  They state she would not want to be on life support.  They also state patient's  (their father) was on life support [facility], was taken off but then coded, requiring cpr, but he .  Explained differences with withdrawal and plans for comfort.  All questions were " asked and answered to their satisfaction.  Emotional support provided.    Plan/recommendation:   Withdrawal of life support today.     Orders for protocol   Comfort measures.   Inpatient hospice if needed.   Consult Spiritual Care.   Palliative Care will follow.     Discussed above with Dr. Velasquez, bedside RN Chey, and RAMÍREZ Espinoza.    Jolene Guan, BSN, RN, CCRN, CHPN   Palliative Care Nurse Coordinator   [facility]  [phone]

## 2018-04-04 NOTE — SUBJECTIVE & OBJECTIVE
Interval History: transition for withdrawal of life saving interventions today. Family at bedside. Patient very comfortable. Vitals stable    Review of Systems   Unable to perform ROS: Acuity of condition     Objective:     Vital Signs (Most Recent):  Temp: 98.3 °F (36.8 °C) (04/03/18 0752)  Pulse: 72 (04/03/18 0800)  Resp: 8 (04/03/18 0800)  BP: (!) 140/66 (04/03/18 0800)  SpO2: 100 % (04/03/18 0800) Vital Signs (24h Range):  Temp:  [94 °F (34.4 °C)-98.2 °F (36.8 °C)] 98 °F (36.7 °C)  Pulse:  [64-67] 66  Resp:  [0-21] 12  SpO2:  [100 %] 100 %  BP: (101-176)/(49-86) 168/65     Weight: 78 kg (171 lb 15.3 oz)  Body mass index is 22.69 kg/m².    Intake/Output Summary (Last 24 hours) at 04/04/18 1651  Last data filed at 04/04/18 0710   Gross per 24 hour   Intake           422.92 ml   Output              205 ml   Net           217.92 ml      Physical Exam   Constitutional: She appears well-developed. No distress.   HENT:   Head: Normocephalic and atraumatic.   Eyes:   Pupils equal but not responsive to light   Neck: Neck supple.   Cardiovascular: Normal rate and regular rhythm.    Pulmonary/Chest: Breath sounds normal. No stridor. She has no wheezes. She has no rales.   On NC   Abdominal: Soft. Bowel sounds are normal.   Musculoskeletal: She exhibits edema.   Neurological:   Off sedation. No withdrawal to pain or verbal stimuli. Patellar reflexes absent. Pupils facing downward, not reactive to light. No rhythmic eye movement. Babinski positive.    Skin: Skin is warm and dry. Capillary refill takes less than 2 seconds. She is not diaphoretic.   Nursing note and vitals reviewed.      Significant Labs: All pertinent labs within the past 24 hours have been reviewed.    Significant Imaging: I have reviewed all pertinent imaging results/findings within the past 24 hours.  I have reviewed and interpreted all pertinent imaging results/findings within the past 24 hours.

## 2018-04-04 NOTE — ASSESSMENT & PLAN NOTE
S/p diuresis with IV lasix. Lungs clear. Hold off on diuresis for now. Will consider if needed to achieve comfort

## 2018-04-04 NOTE — EICU
BG over 200 consistently.  On D5 water at 50 ml for hypoglycemia.  Notes , labs seen.    Will decrease d5w to 25 ml/hr for now.  Watch Bg.  If still over 180 then Dc.

## 2018-04-04 NOTE — PLAN OF CARE
Problem: Airway, Artificial (Adult)  Intervention: Maintain Airway Patency  Patient received on servoi ventilator with 7.5 ETT secured and moved at 25cm to center lip. All ventilator alarms on, set and functioning. ambu bag and mask in room. Oral care done, suctioning done. Will continue with ventilator management.

## 2018-04-04 NOTE — PHYSICIAN QUERY
PT Name: Chey Trujillo  MR #: [id number]    Physician Query Form - Nutrition Clarification     CDS/: Lea Crow RN, CCDS             Contact information: [email or web address]    This form is a permanent document in the medical record.     Query Date: April 4, 2018    By submitting this query, we are merely seeking further clarification of documentation.. Please utilize your independent clinical judgment when addressing the question(s) below.    The Medical record contains the following:   Indicators  Supporting Clinical Findings Location in Medical Record   X % of Estimated Energy Intake over a time frame from p.o., TF, or TPN % Intake of Estimated Energy Needs: 0 - 25 % Rd note 4/2     Weight Status over a time frame      Subcutaneous Fat and/or Muscle Loss      Fluid Accumulation or Edema      Reduced  Strength     X Wt / BMI / Usual Body Weight bmi 22.7 Rd note 4/2   X Delayed Wound Healing / Failure to Thrive Diabetic ulcer right heel with fat layer exposed 4/1 h/p   X Acute or Chronic Illness Metabolic encephalopathy,  DM, UTI, hypoglycemia coma, vascular dementia,   Chronic hep C, Ac/chr diastolic HF,   Protein calorie malnutrition 4/1 h/p    Medication      Treatment     X Other admitted to ICU intubated, D5 infusion to maintain blood glucose,  unresponsive.  4/1 nurse note      4/4-Pal care nurse note       AND / ASPEN Clinical Characteristics (October 2011)  A minimum of two characteristics is recommended for diagnosing either moderate or severe malnutrition   Mild Malnutrition Moderate Malnutrition Severe Malnutrition   Energy Intake from p.o., TF or TPN. < 75% intake of estimated energy needs for less than 7 days < 75% intake of estimated energy needs for greater than 7 days < 50% intake of estimated energy needs for > 5 days   Weight Loss 1-2% in 1 month  5% in 3 months  7.5% in 6 months  10% in 1 year 1-2 % in 1 week  5% in 1 month  7.5% in 3 months  10% in 6 months  20% in 1 year > 2% in  1 week  > 5% in 1 month  > 7.5% in 3 months  > 10% in 6 months  > 20% in 1 year   Physical Findings     None *Mild subcutaneous fat and/or muscle loss  *Mild fluid accumulation  *Stage II decubitus  *Surgical wound or non-healing wound *Mod/severe subcutaneous fat and/or muscle loss  *Mod/severe fluid accumulation  *Stage III or IV decubitus  *Non-healing surgical wound     Provider, please specify diagnosis or diagnoses associated with above clinical findings.    [ ] Mild Protein-Calorie Malnutrition  [x ] Moderate Protein-Calorie Malnutrition  [ ] Other Nutritional Diagnosis (please specify): ____________________________________  [ ] Other: ________________________________  [ ] Clinically Undetermined    Please document in your progress notes daily for the duration of treatment until resolved and include in your discharge summary.

## 2018-04-04 NOTE — PLAN OF CARE
Problem: Patient Care Overview  Goal: Plan of Care Review  Outcome: Ongoing (interventions implemented as appropriate)  Pt resting well in bed. Family members at bedside. Questions about Hospice answered and plans for hospice care in am. Pt tolerating repositioning arm with 4 plus edema elevated on pillows. Bony prominence padded with pillows. Pt free of injury. Family members coping well with pending demise of pt.

## 2018-04-04 NOTE — PLAN OF CARE
04/04/18 1356   Discharge Reassessment   Assessment Type Discharge Planning Reassessment   Provided patient/caregiver education on the expected discharge date and the discharge plan No   Do you have any problems affording any of your prescribed medications? No   Discharge Plan A Inpatient Hospice  (at [facility] with [facility] arranging)   Discharge Plan B Inpatient Hospice  (alternate provider)   Patient choice form signed by patient/caregiver No   Can the patient answer the patient profile reliably? No, cognitively impaired   Describe the patient's ability to walk at the present time. Does not walk or unable to take any steps at all   How often would a person be available to care for the patient? Often   Number of comorbid conditions (as recorded on the chart) Five or more   patient remains in ICU with plan to extubate to comfort care today. RAMÍREZ met with 2 of patient daughters in room Stephanie and Quyen. RAMÍREZ also spoke with Sol with [facility]. If patient needs inpatient hospice, will call Sol to assist with process. SW will follow in ICU and assist as needed.

## 2018-04-04 NOTE — PHYSICIAN QUERY
PT Name: Chey Trujillo  MR #: [id number]     Physician Query Form - Documentation Clarification      CDS/: Lea Crow RN, CCDS              Contact information: [email or web address]    This form is a permanent document in the medical record.     Query Date: April 4, 2018    By submitting this query, we are merely seeking further clarification of documentation. Please utilize your independent clinical judgment when addressing the question(s) below.    The Medical record reflects the following:    Supporting Clinical Findings Location in Medical Record     DM 2    DM 1       4/1 h/p, 4/2 prog notes                                                                                      Doctor, Please specify diagnosis or diagnoses associated with above clinical findings.  Please clarify type DM.     Provider Use Only        (   x )  DM type 2    (    )  DM type 1                                                                                                                       [  ] Clinically undetermined

## 2018-04-05 VITALS
BODY MASS INDEX: 24.58 KG/M2 | SYSTOLIC BLOOD PRESSURE: 177 MMHG | OXYGEN SATURATION: 100 % | HEART RATE: 64 BPM | RESPIRATION RATE: 52 BRPM | HEIGHT: 72 IN | WEIGHT: 181.44 LBS | DIASTOLIC BLOOD PRESSURE: 79 MMHG | TEMPERATURE: 97 F

## 2018-04-05 PROCEDURE — 99231 SBSQ HOSP IP/OBS SF/LOW 25: CPT | Mod: ,,, | Performed by: INTERNAL MEDICINE

## 2018-04-05 PROCEDURE — 25000003 PHARM REV CODE 250: Performed by: HOSPITALIST

## 2018-04-05 RX ADMIN — CHLORHEXIDINE GLUCONATE 15 ML: 1.2 RINSE ORAL at 09:04

## 2018-04-05 NOTE — PROGRESS NOTES
Patient extubated to comfort care yesterday.  Patient is on nasal canula.  Appear comfortable.  We will be available upon request.

## 2018-04-05 NOTE — DISCHARGE SUMMARY
[facility] Medicine  Discharge Summary      Patient Name: Chey Trujillo  MRN: [id number]  Admission Date: 4/1/2018  Hospital Length of Stay: 4 days  Discharge Date and Time:  04/05/2018 5:31 PM  Attending Physician: Ambika Reyes MD   Discharging Provider: Ambika Velasquez MD  Primary Care Provider: Ada Flores MD (Inactive)      HPI:   83 yo F w/ pmhx of HTN, HLD, CAD s/p PCI, DM2, PAF on coumadin, diastolic CHF, chronic Hep C, CKD4, dementia, multiple admits for chf, bedbound, now under hospice care was brought in by EMS to [place] on 3.31.18  for sob and weakness,Pt admitted to hospital medicine in [place] for CHF exacerbation and anasarca.has been  diuresed well with IV lasix and anasarca starting to improve. However pt became unresponsive w/ agonal breathing on 4/1/18 found to have reading of low blood sugar of <30 on bmp. Given amp of dextrose and Rapid response was called and ER physician presented to the bedside and noted her to be unresponsive to sternal rub. Pupils were equal, midrange rhythmic eye movements medial-lateral. She was intubated for airway protection. Repeat accucheck 81. Given additional amp of D50. CT head, CXR and ekg was done. CT head  was negative for acute abnormality. CXR showed increase in pulmonary edema. Remains intubated on propofol w/ dextrose infusion and q1h accuchecks. Urine cx +enterococcus and was on IV vancomycin. Pt remains with minimal response; intermittently grimaces from painful stimuli. Concern for possible seizure activity given rhythmic eye movement and case was discussed with neurology at [facility]. Recommended load with keppra 3000 mg IV x 1 and start 500 mg q12h the following day. Also EEG, MD in [facility]  discussed poor prognosis with family, however family stated that they wished to proceed with aggressive measures and pt to remain full code per family request,patient has been transferred to ICU west  [facility] for neurologic evaluation,patient at this time is totally unresponsive,rectal temp. Is 34,she has sluggish pupils reaction,neurology has been consulted,no sign of seizure activity at this time.not able do ROS,information has been gathered from [facility] record.    * No surgery found *      Hospital Course:   Ms Trujillo presented to [facility] for acute on chronic heart failure resulting in pulmonary edema. Diuresed with IV lasix to which she had shown improvement. Developed hypoglycemia (< 30) resulting in unresponsive state. A rapid response called. Had to be intubated for ventilatory failure, and initiated on IV glucose supplementation. Physician who evaluated patient noted midrange rhythmic eye movements medial-lateral. Remained minimally responsive despite correction of hypoglycemia. Transferred to [facility] for neurology evaluation. Loaded with keppra IV 3000 mg x 1. EEG with no signs of seizure activity. CT brain without acute pathology. No severe anemia. Electrolyte abnormalities corrected. Enterococcus UTI treated with vancomycin. Remained in comatose state despite no sedation x > 48 hours. Palliative care consulted. Family agreed with terminal extubation and enrollment in inpatient hospice. Code status changed to DNR. Has LaPOST signed. Patient remained very comfortable post extubation. Vitals remained stable therefore transferred to inpatient hospice.          Consults:   Consults         Status Ordering Provider     Inpatient consult to Neurology  Once     Provider:  (Not yet assigned)    Completed SHARITA GRECO     Inpatient consult to Palliative Care  Once     Provider:  Jolene Guan RN    Completed JARRED TEJADA     Inpatient consult to Pulmonology  Once     Provider:  Ezra Florentino MD    Completed SHARITA GRECO     IP consult to case management  Once     Provider:  (Not yet assigned)    Completed SHARITA GRECO     Nutrition Services Referral  Once      Provider:  (Not yet assigned)    Completed SHARITA GRECO        Final Active Diagnoses:    Diagnosis Date Noted POA    PRINCIPAL PROBLEM:  Hypoglycemia, coma [E15] 04/01/2018 Yes    Metabolic encephalopathy [G93.41] 04/01/2018 Yes    Acute respiratory failure with hypoxia [J96.01] 04/02/2018 No    Enterococcus UTI [N39.0, B95.2] 04/01/2018 Yes    Airway compromise [J98.8] 04/02/2018 Yes    Physical debility [R53.81] 09/09/2017 Yes    Urinary tract infection without hematuria [N39.0] 08/22/2017 Yes    Hypokalemia [E87.6] 08/22/2017 Yes    Hyperlipidemia [E78.5]  Yes    SSS (sick sinus syndrome) [I49.5] 06/23/2017 Yes     Chronic    Protein calorie malnutrition [E46] 06/18/2017 Yes    Recurrent major depressive disorder, in partial remission [F33.41] 05/13/2017 Yes    Essential hypertension [I10] 05/12/2017 Yes    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 due to underlying condition, with fat layer exposed [E08.621, L97.412] 04/17/2017 Yes    Vascular dementia without behavioral disturbance [F01.50] 01/13/2017 Yes    Vitamin D deficiency [E55.9] 11/25/2016 Yes    Nonrheumatic aortic valve stenosis [I35.0] 10/24/2016 Yes    Elevated troponin [R74.8] 09/28/2016 Yes    Diabetes mellitus with stage 4 chronic kidney disease GFR 15-29 [E11.22, N18.4] 08/16/2016 Yes     Chronic    Anemia of chronic disease [D63.8] 03/24/2016 Yes     Chronic    Chronic hepatitis C without hepatic coma [B18.2] 01/11/2016 Yes    Bilateral carotid artery disease: 40-49% 2016 Bilateral [I77.9] 01/11/2016 Yes    Thrombocytopenia [D69.6] 01/11/2016 Yes    Primary open-angle glaucoma, severe stage [H40.1193] 08/10/2015 Yes    Peripheral vascular disease [I73.9] 09/20/2013 Yes    CAD S/P percutaneous coronary angioplasty [I25.10, Z98.61] 09/27/2012 Not Applicable     Chronic    Acute on chronic diastolic heart failure [I50.33] 09/27/2012 Yes    Chronic atrial fibrillation [I48.2] 06/29/2012  Yes    Anticoagulation monitoring by pharmacist [Z79.01] 06/29/2012 Not Applicable     Chronic      Problems Resolved During this Admission:    Diagnosis Date Noted Date Resolved POA         Disposition: Hospice/Medical Facility      Indwelling Lines/Drains at time of discharge:   Lines/Drains/Airways     Drain                 Urethral Catheter 04/01/18 1731 Double-lumen 4 days                Time spent on the discharge of patient: > 35 minutes  Patient was seen and examined on the date of discharge and determined to be suitable for discharge.    Critical care time spent on the evaluation and treatment of severe organ dysfunction, review of pertinent labs and imaging studies, discussions with consulting providers and discussions with patient/family: > 35 minutes.     Ambika Velasquez MD  Department of Hospital Medicine  [facility]

## 2018-04-05 NOTE — PROGRESS NOTES
PALLIATIVE CARE PROGRESS NOTE:    Bedside visit.  Patient appears very comfortable and peaceful.  RR even and unlabored.  Vital signs remain stable.  She can be transferred to [facility] inpatient hospice today.      Discussed above with Dr. Velasquez and RAMÍREZ Espinoza.    (3510)  Bedside visit.  Daughters Stephanie and Quyen present; awaiting arrival of ambulance for transfer to inpatient hospice at [facility]. Patient remains stable.  Emotional support provided.        Jolene Guan, TUN, RN, CCRN, CHPN   Palliative Care Nurse Coordinator   [facility]  [phone]

## 2018-04-05 NOTE — PLAN OF CARE
04/05/18 1801   Final Note   Assessment Type Final Discharge Note   Discharge Disposition HospiceMedic   Right Care Referral Info   Referral Type Hospice   Facility Name [facility] at [facility]   RAMÍREZ continued to work thru day with Lisa with [facility], Sol with [facility], family including HCPOA daughter Deyanira by phone, unit RN Cari and Palliative Care RN Estefany. Due to insurance considerations, [facility] accepted patient and contracted with [facility] ([facility]) to provide care for patient. [facility] obtained the insurance auth. Patient was transported by [facility] as placed on will call yesterday. No other needs identified prior to discharge.

## 2018-04-05 NOTE — NURSING
[facility] transported  [facility] and family. RR unlabored and even on 2L of O2 per nasal cannula.

## 2018-04-05 NOTE — PROGRESS NOTES
" [facility]  Adult Nutrition  Progress Note    SUMMARY       Recommendations    Recommendation/Intervention:   1. Noted comfort care   2. RD to monitor    Goals: Comfort care  Nutrition Goal Status: new  Communication of RD Recs: discussed on rounds    D/C planning: Comfort care; hospice    Reason for Assessment    Reason for Assessment: RD follow-up  Diagnosis: other (see comments) (metabolic encephalopathy)  Relevant Medical History: CKD4, dm, CHF, A-Fib  Interdisciplinary Rounds: attended    General Information Comments: Patient extubated for withdrawal of care; comfort measures only/hospice. Neurologically unresponsive.   Nutrition Discharge Planning: Too soon to determine. Will monitor/follow-up.    Nutrition Risk Screen    Nutrition Risk Screen: dysphagia or difficulty swallowing    Nutrition/Diet History    Food Preferences: cori  Do you have any cultural, spiritual, Yazidism conflicts, given your current situation?: none on file  Factors Affecting Nutritional Intake: NPO    Anthropometrics    Temp: 98.1 °F (36.7 °C)  Height Method: Estimated  Height: 6' 1" (185.4 cm)  Height (inches): 73 in  Weight Method: Bed Scale  Weight: 78 kg (171 lb 15.3 oz)  Weight (lb): 171.96 lb  Ideal Body Weight (IBW), Female: 165 lb  % Ideal Body Weight, Female (lb): 104.22 lb  BMI (Calculated): 22.7  BMI Grade: 18.5-24.9 - normal       Lab/Procedures/Meds    Pertinent Labs Reviewed: reviewed  Pertinent Medications Reviewed: reviewed  Pertinent Medications Comments: fentanyl    Physical Findings/Assessment    Overall Physical Appearance: advanced age, edematous (4+)  Tubes:  (-)  Oral/Mouth Cavity: tooth/teeth missing  Skin: intact (Popeye Score 9)    Estimated/Assessed Needs    Weight Used For Calorie Calculations: 78 kg (171 lb 15.3 oz)     Energy Need Method: [facility] (1384)  Protein Requirements: 70 - 75 g  Weight Used For Protein Calculations: 78 kg (171 lb 15.3 oz)     Fluid Need Method: RDA Method, " other (see comments) (or per MD)     CHO Requirement: 150 g      Nutrition Prescription Ordered    Current Diet Order: NPO    Evaluation of Received Nutrient/Fluid Intake    Other Calories (kcal): 69 (propofol)  I/O: 1412/230  Energy Calories Required: not meeting needs  Protein Required: not meeting needs  Fluid Required: meeting needs    Nutrition Risk    Level of Risk/Frequency of Follow-up:  (F/U 2x weekly)     Assessment and Plan    No nutrition related diagnosis as evidenced by: Comfort care; hospice.       Monitor and Evaluation    Food and Nutrient Intake: enteral nutrition intake  Food and Nutrient Adminstration: enteral and parenteral nutrition administration, other (specify) (Beneprotein supplement)  Anthropometric Measurements: weight, weight change, body mass index  Biochemical Data, Medical Tests and Procedures: electrolyte and renal panel, gastrointestinal profile, glucose/endocrine profile, lipid profile  Nutrition-Focused Physical Findings: overall appearance     Nutrition Follow-Up    RD Follow-up?: Yes

## 2018-04-05 NOTE — PLAN OF CARE
04/04/18 1900   Transport Information   Transport Diagnosis hypoglycemic coma, metabolic encephalopathy   Transportation Date 04/04/18   Transported From [facility]   Transported To [facility])   Supporting Clinical Information   Unable to sit or travel in a seated position because Bed Confined (unable to ambulate/sit);Postural Instability;Non Weight Bearing Condition   Select all that would support previous selection Oxygen administration required;Dementia;Severe Altered Mental Status;Decreased Level of Consciousness;Frail, debilitated, or extreme muscle atrophy;Risk of falling out of wheelchair while in motion;Extremity requires elevation;Unresponsive to pain & voice and requires trained personnel during transport   7:30 pm SW contacted Ryan, placed patient on will call for RN to call when patient ready.

## 2018-04-05 NOTE — CONSULTS
Discharge planning: thru day patient family met with Palliative Care RN Estefany. Family desired inpatient hospice at [facility] using their former hospice company [facility]. RAMÍREZ spoke with representatives of both (Lisa with [facility] and  Sol with [facility]) through day. Due to administrative issues with the process, plan became that [facility] will supply the service at [facility]. At this time the 2 POA children of patient are unavailable to complete the admission documents. The other 2 adult children have assisted. It is unclear if agency can take verbal permission from the others to accept the signatures those present for patient to discharge tonight.   In event can, discharge tonight, RAMÍREZ has faxed demographics, orders, H&P's and last progress note to [facility] directly. Additionally, sent information via [facility].   [facility]. Prepared packet including copy of LaPOST.  Will assist as needed.

## 2018-04-07 LAB
BACTERIA SPEC AEROBE CULT: NORMAL
GRAM STN SPEC: NORMAL

## 2018-04-19 ENCOUNTER — TELEPHONE (OUTPATIENT)
Dept: ELECTROPHYSIOLOGY | Facility: CLINIC | Age: 83
End: 2018-04-19

## 2018-05-02 NOTE — HPI
83 yo F w/ pmhx of HTN, HLD, CAD s/p PCI, DM2, PAF on coumadin, diastolic CHF, chronic Hep C, CKD4, dementia, multiple admits for chf, bedbound, now under hospice care was brought in by EMS to [facility] on 3.31.18  for sob and weakness,Pt admitted to hospital medicine in [place] for CHF exacerbation and anasarca.has been  diuresed well with IV lasix and anasarca starting to improve. However pt became unresponsive w/ agonal breathing on 4/1/18 found to have reading of low blood sugar of <30 on bmp. Given amp of dextrose and Rapid response was called and ER physician presented to the bedside and noted her to be unresponsive to sternal rub. Pupils were equal, midrange rhythmic eye movements medial-lateral. She was intubated for airway protection. Repeat accucheck 81. Given additional amp of D50. CT head, CXR and ekg was done. CT head  was negative for acute abnormality. CXR showed increase in pulmonary edema. Remains intubated on propofol w/ dextrose infusion and q1h accuchecks. Urine cx +enterococcus and was on IV vancomycin. Pt remains with minimal response; intermittently grimaces from painful stimuli. Concern for possible seizure activity given rhythmic eye movement and case was discussed with neurology at [facility]. Recommended load with keppra 3000 mg IV x 1 and start 500 mg q12h the following day. Also EEG, MD in [facility]  discussed poor prognosis with family, however family stated that they wished to proceed with aggressive measures and pt to remain full code per family request,patient has been transferred to ICU [facility] for neurologic evaluation,patient at this time is totally unresponsive,rectal temp. Is 34,she has sluggish pupils reaction,neurology has been consulted,no sign of seizure activity at this time.not able do ROS,information has been gathered from [facility] record.   No

## 2020-12-18 NOTE — ASSESSMENT & PLAN NOTE
Apparently received levemir last night,will continue with D5 IVF, and monitor blod sugar closely,HbA1C is normal.still is on IVF D 5.     Notified Dr. Gonzalez nurse that pt taken Vitamin K, which was ok per nurse.

## 2021-07-12 NOTE — H&P
[facility] Hospital Medicine  History & Physical    Patient Name: Chey Trujillo  MRN: [id number]  Admission Date: 6/18/2017  Attending Physician: Nichole Damian MD   Primary Care Provider: Viviana Nguyen MD    [facility] Medicine Team: [facility]P MED F Fuad Benavidez NP     Patient information was obtained from patient, relative(s) and ER records.     Subjective:     Principal Problem:Heart failure, acute on chronic, systolic and diastolic    Chief Complaint:   Chief Complaint   Patient presents with    Shortness of Breath     Pt has SOB when walking. Hx of dementia         HPI: 81 y/o female, who presents to the ED with worsening SOB, BLE edema, orthopnea, and dyspnea with exertion.  Her daughter states that symptoms started roughly about a week ago and have progressed.  They state that she has difficulty even walking short distances without shortness of breath, and that she has to sleep on multiple pillows at night (3). She usually on uses 1.  She last underwent echocardiogram 5/17/17 which suggested diastolic dysfunction, elevated PA pressure (63.72), and decreased EF (45%).  She has a PMH of dementia, Afib on chronic warfarin therapy, SSS s/p pacemaker implantation, CAD, CHF, Hep C, DM, CKD, HLD, HTN. Labs obtained on arrival find BNP elevated at 421, troponin 0.034, % ventricular paced,CXR suggestive of right sided pleural effusion which appears slightly worse from previous study.  She denies CP, N/V/D, recent illness, fever, chills, or  symptoms.  She does endorse urinary incontinence.  Family at bed side state that she is complaint with her medications.  She reports somewhat poor oral intake.  She states she usually eats 1-2 meals a day, albumin 2.7.   Glucose on arrival was noted to be 57 and she received D50 IV while in the ED. At this time she is in NAD and is tolerating RA with O2 saturations ranging between % with HOB at about 40 degrees, mild JVD is present. She was  recently seen (6/12/17) outpatient by Dr. Donnelly (Podiatry) related to right foot wound.      Past Medical History:   Diagnosis Date    Anticoagulation monitoring by pharmacist 6/29/2012    At risk for amiodarone toxicity with long term use 1/27/2014    Atrial fibrillation     Bilateral carotid artery disease 1/11/2016    Blood transfusion     CAD S/P percutaneous coronary angioplasty 9/27/2012    Chronic diastolic heart failure 9/27/2012    Echo 3-: 1 - Concentric hypertrophy.  2 - Normal left ventricular systolic function (EF 60-65%).  3 - Right ventricular enlargement with hypertrophy, with normal systolic function.  4 - Left ventricular diastolic dysfunction.  5 - Biatrial enlargement.  6 - Mild tricuspid regurgitation.  7 - Pulmonary hypertension. The estimated PA systolic pressure is 55 mmHg.  8 - Increased central venous pressure (IVC is greater than 3cm in diameter).     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  ANABELA u/s 4/14 wnl 9/12/2012    Secondary pulmonary hypertension 8/13/2013    Spinal stenosis     Thyroid nodule: per ENDO repeat in 2017 and see ENDO then (note 1/29/16) 1/2/2013    Tricuspid regurgitation 1/11/2016    Tubular adenoma x 3 6/13 5/19/2014    Type 2 DM with CKD stage 4 and hypertension 1/16/2015    Urinary, incontinence, stress female        Past Surgical History:   Procedure Laterality Date    CARDIAC CATHETERIZATION       CARDIAC PACEMAKER PLACEMENT  2/9/2012    Quinn Waldropy , serial #[id number]    CATARACT EXTRACTION      Status post cataract extraction and insertion of intraocular lens of right eye    CORONARY ANGIOPLASTY      ROWAN to prox RCA 2002 for UA/+stress test.  ROWAN placed just proximal to stent in 2005 for UA.  Cath 12/2011: normal LMCA, 40% mid LAD, 50% distal LCx    EYE SURGERY      LAMINECTOMY      TOTAL HIP ARTHROPLASTY Right     x's r hip       Review of patient's allergies indicates:   Allergen Reactions    Losartan Other (See Comments)     Hyperkalemia    0.225 % sodium chloride      Other reaction(s): Eye irritation    Amitriptyline      Other reaction(s): Vomiting  Other reaction(s): Vomiting    Azopt  [brinzolamide]      Other reaction(s): Eye irritation    Cantil  [mepenzolate bromide]      Other reaction(s): Unknown  Other reaction(s): Unknown    Ciprofloxacin Nausea And Vomiting     Other reaction(s): Stomach upset    Codeine      Other reaction(s): Unknown  Other reaction(s): Unknown    Duragal-s      Other reaction(s): Vomiting    Erythromycin      Other reaction(s): Unknown  Other reaction(s): Unknown    Fentanyl      Other reaction(s): Vomiting    Hydrocodone-acetaminophen      Other reaction(s): Unknown  Other reaction(s): Unknown    Indomethacin      Other reaction(s): Unknown  Other reaction(s): Unknown    Meperidine      Other reaction(s): Unknown  Other reaction(s): Unknown    Minoxidil      Other reaction(s): druged  Other reaction(s): nausea and vomiting  Other reaction(s): nausea and vomiting  Other reaction(s): druged    Morphine      Other reaction(s): Unknown  Other reaction(s): Unknown    Neuromuscular blockers, steroidal      Other reaction(s): Unknown    Nifedipine      Other reaction(s): Swelling  Other reaction(s): Swelling    Oxycodone hcl-oxycodone-asa      Other reaction(s): Unknown  Other reaction(s): Unknown    Penicillins Hives     Other reaction(s): Unknown     Propoxyphene      Other reaction(s): Unknown    Sensipar [cinacalcet] Nausea Only    Talwin  [pentazocine lactate]      Other reaction(s): Unknown    Talwin compound      Other reaction(s): Unknown    Valsartan Rash and Hives       No current facility-administered medications on file prior to encounter.      Current Outpatient Prescriptions on File Prior to Encounter   Medication Sig    ACCU-CHEK SMARTVIEW TEST STRIP Strp test FOUR TIMES A DAY    amiodarone (PACERONE) 200 MG Tab Take 1 tablet (200 mg total) by mouth once daily.    amlodipine (NORVASC) 10 MG tablet Take 1 tablet (10 mg total) by mouth once daily.    aspirin 81 MG chewable tablet Take 1 tablet by mouth Every morning.    blood-glucose meter Misc Dispense Accu-Chek Natalia meter    carvedilol (COREG) 25 MG tablet Take 1 tablet (25 mg total) by mouth 2 (two) times daily with meals.    collagenase ointment Apply topically once daily.    cyanocobalamin, vitamin B-12, 1,000 mcg TbSR Take 1,000 mcg by mouth once daily.    dorzolamide (TRUSOPT) 2 % ophthalmic solution INSTILL ONE DROP INTO EACH EYE TWICE DAILY    doxazosin (CARDURA) 4 MG tablet Take 1 tablet (4 mg total) by mouth once daily.    escitalopram oxalate (LEXAPRO) 5 MG Tab Take 1 tablet (5 mg total) by mouth once daily.    ferrous sulfate 324 mg (65 mg iron) TbEC Take 1 tablet (325 mg total) by mouth 2 (two) times daily.    hydrALAZINE (APRESOLINE) 100 MG tablet Take 1 tablet (100 mg total) by mouth 2 (two) times daily.    insulin glargine (LANTUS) 100 unit/mL injection INJECT 10 units nightly.    insulin lispro (HUMALOG) 100 unit/mL injection Inject 8 units w/ meals.    insulin syringe-needle U-100 (EASY TOUCH INSULIN SYRINGE) 0.5 mL 31 gauge x 5/16 Syrg To use 4 times daily with insulin injections    isosorbide dinitrate (ISORDIL) 40 MG Tab Take 1 tablet (40 mg total) by mouth 2 (two) times daily.    KLOR-CON M10 10 mEq tablet TAKE ONE TABLET BY MOUTH TWICE DAILY    lactulose  (CEPHULAC) 20 gram Pack Take 1 packet (20 g total) by mouth once daily.    lancets Misc 1 lancet by Misc.(Non-Drug; Combo Route) route 4 (four) times daily.    latanoprost 0.005 % ophthalmic solution INSTILL ONE DROP INTO EACH EYE IN THE EVENING    memantine (NAMENDA) 10 MG Tab Take 1 tablet (10 mg total) by mouth 2 (two) times daily.    nitroGLYCERIN 0.4 MG/DOSE TL SPRY (NITROLINGUAL) 400 mcg/spray spray PLACE ONE SPRAY UNDER THE TONGUE EVERY 5 MINUTES AS NEEDED FOR CHEST PAIN.    NITROSTAT 0.3 mg SL tablet DISSOLVE ONE TABLET UNDER THE TONGUE EVERY 5 MINUTES AS NEEDED FOR CHEST PAIN.  DO NOT EXCEED A TOTAL OF 3 DOSES IN 15 MINUTES    pravastatin (PRAVACHOL) 20 MG tablet TAKE 1 TABLET BY MOUTH once DAILY    torsemide (DEMADEX) 20 MG Tab 1 tablet (20 mg) in the morning, half a tablet (10 mg) in the afternoon.    warfarin (COUMADIN) 5 MG tablet Take 0.5-1 tablets (2.5-5 mg total) by mouth Daily. As directed by [facility]     Family History     Problem Relation (Age of Onset)    Cancer Mother, Brother, Daughter, Maternal Aunt    Diabetes Mother, Sister, Sister, Son        Social History Main Topics    Smoking status: Never Smoker    Smokeless tobacco: Never Used    Alcohol use No    Drug use: No    Sexual activity: No     Review of Systems   Constitutional: Positive for activity change and fatigue. Negative for appetite change, chills and fever.   HENT: Negative for congestion.    Eyes: Negative for visual disturbance.   Respiratory: Positive for shortness of breath. Negative for cough and chest tightness.    Cardiovascular: Positive for leg swelling. Negative for chest pain and palpitations.   Gastrointestinal: Negative for abdominal distention, abdominal pain, constipation, nausea and vomiting.   Genitourinary: Negative for dysuria and frequency.        Incontinent     Musculoskeletal: Negative for back pain and joint swelling.   Skin: Negative for rash.   Neurological: Negative for seizures and  syncope.   Hematological: Bruises/bleeds easily.     Objective:     Vital Signs (Most Recent):  Temp: 98.4 °F (36.9 °C) (06/18/17 0205)  Pulse: 71 (06/18/17 0446)  Resp: 13 (06/18/17 0446)  BP: (!) 158/57 (06/18/17 0446)  SpO2: 96 % (06/18/17 0446) Vital Signs (24h Range):  Temp:  [98.4 °F (36.9 °C)] 98.4 °F (36.9 °C)  Pulse:  [64-84] 71  Resp:  [12-19] 13  SpO2:  [96 %-100 %] 96 %  BP: (146-183)/(57-83) 158/57     Weight: 88.9 kg (196 lb)  Body mass index is 25.16 kg/m².    Physical Exam   Constitutional: She appears well-developed. No distress.   HENT:   Head: Normocephalic and atraumatic.   Eyes: EOM are normal. Pupils are equal, round, and reactive to light.   Neck: Normal range of motion. Neck supple. JVD present.   Cardiovascular: Normal rate, regular rhythm, normal heart sounds and intact distal pulses.    No murmur heard.  Pulmonary/Chest: Effort normal. No respiratory distress. She has no wheezes. She has no rales.   Abdominal: Soft. Bowel sounds are normal. She exhibits no distension. There is no tenderness.   Musculoskeletal: Normal range of motion. She exhibits edema.   Neurological: She is alert. GCS eye subscore is 4. GCS verbal subscore is 4. GCS motor subscore is 6.   Skin: Skin is warm and dry. She is not diaphoretic.   Psychiatric: She has a normal mood and affect. She is withdrawn.   Nursing note and vitals reviewed.       Significant Labs:   CBC:   Recent Labs  Lab 06/18/17 0246   WBC 2.72*   HGB 10.1*   HCT 31.6*   PLT 90*     CMP:   Recent Labs  Lab 06/18/17 0246      K 3.6      CO2 28   GLU 57*   BUN 60*   CREATININE 2.5*   CALCIUM 9.3   PROT 6.4   ALBUMIN 2.7*   BILITOT 0.5   ALKPHOS 74   AST 39   ALT 20   ANIONGAP 8   EGFRNONAA 17.4*     Cardiac Markers:   Recent Labs  Lab 06/18/17  0246   *     Coagulation:   Recent Labs  Lab 06/18/17  0246   INR 2.7*     Troponin:   Recent Labs  Lab 06/18/17  0246   TROPONINI 0.034*     All pertinent labs within the past 24 hours have  been reviewed.    Significant Imaging: I have reviewed all pertinent imaging results/findings within the past 24 hours.    Assessment/Plan:     * Heart failure, acute on chronic, systolic and diastolic    Secondary pulmonary hypertension, SOB (shortness of breath)  - worsening S/D heart failure  - most recent ECHO 5/17/17 EF estimated at 45% ( baseline 60-65%), ongoing diastolic dysfunction, PA systolic pressure 63.72 (baseline 38-91-54.69)  - received 40 mg of furosemide while in ED with reported good response  - continue IV furosemide Q12 for now  - Cardiology consult pending - appreciate input        Paroxysmal atrial fibrillation    - with chronic warfarin usage   - continue home warfarin dosage  - PT/INR daily (2.7 on arrival)  - hx of SSS s/p pacemaker placement  - continue amiodarone as directed        Chronic kidney disease, stage IV (severe)    - creatinine 2.5 on arrival (baseline 1.97-2.45)  - monitor closely during diuresis and trend chemistry daily  - strict I/O's        CAD S/P percutaneous coronary angioplasty    - troponin 0.034 on arrival (previous result of 0.04  - trend troponin Q 6 X 3 - next draw due at 09:00  - maintain telemetry throughout hospitalization   - continue BB, nitrate, and statin as directed        Diabetes mellitus with stage 4 chronic kidney disease GFR 15-29    - hypoglycemic on arrival requiring D50  - monitor glucose closely  - hold long acting insulin for now  -ISS AC/HS        Essential hypertension    - SBP ranging between 146-188  - will likely improve with diuresis   - continue current medication regimen  - monitor closely and consider adding PRN agent if indicated        Pancytopenia    - appears relatively stable  - trend CBC daily        Vascular dementia with behavior disturbance    - continue supportive care  - high risk of fall / injury maintain all safety measures and fall precaution  - may benefit from family staying with her or may want to consider sitter if  possible        Protein calorie malnutrition    - reports poor oral intake  - albumin 2.7  - prealbumin pending  - monitor oral intake if meals less than 75% consumed consider nutrition consultation        Diabetic ulcer of right heel    - followed outpatient by Dr. Donnelly - last seen 6/12/17  - podiatry consult pending          VTE Risk Mitigation         Ordered     warfarin (COUMADIN) tablet 2.5 mg  Daily     Route:  Oral        06/18/17 0519     Medium Risk of VTE  Once      06/18/17 0519     Place JUSTINE hose  Until discontinued      06/18/17 0519     Place sequential compression device  Until discontinued      06/18/17 0519     Reason for No Pharmacological VTE Prophylaxis  Once      06/18/17 0519        Fuad Benavidez NP  Department of Hospital Medicine   [facility]   No

## 2023-11-18 NOTE — PLAN OF CARE
Problem: Patient Care Overview  Goal: Plan of Care Review  Outcome: Ongoing (interventions implemented as appropriate)  Plan of care discussed with patient. Patient is free of fall/trauma/injury. Denies CP, SOB, or pain/discomfort. All questions addressed. Will continue to monitor.       Patient 20-Apr-2023 10:57
